# Patient Record
Sex: FEMALE | Employment: OTHER | URBAN - METROPOLITAN AREA
[De-identification: names, ages, dates, MRNs, and addresses within clinical notes are randomized per-mention and may not be internally consistent; named-entity substitution may affect disease eponyms.]

---

## 2018-07-02 NOTE — PERIOP NOTES
called Dr. Sparkle Boateng office LM for Sofía Baker regarding pt needing to see cardiology prior to surgery and to see if patient is cancelled.

## 2018-07-02 NOTE — PERIOP NOTES
Leonel Mathis NP reviewed chart,  patient needs to follow up with [facility] prior to surgery. called Dr. Christie Howard office spoke to [facility] regarding patient not following up with [facility] in 2 weeks per [facility] note on 5/10/18. Cancelled appt per [facility]. Stated she will let Consuelo Day know and she will get back to me.

## 2018-07-03 ENCOUNTER — ANESTHESIA EVENT (OUTPATIENT)
Dept: SURGERY | Age: 83
End: 2018-07-03
Payer: MEDICARE

## 2018-07-03 ENCOUNTER — ANESTHESIA (OUTPATIENT)
Dept: SURGERY | Age: 83
End: 2018-07-03
Payer: MEDICARE

## 2018-07-03 ENCOUNTER — HOSPITAL ENCOUNTER (OUTPATIENT)
Age: 83
Setting detail: OUTPATIENT SURGERY
Discharge: HOME | End: 2018-07-03
Attending: PODIATRIST | Admitting: PODIATRIST
Payer: MEDICARE

## 2018-07-03 VITALS
RESPIRATION RATE: 21 BRPM | HEIGHT: 63 IN | WEIGHT: 134.92 LBS | BODY MASS INDEX: 23.91 KG/M2 | OXYGEN SATURATION: 96 % | HEART RATE: 78 BPM | DIASTOLIC BLOOD PRESSURE: 85 MMHG | TEMPERATURE: 98.1 F | SYSTOLIC BLOOD PRESSURE: 150 MMHG

## 2018-07-03 DIAGNOSIS — G89.18 POST-OP PAIN: Primary | ICD-10-CM

## 2018-07-03 LAB
GLUCOSE BLD STRIP.AUTO-MCNC: 133 MG/DL (ref 65–100)
SERVICE CMNT-IMP: ABNORMAL

## 2018-07-03 PROCEDURE — 74011000250 HC RX REV CODE- 250

## 2018-07-03 PROCEDURE — 74011250637 HC RX REV CODE- 250/637

## 2018-07-03 PROCEDURE — 76210000006 HC OR PH I REC 0.5 TO 1 HR: Performed by: PODIATRIST

## 2018-07-03 PROCEDURE — 74011250636 HC RX REV CODE- 250/636: Performed by: ANESTHESIOLOGY

## 2018-07-03 PROCEDURE — 76010000149 HC OR TIME 1 TO 1.5 HR: Performed by: PODIATRIST

## 2018-07-03 PROCEDURE — 77030036687 HC SHOE PSTOP S2SG -A

## 2018-07-03 PROCEDURE — 77030011640 HC PAD GRND REM COVD -A: Performed by: PODIATRIST

## 2018-07-03 PROCEDURE — 74011250636 HC RX REV CODE- 250/636: Performed by: PODIATRIST

## 2018-07-03 PROCEDURE — 76060000033 HC ANESTHESIA 1 TO 1.5 HR: Performed by: PODIATRIST

## 2018-07-03 PROCEDURE — 74011000250 HC RX REV CODE- 250: Performed by: PODIATRIST

## 2018-07-03 PROCEDURE — 77030018836 HC SOL IRR NACL ICUM -A: Performed by: PODIATRIST

## 2018-07-03 PROCEDURE — 76210000021 HC REC RM PH II 0.5 TO 1 HR: Performed by: PODIATRIST

## 2018-07-03 PROCEDURE — 74011250636 HC RX REV CODE- 250/636

## 2018-07-03 PROCEDURE — 82962 GLUCOSE BLOOD TEST: CPT

## 2018-07-03 RX ORDER — ONDANSETRON 2 MG/ML
4 INJECTION INTRAMUSCULAR; INTRAVENOUS AS NEEDED
Status: DISCONTINUED | OUTPATIENT
Start: 2018-07-03 | End: 2018-07-03 | Stop reason: HOSPADM

## 2018-07-03 RX ORDER — CEFAZOLIN SODIUM/WATER 2 G/20 ML
2 SYRINGE (ML) INTRAVENOUS ONCE
Status: COMPLETED | OUTPATIENT
Start: 2018-07-03 | End: 2018-07-03

## 2018-07-03 RX ORDER — TRAMADOL HYDROCHLORIDE 50 MG/1
50 TABLET ORAL ONCE
Status: COMPLETED | OUTPATIENT
Start: 2018-07-03 | End: 2018-07-03

## 2018-07-03 RX ORDER — PROPOFOL 10 MG/ML
INJECTION, EMULSION INTRAVENOUS AS NEEDED
Status: DISCONTINUED | OUTPATIENT
Start: 2018-07-03 | End: 2018-07-03 | Stop reason: HOSPADM

## 2018-07-03 RX ORDER — MORPHINE SULFATE 10 MG/ML
2 INJECTION, SOLUTION INTRAMUSCULAR; INTRAVENOUS
Status: DISCONTINUED | OUTPATIENT
Start: 2018-07-03 | End: 2018-07-03 | Stop reason: HOSPADM

## 2018-07-03 RX ORDER — SODIUM CHLORIDE 0.9 % (FLUSH) 0.9 %
5-10 SYRINGE (ML) INJECTION AS NEEDED
Status: DISCONTINUED | OUTPATIENT
Start: 2018-07-03 | End: 2018-07-03 | Stop reason: HOSPADM

## 2018-07-03 RX ORDER — TRAMADOL HYDROCHLORIDE 50 MG/1
TABLET ORAL
Status: COMPLETED
Start: 2018-07-03 | End: 2018-07-03

## 2018-07-03 RX ORDER — PHENYLEPHRINE HCL IN 0.9% NACL 0.4MG/10ML
SYRINGE (ML) INTRAVENOUS AS NEEDED
Status: DISCONTINUED | OUTPATIENT
Start: 2018-07-03 | End: 2018-07-03 | Stop reason: HOSPADM

## 2018-07-03 RX ORDER — HYDROMORPHONE HYDROCHLORIDE 1 MG/ML
.2-.5 INJECTION, SOLUTION INTRAMUSCULAR; INTRAVENOUS; SUBCUTANEOUS
Status: DISCONTINUED | OUTPATIENT
Start: 2018-07-03 | End: 2018-07-03 | Stop reason: HOSPADM

## 2018-07-03 RX ORDER — PROPOFOL 10 MG/ML
INJECTION, EMULSION INTRAVENOUS
Status: DISCONTINUED | OUTPATIENT
Start: 2018-07-03 | End: 2018-07-03 | Stop reason: HOSPADM

## 2018-07-03 RX ORDER — SODIUM CHLORIDE, SODIUM LACTATE, POTASSIUM CHLORIDE, CALCIUM CHLORIDE 600; 310; 30; 20 MG/100ML; MG/100ML; MG/100ML; MG/100ML
25 INJECTION, SOLUTION INTRAVENOUS CONTINUOUS
Status: DISCONTINUED | OUTPATIENT
Start: 2018-07-03 | End: 2018-07-03 | Stop reason: HOSPADM

## 2018-07-03 RX ORDER — BACITRACIN 500 [USP'U]/G
OINTMENT TOPICAL AS NEEDED
Status: DISCONTINUED | OUTPATIENT
Start: 2018-07-03 | End: 2018-07-03 | Stop reason: HOSPADM

## 2018-07-03 RX ORDER — TRAMADOL HYDROCHLORIDE 50 MG/1
50 TABLET ORAL
Qty: 35 TAB | Refills: 0 | Status: SHIPPED | OUTPATIENT
Start: 2018-07-03 | End: 2018-11-14 | Stop reason: ALTCHOICE

## 2018-07-03 RX ORDER — FENTANYL CITRATE 50 UG/ML
INJECTION, SOLUTION INTRAMUSCULAR; INTRAVENOUS AS NEEDED
Status: DISCONTINUED | OUTPATIENT
Start: 2018-07-03 | End: 2018-07-03 | Stop reason: HOSPADM

## 2018-07-03 RX ORDER — BUPIVACAINE HYDROCHLORIDE AND EPINEPHRINE 2.5; 5 MG/ML; UG/ML
INJECTION, SOLUTION EPIDURAL; INFILTRATION; INTRACAUDAL; PERINEURAL AS NEEDED
Status: DISCONTINUED | OUTPATIENT
Start: 2018-07-03 | End: 2018-07-03 | Stop reason: HOSPADM

## 2018-07-03 RX ORDER — BUPIVACAINE HYDROCHLORIDE 5 MG/ML
INJECTION, SOLUTION EPIDURAL; INTRACAUDAL AS NEEDED
Status: DISCONTINUED | OUTPATIENT
Start: 2018-07-03 | End: 2018-07-03 | Stop reason: HOSPADM

## 2018-07-03 RX ORDER — LIDOCAINE HYDROCHLORIDE 20 MG/ML
INJECTION, SOLUTION EPIDURAL; INFILTRATION; INTRACAUDAL; PERINEURAL AS NEEDED
Status: DISCONTINUED | OUTPATIENT
Start: 2018-07-03 | End: 2018-07-03 | Stop reason: HOSPADM

## 2018-07-03 RX ORDER — EPHEDRINE SULFATE 50 MG/ML
INJECTION, SOLUTION INTRAVENOUS AS NEEDED
Status: DISCONTINUED | OUTPATIENT
Start: 2018-07-03 | End: 2018-07-03 | Stop reason: HOSPADM

## 2018-07-03 RX ORDER — FENTANYL CITRATE 50 UG/ML
25 INJECTION, SOLUTION INTRAMUSCULAR; INTRAVENOUS
Status: DISCONTINUED | OUTPATIENT
Start: 2018-07-03 | End: 2018-07-03 | Stop reason: HOSPADM

## 2018-07-03 RX ORDER — DIPHENHYDRAMINE HYDROCHLORIDE 50 MG/ML
12.5 INJECTION, SOLUTION INTRAMUSCULAR; INTRAVENOUS AS NEEDED
Status: DISCONTINUED | OUTPATIENT
Start: 2018-07-03 | End: 2018-07-03 | Stop reason: HOSPADM

## 2018-07-03 RX ORDER — SODIUM CHLORIDE 0.9 % (FLUSH) 0.9 %
5-10 SYRINGE (ML) INJECTION EVERY 8 HOURS
Status: DISCONTINUED | OUTPATIENT
Start: 2018-07-03 | End: 2018-07-03 | Stop reason: HOSPADM

## 2018-07-03 RX ADMIN — PROPOFOL 30 MG: 10 INJECTION, EMULSION INTRAVENOUS at 17:00

## 2018-07-03 RX ADMIN — TRAMADOL HYDROCHLORIDE 50 MG: 50 TABLET ORAL at 18:20

## 2018-07-03 RX ADMIN — PROPOFOL 30 MG: 10 INJECTION, EMULSION INTRAVENOUS at 17:04

## 2018-07-03 RX ADMIN — Medication 2 G: at 17:01

## 2018-07-03 RX ADMIN — FENTANYL CITRATE 50 MCG: 50 INJECTION, SOLUTION INTRAMUSCULAR; INTRAVENOUS at 17:15

## 2018-07-03 RX ADMIN — PROPOFOL 30 MG: 10 INJECTION, EMULSION INTRAVENOUS at 17:09

## 2018-07-03 RX ADMIN — Medication 80 MCG: at 17:08

## 2018-07-03 RX ADMIN — Medication 80 MCG: at 17:17

## 2018-07-03 RX ADMIN — Medication 80 MCG: at 17:13

## 2018-07-03 RX ADMIN — TRAMADOL HYDROCHLORIDE 50 MG: 50 TABLET, FILM COATED ORAL at 18:20

## 2018-07-03 RX ADMIN — FENTANYL CITRATE 25 MCG: 50 INJECTION, SOLUTION INTRAMUSCULAR; INTRAVENOUS at 17:00

## 2018-07-03 RX ADMIN — EPHEDRINE SULFATE 10 MG: 50 INJECTION, SOLUTION INTRAVENOUS at 17:19

## 2018-07-03 RX ADMIN — PROPOFOL 50 MCG/KG/MIN: 10 INJECTION, EMULSION INTRAVENOUS at 17:01

## 2018-07-03 RX ADMIN — LIDOCAINE HYDROCHLORIDE 80 MG: 20 INJECTION, SOLUTION EPIDURAL; INFILTRATION; INTRACAUDAL; PERINEURAL at 17:00

## 2018-07-03 RX ADMIN — SODIUM CHLORIDE, SODIUM LACTATE, POTASSIUM CHLORIDE, AND CALCIUM CHLORIDE 25 ML/HR: 600; 310; 30; 20 INJECTION, SOLUTION INTRAVENOUS at 15:02

## 2018-07-03 RX ADMIN — FENTANYL CITRATE 25 MCG: 50 INJECTION, SOLUTION INTRAMUSCULAR; INTRAVENOUS at 17:07

## 2018-07-03 RX ADMIN — Medication 80 MCG: at 17:34

## 2018-07-03 NOTE — PERIOP NOTES
Liquid Phenol/alcohol used on bilateral feet (9 toes)      Patient: Piotr Daigle MRN: [id number]  SSN: [id number]   YOB: 1935  Age: 80 y.o. Sex: female     Patient is status post Procedure(s): PHENOL AND ALCOHOL TOES 1,2,3,4 RIGHT FOOT, PHENOL AND ALCOHOL TOES 1-5 LEFT FOOT . Surgeon(s) and Role:     * Ayanna Bernardo DPM - Primary     * Sparkle Lozada MD - Assisting    Local/Dose/Irrigation:  SEE MAR                  Peripheral IV 07/03/18 Right Forearm (Active)   Site Assessment Clean, dry, & intact 7/3/2018  3:00 PM   Phlebitis Assessment 0 7/3/2018  3:00 PM   Infiltration Assessment 0 7/3/2018  3:00 PM   Dressing Status Clean, dry, & intact 7/3/2018  3:00 PM   Dressing Type Tape;Transparent 7/3/2018  3:00 PM   Hub Color/Line Status Pink; Infusing 7/3/2018  3:00 PM                           Dressing/Packing:  Wound Foot Right-DRESSING TYPE: Xeroform;Gauze (07/03/18 1731)  Wound Foot Left-DRESSING TYPE: Xeroform;Gauze (07/03/18 1731)  Splint/Cast:  ]    Other:  none

## 2018-07-03 NOTE — ANESTHESIA PREPROCEDURE EVALUATION
Anesthetic History   No history of anesthetic complications            Review of Systems / Medical History  Patient summary reviewed, nursing notes reviewed and pertinent labs reviewed    Pulmonary    COPD (stable symptoms)               Neuro/Psych         Psychiatric history ( anxiety/ depression)    Comments: Essential tremor Cardiovascular    Hypertension        Dysrhythmias (palpitations)       Exercise tolerance: >4 METS  Comments: Negative Holter   GI/Hepatic/Renal     GERD: well controlled      PUD     Endo/Other        Arthritis     Other Findings            Physical Exam    Airway  Mallampati: II  TM Distance: 4 - 6 cm    Mouth opening: Normal     Cardiovascular    Rhythm: regular  Rate: normal      Pertinent negatives: No murmur   Dental    Dentition: Full upper dentures     Pulmonary              Pertinent negatives:  No wheezes ( fine, expiratory)   Abdominal  GI exam deferred       Other Findings            Anesthetic Plan    ASA: 3  Anesthesia type: MAC          Induction: Intravenous  Anesthetic plan and risks discussed with: Patient      MAC

## 2018-07-03 NOTE — DISCHARGE INSTRUCTIONS
INSTRUCTIONS FOLLOWING FOOT SURGERY    ACTIVITY:  · Elevate feet for 48 hours  · Use ice as directed by your doctor  · Use crutches / walker as directed by your doctor, and follow your doctors instructions as to your weight bearing status  - you can bear weight    DIET:  · Clear liquids until no nausea or vomiting; then light diet for the first day  · An advance to regular diet on second day, unless your doctor orders otherwise. PAIN:  · Take pain medication as directed by your doctor. · Call your doctor if pain is not relieved by medication. · Do not take aspirin or blood thinners until directed by your doctor. DRESSING CARE: keep dressing clean and dry, do not remove       FOLLOW-UP PHONE CALLS:  · Calls will be made by nursing staff. · If you had any problems, call your doctor as needed. CALL YOUR DOCTOR IF:  · Excessive bleeding that does not stop after holding mild pressure over the area  · Temperature of 101° F or above  · Redness, excessive swelling or bruising, and/or green or yellow, smelly discharge from incision  · Loss of sensation -cold, white, or blue toes    AFTER ANESTHESIA :   · For the first 24 hours: do not drive, drink alcoholic beverages, or make important decisions. · Be aware of dizziness following anesthesia and while taking pain medication. DISCHARGE MEDICATIONS:         APPOINTMENT DATE/TIME: please call for an appointment    YOUR DOCTORS PHONE NUMBER: [phone]    Patients signature:  Date: 7/3/2018  Discharging Nurse:      Tramadol/Acetaminophen (Ultracet) - (By mouth)   Why this medicine is used:   Relieves pain.   Contact a nurse or doctor right away if you have:  · Extreme weakness, shallow breathing, fast or slow heartbeat  · Dark urine or pale stools, loss of appetite, stomach pain  · Yellow skin or eyes  · Seizures, seeing or hearing things that are not there  · Anxiety, restlessness, fever, sweating, muscle spasms, diarrhea     Common side effects:  · Nausea, vomiting, constipation  · Headache, drowsiness  · Itching, feeling of warmth, redness of the face, neck, arms, and upper chest  © 2017 [address] [employer] Information is for End User's use only and may not be sold, redistributed or otherwise used for commercial purposes.

## 2018-07-03 NOTE — BRIEF OP NOTE
BRIEF OPERATIVE NOTE    Date of Procedure: 7/3/2018   Preoperative Diagnosis: INGROWN TOENAIL BILATERAL  Postoperative Diagnosis: INGROWN TOENAIL BILATERAL    Procedure(s):   PHENOL AND ALCOHOL TOES 1,2,3,4 RIGHT FOOT, PHENOL AND ALCOHOL TOES 1-5 LEFT FOOT   Surgeon(s) and Role:     * Azra Mancia DPM - Primary     * Shayla Junior DPM - Assisting         Surgical Assistant: none    Surgical Staff:  Circ-1: Keith Montano RN  Circ-2: Timothy Rodriguez RN  Scrub Tech-1: [facility]  Event Time In   Incision Start 1709   Incision Close 1745     Anesthesia: MAC   Estimated Blood Loss: 5cc  Specimens: * No specimens in log *   Findings: ingrown nails   Complications: none  Implants: * No implants in log *

## 2018-07-03 NOTE — IP AVS SNAPSHOT
[address] [facility] 
[phone] Patient: Sofy Bennett MRN: [id number] A check nohelia indicates which time of day the medication should be taken. My Medications CHANGE how you take these medications Instructions Each Dose to Equal  
 Morning Noon Evening Bedtime * traMADol 50 mg tablet Commonly known as:  ULTRAM  
What changed:  Another medication with the same name was added. Make sure you understand how and when to take each. Your last dose was: Your next dose is: Take 50 mg by mouth every six (6) hours as needed for Pain. 50 mg  
    
   
   
   
  
 * traMADol 50 mg tablet Commonly known as:  ULTRAM  
What changed: You were already taking a medication with the same name, and this prescription was added. Make sure you understand how and when to take each. Your last dose was: Your next dose is: Take 1 Tab by mouth every six (6) hours as needed for Pain. Max Daily Amount: 200 mg. Indications: Pain 50 mg  
    
   
   
   
  
 * Notice: This list has 2 medication(s) that are the same as other medications prescribed for you. Read the directions carefully, and ask your doctor or other care provider to review them with you. CONTINUE taking these medications Instructions Each Dose to Equal  
 Morning Noon Evening Bedtime ALDACTONE 25 mg tablet Generic drug:  spironolactone Your last dose was: Your next dose is: Take 25 mg by mouth daily. 25 mg  
    
   
   
   
  
 metoprolol succinate 50 mg XL tablet Commonly known as:  TOPROL-XL Your last dose was: Your next dose is: Take 50 mg by mouth nightly. Patient takes @ lunchtime 50 mg  
    
   
   
   
  
 multivitamin tablet Commonly known as:  ONE A DAY Your last dose was: Your next dose is: Take 1 Tab by mouth daily. 1 Tab Oxygen Your last dose was: Your next dose is:    
   
   
 3 Devices by Nasal route nightly. Indications: 3L through night 3 Device  
    
   
   
   
  
 pramipexole 0.5 mg tablet Commonly known as:  MIRAPEX Your last dose was: Your next dose is: Take 0.5 mg by mouth once. 0.5 mg  
    
   
   
   
  
 predniSONE 20 mg tablet Commonly known as:  Holly Rodriguez Your last dose was: Your next dose is: Take 3 tablets (60mg) daily for 4 days, then take 2 (40mg) tablets daily for 4 days, then take 1 (20mg) tablet daily until you see Dr Rachelle Junior  
     
   
   
   
  
 quinapril 20 mg tablet Commonly known as:  ACCUPRIL Your last dose was: Your next dose is: Take 40 mg by mouth two (2) times a day. 40 mg  
    
   
   
   
  
 SPIRIVA WITH HANDIHALER 18 mcg inhalation capsule Generic drug:  tiotropium Your last dose was: Your next dose is: Take 1 Cap by inhalation daily. 1 Cap Where to Get Your Medications Information on where to get these meds will be given to you by the nurse or doctor. ! Ask your nurse or doctor about these medications  
  traMADol 50 mg tablet

## 2018-07-03 NOTE — ANESTHESIA POSTPROCEDURE EVALUATION
Post-Anesthesia Evaluation and Assessment    Patient: Ambrose Ribera MRN: [id number]  SSN: [id number]    YOB: 1935  Age: 80 y.o. Sex: female       Cardiovascular Function/Vital Signs  Visit Vitals    /78 (BP 1 Location: Left arm, BP Patient Position: At rest)    Pulse 70    Temp 36.7 °C (98 °F)    Resp 21    Ht 5' 3\" (1.6 m)    Wt 61.2 kg (134 lb 14.7 oz)    SpO2 100%    BMI 23.9 kg/m2       Patient is status post MAC anesthesia for Procedure(s): PHENOL AND ALCOHOL TOES 1,2,3,4 RIGHT FOOT, PHENOL AND ALCOHOL TOES 1-5 LEFT FOOT . Nausea/Vomiting: None    Postoperative hydration reviewed and adequate. Pain:  Pain Scale 1: Numeric (0 - 10) (07/03/18 1830)  Pain Intensity 1: 0 (07/03/18 1830)   Managed    Neurological Status:   Neuro (WDL): Exceptions to WDL (07/03/18 1830)  Neuro  Neurologic State: Alert;Eyes open spontaneously (07/03/18 1830)  Orientation Level: Oriented to person;Oriented to place;Oriented to situation (07/03/18 1830)  Cognition: Follows commands (07/03/18 1830)  Speech: Clear (07/03/18 1830)  Assessment L Pupil: Deferred (07/03/18 1445)  Assessment R Pupil: Deferred (07/03/18 1445)  LUE Motor Response: Purposeful (07/03/18 1830)  LLE Motor Response: Purposeful (07/03/18 1830)  RUE Motor Response: Purposeful (07/03/18 1830)  RLE Motor Response: Purposeful (07/03/18 1830)   At baseline    Mental Status and Level of Consciousness: Arousable    Pulmonary Status:   O2 Device: Room air (07/03/18 1830)   Adequate oxygenation and airway patent    Complications related to anesthesia: None    Post-anesthesia assessment completed.  No concerns    Signed By: Soham Gardner MD     July 3, 2018

## 2018-07-03 NOTE — ROUTINE PROCESS
TRANSFER - IN REPORT:    Verbal report received from PAUL Junior RN(name) on Estephania Alcaraz  being received from OR(unit) for routine post - op      Report consisted of patients Situation, Background, Assessment and   Recommendations(SBAR). Information from the following report(s) OR Summary was reviewed with the receiving nurse. Opportunity for questions and clarification was provided. Assessment completed upon patients arrival to unit and care assumed.

## 2018-07-03 NOTE — IP AVS SNAPSHOT
[address] [facility] 
[phone] Patient: Fady Valdivia MRN: [id number] MGX:4/9/5249 About your hospitalization You were admitted on:  July 3, 2018 You last received care in the:  [facility] PACU You were discharged on:  July 3, 2018 Why you were hospitalized Your primary diagnosis was:  Not on File Follow-up Information Follow up With Details Comments Contact [place]   [address] [address] 
[phone] Discharge Orders None A check nohelia indicates which time of day the medication should be taken. My Medications CHANGE how you take these medications Instructions Each Dose to Equal  
 Morning Noon Evening Bedtime * traMADol 50 mg tablet Commonly known as:  ULTRAM  
What changed:  Another medication with the same name was added. Make sure you understand how and when to take each. Your last dose was: Your next dose is: Take 50 mg by mouth every six (6) hours as needed for Pain. 50 mg  
    
   
   
   
  
 * traMADol 50 mg tablet Commonly known as:  ULTRAM  
What changed: You were already taking a medication with the same name, and this prescription was added. Make sure you understand how and when to take each. Your last dose was: Your next dose is: Take 1 Tab by mouth every six (6) hours as needed for Pain. Max Daily Amount: 200 mg. Indications: Pain 50 mg  
    
   
   
   
  
 * Notice: This list has 2 medication(s) that are the same as other medications prescribed for you. Read the directions carefully, and ask your doctor or other care provider to review them with you. CONTINUE taking these medications Instructions Each Dose to Equal  
 Morning Noon Evening Bedtime ALDACTONE 25 mg tablet Generic drug:  spironolactone Your last dose was: Your next dose is: Take 25 mg by mouth daily. 25 mg  
    
   
   
   
  
 metoprolol succinate 50 mg XL tablet Commonly known as:  TOPROL-XL Your last dose was: Your next dose is: Take 50 mg by mouth nightly. Patient takes @ lunchtime 50 mg  
    
   
   
   
  
 multivitamin tablet Commonly known as:  ONE A DAY Your last dose was: Your next dose is: Take 1 Tab by mouth daily. 1 Tab Oxygen Your last dose was: Your next dose is:    
   
   
 3 Devices by Nasal route nightly. Indications: 3L through night 3 Device  
    
   
   
   
  
 pramipexole 0.5 mg tablet Commonly known as:  MIRAPEX Your last dose was: Your next dose is: Take 0.5 mg by mouth once. 0.5 mg  
    
   
   
   
  
 predniSONE 20 mg tablet Commonly known as:  Iqrahudson Marti Your last dose was: Your next dose is: Take 3 tablets (60mg) daily for 4 days, then take 2 (40mg) tablets daily for 4 days, then take 1 (20mg) tablet daily until you see Dr Armando Gardner  
     
   
   
   
  
 quinapril 20 mg tablet Commonly known as:  ACCUPRIL Your last dose was: Your next dose is: Take 40 mg by mouth two (2) times a day. 40 mg  
    
   
   
   
  
 SPIRIVA WITH HANDIHALER 18 mcg inhalation capsule Generic drug:  tiotropium Your last dose was: Your next dose is: Take 1 Cap by inhalation daily. 1 Cap Where to Get Your Medications Information on where to get these meds will be given to you by the nurse or doctor. ! Ask your nurse or doctor about these medications  
  traMADol 50 mg tablet Opioid Education  Prescription Opioids: What You Need to Know: 
 
 
ACTIVITY: 
· Elevate feet for 48 hours · Use ice as directed by your doctor · Use crutches / walker as directed by your doctor, and follow your doctors instructions as to your weight bearing status  - you can bear weight DIET: 
· Clear liquids until no nausea or vomiting; then light diet for the first day · An advance to regular diet on second day, unless your doctor orders otherwise. PAIN: 
· Take pain medication as directed by your doctor. · Call your doctor if pain is not relieved by medication. · Do not take aspirin or blood thinners until directed by your doctor. DRESSING CARE: keep dressing clean and dry, do not remove FOLLOW-UP PHONE CALLS: 
· Calls will be made by nursing staff. · If you had any problems, call your doctor as needed. CALL YOUR DOCTOR IF: 
· Excessive bleeding that does not stop after holding mild pressure over the area · Temperature of 101° F or above · Redness, excessive swelling or bruising, and/or green or yellow, smelly discharge from incision · Loss of sensation cold, white, or blue toes AFTER ANESTHESIA :  
· For the first 24 hours: do not drive, drink alcoholic beverages, or make important decisions. · Be aware of dizziness following anesthesia and while taking pain medication. DISCHARGE MEDICATIONS:  
   
 
APPOINTMENT DATE/TIME: please call for an appointment YOUR DOCTORS PHONE NUMBER: [phone] Patients signature: 
Date: 7/3/2018 Discharging Nurse:  
 
 Tramadol/Acetaminophen (Ultracet) - (By mouth) Why this medicine is used:  
Relieves pain. Contact a nurse or doctor right away if you have: 
· Extreme weakness, shallow breathing, fast or slow heartbeat · Dark urine or pale stools, loss of appetite, stomach pain · Yellow skin or eyes · Seizures, seeing or hearing things that are not there · Anxiety, restlessness, fever, sweating, muscle spasms, diarrhea Common side effects: 
· Nausea, vomiting, constipation · Headache, drowsiness · Itching, feeling of warmth, redness of the face, neck, arms, and upper chest 
© 2017 [address] [employer] Information is for End User's use only and may not be sold, redistributed or otherwise used for commercial purposes. Introducing [facility]! Morris Andrews introduces makerist patient portal. Now you can access parts of your medical record, email your doctor's office, and request medication refills online. 1. In your internet browser, go to [email or web address] 2. Click on the First Time User? Click Here link in the Sign In box. You will see the New Member Sign Up page. 3. Enter your makerist Access Code exactly as it appears below. You will not need to use this code after youve completed the sign-up process. If you do not sign up before the expiration date, you must request a new code. · makerist Access Code: [email or web address] Expires: 8/23/2018  6:22 AM 
 
4. Enter the last four digits of your Social Security Number (xxxx) and Date of Birth (mm/dd/yyyy) as indicated and click Submit. You will be taken to the next sign-up page. 5. Create a makerist ID. This will be your makerist login ID and cannot be changed, so think of one that is secure and easy to remember. 6. Create a makerist password. You can change your password at any time. 7. Enter your Password Reset Question and Answer. This can be used at a later time if you forget your password. 8. Enter your e-mail address. You will receive e-mail notification when new information is available in [address] 9. Click Sign Up. You can now view and download portions of your medical record. 10. Click the Download Summary menu link to download a portable copy of your medical information. If you have questions, please visit the Frequently Asked Questions section of the [employer] website. Remember, [employer] is NOT to be used for urgent needs. For medical emergencies, dial 911. Now available from your iPhone and Android! Introducing [employer] As a Lucie Robbin patient, I wanted to make you aware of our electronic visit tool called [employer] [facility]. [employer] 24/7 allows you to connect within minutes with a medical provider 24 hours a day, seven days a week via a mobile device or tablet or logging into a secure website from your computer. You can access Jose Solis from anywhere in the [place]. A virtual visit might be right for you when you have a simple condition and feel like you just dont want to get out of bed, or cant get away from work for an appointment, when your regular Lucie Siegel provider is not available (evenings, weekends or holidays), or when youre out of town and need minor care. Electronic visits cost only $49 and if the [facility] 24/7 provider determines a prescription is needed to treat your condition, one can be electronically transmitted to a nearby pharmacy*. Please take a moment to enroll today if you have not already done so. The enrollment process is free and takes just a few minutes. To enroll, please download the [employer]ippable 24/[employer] charly to your tablet or phone, or visit [email or web address] to enroll on your computer. And, as an [address] [place] [address] patient with a [employer]Fly account, the results of your visits will be scanned into your electronic medical record and your primary care provider will be able to view the scanned results. We urge you to continue to see your regular Lucie Siegel provider for your ongoing medical care.   And while your primary care provider may not be the one available when you seek a [employer] Solis virtual visit, the peace of mind you get from getting a real diagnosis real time can be priceless. For more information on [employer] Solis, view our Frequently Asked Questions (FAQs) at [email or web address]. Sincerely, 
 
Dejah Lord MD 
Chief Medical Officer [employer] *:  certain medications cannot be prescribed via Jose Solis Providers Seen During Your Hospitalization Provider Specialty Primary office phone [facility]  [phone] Your Primary Care Physician (PCP) Primary Care Physician Office Phone Office Fax Blanca Martinez [phone] You are allergic to the following Allergen Reactions Adhesive Tape-Silicones Other (comments) Unsure Codeine Rash Patient reports no reaction to Percocet. Ivp Dye (Fd And C Blue No.1) Rash Tylenol (Acetaminophen) Nausea and Vomiting Patient reports no reaction to Percocet Recent Documentation Height Weight BMI OB Status Smoking Status 1.6 m 61.2 kg 23.9 kg/m2 Hysterectomy Former Smoker Emergency Contacts Name Discharge Info Relation Home Work Mobile Eufemia Hdz  Child [2] [phone] Patient Belongings The following personal items are in your possession at time of discharge: 
  Dental Appliances: None  Visual Aid: Glasses (glasses to recovery room )      Home Medications: None   Jewelry: None  Clothing: Pants, Shirt, Undergarments, Footwear    Other Valuables: Purse (purse to daughter ) Please provide this summary of care documentation to your next provider. Signatures-by signing, you are acknowledging that this After Visit Summary has been reviewed with you and you have received a copy. Patient Signature:  ____________________________________________________________  Date:  ____________________________________________________________  
  
[facility]    
    
 Provider Signature:  ____________________________________________________________ Date:  ____________________________________________________________

## 2018-07-04 NOTE — OP NOTES
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 PROCEDURE PERFORMED:  Phenol and alcohol procedures toes 1, 2, 3, 4 and 5 right, and toes 1, 2, 3 and 4 left. SURGEON:  TODD Saez DPm - resident    ASSISTANT:  none     ANESTHESIA:  Monitored anesthesia care. ESTIMATED BLOOD LOSS:  5 mL. COMPLICATIONS:  None. PATHOLOGY:  None. SPECIMENS REMOVED:  none     IMPLANTS:  none     INDICATIONS:  The patient has chronic ingrown nails of her toenails. She complains of pain and has been asking to have them removed permanently. We discussed the surgery, the risks, the complications. She has been cleared by Cardiology and Vascular Surgery. We have explained the nature of the procedure and the risks and she is aware and agreeable. PROCEDURE IN DETAIL:  She was taken to the operating room, placed on the operating table in supine position. After adequate induction of intravenous sedation and the patient blocked locally, infiltrated with 0.5% Marcaine with epinephrine at the posterior nail fold and then Marcaine plain around the toe. At this point, we used a Putnam elevator to remove all of the toenails. Then, we did phenol and then alcohol to the toenails and placed antibiotic ointment around the toenails to protect the surrounding skin from the phenol. After application of phenol we applied alcohol toe the toes After I was done with the phenol and alcohol procedure, I then used the rest of the antibiotic ointment on the nail beds followed by Xeroform and then dry sterile dressing. She left the operating room to the recovery room in stable condition.       BIBI MEADE DPM       LR / PN  D: 07/03/2018 18:37     T: 07/03/2018 21:43  JOB #: [id number]

## 2018-11-14 ENCOUNTER — HOSPITAL ENCOUNTER (EMERGENCY)
Age: 83
Discharge: HOME | End: 2018-11-14
Attending: EMERGENCY MEDICINE
Payer: MEDICARE

## 2018-11-14 ENCOUNTER — APPOINTMENT (OUTPATIENT)
Dept: GENERAL RADIOLOGY | Age: 83
End: 2018-11-14
Attending: PHYSICIAN ASSISTANT
Payer: MEDICARE

## 2018-11-14 ENCOUNTER — APPOINTMENT (OUTPATIENT)
Dept: ULTRASOUND IMAGING | Age: 83
End: 2018-11-14
Attending: EMERGENCY MEDICINE
Payer: MEDICARE

## 2018-11-14 VITALS
OXYGEN SATURATION: 94 % | BODY MASS INDEX: 23.04 KG/M2 | WEIGHT: 130 LBS | DIASTOLIC BLOOD PRESSURE: 91 MMHG | HEIGHT: 63 IN | RESPIRATION RATE: 16 BRPM | TEMPERATURE: 97.3 F | SYSTOLIC BLOOD PRESSURE: 164 MMHG | HEART RATE: 73 BPM

## 2018-11-14 DIAGNOSIS — S32.000A LUMBAR COMPRESSION FRACTURE, CLOSED, INITIAL ENCOUNTER (HCC): ICD-10-CM

## 2018-11-14 DIAGNOSIS — I82.401 LEG DVT (DEEP VENOUS THROMBOEMBOLISM), ACUTE, RIGHT (HCC): Primary | ICD-10-CM

## 2018-11-14 LAB
ALBUMIN SERPL-MCNC: 3.4 G/DL (ref 3.5–5)
ALBUMIN/GLOB SERPL: 1.1 {RATIO} (ref 1.1–2.2)
ALP SERPL-CCNC: 97 U/L (ref 45–117)
ALT SERPL-CCNC: 13 U/L (ref 12–78)
ANION GAP SERPL CALC-SCNC: 4 MMOL/L (ref 5–15)
APPEARANCE UR: CLEAR
AST SERPL-CCNC: 9 U/L (ref 15–37)
BACTERIA URNS QL MICRO: NEGATIVE /HPF
BASOPHILS # BLD: 0 K/UL (ref 0–0.1)
BASOPHILS NFR BLD: 0 % (ref 0–1)
BILIRUB SERPL-MCNC: 0.3 MG/DL (ref 0.2–1)
BILIRUB UR QL: NEGATIVE
BUN SERPL-MCNC: 21 MG/DL (ref 6–20)
BUN/CREAT SERPL: 30 (ref 12–20)
CALCIUM SERPL-MCNC: 9.5 MG/DL (ref 8.5–10.1)
CHLORIDE SERPL-SCNC: 103 MMOL/L (ref 97–108)
CO2 SERPL-SCNC: 34 MMOL/L (ref 21–32)
COLOR UR: ABNORMAL
CREAT SERPL-MCNC: 0.71 MG/DL (ref 0.55–1.02)
DIFFERENTIAL METHOD BLD: NORMAL
EOSINOPHIL # BLD: 0.1 K/UL (ref 0–0.4)
EOSINOPHIL NFR BLD: 1 % (ref 0–7)
EPITH CASTS URNS QL MICRO: ABNORMAL /LPF
ERYTHROCYTE [DISTWIDTH] IN BLOOD BY AUTOMATED COUNT: 13 % (ref 11.5–14.5)
GLOBULIN SER CALC-MCNC: 3.1 G/DL (ref 2–4)
GLUCOSE SERPL-MCNC: 123 MG/DL (ref 65–100)
GLUCOSE UR STRIP.AUTO-MCNC: NEGATIVE MG/DL
HCT VFR BLD AUTO: 42 % (ref 35–47)
HGB BLD-MCNC: 13.1 G/DL (ref 11.5–16)
HGB UR QL STRIP: NEGATIVE
HYALINE CASTS URNS QL MICRO: ABNORMAL /LPF (ref 0–5)
IMM GRANULOCYTES # BLD: 0 K/UL (ref 0–0.04)
IMM GRANULOCYTES NFR BLD AUTO: 0 % (ref 0–0.5)
KETONES UR QL STRIP.AUTO: NEGATIVE MG/DL
LEUKOCYTE ESTERASE UR QL STRIP.AUTO: ABNORMAL
LIPASE SERPL-CCNC: 150 U/L (ref 73–393)
LYMPHOCYTES # BLD: 1.5 K/UL (ref 0.8–3.5)
LYMPHOCYTES NFR BLD: 17 % (ref 12–49)
MCH RBC QN AUTO: 28.5 PG (ref 26–34)
MCHC RBC AUTO-ENTMCNC: 31.2 G/DL (ref 30–36.5)
MCV RBC AUTO: 91.3 FL (ref 80–99)
MONOCYTES # BLD: 0.6 K/UL (ref 0–1)
MONOCYTES NFR BLD: 7 % (ref 5–13)
NEUTS SEG # BLD: 6.5 K/UL (ref 1.8–8)
NEUTS SEG NFR BLD: 75 % (ref 32–75)
NITRITE UR QL STRIP.AUTO: NEGATIVE
NRBC # BLD: 0 K/UL (ref 0–0.01)
NRBC BLD-RTO: 0 PER 100 WBC
PH UR STRIP: 7 [PH] (ref 5–8)
PLATELET # BLD AUTO: 225 K/UL (ref 150–400)
PMV BLD AUTO: 10.4 FL (ref 8.9–12.9)
POTASSIUM SERPL-SCNC: 4.5 MMOL/L (ref 3.5–5.1)
PROT SERPL-MCNC: 6.5 G/DL (ref 6.4–8.2)
PROT UR STRIP-MCNC: NEGATIVE MG/DL
RBC # BLD AUTO: 4.6 M/UL (ref 3.8–5.2)
RBC #/AREA URNS HPF: ABNORMAL /HPF (ref 0–5)
SODIUM SERPL-SCNC: 141 MMOL/L (ref 136–145)
SP GR UR REFRACTOMETRY: 1.02 (ref 1–1.03)
UA: UC IF INDICATED,UAUC: ABNORMAL
UROBILINOGEN UR QL STRIP.AUTO: 0.2 EU/DL (ref 0.2–1)
WBC # BLD AUTO: 8.7 K/UL (ref 3.6–11)
WBC URNS QL MICRO: ABNORMAL /HPF (ref 0–4)

## 2018-11-14 PROCEDURE — 80053 COMPREHEN METABOLIC PANEL: CPT

## 2018-11-14 PROCEDURE — 83690 ASSAY OF LIPASE: CPT

## 2018-11-14 PROCEDURE — 93971 EXTREMITY STUDY: CPT

## 2018-11-14 PROCEDURE — 99284 EMERGENCY DEPT VISIT MOD MDM: CPT

## 2018-11-14 PROCEDURE — 72100 X-RAY EXAM L-S SPINE 2/3 VWS: CPT

## 2018-11-14 PROCEDURE — 36415 COLL VENOUS BLD VENIPUNCTURE: CPT

## 2018-11-14 PROCEDURE — 81001 URINALYSIS AUTO W/SCOPE: CPT

## 2018-11-14 PROCEDURE — 73521 X-RAY EXAM HIPS BI 2 VIEWS: CPT

## 2018-11-14 PROCEDURE — 74011250637 HC RX REV CODE- 250/637: Performed by: EMERGENCY MEDICINE

## 2018-11-14 PROCEDURE — 85025 COMPLETE CBC W/AUTO DIFF WBC: CPT

## 2018-11-14 PROCEDURE — 74011250637 HC RX REV CODE- 250/637: Performed by: PHYSICIAN ASSISTANT

## 2018-11-14 RX ORDER — OXYCODONE AND ACETAMINOPHEN 5; 325 MG/1; MG/1
1 TABLET ORAL
Qty: 10 TAB | Refills: 0 | Status: ON HOLD | OUTPATIENT
Start: 2018-11-14 | End: 2019-12-18

## 2018-11-14 RX ORDER — METHOCARBAMOL 750 MG/1
750 TABLET, FILM COATED ORAL
Qty: 20 TAB | Refills: 0 | Status: ON HOLD | OUTPATIENT
Start: 2018-11-14 | End: 2019-12-18

## 2018-11-14 RX ORDER — METHOCARBAMOL 750 MG/1
750 TABLET, FILM COATED ORAL
Status: COMPLETED | OUTPATIENT
Start: 2018-11-14 | End: 2018-11-14

## 2018-11-14 RX ORDER — OXYCODONE AND ACETAMINOPHEN 5; 325 MG/1; MG/1
1 TABLET ORAL
Status: COMPLETED | OUTPATIENT
Start: 2018-11-14 | End: 2018-11-14

## 2018-11-14 RX ADMIN — OXYCODONE AND ACETAMINOPHEN 1 TABLET: 5; 325 TABLET ORAL at 13:07

## 2018-11-14 RX ADMIN — METHOCARBAMOL 750 MG: 750 TABLET ORAL at 16:31

## 2018-11-14 RX ADMIN — APIXABAN 10 MG: 5 TABLET, FILM COATED ORAL at 17:08

## 2018-11-14 NOTE — ED NOTES
Patient presents with low back pain and right hip pain s/p fall last week when she fell against a wall. No head injury or LOC. Multiple prior back surgeries. I have evaluated the patient as the Provider in Triage. I have reviewed Her  vital signs and the triage nurse assessment. I have talked with the patient and any available family and advised that I am the provider in triage and have ordered the appropriate study to initiate their work up based on the clinical presentation during my assessment. I have advised that the patient will be accommodated in the Main ED as soon as possible. I have also requested to contact the triage nurse or myself immediately if the patient experiences any changes in their condition during this brief waiting period.  
Emery Short PA-C

## 2018-11-14 NOTE — PROGRESS NOTES
Physical Therapy Received ED PT consult on this pt who comes to the ED having had a fall a week ago. Dtr has been encouraging her to come to ED but has only been able to convince her today. Pt fell while trying to lift an object. She lives with her dtr (who works from home) and states she uses a cane sometimes but does not like to use a device. She states she gets into the tub to bathe. She owns a cane, w/c, and a rollator. Pt found to have a DVT and compression fxs. Pt in her w/c and  very anxious to go home from ED. Educated pt in home safety and emphasized need to prevent further falls. Encouraged use of rollator during time of recovery for more support. Reviewed back safety and proper log roll technique for bed mobility. Handouts provided for all. Dtr present for education. Educated in access to [address] through PCP should activity not be returning to normal and also post recommendations from Ortho f/u. Dtr voiced understanding and support for all recommendations but endorses that pt is fiercely independent and does not always follow through. Pt voices understanding as well and reluctantly agrees. No other current PT needs in the ED.  
 
Benoit Chowdhury, PT

## 2018-11-14 NOTE — PROGRESS NOTES
CM consulted re: Eliquis free 30 day card. CM met with pt and family, introduced self, role. Pt verbalized understanding. Pt stated she has previously been on Eliquis, but was receiving samples through the cardiologist office and has not utilized a free 30 day card prior to coming to the ED. CM gave pt the free 30 day card with education on how to utilize the card. Pt verbalized understanding. Pt also requested a new PCP list. CM gave pt a list of current [facility] providers. No further questions or needs identified at this time. CM to continue to remain available for support, discharge planning as needed. Alec Redman, MSW [facility] [phone]

## 2018-11-14 NOTE — ED TRIAGE NOTES
Pt reports she lifted a machine up and fell back against wall, reports lower back pain radiating down right leg
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
## 2018-11-28 ENCOUNTER — HOSPITAL ENCOUNTER (OUTPATIENT)
Dept: MRI IMAGING | Age: 83
Discharge: HOME | End: 2018-11-28
Attending: ORTHOPAEDIC SURGERY
Payer: MEDICARE

## 2018-11-28 DIAGNOSIS — S32.020A COMPRESSION FRACTURE OF L2 LUMBAR VERTEBRA, CLOSED, INITIAL ENCOUNTER (HCC): ICD-10-CM

## 2018-11-28 DIAGNOSIS — S32.010A COMPRESSION FRACTURE OF L1 LUMBAR VERTEBRA, CLOSED, INITIAL ENCOUNTER (HCC): ICD-10-CM

## 2018-11-28 PROCEDURE — 72148 MRI LUMBAR SPINE W/O DYE: CPT

## 2018-12-17 ENCOUNTER — HOSPITAL ENCOUNTER (OUTPATIENT)
Dept: GENERAL RADIOLOGY | Age: 83
Discharge: HOME | End: 2018-12-17
Payer: MEDICARE

## 2018-12-17 DIAGNOSIS — R52 PAIN: ICD-10-CM

## 2018-12-17 PROCEDURE — 71046 X-RAY EXAM CHEST 2 VIEWS: CPT

## 2019-01-21 NOTE — PERIOP NOTES
[facility] Ambulatory Surgery Unit Pre-operative Instructions Procedure Date  1/22/19           Tentative Arrival Time  1. On the day of your procedure, please report to the Ambulatory Surgery Unit Registration Desk and sign in at your designated time. The Ambulatory Surgery Unit is located in [facility] on the [facility] side of the [facility] across from the [address] [place] [address]. Please have all of your health insurance cards and a photo ID. 2. You must have someone with you to drive you home as directed by your surgeon. 3. You may have a light breakfast and take normal morning medications. 4. We recommend you do not drink any alcoholic beverages for 24 hours before and after your procedure. 5. Contact your surgeons office for instructions on the following medications: non-steroidal anti-inflammatory drugs (i.e. Advil, Aleve), vitamins, and supplements. (Some surgeons will want you to stop these medications prior to surgery and others may allow you to take them) **If you are currently taking Plavix, Coumadin, Aspirin and/or other blood-thinning agents, contact your surgeon for instructions. ** Your surgeon will partner with the physician prescribing these medications to determine if it is safe to stop or if you need to continue taking. Please do not stop taking these medications without instructions from your surgeon. 6. In an effort to help prevent surgical site infection, we ask that you shower with an anti-bacterial soap (i.e. Dial or Safeguard) on the morning of your procedure. Do not apply any lotions, powders, or deodorants after showering. 7. Wear comfortable clothes. Wear glasses instead of contacts. Do not bring any jewelry or money (other than copays or fees as instructed). Do not wear make-up, particularly mascara, the morning of your procedure. Wear your hair loose or down, no ponytails, buns, eda pins or clips.  All body piercings must be removed. 8. You should understand that if you do not follow these instructions your procedure may be cancelled. If your physical condition changes (i.e. fever, cold or flu) please contact your surgeon as soon as possible. 9. It is important that you be on time. If a situation occurs where you may be late, or if you have any questions or problems, please call [phone]. 
 
10. Your procedure time may be subject to change. You will receive a phone call the day prior to confirm your arrival time. I understand a pre-operative phone call will be made to verify my procedure time. In the event that I am not available, I give permission for a message to be left on my answering service and/or with another person? YES The above note was reviewed with patient during PAT phone call on 1/21/19, patient verbalized understanding.  
 
 
 
 
 ___________________      ___________________      ___________________ 
(Signature of Patient)          (Witness)                   (Date and Time)

## 2019-01-21 NOTE — PERIOP NOTES
Patient reports she is no longer taking Eliquis as prescribed and was unable to get it refilled after it was initially prescribed in the ED in 11/2018. Per pt she did followup with Dr. Dm Subramanian (cardiology) after ED visit. Patient also reported to RN that she experienced episode of chest pain yesterday as she was out at [employer] shopping. Patient reports she did not seek medical attention. Patient denies chest pain at this present. RN informed pt to seek medical attention for chest pain and shortness of breath multiple times and patient stated to RN \"I always have chest pain it is from my COPD\". RN informed patient that she would make Dr. Ekaterina Salazar aware of episode of chest pain from yesterday and patient stated \"Ill let yall decide and just tell me what time to be there or not\", then patient hung up the phone. [id number]:  RN informed Simran Dela Cruz at Dr. Luz Marina Padilla office of above, per Simran Dela Cruz she will contact patient and inform Dr. Ekaterina Salazar and return call to [facility].   
 
[phone]:  RN spoke with Simran Dela Cruz at Dr. Ekaterina Salazar office who reports she spoke to patient and pt denied any active chest pain and pt is to come for appt tomorrow as planned per Dr. Ekaterina Salazar. [id number]:  RN spoke with patient, pt wanting arrival time. Pt informed to arrive at [phone].

## 2019-01-22 ENCOUNTER — APPOINTMENT (OUTPATIENT)
Dept: GENERAL RADIOLOGY | Age: 84
End: 2019-01-22
Attending: PHYSICAL MEDICINE & REHABILITATION
Payer: MEDICARE

## 2019-01-22 ENCOUNTER — HOSPITAL ENCOUNTER (OUTPATIENT)
Age: 84
Setting detail: OUTPATIENT SURGERY
Discharge: HOME | End: 2019-01-22
Attending: PHYSICAL MEDICINE & REHABILITATION | Admitting: PHYSICAL MEDICINE & REHABILITATION
Payer: MEDICARE

## 2019-01-22 VITALS
RESPIRATION RATE: 18 BRPM | HEART RATE: 84 BPM | HEIGHT: 63 IN | BODY MASS INDEX: 23.04 KG/M2 | DIASTOLIC BLOOD PRESSURE: 88 MMHG | SYSTOLIC BLOOD PRESSURE: 156 MMHG | OXYGEN SATURATION: 93 % | WEIGHT: 130 LBS | TEMPERATURE: 98.3 F

## 2019-01-22 PROCEDURE — A9579 GAD-BASE MR CONTRAST NOS,1ML: HCPCS | Performed by: PHYSICAL MEDICINE & REHABILITATION

## 2019-01-22 PROCEDURE — 74011250636 HC RX REV CODE- 250/636: Performed by: PHYSICAL MEDICINE & REHABILITATION

## 2019-01-22 PROCEDURE — 74011000250 HC RX REV CODE- 250: Performed by: PHYSICAL MEDICINE & REHABILITATION

## 2019-01-22 PROCEDURE — 74011636320 HC RX REV CODE- 636/320: Performed by: PHYSICAL MEDICINE & REHABILITATION

## 2019-01-22 PROCEDURE — 72020 X-RAY EXAM OF SPINE 1 VIEW: CPT

## 2019-01-22 PROCEDURE — 76210000046 HC AMBSU PH II REC FIRST 0.5 HR: Performed by: PHYSICAL MEDICINE & REHABILITATION

## 2019-01-22 PROCEDURE — 77030003665 HC NDL SPN BBMI -A: Performed by: PHYSICAL MEDICINE & REHABILITATION

## 2019-01-22 PROCEDURE — 76030000002 HC AMB SURG OR TIME FIRST 0.: Performed by: PHYSICAL MEDICINE & REHABILITATION

## 2019-01-22 PROCEDURE — 76000 FLUOROSCOPY <1 HR PHYS/QHP: CPT

## 2019-01-22 RX ORDER — LIDOCAINE HYDROCHLORIDE 20 MG/ML
5 INJECTION, SOLUTION INFILTRATION; PERINEURAL ONCE
Status: COMPLETED | OUTPATIENT
Start: 2019-01-22 | End: 2019-01-22

## 2019-01-22 RX ORDER — METHYLPREDNISOLONE ACETATE 40 MG/ML
40 INJECTION, SUSPENSION INTRA-ARTICULAR; INTRALESIONAL; INTRAMUSCULAR; SOFT TISSUE ONCE
Status: COMPLETED | OUTPATIENT
Start: 2019-01-22 | End: 2019-01-22

## 2019-01-22 RX ORDER — SODIUM BICARBONATE 42 MG/ML
2 INJECTION, SOLUTION INTRAVENOUS ONCE
Status: DISCONTINUED | OUTPATIENT
Start: 2019-01-22 | End: 2019-01-22

## 2019-01-22 RX ORDER — BUPIVACAINE HYDROCHLORIDE 5 MG/ML
5 INJECTION, SOLUTION EPIDURAL; INTRACAUDAL ONCE
Status: DISCONTINUED | OUTPATIENT
Start: 2019-01-22 | End: 2019-01-22 | Stop reason: HOSPADM
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
## 2019-01-22 NOTE — DISCHARGE INSTRUCTIONS
Dr. Ceci Marin Discharge Instructions  Transforaminal Epidural Steroid Injection/ Selective Nerve Block    You had a transforaminal epidural steroid injection/ selective nerve block today. You will probably have some numbness, and possibly weakness, in your leg for the next 6 to 8 hours. The steroids will slowly become effective, reducing your pain, over the next 2 weeks. You should begin feeling better after a few days, but it may take up to 2 weeks to notice the difference. The benefit you get from your injection will last a variable amount of time, depending on the severity of your lumbar spine problem.  Pain: Most people do not have any increase in pain after this injection. However, you might experience some soreness in your low back. If this happens, putting an ice pack over the sore area will help.  Bandage: You will have a small bandage covering the site of the injection. You may remove it once you get home.  Restrictions: Someone should drive you home after the injection. After that, you have no restrictions. You need to be careful while walking, as you may still have some numbness or weakness in your leg. You may resume your normal level of activity. You may take a shower or bath, and you may eat normally. You should continue your current exercises and/or therapy routine.   Medications: Continue your current medications as prescribed. If your pain decreases, you may reduce the amount of your pain medicines. If you stopped taking anticoagulants or blood-thinners before the injection, start them tomorrow. If you have diabetes, your blood sugar may be elevated for a few days. Call your primary doctor with any questions.   Call Dr. Ceci Marin at [phone] if you experience:   Fever (101 degrees Fahrenheit or greater)   Nausea or vomiting   Headache unrelieved by your normal pain medicine   Redness or swelling at the injection site that lasts more than 1 day   New numbness, tingling, weakness, or pain that you didnt have before the injection    Follow-up appointment:   If still having pain in 1-2 weeks, call office at [phone] for a follow up appointment. DISCHARGE SUMMARY from Nurse    The following personal items collected during your admission are returned to you:   Dental Appliance: Dental Appliances: Uppers  Vision: Visual Aid: Glasses  Hearing Aid:    Jewelry: Jewelry: With patient  Clothing: Clothing: None  Other Valuables: Other Valuables: Wheelchair(placed in PACU)  Valuables sent to safe: If you were given prescriptions, please review the written information on prescribed medications. · You will receive a Post Operative Call from one of the Recovery Room Nurses on the day after your surgery to check on you. It is very important for us to know how you are recovering after your surgery. · You may receive an e-mail or letter in the mail from [facility] regarding your experience with us in the Ambulatory Surgery Unit. Your feedback is valuable to us and we appreciate your participation in the survey. If you have not had your influenza or pneumococcal vaccines, please follow up with your primary care physician. The discharge information has been reviewed with the patient. The patient verbalized understanding.

## 2019-01-22 NOTE — OP NOTES
Epidural and Facet Steroid Injection Operative Report    Indications: This is a 80 y.o. female who presents with low back pain. She was positive for LS DDD and LS DJD. The patient was admitted for surgery as conservative measures have failed. Date of Surgery: 1/22/2019    Preoperative Diagnosis: LS DDD/DJD    Postoperative Diagnosis: LS DDD/DJD    Surgeon(s) and Role:     * Josh Scott MD - Primary     Procedure:  Procedure(s):  RIGHT L3-4 L4-5 FACET AND RIGHT L3 L4 TRANSFORAMINAL EPIDURAL STEROID INJECTION    Procedure in Detail:  After appropriate informed consent was obtained, the patient was taken to the operating suite and placed in the prone position on the operating table on appropriate padding. The LS region was prepped and draped in the usual sterile fashion. Intraoperative fluoroscopy was used to localize the LS spine. The skin was infiltrated with 2% lidocaine. An 22-g needle was advanced into the Right L3 and L4 neuroforamen and the Right L3-4 and L4-5 facets under fluoroscopic guidance. A small amount of Isovue was injected into the epidural space, confirming appropriate needle placement on fluoroscopy. Next, 2ml of 2% lidocaine and 40mg of Depo-Medrol were injected into the epidural space, and  2ml of 2% lidocaine and 40mg of Depo-Medrol were injected into the facet joints. The needle was removed from the patient. The patient was then turned back into the supine position on the stretcher and was taken to the Recovery Room in stable condition.     Estimated Blood Loss:  none     Specimens: None       Drains: None          Complications:  None    Signed By: Bruce Moore MD                        January 22, 2019

## 2019-01-22 NOTE — PERIOP NOTES
Court Trammell 1935 
[id number] Situation: 
Verbal report given from: YARITZA Lucero Procedure: Procedure(s): RIGHT L3-4 L4-5 FACET AND RIGHT L3 L4 TRANSFORAMINAL EPIDURAL STEROID INJECTION Background: 
 
Preoperative diagnosis: Degeneration of lumbar intervertebral disc [M51.36] Postoperative diagnosis: Degeneration of lumbar intervertebral disc [M51.36] :  Dr. Meenu Quinones Assistant(s): Circ-1: Marshal Gallegos RN Local Nurse Monitor: Michelle Ferreira RN Surg Asst-1: Abiodun Singh Specimens: * No specimens in log * Assessment: 
Intra-procedure medications Anesthesia gave intra-procedure sedation and medications, see anesthesia flow sheet Intravenous fluids: Loye Badder Vital signs stable

## 2019-01-22 NOTE — PERIOP NOTES
Skin assessment: 
 WNL Skin color: normal for ethnicity Skin condition: pt has mulitple bruises to hands and arms; no open sores noted Skin integrity: WNL Turgor: loose; normal for age Neuro:  Push/Pull assessment: LUE Response: strong  LLE Response: moderate push/pull RUE Response: strong  RLE Response: moderate push/pull OB/Gyn assessment: 
 
LMP: n/a If no menstrual period then reason: n/a

## 2019-03-14 ENCOUNTER — HOSPITAL ENCOUNTER (OUTPATIENT)
Dept: CT IMAGING | Age: 84
Discharge: HOME | End: 2019-03-14
Attending: INTERNAL MEDICINE
Payer: MEDICARE

## 2019-03-14 DIAGNOSIS — R63.4 LOSS OF WEIGHT: ICD-10-CM

## 2019-03-14 DIAGNOSIS — I82.452 THROMBOSIS OF LEFT PERONEAL VEIN (HCC): ICD-10-CM

## 2019-03-14 DIAGNOSIS — I82.442 EMBOLISM AND THROMBOSIS OF LEFT TIBIAL VEIN (HCC): ICD-10-CM

## 2019-03-14 DIAGNOSIS — R10.813 ABDOMINAL TENDERNESS, RIGHT LOWER QUADRANT: ICD-10-CM

## 2019-03-14 LAB — CREAT BLD-MCNC: 0.8 MG/DL (ref 0.6–1.3)

## 2019-03-14 PROCEDURE — 74011636320 HC RX REV CODE- 636/320: Performed by: INTERNAL MEDICINE

## 2019-03-14 PROCEDURE — 82565 ASSAY OF CREATININE: CPT

## 2019-03-14 PROCEDURE — 74177 CT ABD & PELVIS W/CONTRAST: CPT

## 2019-03-14 RX ORDER — SODIUM CHLORIDE 0.9 % (FLUSH) 0.9 %
10 SYRINGE (ML) INJECTION
Status: COMPLETED | OUTPATIENT
Start: 2019-03-14 | End: 2019-03-14

## 2019-03-14 RX ADMIN — Medication 10 ML: at 14:23

## 2019-03-14 RX ADMIN — IOHEXOL 50 ML: 240 INJECTION, SOLUTION INTRATHECAL; INTRAVASCULAR; INTRAVENOUS; ORAL at 14:23

## 2019-03-14 RX ADMIN — IOPAMIDOL 100 ML: 755 INJECTION, SOLUTION INTRAVENOUS at 14:23

## 2019-12-10 ENCOUNTER — APPOINTMENT (OUTPATIENT)
Dept: CT IMAGING | Age: 84
DRG: 391 | End: 2019-12-10
Attending: EMERGENCY MEDICINE
Payer: MEDICARE

## 2019-12-10 ENCOUNTER — APPOINTMENT (OUTPATIENT)
Dept: GENERAL RADIOLOGY | Age: 84
DRG: 391 | End: 2019-12-10
Attending: EMERGENCY MEDICINE
Payer: MEDICARE

## 2019-12-10 ENCOUNTER — HOSPITAL ENCOUNTER (INPATIENT)
Age: 84
LOS: 9 days | Discharge: NURSING, REHAB OR LONG-TERM CARE | DRG: 391 | End: 2019-12-20
Attending: EMERGENCY MEDICINE | Admitting: SURGERY
Payer: MEDICARE

## 2019-12-10 DIAGNOSIS — K57.20 DIVERTICULITIS OF LARGE INTESTINE WITH ABSCESS WITHOUT BLEEDING: ICD-10-CM

## 2019-12-10 DIAGNOSIS — R13.10 DYSPHAGIA, UNSPECIFIED TYPE: ICD-10-CM

## 2019-12-10 DIAGNOSIS — K57.32 DIVERTICULITIS OF COLON: ICD-10-CM

## 2019-12-10 DIAGNOSIS — E78.2 MIXED HYPERLIPIDEMIA: ICD-10-CM

## 2019-12-10 DIAGNOSIS — Z71.89 ADVANCED CARE PLANNING/COUNSELING DISCUSSION: ICD-10-CM

## 2019-12-10 DIAGNOSIS — Z71.89 DNR (DO NOT RESUSCITATE) DISCUSSION: ICD-10-CM

## 2019-12-10 DIAGNOSIS — E86.0 DEHYDRATION: ICD-10-CM

## 2019-12-10 DIAGNOSIS — I65.23 BILATERAL CAROTID ARTERY STENOSIS: ICD-10-CM

## 2019-12-10 DIAGNOSIS — N30.00 ACUTE CYSTITIS WITHOUT HEMATURIA: ICD-10-CM

## 2019-12-10 DIAGNOSIS — I66.01 OCCLUSION OF MIDDLE CEREBRAL ARTERY, RIGHT: ICD-10-CM

## 2019-12-10 DIAGNOSIS — I63.9 ACUTE CVA (CEREBROVASCULAR ACCIDENT) (HCC): ICD-10-CM

## 2019-12-10 DIAGNOSIS — K57.30 DIVERTICULOSIS OF COLON: Primary | ICD-10-CM

## 2019-12-10 DIAGNOSIS — R06.02 SHORTNESS OF BREATH: ICD-10-CM

## 2019-12-10 LAB
ALBUMIN SERPL-MCNC: 2.8 G/DL (ref 3.5–5)
ALBUMIN/GLOB SERPL: 0.8 {RATIO} (ref 1.1–2.2)
ALP SERPL-CCNC: 75 U/L (ref 45–117)
ALT SERPL-CCNC: 12 U/L (ref 12–78)
ANION GAP SERPL CALC-SCNC: 3 MMOL/L (ref 5–15)
AST SERPL-CCNC: 11 U/L (ref 15–37)
BASOPHILS # BLD: 0 K/UL (ref 0–0.1)
BASOPHILS NFR BLD: 0 % (ref 0–1)
BILIRUB SERPL-MCNC: 0.4 MG/DL (ref 0.2–1)
BUN SERPL-MCNC: 32 MG/DL (ref 6–20)
BUN/CREAT SERPL: 34 (ref 12–20)
CALCIUM SERPL-MCNC: 9.4 MG/DL (ref 8.5–10.1)
CHLORIDE SERPL-SCNC: 103 MMOL/L (ref 97–108)
CO2 SERPL-SCNC: 34 MMOL/L (ref 21–32)
CREAT SERPL-MCNC: 0.95 MG/DL (ref 0.55–1.02)
DIFFERENTIAL METHOD BLD: ABNORMAL
EOSINOPHIL # BLD: 0.1 K/UL (ref 0–0.4)
EOSINOPHIL NFR BLD: 1 % (ref 0–7)
ERYTHROCYTE [DISTWIDTH] IN BLOOD BY AUTOMATED COUNT: 14.4 % (ref 11.5–14.5)
GLOBULIN SER CALC-MCNC: 3.3 G/DL (ref 2–4)
GLUCOSE SERPL-MCNC: 150 MG/DL (ref 65–100)
HCT VFR BLD AUTO: 42.5 % (ref 35–47)
HEMOCCULT STL QL: ABNORMAL
HGB BLD-MCNC: 12.4 G/DL (ref 11.5–16)
IMM GRANULOCYTES # BLD AUTO: 0 K/UL (ref 0–0.04)
IMM GRANULOCYTES NFR BLD AUTO: 0 % (ref 0–0.5)
INR PPP: 1 (ref 0.9–1.1)
LACTATE BLD-SCNC: 1.17 MMOL/L (ref 0.4–2)
LYMPHOCYTES # BLD: 0.6 K/UL (ref 0.8–3.5)
LYMPHOCYTES NFR BLD: 6 % (ref 12–49)
MCH RBC QN AUTO: 25.3 PG (ref 26–34)
MCHC RBC AUTO-ENTMCNC: 29.2 G/DL (ref 30–36.5)
MCV RBC AUTO: 86.7 FL (ref 80–99)
MONOCYTES # BLD: 0.8 K/UL (ref 0–1)
MONOCYTES NFR BLD: 8 % (ref 5–13)
NEUTS SEG # BLD: 8.5 K/UL (ref 1.8–8)
NEUTS SEG NFR BLD: 85 % (ref 32–75)
NRBC # BLD: 0 K/UL (ref 0–0.01)
NRBC BLD-RTO: 0 PER 100 WBC
PLATELET # BLD AUTO: 215 K/UL (ref 150–400)
PMV BLD AUTO: 10.3 FL (ref 8.9–12.9)
POTASSIUM SERPL-SCNC: 4.6 MMOL/L (ref 3.5–5.1)
PROT SERPL-MCNC: 6.1 G/DL (ref 6.4–8.2)
PROTHROMBIN TIME: 10.5 SEC (ref 9–11.1)
RBC # BLD AUTO: 4.9 M/UL (ref 3.8–5.2)
RBC MORPH BLD: ABNORMAL
SODIUM SERPL-SCNC: 140 MMOL/L (ref 136–145)
WBC # BLD AUTO: 10 K/UL (ref 3.6–11)

## 2019-12-10 PROCEDURE — 81001 URINALYSIS AUTO W/SCOPE: CPT

## 2019-12-10 PROCEDURE — 99285 EMERGENCY DEPT VISIT HI MDM: CPT

## 2019-12-10 PROCEDURE — 85025 COMPLETE CBC W/AUTO DIFF WBC: CPT

## 2019-12-10 PROCEDURE — 87077 CULTURE AEROBIC IDENTIFY: CPT

## 2019-12-10 PROCEDURE — 83605 ASSAY OF LACTIC ACID: CPT

## 2019-12-10 PROCEDURE — 85610 PROTHROMBIN TIME: CPT

## 2019-12-10 PROCEDURE — 96366 THER/PROPH/DIAG IV INF ADDON: CPT

## 2019-12-10 PROCEDURE — 96375 TX/PRO/DX INJ NEW DRUG ADDON: CPT

## 2019-12-10 PROCEDURE — 80053 COMPREHEN METABOLIC PANEL: CPT

## 2019-12-10 PROCEDURE — 87086 URINE CULTURE/COLONY COUNT: CPT

## 2019-12-10 PROCEDURE — 87186 SC STD MICRODIL/AGAR DIL: CPT

## 2019-12-10 PROCEDURE — 36415 COLL VENOUS BLD VENIPUNCTURE: CPT

## 2019-12-10 PROCEDURE — 71045 X-RAY EXAM CHEST 1 VIEW: CPT

## 2019-12-10 PROCEDURE — 74011250636 HC RX REV CODE- 250/636: Performed by: EMERGENCY MEDICINE

## 2019-12-10 PROCEDURE — 74177 CT ABD & PELVIS W/CONTRAST: CPT

## 2019-12-10 PROCEDURE — 94761 N-INVAS EAR/PLS OXIMETRY MLT: CPT

## 2019-12-10 PROCEDURE — 93005 ELECTROCARDIOGRAM TRACING: CPT

## 2019-12-10 PROCEDURE — 87040 BLOOD CULTURE FOR BACTERIA: CPT

## 2019-12-10 PROCEDURE — 82272 OCCULT BLD FECES 1-3 TESTS: CPT

## 2019-12-10 PROCEDURE — 86900 BLOOD TYPING SEROLOGIC ABO: CPT

## 2019-12-10 PROCEDURE — 96365 THER/PROPH/DIAG IV INF INIT: CPT

## 2019-12-10 RX ORDER — SODIUM CHLORIDE 0.9 % (FLUSH) 0.9 %
10 SYRINGE (ML) INJECTION
Status: COMPLETED | OUTPATIENT
Start: 2019-12-10 | End: 2019-12-11

## 2019-12-10 RX ADMIN — SODIUM CHLORIDE 1000 ML: 900 INJECTION, SOLUTION INTRAVENOUS at 22:37

## 2019-12-11 PROBLEM — K57.30 DIVERTICULAR DISEASE OF LARGE INTESTINE WITH COMPLICATION: Status: ACTIVE | Noted: 2019-12-11

## 2019-12-11 PROBLEM — K57.20 DIVERTICULITIS OF LARGE INTESTINE WITH ABSCESS: Status: ACTIVE | Noted: 2019-12-11

## 2019-12-11 LAB
ABO + RH BLD: NORMAL
APPEARANCE UR: ABNORMAL
ATRIAL RATE: 137 BPM
BACTERIA URNS QL MICRO: ABNORMAL /HPF
BILIRUB UR QL: NEGATIVE
BLOOD GROUP ANTIBODIES SERPL: NORMAL
CALCULATED P AXIS, ECG09: 38 DEGREES
CALCULATED R AXIS, ECG10: -63 DEGREES
CALCULATED T AXIS, ECG11: 79 DEGREES
COLOR UR: ABNORMAL
DIAGNOSIS, 93000: NORMAL
EPITH CASTS URNS QL MICRO: ABNORMAL /LPF
GLUCOSE UR STRIP.AUTO-MCNC: NEGATIVE MG/DL
HGB UR QL STRIP: NEGATIVE
HYALINE CASTS URNS QL MICRO: ABNORMAL /LPF (ref 0–5)
KETONES UR QL STRIP.AUTO: NEGATIVE MG/DL
LEUKOCYTE ESTERASE UR QL STRIP.AUTO: ABNORMAL
NITRITE UR QL STRIP.AUTO: POSITIVE
P-R INTERVAL, ECG05: 148 MS
PH UR STRIP: 6 [PH] (ref 5–8)
PROT UR STRIP-MCNC: ABNORMAL MG/DL
Q-T INTERVAL, ECG07: 304 MS
QRS DURATION, ECG06: 92 MS
QTC CALCULATION (BEZET), ECG08: 459 MS
RBC #/AREA URNS HPF: ABNORMAL /HPF (ref 0–5)
SP GR UR REFRACTOMETRY: 1.02 (ref 1–1.03)
SPECIMEN EXP DATE BLD: NORMAL
UA: UC IF INDICATED,UAUC: ABNORMAL
UROBILINOGEN UR QL STRIP.AUTO: 1 EU/DL (ref 0.2–1)
VENTRICULAR RATE, ECG03: 137 BPM
WBC URNS QL MICRO: ABNORMAL /HPF (ref 0–4)

## 2019-12-11 PROCEDURE — 74011000258 HC RX REV CODE- 258: Performed by: EMERGENCY MEDICINE

## 2019-12-11 PROCEDURE — 65660000000 HC RM CCU STEPDOWN

## 2019-12-11 PROCEDURE — 74011250637 HC RX REV CODE- 250/637: Performed by: INTERNAL MEDICINE

## 2019-12-11 PROCEDURE — 74011000258 HC RX REV CODE- 258: Performed by: SURGERY

## 2019-12-11 PROCEDURE — 74011250636 HC RX REV CODE- 250/636: Performed by: EMERGENCY MEDICINE

## 2019-12-11 PROCEDURE — 77010033678 HC OXYGEN DAILY

## 2019-12-11 PROCEDURE — 74011636320 HC RX REV CODE- 636/320

## 2019-12-11 PROCEDURE — 74011000258 HC RX REV CODE- 258: Performed by: INTERNAL MEDICINE

## 2019-12-11 PROCEDURE — 74011250637 HC RX REV CODE- 250/637: Performed by: SURGERY

## 2019-12-11 PROCEDURE — 74011250636 HC RX REV CODE- 250/636: Performed by: SURGERY

## 2019-12-11 RX ORDER — PREDNISONE 5 MG/1
10 TABLET ORAL
Status: DISCONTINUED | OUTPATIENT
Start: 2019-12-12 | End: 2019-12-12

## 2019-12-11 RX ORDER — ACETAMINOPHEN 325 MG/1
650 TABLET ORAL
Status: DISCONTINUED | OUTPATIENT
Start: 2019-12-11 | End: 2019-12-20 | Stop reason: HOSPADM

## 2019-12-11 RX ORDER — DIPHENHYDRAMINE HYDROCHLORIDE 50 MG/ML
25 INJECTION, SOLUTION INTRAMUSCULAR; INTRAVENOUS
Status: DISCONTINUED | OUTPATIENT
Start: 2019-12-11 | End: 2019-12-20 | Stop reason: HOSPADM

## 2019-12-11 RX ORDER — POTASSIUM CHLORIDE 750 MG/1
10 TABLET, EXTENDED RELEASE ORAL DAILY
Status: ON HOLD | COMMUNITY
End: 2019-12-20 | Stop reason: SDUPTHER

## 2019-12-11 RX ORDER — PREDNISONE 5 MG/1
5 TABLET ORAL
Status: DISCONTINUED | OUTPATIENT
Start: 2019-12-12 | End: 2019-12-11

## 2019-12-11 RX ORDER — SPIRONOLACTONE 25 MG/1
25 TABLET ORAL DAILY
Status: DISCONTINUED | OUTPATIENT
Start: 2019-12-12 | End: 2019-12-20 | Stop reason: HOSPADM

## 2019-12-11 RX ORDER — SODIUM CHLORIDE 0.9 % (FLUSH) 0.9 %
5-40 SYRINGE (ML) INJECTION AS NEEDED
Status: DISCONTINUED | OUTPATIENT
Start: 2019-12-11 | End: 2019-12-20 | Stop reason: HOSPADM

## 2019-12-11 RX ORDER — HEPARIN SODIUM 5000 [USP'U]/ML
5000 INJECTION, SOLUTION INTRAVENOUS; SUBCUTANEOUS EVERY 12 HOURS
Status: DISCONTINUED | OUTPATIENT
Start: 2019-12-12 | End: 2019-12-12

## 2019-12-11 RX ORDER — QUINAPRIL 40 MG/1
40 TABLET ORAL 2 TIMES DAILY
Status: DISCONTINUED | OUTPATIENT
Start: 2019-12-11 | End: 2019-12-17

## 2019-12-11 RX ORDER — METHOCARBAMOL 500 MG/1
750 TABLET, FILM COATED ORAL
Status: DISCONTINUED | OUTPATIENT
Start: 2019-12-11 | End: 2019-12-20 | Stop reason: HOSPADM

## 2019-12-11 RX ORDER — MORPHINE SULFATE 2 MG/ML
2 INJECTION, SOLUTION INTRAMUSCULAR; INTRAVENOUS
Status: DISCONTINUED | OUTPATIENT
Start: 2019-12-11 | End: 2019-12-18

## 2019-12-11 RX ORDER — SODIUM CHLORIDE 0.9 % (FLUSH) 0.9 %
5-40 SYRINGE (ML) INJECTION EVERY 8 HOURS
Status: DISCONTINUED | OUTPATIENT
Start: 2019-12-11 | End: 2019-12-20 | Stop reason: HOSPADM

## 2019-12-11 RX ORDER — DEXTROSE, SODIUM CHLORIDE, AND POTASSIUM CHLORIDE 5; .45; .15 G/100ML; G/100ML; G/100ML
125 INJECTION INTRAVENOUS CONTINUOUS
Status: DISCONTINUED | OUTPATIENT
Start: 2019-12-11 | End: 2019-12-11

## 2019-12-11 RX ORDER — ACETAMINOPHEN AND CODEINE PHOSPHATE 300; 30 MG/1; MG/1
1 TABLET ORAL
Status: DISCONTINUED | OUTPATIENT
Start: 2019-12-11 | End: 2019-12-11

## 2019-12-11 RX ORDER — NALOXONE HYDROCHLORIDE 0.4 MG/ML
0.4 INJECTION, SOLUTION INTRAMUSCULAR; INTRAVENOUS; SUBCUTANEOUS AS NEEDED
Status: DISCONTINUED | OUTPATIENT
Start: 2019-12-11 | End: 2019-12-20 | Stop reason: HOSPADM

## 2019-12-11 RX ORDER — ACETAMINOPHEN AND CODEINE PHOSPHATE 300; 30 MG/1; MG/1
1 TABLET ORAL
Status: DISCONTINUED | OUTPATIENT
Start: 2019-12-11 | End: 2019-12-20 | Stop reason: HOSPADM

## 2019-12-11 RX ORDER — ENOXAPARIN SODIUM 100 MG/ML
40 INJECTION SUBCUTANEOUS
Status: DISCONTINUED | OUTPATIENT
Start: 2019-12-12 | End: 2019-12-11 | Stop reason: ALTCHOICE

## 2019-12-11 RX ORDER — POLYETHYLENE GLYCOL 3350 17 G/17G
17 POWDER, FOR SOLUTION ORAL DAILY
Status: DISCONTINUED | OUTPATIENT
Start: 2019-12-11 | End: 2019-12-20 | Stop reason: HOSPADM

## 2019-12-11 RX ORDER — ONDANSETRON 2 MG/ML
4 INJECTION INTRAMUSCULAR; INTRAVENOUS
Status: DISCONTINUED | OUTPATIENT
Start: 2019-12-11 | End: 2019-12-12 | Stop reason: SDUPTHER

## 2019-12-11 RX ORDER — METOPROLOL SUCCINATE 50 MG/1
50 TABLET, EXTENDED RELEASE ORAL DAILY
Status: DISCONTINUED | OUTPATIENT
Start: 2019-12-12 | End: 2019-12-16

## 2019-12-11 RX ORDER — QUINAPRIL 10 MG/1
40 TABLET ORAL 2 TIMES DAILY
Status: DISCONTINUED | OUTPATIENT
Start: 2019-12-11 | End: 2019-12-11

## 2019-12-11 RX ORDER — PRAMIPEXOLE DIHYDROCHLORIDE 0.25 MG/1
0.5 TABLET ORAL DAILY
Status: DISCONTINUED | OUTPATIENT
Start: 2019-12-12 | End: 2019-12-20 | Stop reason: HOSPADM

## 2019-12-11 RX ORDER — BUMETANIDE 1 MG/1
1 TABLET ORAL DAILY
Status: ON HOLD | COMMUNITY
End: 2019-12-20 | Stop reason: SDUPTHER

## 2019-12-11 RX ORDER — ACETAMINOPHEN AND CODEINE PHOSPHATE 300; 30 MG/1; MG/1
1 TABLET ORAL
COMMUNITY
End: 2020-06-25

## 2019-12-11 RX ORDER — DEXTROSE MONOHYDRATE AND SODIUM CHLORIDE 5; .45 G/100ML; G/100ML
75 INJECTION, SOLUTION INTRAVENOUS CONTINUOUS
Status: DISCONTINUED | OUTPATIENT
Start: 2019-12-11 | End: 2019-12-12 | Stop reason: SDUPTHER

## 2019-12-11 RX ORDER — DIPHENHYDRAMINE HYDROCHLORIDE 50 MG/ML
25 INJECTION, SOLUTION INTRAMUSCULAR; INTRAVENOUS
Status: COMPLETED | OUTPATIENT
Start: 2019-12-11 | End: 2019-12-11

## 2019-12-11 RX ORDER — IPRATROPIUM BROMIDE AND ALBUTEROL SULFATE 2.5; .5 MG/3ML; MG/3ML
3 SOLUTION RESPIRATORY (INHALATION)
Status: DISCONTINUED | OUTPATIENT
Start: 2019-12-11 | End: 2019-12-20 | Stop reason: HOSPADM

## 2019-12-11 RX ADMIN — Medication 10 ML: at 02:09

## 2019-12-11 RX ADMIN — POLYETHYLENE GLYCOL (3350) 17 G: 17 POWDER, FOR SOLUTION ORAL at 10:32

## 2019-12-11 RX ADMIN — MORPHINE SULFATE 2 MG: 2 INJECTION, SOLUTION INTRAMUSCULAR; INTRAVENOUS at 22:26

## 2019-12-11 RX ADMIN — DIPHENHYDRAMINE HYDROCHLORIDE 25 MG: 50 INJECTION, SOLUTION INTRAMUSCULAR; INTRAVENOUS at 00:18

## 2019-12-11 RX ADMIN — ONDANSETRON 4 MG: 2 INJECTION INTRAMUSCULAR; INTRAVENOUS at 07:13

## 2019-12-11 RX ADMIN — MORPHINE SULFATE 2 MG: 2 INJECTION, SOLUTION INTRAMUSCULAR; INTRAVENOUS at 15:45

## 2019-12-11 RX ADMIN — PIPERACILLIN AND TAZOBACTAM 3.38 G: 3; .375 INJECTION, POWDER, LYOPHILIZED, FOR SOLUTION INTRAVENOUS at 04:15

## 2019-12-11 RX ADMIN — SODIUM CHLORIDE 1000 ML: 900 INJECTION, SOLUTION INTRAVENOUS at 00:22

## 2019-12-11 RX ADMIN — CEFTRIAXONE 1 G: 1 INJECTION, POWDER, FOR SOLUTION INTRAMUSCULAR; INTRAVENOUS at 01:09

## 2019-12-11 RX ADMIN — Medication 10 ML: at 22:26

## 2019-12-11 RX ADMIN — DEXTROSE MONOHYDRATE AND SODIUM CHLORIDE 75 ML/HR: 5; .45 INJECTION, SOLUTION INTRAVENOUS at 21:11

## 2019-12-11 RX ADMIN — IOPAMIDOL 100 ML: 755 INJECTION, SOLUTION INTRAVENOUS at 01:34

## 2019-12-11 RX ADMIN — PIPERACILLIN AND TAZOBACTAM 3.38 G: 3; .375 INJECTION, POWDER, LYOPHILIZED, FOR SOLUTION INTRAVENOUS at 10:32

## 2019-12-11 RX ADMIN — PIPERACILLIN AND TAZOBACTAM 3.38 G: 3; .375 INJECTION, POWDER, LYOPHILIZED, FOR SOLUTION INTRAVENOUS at 17:47

## 2019-12-11 RX ADMIN — METHYLPREDNISOLONE SODIUM SUCCINATE 125 MG: 125 INJECTION, POWDER, FOR SOLUTION INTRAMUSCULAR; INTRAVENOUS at 00:18

## 2019-12-11 RX ADMIN — POTASSIUM CHLORIDE, DEXTROSE MONOHYDRATE AND SODIUM CHLORIDE 125 ML/HR: 150; 5; 450 INJECTION, SOLUTION INTRAVENOUS at 05:20

## 2019-12-11 RX ADMIN — ACETAMINOPHEN AND CODEINE PHOSPHATE 1 TABLET: 300; 30 TABLET ORAL at 23:37

## 2019-12-11 NOTE — PROGRESS NOTES
Reason for Admission:   Diverticulosis, acute cystitis                  RRAT Score:     13 moderate risk             Do you (patient/family) have any concerns for transition/discharge? Patient feels she needs assistance at home              Plan for utilizing home health:   Has not used home health in the past    Current Advanced Directive/Advance Care Plan:  none            Transition of Care Plan:          1. Patient in ED bed waiting for inpatient admission  2. Patient will need 2nd IM letter at discharge  3. Patient prefers to discharge home but acknowledges she needs more assistance at home and is unsafe with some activities. 4.  Patient needs oxygen needs addressed at discharge  5. Patient would benefit from transportation resources    Patient is a 80year old female admitted 12/11 for diverticulosis and acute cystitis. Patient alert and oriented, tearful, with no visitors present in room. Demographic information verified and correct. Insurance information verified and correct. Patient lives alone but her daughter Frances Morley stays with her frequently. Patient uses 3-4L oxygen at night from [address]. Patient states she \"fired\" [employer] because they were unreliable and \"nasty on the phone\". Patient states she bought an [employer] machine from a friend but has used it sparingly because \"I don't want to use up those expensive batteries\". Patient uses a walker occasionally for ambulation but states she furniture walks in the home. She also has a transport chair. Patient has not used home health in the past.  Patient [facility] [address] in [place]    Patient states she takes sponge baths and gets into the tub once/week but knows this is dangerous and is afraid of falling. Patient states she can dress herself and fixes meals for herself and her daughter. Patient manages her own medications.   Patient still drives to [employer] and the grocery store but feels she is becoming more unsafe to drive alone. Patient is very worried about her home safety and would appreciated home health or caregivers to assist her . Care Management Interventions  PCP Verified by CM: Chema Giron MD)  Mode of Transport at Discharge:  Other (see comment)(family provides transportatoin)  Transition of Care Consult (CM Consult): Discharge Planning  Discharge Durable Medical Equipment: No  Physical Therapy Consult: No  Occupational Therapy Consult: No  Speech Therapy Consult: No  Current Support Network: Own Home(1 story house, 3 steps to enter)  Confirm Follow Up Transport: Family  Plan discussed with Pt/Family/Caregiver: Yes  Discharge Location  Discharge Placement: [address], RN, [facility]  [phone]

## 2019-12-11 NOTE — ED PROVIDER NOTES
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 questions as they arise throughout their visit. I reviewed our electronic medical record system for any past medical records that were available that may contribute to the patients current condition, the nursing notes and and vital signs from today's visit    Nursing notes will be reviewed as they become available in realtime while the pt has been in the ED. Claude Saddler, MD    EKG interpretation 4847: Sinus tachycardia with PVCs, left Axis, rate 137; , QRS 92, QTc 459; no acute ischemia; interpreted by Claude Saddler, MD    I personally reviewed pt's imaging. Official read by radiology noted above. Claude Saddler, MD    PROGRESS NOTE:  11:15 PM  Nursing reports that guaiac testing could not be performed due to lack of stool on card. Claude Saddler, MD    PROGRESS NOTE  12:06 AM  Notified by CT  attack the patient is allergic to IV dye and Benadryl Solu-Medrol before receiving her scan. Will order notes that she usually just receives and wait for CT scan to be done. PROGRESS NOTE  1230 AM  Patient requesting Tylenol 3 that she takes regularly. Patient with allergies with codeine and Tylenol in her chart. Patient notes that she takes this medicine regularly without issue and that she is not allergic to codeine. Notes that she is allergic to \"regular Tylenol. \"  Will provide her Tylenol 3 as requested for chronic pain issues related to her previously noted compression fractures. PROGRESS NOTE  3:05 AM  Patient noted to have findings on CT consistent with diverticulitis with small intramural abscess. Will consult with general surgery for further recommendations. Patient with improvement of heart rate to 90s. Remains on her supplement oxygen for her COPD. Patient reportedly mouth breathing per staff noting oxygen levels around high 80s/low 90s. Jossie Beatty MD      CONSULT NOTE:   3:08 AM  Jossie Beatty MD spoke with Dr. Alek Martínez,   Specialty: Pramod Limb Dr. Alek Martínez due to diverticulitis and small intramural abscess on CT. Discussed pt's HPI and available diagnostic results thus far, specifically noting normal white blood cell count and lactate, UTI treated with ceftriaxone. Consultant recommends admission under his service. Jossie Beatty MD      ADMISSION NOTE:  3:08 AM  Patient is being admitted to the hospital by Dr. Alek Martínez. The results of their tests and reasons for their admission have been discussed with them and/or available family. They convey agreement and understanding for the need to be admitted and for their admission diagnosis.    Jossie Beatty MD           Critical Care Time:     CRITICAL CARE NOTE:  11:15 PM  IMPENDING DETERIORATION -Cardiovascular, CNS and Metabolic  ASSOCIATED RISK FACTORS - Hypotension, Hypoxia, Dysrhythmia, Metabolic changes, Dehydration, Vascular Compromise and CNS Decompensation  MANAGEMENT- Bedside Assessment and Supervision of Care  INTERPRETATION -  Xrays, CT Scan, ECG and Blood Pressure  INTERVENTIONS - hemodynamic mngmt, vascular control, Neurologic interventions  and Metobolic interventions  CASE REVIEW - Hospitalist, Nursing and Family  TREATMENT RESPONSE -Improved  PERFORMED BY - Self  NOTES   :  I have spent 125 minutes of critical care time involved in lab review, consultations with specialist, family decision- making, bedside attention and documentation excluding time spent on any separately billed procedures. During this entire length of time I was immediately available to the patient . Daron Rubinstein, MD      Diagnosis     Clinical Impression:   1. Diverticulosis of colon    2. Acute cystitis without hematuria    3. Dehydration    4. Diverticulitis of colon        PLAN:  1. Admit to general surgery          This note will not be viewable in [address]
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
## 2019-12-11 NOTE — ED NOTES
Patient alert in bed, requesting juice or crackers. Patient informed that she is currently NPO. Will continue to monitor.

## 2019-12-11 NOTE — ED NOTES
Patient resting in bed, no complaints at this time. Will continue to monitor and assess patient needs.

## 2019-12-11 NOTE — PROGRESS NOTES
TRANSFER - OUT REPORT:    Verbal report given to Lemuel rn(name) on Rickey Gould  being transferred to [facility] for routine progression of care       Report consisted of patients Situation, Background, Assessment and   Recommendations(SBAR). Information from the following report(s) SBAR, Kardex, Procedure Summary, Intake/Output, MAR, Recent Results and Cardiac Rhythm NSR with PVCs was reviewed with the receiving nurse. Lines:   Peripheral IV 12/11/19 Left Antecubital (Active)   Site Assessment Clean, dry, & intact 12/11/2019  3:19 AM   Phlebitis Assessment 0 12/11/2019  3:19 AM   Infiltration Assessment 0 12/11/2019  3:19 AM   Dressing Status Clean, dry, & intact 12/11/2019  3:19 AM   Dressing Type 4 X 4 12/11/2019  3:19 AM        Opportunity for questions and clarification was provided.       Patient transported with:   Monitor  O2 @ 4L liters  [employer]

## 2019-12-11 NOTE — PROGRESS NOTES
Pharmacy Clarification of Prior to Admission Medication Regimen-Follow Up Needed    The patient was able to participate in interview regarding clarification of the prior to admission medication regimen. Pharmacy attempted to clarify the prior to admission medication regimen for the patient, but was unsuccessful after multiple attempts. The following resources were used to facilitate this attempt:  MHT interviewed patient with son in room and patient and son stated that the patient has compliance issues. Per patient , she lives with her daughter who has been sick with high blood pressure and it has been like \"the blind leading the blind. \"  Neither patient nor son could remember or knew what medications that are taken. Per patient and son, patient's last day on medication calendar was 12/5 and patient has medications laying everywhere around the house. MHT called [facility] [phone] and spoke with Aden De La Fuente who was able to giver patient's recent fills. MHT updated patient's PTA med list based on information provided. The medication history will need to be re-evaluated at a later time during admission when patient is willing/able to participate or if more information is provided.     Thank you,  Peg Alexandra  Medication History Pharmacy Technician

## 2019-12-11 NOTE — PROGRESS NOTES
Pharmacy Automatic Renal Dosing Protocol - Antimicrobials    Indication for Antimicrobials: Intraabdominal Infection    Current Regimen of Each Antimicrobial:  Zosyn 3.375 gram iv q8h (start: , day 1)    Previous Antimicrobial Therapy:    Goal Level: (AUC: 400 - 600 mg/hr/Liter/day)     Date Dose & Interval Measured (mcg/mL) Extrapolated (mcg/mL)                       Date & time of next level:     Significant Cultures:    12/10: Paired blood cultures - pending  12/10: urine - pending    Radiology / Imaging results: (X-ray, CT scan or MRI):   12/10: CT abdomen/pelvis: IMPRESSION:  1. Sigmoid diverticulosis with mild adjacent stranding consistent with  diverticulitis. Probable small intramural abscess. 12/10: chest xray: no acute process    Paralysis, amputations, malnutrition: none    Labs:  Recent Labs     12/10/19  2219   CREA 0.95   BUN 32*   WBC 10.0     Temp (24hrs), Av.7 °F (37.1 °C), Min:97.6 °F (36.4 °C), Max:100.2 °F (37.9 °C)    Creatinine Clearance (mL/min) or Dialysis: 34.9    Impression/Plan:   Zosyn dosing is appropriate. BMP daily per MD   Antimicrobial stop date      Pharmacy will follow daily and adjust medications as appropriate for renal function and/or serum levels. Thank you,  FERNY Russell    Recommended duration of therapy  [email or web address]://[facility]/[place]/[facility]/Pharmacy/Clinical%20Companion/Duration%20of%[email or web address]. docx    Renal Dosing  http://[facility]/[place]/[facility]/Pharmacy/Clinical%20Companion/Renal%[id number]. pdf

## 2019-12-12 ENCOUNTER — APPOINTMENT (OUTPATIENT)
Dept: NON INVASIVE DIAGNOSTICS | Age: 84
DRG: 391 | End: 2019-12-12
Attending: INTERNAL MEDICINE
Payer: MEDICARE

## 2019-12-12 ENCOUNTER — APPOINTMENT (OUTPATIENT)
Dept: CT IMAGING | Age: 84
DRG: 391 | End: 2019-12-12
Attending: HOSPITALIST
Payer: MEDICARE

## 2019-12-12 LAB
ANION GAP SERPL CALC-SCNC: 1 MMOL/L (ref 5–15)
ANION GAP SERPL CALC-SCNC: 4 MMOL/L (ref 5–15)
APTT PPP: 22.7 SEC (ref 22.1–32)
AV VELOCITY RATIO: 0.48
AV VTI RATIO: 0.6
BUN SERPL-MCNC: 20 MG/DL (ref 6–20)
BUN SERPL-MCNC: 21 MG/DL (ref 6–20)
BUN/CREAT SERPL: 27 (ref 12–20)
BUN/CREAT SERPL: 27 (ref 12–20)
CALCIUM SERPL-MCNC: 8.4 MG/DL (ref 8.5–10.1)
CALCIUM SERPL-MCNC: 8.6 MG/DL (ref 8.5–10.1)
CHLORIDE SERPL-SCNC: 106 MMOL/L (ref 97–108)
CHLORIDE SERPL-SCNC: 106 MMOL/L (ref 97–108)
CO2 SERPL-SCNC: 30 MMOL/L (ref 21–32)
CO2 SERPL-SCNC: 32 MMOL/L (ref 21–32)
CREAT SERPL-MCNC: 0.74 MG/DL (ref 0.55–1.02)
CREAT SERPL-MCNC: 0.77 MG/DL (ref 0.55–1.02)
ECHO AV AREA PEAK VELOCITY: 1.5 CM2
ECHO AV AREA VTI: 1.8 CM2
ECHO AV MEAN GRADIENT: 8.5 MMHG
ECHO AV MEAN VELOCITY: 1.37 M/S
ECHO AV PEAK GRADIENT: 15.7 MMHG
ECHO AV PEAK VELOCITY: 198.3 CM/S
ECHO AV VTI: 34.82 CM
ECHO EST RA PRESSURE: 10 MMHG
ECHO LA AREA 4C: 18.6 CM2
ECHO LA MAJOR AXIS: 2.47 CM
ECHO LA VOL 4C: 45.67 ML (ref 22–52)
ECHO LA VOLUME INDEX A4C: 29.48 ML/M2 (ref 16–28)
ECHO LV E' LATERAL VELOCITY: 3.05 CM/S
ECHO LV E' SEPTAL VELOCITY: 3.9 CM/S
ECHO LV EDV A4C: 97.4 ML
ECHO LV EDV INDEX A4C: 62.9 ML/M2
ECHO LV EJECTION FRACTION A4C: 45 %
ECHO LV ESV A4C: 53.6 ML
ECHO LV ESV INDEX A4C: 34.6 ML/M2
ECHO LV INTERNAL DIMENSION DIASTOLIC: 2.89 CM (ref 3.9–5.3)
ECHO LV INTERNAL DIMENSION SYSTOLIC: 2.64 CM
ECHO LV IVSD: 1.56 CM (ref 0.6–0.9)
ECHO LV MASS 2D: 135.2 G (ref 67–162)
ECHO LV MASS INDEX 2D: 87.3 G/M2 (ref 43–95)
ECHO LV POSTERIOR WALL DIASTOLIC: 1.07 CM (ref 0.6–0.9)
ECHO LVOT CARDIAC OUTPUT: 6.1 L/MIN
ECHO LVOT DIAM: 1.98 CM
ECHO LVOT PEAK GRADIENT: 3.7 MMHG
ECHO LVOT PEAK VELOCITY: 95.55 CM/S
ECHO LVOT SV: 63.7 ML
ECHO LVOT VTI: 20.68 CM
ECHO MV A VELOCITY: 128.94 CM/S
ECHO MV AREA PHT: 5.1 CM2
ECHO MV AREA VTI: 3.5 CM2
ECHO MV E DECELERATION TIME (DT): 125.8 MS
ECHO MV E VELOCITY: 67.65 CM/S
ECHO MV E/A RATIO: 0.52
ECHO MV E/E' LATERAL: 22.18
ECHO MV E/E' RATIO (AVERAGED): 19.76
ECHO MV E/E' SEPTAL: 17.35
ECHO MV MAX VELOCITY: 128.49 CM/S
ECHO MV MEAN GRADIENT: 2.7 MMHG
ECHO MV MEAN INFLOW VELOCITY: 0.75 M/S
ECHO MV PEAK GRADIENT: 6.6 MMHG
ECHO MV PRESSURE HALF TIME (PHT): 43.1 MS
ECHO MV VTI: 18.19 CM
ECHO PULMONARY ARTERY SYSTOLIC PRESSURE (PASP): 36.8 MMHG
ECHO PV MAX VELOCITY: 97.05 CM/S
ECHO PV MEAN GRADIENT: 1.4 MMHG
ECHO PV PEAK GRADIENT: 3.8 MMHG
ECHO PV VTI: 15.87 CM
ECHO RA AREA 4C: 18.95 CM2
ECHO RIGHT VENTRICULAR SYSTOLIC PRESSURE (RVSP): 36.8 MMHG
ECHO TV A WAVE: 91.75 CM/S
ECHO TV E WAVE: 52.08 CM/S
ECHO TV EROA: 1.8
ECHO TV REGURGITANT MAX VELOCITY: 258.77 CM/S
ECHO TV REGURGITANT PEAK GRADIENT: 26.8 MMHG
ERYTHROCYTE [DISTWIDTH] IN BLOOD BY AUTOMATED COUNT: 14.1 % (ref 11.5–14.5)
GLUCOSE BLD STRIP.AUTO-MCNC: 125 MG/DL (ref 65–100)
GLUCOSE SERPL-MCNC: 122 MG/DL (ref 65–100)
GLUCOSE SERPL-MCNC: 134 MG/DL (ref 65–100)
HCT VFR BLD AUTO: 38.4 % (ref 35–47)
HGB BLD-MCNC: 11.1 G/DL (ref 11.5–16)
INR PPP: 1.2 (ref 0.9–1.1)
LVFS 2D: 8.66 %
LVOT MG: 2.24 MMHG
LVOT MV: 0.72 CM/S
MCH RBC QN AUTO: 25.2 PG (ref 26–34)
MCHC RBC AUTO-ENTMCNC: 28.9 G/DL (ref 30–36.5)
MCV RBC AUTO: 87.1 FL (ref 80–99)
MV DEC SLOPE: 5.38
NRBC # BLD: 0 K/UL (ref 0–0.01)
NRBC BLD-RTO: 0 PER 100 WBC
PLATELET # BLD AUTO: 203 K/UL (ref 150–400)
PMV BLD AUTO: 10.8 FL (ref 8.9–12.9)
POTASSIUM SERPL-SCNC: 4.4 MMOL/L (ref 3.5–5.1)
POTASSIUM SERPL-SCNC: 4.7 MMOL/L (ref 3.5–5.1)
PROTHROMBIN TIME: 12.2 SEC (ref 9–11.1)
RBC # BLD AUTO: 4.41 M/UL (ref 3.8–5.2)
SERVICE CMNT-IMP: ABNORMAL
SODIUM SERPL-SCNC: 139 MMOL/L (ref 136–145)
SODIUM SERPL-SCNC: 140 MMOL/L (ref 136–145)
THERAPEUTIC RANGE,PTTT: NORMAL SECS (ref 58–77)
TROPONIN I SERPL-MCNC: 0.16 NG/ML
WBC # BLD AUTO: 8.4 K/UL (ref 3.6–11)

## 2019-12-12 PROCEDURE — 74011250636 HC RX REV CODE- 250/636: Performed by: SURGERY

## 2019-12-12 PROCEDURE — 94760 N-INVAS EAR/PLS OXIMETRY 1: CPT

## 2019-12-12 PROCEDURE — 85027 COMPLETE CBC AUTOMATED: CPT

## 2019-12-12 PROCEDURE — 70496 CT ANGIOGRAPHY HEAD: CPT

## 2019-12-12 PROCEDURE — 94640 AIRWAY INHALATION TREATMENT: CPT

## 2019-12-12 PROCEDURE — 74011636320 HC RX REV CODE- 636/320: Performed by: SURGERY

## 2019-12-12 PROCEDURE — 84484 ASSAY OF TROPONIN QUANT: CPT

## 2019-12-12 PROCEDURE — 70450 CT HEAD/BRAIN W/O DYE: CPT

## 2019-12-12 PROCEDURE — 74011250636 HC RX REV CODE- 250/636: Performed by: INTERNAL MEDICINE

## 2019-12-12 PROCEDURE — 65610000006 HC RM INTENSIVE CARE

## 2019-12-12 PROCEDURE — 74011250636 HC RX REV CODE- 250/636: Performed by: HOSPITALIST

## 2019-12-12 PROCEDURE — 0042T CT CODE NEURO PERF W CBF: CPT

## 2019-12-12 PROCEDURE — 92526 ORAL FUNCTION THERAPY: CPT

## 2019-12-12 PROCEDURE — 82962 GLUCOSE BLOOD TEST: CPT

## 2019-12-12 PROCEDURE — 77010033678 HC OXYGEN DAILY

## 2019-12-12 PROCEDURE — 77030029684 HC NEB SM VOL KT MONA -A

## 2019-12-12 PROCEDURE — 0042T CT PERF W CBF: CPT

## 2019-12-12 PROCEDURE — 85610 PROTHROMBIN TIME: CPT

## 2019-12-12 PROCEDURE — 36415 COLL VENOUS BLD VENIPUNCTURE: CPT

## 2019-12-12 PROCEDURE — 92610 EVALUATE SWALLOWING FUNCTION: CPT

## 2019-12-12 PROCEDURE — 74011000250 HC RX REV CODE- 250: Performed by: INTERNAL MEDICINE

## 2019-12-12 PROCEDURE — 74011000258 HC RX REV CODE- 258: Performed by: SURGERY

## 2019-12-12 PROCEDURE — 80048 BASIC METABOLIC PNL TOTAL CA: CPT

## 2019-12-12 PROCEDURE — 77030038269 HC DRN EXT URIN PURWCK BARD -A

## 2019-12-12 PROCEDURE — 74011250636 HC RX REV CODE- 250/636

## 2019-12-12 PROCEDURE — 85730 THROMBOPLASTIN TIME PARTIAL: CPT

## 2019-12-12 PROCEDURE — 74011000258 HC RX REV CODE- 258: Performed by: HOSPITALIST

## 2019-12-12 PROCEDURE — 3E03317 INTRODUCTION OF OTHER THROMBOLYTIC INTO PERIPHERAL VEIN, PERCUTANEOUS APPROACH: ICD-10-PCS | Performed by: HOSPITALIST

## 2019-12-12 PROCEDURE — 93306 TTE W/DOPPLER COMPLETE: CPT

## 2019-12-12 RX ORDER — SODIUM CHLORIDE 0.9 % (FLUSH) 0.9 %
5-40 SYRINGE (ML) INJECTION EVERY 8 HOURS
Status: DISCONTINUED | OUTPATIENT
Start: 2019-12-12 | End: 2019-12-18

## 2019-12-12 RX ORDER — IPRATROPIUM BROMIDE AND ALBUTEROL SULFATE 2.5; .5 MG/3ML; MG/3ML
3 SOLUTION RESPIRATORY (INHALATION)
Status: DISCONTINUED | OUTPATIENT
Start: 2019-12-12 | End: 2019-12-19

## 2019-12-12 RX ORDER — SODIUM CHLORIDE 0.9 % (FLUSH) 0.9 %
5-40 SYRINGE (ML) INJECTION AS NEEDED
Status: DISCONTINUED | OUTPATIENT
Start: 2019-12-12 | End: 2019-12-20 | Stop reason: HOSPADM

## 2019-12-12 RX ORDER — DIPHENHYDRAMINE HYDROCHLORIDE 50 MG/ML
25 INJECTION, SOLUTION INTRAMUSCULAR; INTRAVENOUS ONCE
Status: COMPLETED | OUTPATIENT
Start: 2019-12-12 | End: 2019-12-12

## 2019-12-12 RX ORDER — SODIUM CHLORIDE 0.9 % (FLUSH) 0.9 %
10 SYRINGE (ML) INJECTION
Status: COMPLETED | OUTPATIENT
Start: 2019-12-12 | End: 2019-12-12

## 2019-12-12 RX ORDER — SODIUM CHLORIDE 9 MG/ML
75 INJECTION, SOLUTION INTRAVENOUS CONTINUOUS
Status: DISCONTINUED | OUTPATIENT
Start: 2019-12-12 | End: 2019-12-17

## 2019-12-12 RX ORDER — DEXTROSE MONOHYDRATE 25 G/50ML
12.5 INJECTION, SOLUTION INTRAVENOUS
Status: ACTIVE | OUTPATIENT
Start: 2019-12-12 | End: 2019-12-13

## 2019-12-12 RX ORDER — ONDANSETRON 2 MG/ML
4 INJECTION INTRAMUSCULAR; INTRAVENOUS
Status: DISCONTINUED | OUTPATIENT
Start: 2019-12-12 | End: 2019-12-20 | Stop reason: HOSPADM

## 2019-12-12 RX ORDER — ATORVASTATIN CALCIUM 40 MG/1
80 TABLET, FILM COATED ORAL
Status: DISCONTINUED | OUTPATIENT
Start: 2019-12-12 | End: 2019-12-20 | Stop reason: HOSPADM

## 2019-12-12 RX ADMIN — ALTEPLASE 4.98 MG: KIT at 07:28

## 2019-12-12 RX ADMIN — IPRATROPIUM BROMIDE AND ALBUTEROL SULFATE 3 ML: .5; 3 SOLUTION RESPIRATORY (INHALATION) at 11:22

## 2019-12-12 RX ADMIN — SODIUM CHLORIDE 50 ML: 900 INJECTION, SOLUTION INTRAVENOUS at 08:22

## 2019-12-12 RX ADMIN — IOPAMIDOL 100 ML: 755 INJECTION, SOLUTION INTRAVENOUS at 06:25

## 2019-12-12 RX ADMIN — PIPERACILLIN AND TAZOBACTAM 3.38 G: 3; .375 INJECTION, POWDER, LYOPHILIZED, FOR SOLUTION INTRAVENOUS at 02:15

## 2019-12-12 RX ADMIN — METHYLPREDNISOLONE SODIUM SUCCINATE 125 MG: 40 INJECTION, POWDER, FOR SOLUTION INTRAMUSCULAR; INTRAVENOUS at 05:57

## 2019-12-12 RX ADMIN — Medication 10 ML: at 21:10

## 2019-12-12 RX ADMIN — Medication 10 ML: at 14:33

## 2019-12-12 RX ADMIN — MORPHINE SULFATE 2 MG: 2 INJECTION, SOLUTION INTRAMUSCULAR; INTRAVENOUS at 07:02

## 2019-12-12 RX ADMIN — IPRATROPIUM BROMIDE AND ALBUTEROL SULFATE 3 ML: .5; 3 SOLUTION RESPIRATORY (INHALATION) at 15:27

## 2019-12-12 RX ADMIN — PIPERACILLIN AND TAZOBACTAM 3.38 G: 3; .375 INJECTION, POWDER, LYOPHILIZED, FOR SOLUTION INTRAVENOUS at 18:01

## 2019-12-12 RX ADMIN — PIPERACILLIN AND TAZOBACTAM 3.38 G: 3; .375 INJECTION, POWDER, LYOPHILIZED, FOR SOLUTION INTRAVENOUS at 10:49

## 2019-12-12 RX ADMIN — Medication 10 ML: at 06:25

## 2019-12-12 RX ADMIN — SODIUM CHLORIDE 75 ML/HR: 900 INJECTION, SOLUTION INTRAVENOUS at 08:45

## 2019-12-12 RX ADMIN — METHYLPREDNISOLONE SODIUM SUCCINATE 40 MG: 40 INJECTION, POWDER, FOR SOLUTION INTRAMUSCULAR; INTRAVENOUS at 21:10

## 2019-12-12 RX ADMIN — SODIUM CHLORIDE 75 ML/HR: 900 INJECTION, SOLUTION INTRAVENOUS at 21:11

## 2019-12-12 RX ADMIN — METHYLPREDNISOLONE SODIUM SUCCINATE 40 MG: 40 INJECTION, POWDER, FOR SOLUTION INTRAMUSCULAR; INTRAVENOUS at 10:42

## 2019-12-12 RX ADMIN — ALTEPLASE 44.79 MG: KIT at 07:30

## 2019-12-12 RX ADMIN — DIPHENHYDRAMINE HYDROCHLORIDE 25 MG: 50 INJECTION, SOLUTION INTRAMUSCULAR; INTRAVENOUS at 06:05

## 2019-12-12 RX ADMIN — IPRATROPIUM BROMIDE AND ALBUTEROL SULFATE 3 ML: .5; 3 SOLUTION RESPIRATORY (INHALATION) at 19:18

## 2019-12-12 RX ADMIN — Medication 10 ML: at 09:37

## 2019-12-12 NOTE — PROGRESS NOTES
General Surgery End of Shift Nursing Note        Shift worked:   [id number]   Summary of shift:  Chronic back/Acute abdominal pain persists. No s/s bleeding. Code Stroke called at 0535 for Left sided facial droop and slurred speech. See RRT note. Issues for physician to address: Pt states she's only been taking quinapril 40mg daily HS at home (she thought this was how it was prescribed) BP's normal to low yesterday, last night's dose held.       Number times ambulated in hallway past shift: 0    Number of times OOB to chair past shift: 0    Pain Management:  Current medication: Tylenol #3, Morphine  Patient states pain is manageable on current pain medication: YES    GI:    Current diet:  DIET CLEAR LIQUID    Tolerating current diet: YES  Passing flatus: YES  Last Bowel Movement: 12/10      Respiratory:    4L NC at baseline        Zaida Godfrey RN

## 2019-12-12 NOTE — PROGRESS NOTES
Patient to CCU room 2512. Patient being transferred to bed and attached to monitor. Patient alert and oriented X4. Patient reports no discomfort. Patient with left mouth droop. Dual skin assessment with Parker Perry RN reveals pink barely blanchable area on right buttock, medial area of buttock, both arms with scattered ecchymosis. Reynaldo scale 15.   0725 NIH 3.   0728 TPA bolus started at this time. 0730 TPA drip started at this time. 3287 Dr. Astrid Kidd in to see patient. 5252 Dr. Jose Henriquez in to see patient. 3926 Speech therapy in to see patient. 1105 left a/c IV bleeding at site and tape loosened and IV out. Hand pressure held to site and pressure dsg. Placed over old IV site. 1112 Patient O2 sats 83-86%. Patient keeps trying to go to sleep and breathing through her mouth. Venti mask placed back on patient at this time. O2 sat 93%. Dr. Jose Henriquez notified of IV site being lost and patient with only 1 IV at this time. Unable to re-draw CBC at this time, due to loss of IV site. Dr. Jose Henriquez aware, ok to hold off on CBC at this time. [address] Dona Coy in to see patient. 1136 Left arm dressing from IV site clean and dry, no oozing or bleeding noted at this time. 1249 echo tech in to do echocardiogram.  1250 Dr. Jama Abreu in to see patient. 1511 Patient with incontinent of large amount of urine. Patient cleaned,  Bed pad changed and new pure wick placed. [phone] Patient still with some drooping of mouth on left side, but patient able to smile and left side of mouth much better with patients smile at this time. Patient states that she feels like her mouth is less droopy. 1900 No changes. Report to Balbir Hall RN.
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
## 2019-12-12 NOTE — CDMP QUERY
Patient admitted with \"Rectal Bleeding and irregular heart beat. Noted documentation of \"Acute on chronic respiratory failure with hypoxia\" in IM consult note on 12/11. Please provide clinical indicators/treatment to support this diagnosis. The medical record reflects the following:   
   Risk Factors: Hx COPD w/ 2.5-3L home O2 at HS and PRN Clinical Indicators: RR: 20-43. Sera Linda .. 95% on 5L (wears at baseline). ...... noted per ED: with significant pmhx of COPD (02 use at home 3-5L pm at home. .... Sera Linda Sera Linda Respiratory distress present. .. Sera Linda Sera Linda Sera Linda Rhonchi and rales (Bilateral bases) present. .. Sera Linda Sera Linda CXR: No acute process. ... Sera Linda noted: Patient reported being on 3 L of oxygen at baseline, currently is on 4 L. Treatment: Supplemental O2; CXR; IV Solu-Medrol; C/w Incruse; C/w Nebs q6 as needed; Home dose prednisone increased to 10mg daily Thank you, Kaykay Click CDI

## 2019-12-12 NOTE — PROGRESS NOTES
Physical Therapy    Chart reviewed, patient had code stroke called last night and now in TPA window. Plan to defer PT eval today as per guidelines and plan to see tomorrow 11/13/19.     Popeye Shin

## 2019-12-12 NOTE — PROGRESS NOTES
Hospitalist Progress Note    NAME: Leigha Herron   :  1935   MRN:  [id number]       Assessment / Plan:  Pt admitted to Surgery service for Acute Diverticulitis with abscess, being treated conservatively for now. Had Code stroke 2019  Around 550 AM. Currently getting TPA. Acute CVA  Left Sided Facial droop/Slurred speech with Left Ext weakness    -CTA showed  1. 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 2. At least 70% stenosis at origin of right internal carotid artery. No neuro intervention as per NIS. -getting TPA  -Tomorrow CT brain/MRI Brain    -ECHO pending  -Check A1c and Lipid panel    Acute on chronic respiratory failure with hypoxia  Mild COPD exacerbation  Patient reported being on 3 L of oxygen at baseline, currently is on 4 L. C/w incruse      Sinus tachycardia resolved   On admission , pt HR went up to 130-140, resolved . Last HR <100  C/w BB. Pt denies any chest pain      History of provoked DVT x3  Patient reported being initially on warfarin which was switched to Eliquis 1 year ago, she stopped taking Eliquis by herself because it was expensive and no physician was monitoring Eliquis per patient        Hypertension  Continue BB , acei      Acute complicated diverticulitis   C/w IV abx, CLD , pain control . Management per surgery       Discussed with Neurologist.    Daniel Gutiérrez continue to follow     Subjective:     Chief Complaint / Reason for Physician Visit  \"pt seen in ICU today. Getting TPA now. \". Discussed with RN events overnight.      Review of Systems:  Symptom Y/N Comments  Symptom Y/N Comments   Fever/Chills n   Chest Pain n    Poor Appetite n   Edema n    Cough n   Abdominal Pain     Sputum n   Joint Pain     SOB/CRAIG    Pruritis/Rash     Nausea/vomit    Tolerating PT/OT     Diarrhea    Tolerating Diet     Constipation    Other       Could NOT obtain due to:      Objective:     VITALS:   Last 24hrs VS reviewed since prior progress note. Most recent are:  Patient Vitals for the past 24 hrs:   Temp Pulse Resp BP SpO2   12/12/19 1330  (!) 105 22 113/63 92 %   12/12/19 1300  (!) 116 26 110/60 91 %   12/12/19 1252    125/58    12/12/19 1230  (!) 114 26 125/58 94 %   12/12/19 1200 97.5 °F (36.4 °C) 95 25 120/62 92 %   12/12/19 1130  (!) 107 27 125/77 95 %   12/12/19 1122     95 %   12/12/19 1112  94 26  93 %   12/12/19 1104  95 27  (!) 86 %   12/12/19 1100  (!) 101 20 129/76 (!) 89 %   12/12/19 1030  (!) 109 23 148/68 90 %   12/12/19 1000  (!) 104 24 132/68 95 %   12/12/19 0930  (!) 112 26 117/60 93 %   12/12/19 0928  94 25 127/63 94 %   12/12/19 0913  97 24 127/84 90 %   12/12/19 0900  82 22 127/70 91 %   12/12/19 0858  83 21 110/61 93 %   12/12/19 0843  98 25 121/57 91 %   12/12/19 0828  (!) 102 27 123/66 92 %   12/12/19 0823  86 25 128/63 94 %   12/12/19 0813  91 25 126/63 91 %   12/12/19 0800 98 °F (36.7 °C) 100 23 138/72 93 %   12/12/19 0758 98 °F (36.7 °C) 97 22 129/75 94 %   12/12/19 0743 97.8 °F (36.6 °C) 98 25 134/78 93 %   12/12/19 0735 97.7 °F (36.5 °C) 83 23 130/64 97 %   12/12/19 0728 97.7 °F (36.5 °C) 89 23 132/63 94 %   12/12/19 0725  99 27 138/68 93 %   12/12/19 0718  (!) 102 23 135/78 94 %   12/12/19 0645    145/88    12/12/19 0555     97 %   12/12/19 0545  99 18 143/74 95 %   12/12/19 0542 97.5 °F (36.4 °C) 99 18 141/72 94 %   12/12/19 0319 97.5 °F (36.4 °C) 79 18 122/68 92 %   12/11/19 2341 97.8 °F (36.6 °C) 67 18 104/61 94 %   12/11/19 1827 97.7 °F (36.5 °C) 97 19 125/65 96 %   12/11/19 1459   22     12/11/19 1451 98.2 °F (36.8 °C) 87 17 131/72 93 %       Intake/Output Summary (Last 24 hours) at 12/12/2019 1345  Last data filed at 12/12/2019 1300  Gross per 24 hour   Intake 1034.58 ml   Output 175 ml   Net 859.58 ml        PHYSICAL EXAM:  General: WD, WN. Alert, cooperative, no acute distress    EENT:  EOMI. Anicteric sclerae.  MMM  Resp:  CTA bilaterally, no wheezing or rales. No accessory muscle use  CV:  Regular  rhythm,  No edema  GI:  Soft, Non distended, Non tender.  +Bowel sounds  Neurologic:  Alert and oriented X 3 left facial droop. Left ext 4/5 strength. Decreased sensation on left ext  Psych:   Good insight. Not anxious nor agitated  Skin:  No rashes. No jaundice    Reviewed most current lab test results and cultures  YES  Reviewed most current radiology test results   YES  Review and summation of old records today    NO  Reviewed patient's current orders and MAR    YES  PMH/SH reviewed - no change compared to H&P  ________________________________________________________________________  Care Plan discussed with:    Comments   Patient x    Family      RN x    Care Manager     Consultant  x Neuro                     Multidiciplinary team rounds were held today with , nursing, pharmacist and clinical coordinator. Patient's plan of care was discussed; medications were reviewed and discharge planning was addressed. ________________________________________________________________________  Total NON critical care TIME:  28   Minutes    Total CRITICAL CARE TIME Spent:   Minutes non procedure based      Comments   >50% of visit spent in counseling and coordination of care     ________________________________________________________________________  Loco Galarza MD     Procedures: see electronic medical records for all procedures/Xrays and details which were not copied into this note but were reviewed prior to creation of Plan. LABS:  I reviewed today's most current labs and imaging studies.   Pertinent labs include:  Recent Labs     12/12/19  0317 12/10/19  2219   WBC 8.4 10.0   HGB 11.1* 12.4   HCT 38.4 42.5    215     Recent Labs     12/12/19  1013 12/12/19  0317 12/10/19  2219    139 140   K 4.4 4.7 4.6    106 103   CO2 30 32 34*   * 122* 150*   BUN 20 21* 32*   CREA 0.74 0.77 0.95   CA 8.4* 8.6 9.4   ALB  -- --  2.8*   TBILI  --   --  0.4   SGOT  --   --  11*   ALT  --   --  12   INR 1.2*  --  1.0       Signed: Raimundo Medrano MD

## 2019-12-12 NOTE — PROGRESS NOTES
SURGERY PROGRESS NOTE      Admit Date: 12/10/2019      Subjective:     Patient had an acute stroke and was placed on TPA over night. She states she feels better than on admission. Abdominal pain is resolved. Denies nausea, fevers and chills    Objective:     Visit Vitals  /59   Pulse 96   Temp 97.5 °F (36.4 °C)   Resp 23   Ht 5' 2\" (1.575 m)   Wt 55.3 kg (122 lb)   SpO2 92%   BMI 22.31 kg/m²        Temp (24hrs), Av.8 °F (36.6 °C), Min:97.5 °F (36.4 °C), Max:98.2 °F (36.8 °C)      701 -  190  In: 633.3 [I.V.:633.3]  Out: 175 [Urine:175]  12/10 1901 -  0700  In: 3527.9 [P.O.:60; I.V.:3467.9]  Out: 500 [Urine:500]    Physical Exam:    General:  alert, cooperative, no distress, appears stated age   Abdomen: soft, bowel sounds active, non-tender   neuro:   left facial drop, and left arm weakness            Lab Results   Component Value Date/Time    WBC 8.4 2019 03:17 AM    HGB 11.1 (L) 2019 03:17 AM    HCT 38.4 2019 03:17 AM    PLATELET 490  03:17 AM    MCV 87.1 2019 03:17 AM     Lab Results   Component Value Date/Time    GFR est non-AA >60 2019 10:13 AM    GFRNA, POC >60 2019 01:57 PM    GFR est AA >60 2019 10:13 AM    GFRAA, POC >60 2019 01:57 PM    Creatinine 0.74 2019 10:13 AM    Creatinine (POC) 0.8 2019 01:57 PM    BUN 20 2019 10:13 AM    Sodium 140 2019 10:13 AM    Potassium 4.4 2019 10:13 AM    Chloride 106 2019 10:13 AM    CO2 30 2019 10:13 AM    Magnesium 1.7 2016 02:16 AM       Assessment:     Active Problems:    Diverticulitis of large intestine with abscess (2019)      Diverticular disease of large intestine with complication ()    diverticulitis -  Improving with medical management. Very unlikely to need surgery this admission    CVA - on TPA, some improvement but still residual weakness. Appreciate hospitalist and neurology assistance.    Pain is MRI tomorrow     Arrhythmias -  Episodic and not persistent.   TTE

## 2019-12-12 NOTE — PROGRESS NOTES
Responded to code stroke. Called due to new left facial droop and slurred speech. Last known well 3:00 AM per nursing. On my assessment she is alert and follows commands, has dysarthria, left facial droop, 4/5 L arm strength (handgrip, elbow and shoulder flexion). Will obtain a STAT head CT and CTA of head neck for code stroke protocol. Pre-medicate with IV solumedrol and diphenhydramine for contrast allergy. Discussed with tele-neurology on-call who will assess the patient after imaging. Noted the pt had been on Eliquis in the past but she reports not being on any anticoagulation since April of this year. 6:36 AM d/w Radiology:  R MCA territory CVA  CTA shows distal right M2 occlusion  Infarct appears completed  Will d/w NIS      D/w Dr. Regina Olivas  Occlusion actually appears M3/M4, appears completed, no significant ischemic penumbra nothing to do from a NIS standpoint. Will d/w tele-neuro. D/w Dr. Kandy Sepulveda tele-neuro who evaluated the patient. Discussed the above findings with him. Recommends tPA. Orders placed. Transfer to ICU.

## 2019-12-12 NOTE — PROGRESS NOTES
0535: Noticed new left sided facial droop and slurred speech. Code stroke called. 0800: Notified pt's daughter Evelyn Levy of event and transfer that occurred this morning.
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
## 2019-12-12 NOTE — PROGRESS NOTES
Chart reviewed, patient had code stroke called last night and now in TPA window. Plan to defer OT eval today as per guidelines. She will be out of TPA window after 18:30pm today. Will eval pt tomorrow if cleared to be seen. Will continue to follow.

## 2019-12-12 NOTE — PROGRESS NOTES
Pharmacy - Enoxaparin (Lovenox®) Monitoring      Indication: VTE prophylaxis     Current Dose: Enoxaparin 40 mg subcutaneously every 24 hours    Labs:  Recent Labs     12/10/19  2219   CREA 0.95   HGB 12.4      INR 1.0     Wt Readings from Last 1 Encounters:   12/10/19 55.3 kg (122 lb)     Ht Readings from Last 1 Encounters:   12/10/19 157.5 cm (62\")       Impression/Plan:   Change to heparin 5000 units subcutaneously every 12 hours per protocol for low weight patients <60 kg       Thanks,  Ivis Nicholson, PHARMD

## 2019-12-12 NOTE — PROGRESS NOTES
RAPID RESPONSE TEAM- CODE S    0537:  Responded to overhead CODE S to room 2142. Patient alert in the bed, oriented x4, with left sided facial droop, left sided weakness, and slurred/garbeled speech. NIH 7. . Discrepency of last known well, per primary RN Sari, she last saw patient at baseline at 23:30, however [facility] states she saw patient at baseline at 0300.     0543: Dr. Jim Agustin at bedside, assessment completed. MD would like Neuro CT/CTA, however patient with allergy to Nacho Broom, Dr. Jim Agustin requested radiologist to be paged to obtain pre-medication orders. 9664: Radiology paged, awaiting response. 6860: Tele-neurologist consulted for Dr. Jim Agustin to [phone].     56: Dr. Jim Agustin discussing with Dr. Elly Peters (tele-neurologist). 1294: Patient medicated benadryl 25 mg and solu-medrol 125 mg IV.     1024: Patient arrived at CT/CTA.    5827: Patient returned to room 2143. Dr. Jim Agustin called requesting telephone number for [facility] neuro IR. CT/CTA results: \"Acute occlusion right M2 branch with associated moderate-sized area of hypoperfusion lateral frontal lobe, corresponding to infarct on noncontrast CT. \"    9597: Tele-Neuro called and notified of patient return to room. 9633: Dr. Elly Peters on tele-neuro screen, performing assessment. 1941: Dr. Elly Peters given Dr. Jim Agustin phone number ([phone]) for patient treatment discussion. 0700: Dr. Jim Agustin placed orders for TPA and transfer to CCU. Patient Vitals for the past 8 hrs:   Temp Pulse Resp BP SpO2   12/12/19 0728    132/63    12/12/19 0645    145/88    12/12/19 0555     97 %   12/12/19 0545  99 18 143/74 95 %   12/12/19 0542 97.5 °F (36.4 °C) 99 18 141/72 94 %   12/12/19 0319 97.5 °F (36.4 °C) 79 18 122/68 92 %   12/11/19 2341 97.8 °F (36.6 °C) 67 18 104/61 94 %       Patient transferred to CCU and care handed over to CCU team.     Please call with any needs or concerns.      Alyce Vallecillo  [facility] [place]

## 2019-12-12 NOTE — PROGRESS NOTES
12/12/2019    INTENSIVIST PROGRESS NOTE:     Patient seen and evaluated, chart reviewed. Patient initially admitted with acute diverticulitis with plans for conservative management. She had a mild COPD exacerbation which was improving, but she developed an acute stroke last night with facial droop. Was administered TPA with some neurologic improvement. No significant bleeding. Has some abdominal pain but is also hungry.      Visit Vitals  /84   Pulse 97   Temp 98 °F (36.7 °C)   Resp 24   Ht 5' 2\" (1.575 m)   Wt 55.3 kg (122 lb)   SpO2 90%   BMI 22.31 kg/m²       General: alert, NAD  Eyes: anicteric  HEENT: NC/AT  CV: RRR  Lungs: decreased bilateral air movement, no wheeze  Abd: soft, +BS  Ext: no cyanosis, no clubbing  Skin: warm and dry  Musculoskeletal: no gross deformities    Head CT reports reviewed  Labs reviewed    A/P:  - acute stroke s/p TPA - continue post-stroke care, TPA completed at 0800 today  - COPD baseline 3 LPM with mild exacerbation, continue bronchodilators, patient of Dr. Alessandra Suárez  - diverticulitis per surgery  - h/o recurrent DVTs, has been off anticoagulation  Gage Srivastava MD

## 2019-12-12 NOTE — CONSULTS
Vascular Surgery Consult Note  12/12/2019    Subjective:     Anna Johnson is a 80 y.o.  female w/ a pmhx significant for COPD, HTN, diverticulosis, PUD, and GERD. She has a hx of tracheostomy and DVT. She is admitted to the hospital w/ acute diverticulitis w/ abscess. She suffers from chronic constipation with associated abd pain and has been manually disimpacting herself 3x per week for the last 2 months. She has failed to report this to her PCP as he is a male and she felt uncomfortable discussing it w/ him. She was encouraged by a fellow Saurav Sullivan who is an NP to seek medical evaluation. She reports palpitations and presyncope for the last severe months (holter test was unremarkable). She has recently had multiple GLFs as well resulting in compression fractures of the spine. Her hospital course was complicated by hypotension. At approximate 0535 this am patient was noted to have left sided facial droop, left sided weakness and slurred speech. A code stroke was initiated. A CTA of the head and neck was significant for an acute occlusion of the right M2 branch with associated moderate-sized area of hypoperfusion nof the lateral frontal lobe. TPA was administered. Her CTA also revealed a right ICA stenosis of > 70%. Past Medical History   COPD  -w/ chronic respiratory failure that is oxygen dependent. She is intermittently non-compliant w/ oxygen use. She uses a  [employer] machine she purchased herself after \"firing\" [employer].     Hypertension  Diverticulosis   GERD  PUD  Constipation  Bladder prolapse  MRSA after foot sx  Provoked DVT x 3  -non-complaint w/ Eliquis   Spinal compression fractures   Right eye blindness     Past Procedural History   Tracheotomy s/p throat sx  Cystocele w/ bladder tacking   Foot sx  Back sx  Esophageal dilation   Breast Augmentation  Appendectomy   Bilateral cataract removal    Family History   Problem Relation Age of Onset    Alcohol abuse Mother  Heart Disease Mother         CHF    Diabetes Mother     Hypertension Mother     Emphysema Mother     Asthma Father     Alcohol abuse Father     Cancer Sister         STOMACH CA    Alcohol abuse Sister     Cancer Brother         LIVER CA    Alcohol abuse Brother     Alcohol abuse Daughter     Diabetes Sister     Hypertension Sister       Social History     Tobacco Use    Smoking status: Former Smoker     Years: 15.00     Last attempt to quit: 4/21/2004     Years since quitting: 15.6    Smokeless tobacco: Never Used   Substance Use Topics    Alcohol use: Yes     Comment: approx 2 mixed drinks per year       Patient uses a walker and furniture to ambulate baseline. She is routinely independent of her ADLs. She gets into the bathtub weekly and takes sponge baths in between. She continues to cook and drive. Prior to Admission medications    Medication Sig Start Date End Date Taking? Authorizing Provider   bumetanide (BUMEX) 1 mg tablet Take 1 mg by mouth daily. Provider, Historical   potassium chloride (KLOR-CON) 10 mEq tablet Take 10 mEq by mouth daily. Provider, Historical   methocarbamol (ROBAXIN-750) 750 mg tablet Take 1 Tab by mouth four (4) times daily as needed. 11/14/18   Michael Pride MD   Oxygen 3 Devices by Nasal route nightly. Provider, Historical   pramipexole (MIRAPEX) 0.5 mg tablet Take 0.5 mg by mouth daily. Other, MD Gianfranco   tiotropium (SPIRIVA WITH HANDIHALER) 18 mcg inhalation capsule Take 1 Cap by inhalation daily. Provider, Historical   spironolactone (ALDACTONE) 25 mg tablet Take 25 mg by mouth daily. Provider, Historical   metoprolol succinate (TOPROL-XL) 50 mg XL tablet Take 50 mg by mouth daily. Provider, Historical   multivitamin (ONE A DAY) tablet Take 1 Tab by mouth daily. Provider, Historical   quinapril (ACCUPRIL) 40 mg tablet Take 40 mg by mouth two (2) times a day.  Patient has been taking once per day    Other, MD Gianfranco     Allergies Allergen Reactions    Adhesive Tape-Silicones Other (comments)     Unsure     Ivp Dye [Fd And C Blue No.1] Rash    Tylenol [Acetaminophen] Nausea and Vomiting     Patient reports no reaction to Percocet. As of 12/18/18 pt states she has taken tylenol since without problems. As of 1/21/2018 pt states she cannot take tylenol as it makes her extremely nauseous. Review of Systems   Constitutional: Positive for activity change. Negative for chills, fatigue and fever. HENT: Negative for congestion. Eyes: Negative for visual disturbance. Respiratory: Positive for shortness of breath. Negative for cough and choking. Cardiovascular: Positive for palpitations. Negative for chest pain. Gastrointestinal: Positive for abdominal pain, blood in stool and constipation. Negative for nausea and vomiting. Endocrine: Negative for polydipsia and polyuria. Genitourinary: Negative. Musculoskeletal: Positive for arthralgias, back pain and gait problem. Skin: Negative for color change and wound. Neurological: Positive for dizziness, facial asymmetry, speech difficulty, weakness and numbness. Hematological: Negative. Psychiatric/Behavioral: Negative.       Objective:       Patient Vitals for the past 24 hrs:   BP Temp Pulse Resp SpO2 Height Weight   12/12/19 1400 106/59  96 23 92 %     12/12/19 1330 113/63  (!) 105 22 92 %     12/12/19 1300 110/60  (!) 116 26 91 %     12/12/19 1252 125/58     5' 2\" (1.575 m) 55.3 kg (122 lb)   12/12/19 1230 125/58  (!) 114 26 94 %     12/12/19 1200 120/62 97.5 °F (36.4 °C) 95 25 92 %     12/12/19 1130 125/77  (!) 107 27 95 %     12/12/19 1122     95 %     12/12/19 1112   94 26 93 %     12/12/19 1104   95 27 (!) 86 %     12/12/19 1100 129/76  (!) 101 20 (!) 89 %     12/12/19 1030 148/68  (!) 109 23 90 %     12/12/19 1000 132/68  (!) 104 24 95 %     12/12/19 0930 117/60  (!) 112 26 93 %     12/12/19 0928 127/63  94 25 94 %     12/12/19 0913 127/84  97 24 90 %     12/12/19 0900 127/70  82 22 91 %     12/12/19 0858 110/61  83 21 93 %     12/12/19 0843 121/57  98 25 91 %     12/12/19 0828 123/66  (!) 102 27 92 %     12/12/19 0823 128/63  86 25 94 %     12/12/19 0813 126/63  91 25 91 %     12/12/19 0800 138/72 98 °F (36.7 °C) 100 23 93 %     12/12/19 0758 129/75 98 °F (36.7 °C) 97 22 94 %     12/12/19 0743 134/78 97.8 °F (36.6 °C) 98 25 93 %     12/12/19 0735 130/64 97.7 °F (36.5 °C) 83 23 97 %     12/12/19 0728 132/63 97.7 °F (36.5 °C) 89 23 94 %     12/12/19 0725 138/68  99 27 93 %     12/12/19 0718 135/78  (!) 102 23 94 %     12/12/19 0645 145/88         12/12/19 0555     97 %     12/12/19 0545 143/74  99 18 95 %     12/12/19 0542 141/72 97.5 °F (36.4 °C) 99 18 94 %     12/12/19 0319 122/68 97.5 °F (36.4 °C) 79 18 92 %     12/11/19 2341 104/61 97.8 °F (36.6 °C) 67 18 94 %     12/11/19 1827 125/65 97.7 °F (36.5 °C) 97 19 96 %     12/11/19 1459    22      12/11/19 1451 131/72 98.2 °F (36.8 °C) 87 17 93 %       Physical Exam  Constitutional:       Appearance: Normal appearance. HENT:      Head: Normocephalic and atraumatic. Nose: Nose normal.      Mouth/Throat:      Mouth: Mucous membranes are moist.   Eyes:      Pupils: Pupils are equal, round, and reactive to light. Neck:      Musculoskeletal: Normal range of motion. Vascular: Carotid bruit present. Cardiovascular:      Rate and Rhythm: Normal rate and regular rhythm. Pulmonary:      Effort: Pulmonary effort is normal.      Breath sounds: Normal breath sounds. Abdominal:      General: Abdomen is flat. Palpations: Abdomen is soft. Musculoskeletal: Normal range of motion. Skin:     General: Skin is warm and dry. Neurological:      Mental Status: She is alert and oriented to person, place, and time. Motor: Weakness (LUE ) and pronator drift present.       Comments: Right eye blindness  Left facial droop    Psychiatric:         Mood and Affect: Mood normal.         Behavior: Behavior normal.       Pertinent Test Results:   Recent Results (from the past 24 hour(s))   METABOLIC PANEL, BASIC    Collection Time: 12/12/19  3:17 AM   Result Value Ref Range    Sodium 139 136 - 145 mmol/L    Potassium 4.7 3.5 - 5.1 mmol/L    Chloride 106 97 - 108 mmol/L    CO2 32 21 - 32 mmol/L    Anion gap 1 (L) 5 - 15 mmol/L    Glucose 122 (H) 65 - 100 mg/dL    BUN 21 (H) 6 - 20 MG/DL    Creatinine 0.77 0.55 - 1.02 MG/DL    BUN/Creatinine ratio 27 (H) 12 - 20      GFR est AA >60 >60 ml/min/1.73m2    GFR est non-AA >60 >60 ml/min/1.73m2    Calcium 8.6 8.5 - 10.1 MG/DL   CBC W/O DIFF    Collection Time: 12/12/19  3:17 AM   Result Value Ref Range    WBC 8.4 3.6 - 11.0 K/uL    RBC 4.41 3.80 - 5.20 M/uL    HGB 11.1 (L) 11.5 - 16.0 g/dL    HCT 38.4 35.0 - 47.0 %    MCV 87.1 80.0 - 99.0 FL    MCH 25.2 (L) 26.0 - 34.0 PG    MCHC 28.9 (L) 30.0 - 36.5 g/dL    RDW 14.1 11.5 - 14.5 %    PLATELET 701 110 - 804 K/uL    MPV 10.8 8.9 - 12.9 FL    NRBC 0.0 0  WBC    ABSOLUTE NRBC 0.00 0.00 - 0.01 K/uL   GLUCOSE, POC    Collection Time: 12/12/19  5:41 AM   Result Value Ref Range    Glucose (POC) 125 (H) 65 - 100 mg/dL    Performed by Ernesto Bingham, BASIC    Collection Time: 12/12/19 10:13 AM   Result Value Ref Range    Sodium 140 136 - 145 mmol/L    Potassium 4.4 3.5 - 5.1 mmol/L    Chloride 106 97 - 108 mmol/L    CO2 30 21 - 32 mmol/L    Anion gap 4 (L) 5 - 15 mmol/L    Glucose 134 (H) 65 - 100 mg/dL    BUN 20 6 - 20 MG/DL    Creatinine 0.74 0.55 - 1.02 MG/DL    BUN/Creatinine ratio 27 (H) 12 - 20      GFR est AA >60 >60 ml/min/1.73m2    GFR est non-AA >60 >60 ml/min/1.73m2    Calcium 8.4 (L) 8.5 - 10.1 MG/DL   TROPONIN I    Collection Time: 12/12/19 10:13 AM   Result Value Ref Range    Troponin-I, Qt. 0.16 (H) <0.05 ng/mL   PTT    Collection Time: 12/12/19 10:13 AM   Result Value Ref Range    aPTT 22.7 22.1 - 32.0 sec    aPTT, therapeutic range     58.0 - 77.0 SECS   PROTHROMBIN TIME + INR    Collection Time: 12/12/19 10:13 AM   Result Value Ref Range    INR 1.2 (H) 0.9 - 1.1      Prothrombin time 12.2 (H) 9.0 - 11.1 sec   ECHO ADULT COMPLETE    Collection Time: 12/12/19  1:18 PM   Result Value Ref Range    Right Atrial Area 4C 18.95 cm2    LV E' Lateral Velocity 3.05 cm/s    LV E' Septal Velocity 3.90 cm/s    Aortic Valve Systolic Peak Velocity 850.55 cm/s    AoV VTI 34.82 cm    Aortic Valve Area by Continuity of Peak Velocity 1.5 cm2    Aortic Valve Area by Continuity of VTI 1.8 cm2    AoV PG 15.7 mmHg    LVIDd 2.89 (A) 3.9 - 5.3 cm    LVPWd 1.07 (A) 0.6 - 0.9 cm    LVIDs 2.64 cm    IVSd 1.56 (A) 0.6 - 0.9 cm    LV ES Vol A4C 53.6 mL    LVOT d 1.98 cm    LVOT Peak Velocity 95.55 cm/s    LVOT Peak Gradient 3.7 mmHg    LVOT VTI 20.68 cm    MVA (PHT) 5.1 cm2    MV A González 128.94 cm/s    MV E [place] 67.65 cm/s    MV E/A 0.50     MV Mean Gradient 2.7 mmHg    Mitral Valve Annulus Velocity Time Integral 18.19 cm    Pulmonic Valve Systolic Mean Gradient 1.4 mmHg    Pulmonic Valve Systolic Velocity Time Integral 15.87 cm    Tricuspid Valve E Wave Peak Velocity 52.08 cm/s    Tricuspid Valve A Wave Peak Velocity 91.75 cm/s    Aortic Valve Systolic Mean Gradient 8.5 mmHg    LV Ejection Fraction MOD 4C 45 %    LVOT Cardiac Output 6.1 L/min    LA Vol 4C 45.67 22 - 52 mL    LA Area 4C 18.6 cm2    LV Mass .2 67 - 162 g    LV Mass AL Index 87.3 43 - 95 g/m2    E/E' lateral 22.18     E/E' septal 17.35     TR ERO 1.8     RVSP 36.8 mmHg    MV Peak Gradient 6.6 mmHg    E/E' ratio (averaged) 19.76     LV ED Vol A4C 97.4 mL    Est. RA Pressure 10.0 mmHg    Mitral Valve E Wave Deceleration Time 125.8 ms    Mitral Valve Pressure Half-time 43.1 ms    Left Atrium Major Axis 2.47 cm    Triscuspid Valve Regurgitation Peak Gradient 26.8 mmHg    Pulmonic Valve Max Velocity 97.05 cm/s    Mitral Valve Max Velocity 128.49 cm/s    MVA VTI 3.5 cm2    LVOT SV 63.7 ml    TR Max Velocity 258.77 cm/s    PASP 36.8 mmHg    LA Vol Index 29.48 16 - 28 ml/m2    LVED Vol Index A4C 62.9 mL/m2    LVES Vol Index A4C 34.6 mL/m2    Left Ventricular Fractional Shortening by 2D 0.7467581844 %    Left Ventricular Outflow Tract Mean Gradient 9.210054844575 mmHg    Left Ventricular Outflow Tract Mean Velocity 1.08011456115530 cm/s    Mitral Valve Deceleration [place] 0.4920871303670     Mitral valve mean inflow velocity 1.540728718142 m/s    AV Velocity Ratio 0.48     AV VTI Ratio 0.6     Aortic valve mean velocity 6.6672923855033 m/s    PV peak gradient 3.8 mmHg       Assessmen/Plan:     Consult problem   Symptomatic right ICA stenosis w/ a right hemispheric acute CVA  -s/p tPA    Recommend Plavix/ASA from a vascular standpoint once cleared from a general surgery standpoint. Recommend statin. Check carotid ultrasound. May need carotid revascularization as outpatient once she recovers from stroke and episode of diverticulitis. Active problems  Diverticulitis w/ abscess  -conservative management per general surgery     Acute on chronic hypoxic/hypercapnic respiratory failure   COPD exacerbation   Hypertension  GERD  Hx of PUD  Constipation  Hx of spinal compression fractures   Management of comorbid conditions by primary team.    VTE Prophylaxis:  Hx of provoked DVT x 3  -non-complaint w/ Eliquis     Disposition:  Inpatient rehabilitation     Vascular surgery will continue to follow intermittently while admitted. We appreciate the opportunity to participate in the care of Ms. Kaley Gilmore. Please call for any urgent vascular issues.          Signed By: Vesta Herring NP     December 12, 2019
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
## 2019-12-13 ENCOUNTER — APPOINTMENT (OUTPATIENT)
Dept: CT IMAGING | Age: 84
DRG: 391 | End: 2019-12-13
Attending: PSYCHIATRY & NEUROLOGY
Payer: MEDICARE

## 2019-12-13 ENCOUNTER — APPOINTMENT (OUTPATIENT)
Dept: VASCULAR SURGERY | Age: 84
DRG: 391 | End: 2019-12-13
Attending: SURGERY
Payer: MEDICARE

## 2019-12-13 LAB
ANION GAP SERPL CALC-SCNC: 5 MMOL/L (ref 5–15)
BACTERIA SPEC CULT: ABNORMAL
BUN SERPL-MCNC: 19 MG/DL (ref 6–20)
BUN/CREAT SERPL: 26 (ref 12–20)
CALCIUM SERPL-MCNC: 8.5 MG/DL (ref 8.5–10.1)
CC UR VC: ABNORMAL
CHLORIDE SERPL-SCNC: 111 MMOL/L (ref 97–108)
CHOLEST SERPL-MCNC: 153 MG/DL
CO2 SERPL-SCNC: 28 MMOL/L (ref 21–32)
CREAT SERPL-MCNC: 0.74 MG/DL (ref 0.55–1.02)
EST. AVERAGE GLUCOSE BLD GHB EST-MCNC: 154 MG/DL
GLUCOSE SERPL-MCNC: 113 MG/DL (ref 65–100)
HBA1C MFR BLD: 7 % (ref 4–5.6)
HDLC SERPL-MCNC: 43 MG/DL
HDLC SERPL: 3.6 {RATIO} (ref 0–5)
LDLC SERPL CALC-MCNC: 84.4 MG/DL (ref 0–100)
LIPID PROFILE,FLP: NORMAL
POTASSIUM SERPL-SCNC: 4.2 MMOL/L (ref 3.5–5.1)
SERVICE CMNT-IMP: ABNORMAL
SODIUM SERPL-SCNC: 144 MMOL/L (ref 136–145)
TRIGL SERPL-MCNC: 128 MG/DL (ref ?–150)
TROPONIN I SERPL-MCNC: 0.08 NG/ML
VLDLC SERPL CALC-MCNC: 25.6 MG/DL

## 2019-12-13 PROCEDURE — 51798 US URINE CAPACITY MEASURE: CPT

## 2019-12-13 PROCEDURE — 92526 ORAL FUNCTION THERAPY: CPT

## 2019-12-13 PROCEDURE — 74011250636 HC RX REV CODE- 250/636: Performed by: HOSPITALIST

## 2019-12-13 PROCEDURE — 93005 ELECTROCARDIOGRAM TRACING: CPT

## 2019-12-13 PROCEDURE — 74011000250 HC RX REV CODE- 250: Performed by: INTERNAL MEDICINE

## 2019-12-13 PROCEDURE — 74011250636 HC RX REV CODE- 250/636: Performed by: SURGERY

## 2019-12-13 PROCEDURE — 84484 ASSAY OF TROPONIN QUANT: CPT

## 2019-12-13 PROCEDURE — 83036 HEMOGLOBIN GLYCOSYLATED A1C: CPT

## 2019-12-13 PROCEDURE — 65610000006 HC RM INTENSIVE CARE

## 2019-12-13 PROCEDURE — 97165 OT EVAL LOW COMPLEX 30 MIN: CPT

## 2019-12-13 PROCEDURE — 80048 BASIC METABOLIC PNL TOTAL CA: CPT

## 2019-12-13 PROCEDURE — 36415 COLL VENOUS BLD VENIPUNCTURE: CPT

## 2019-12-13 PROCEDURE — 80061 LIPID PANEL: CPT

## 2019-12-13 PROCEDURE — 97161 PT EVAL LOW COMPLEX 20 MIN: CPT

## 2019-12-13 PROCEDURE — 74011250637 HC RX REV CODE- 250/637: Performed by: INTERNAL MEDICINE

## 2019-12-13 PROCEDURE — 74011000258 HC RX REV CODE- 258: Performed by: SURGERY

## 2019-12-13 PROCEDURE — 94640 AIRWAY INHALATION TREATMENT: CPT

## 2019-12-13 PROCEDURE — 74011250636 HC RX REV CODE- 250/636: Performed by: INTERNAL MEDICINE

## 2019-12-13 PROCEDURE — 93882 EXTRACRANIAL UNI/LTD STUDY: CPT

## 2019-12-13 PROCEDURE — 77010033678 HC OXYGEN DAILY

## 2019-12-13 PROCEDURE — 74011250637 HC RX REV CODE- 250/637: Performed by: SURGERY

## 2019-12-13 PROCEDURE — 70450 CT HEAD/BRAIN W/O DYE: CPT

## 2019-12-13 PROCEDURE — 97530 THERAPEUTIC ACTIVITIES: CPT

## 2019-12-13 RX ORDER — LORAZEPAM 2 MG/ML
1 INJECTION INTRAMUSCULAR
Status: DISCONTINUED | OUTPATIENT
Start: 2019-12-13 | End: 2019-12-20 | Stop reason: HOSPADM

## 2019-12-13 RX ORDER — BUMETANIDE 0.25 MG/ML
1 INJECTION INTRAMUSCULAR; INTRAVENOUS DAILY
Status: DISCONTINUED | OUTPATIENT
Start: 2019-12-13 | End: 2019-12-14

## 2019-12-13 RX ADMIN — METHOCARBAMOL TABLETS 750 MG: 750 TABLET, COATED ORAL at 08:23

## 2019-12-13 RX ADMIN — Medication 10 ML: at 05:19

## 2019-12-13 RX ADMIN — IPRATROPIUM BROMIDE AND ALBUTEROL SULFATE 3 ML: .5; 3 SOLUTION RESPIRATORY (INHALATION) at 15:08

## 2019-12-13 RX ADMIN — IPRATROPIUM BROMIDE AND ALBUTEROL SULFATE 3 ML: .5; 3 SOLUTION RESPIRATORY (INHALATION) at 19:57

## 2019-12-13 RX ADMIN — MORPHINE SULFATE 2 MG: 2 INJECTION, SOLUTION INTRAMUSCULAR; INTRAVENOUS at 14:42

## 2019-12-13 RX ADMIN — MORPHINE SULFATE 2 MG: 2 INJECTION, SOLUTION INTRAMUSCULAR; INTRAVENOUS at 09:29

## 2019-12-13 RX ADMIN — Medication 10 ML: at 14:46

## 2019-12-13 RX ADMIN — METHYLPREDNISOLONE SODIUM SUCCINATE 40 MG: 40 INJECTION, POWDER, FOR SOLUTION INTRAMUSCULAR; INTRAVENOUS at 21:04

## 2019-12-13 RX ADMIN — ONDANSETRON 4 MG: 2 INJECTION INTRAMUSCULAR; INTRAVENOUS at 07:16

## 2019-12-13 RX ADMIN — UMECLIDINIUM 1 PUFF: 62.5 AEROSOL, POWDER ORAL at 09:37

## 2019-12-13 RX ADMIN — Medication 10 ML: at 21:05

## 2019-12-13 RX ADMIN — Medication 1 AMPULE: at 10:47

## 2019-12-13 RX ADMIN — PIPERACILLIN AND TAZOBACTAM 3.38 G: 3; .375 INJECTION, POWDER, LYOPHILIZED, FOR SOLUTION INTRAVENOUS at 17:32

## 2019-12-13 RX ADMIN — IPRATROPIUM BROMIDE AND ALBUTEROL SULFATE 3 ML: .5; 3 SOLUTION RESPIRATORY (INHALATION) at 11:44

## 2019-12-13 RX ADMIN — PIPERACILLIN AND TAZOBACTAM 3.38 G: 3; .375 INJECTION, POWDER, LYOPHILIZED, FOR SOLUTION INTRAVENOUS at 02:44

## 2019-12-13 RX ADMIN — LORAZEPAM 1 MG: 2 INJECTION INTRAMUSCULAR; INTRAVENOUS at 17:29

## 2019-12-13 RX ADMIN — Medication 10 ML: at 21:04

## 2019-12-13 RX ADMIN — METHYLPREDNISOLONE SODIUM SUCCINATE 40 MG: 40 INJECTION, POWDER, FOR SOLUTION INTRAMUSCULAR; INTRAVENOUS at 08:24

## 2019-12-13 RX ADMIN — Medication 10 ML: at 14:47

## 2019-12-13 RX ADMIN — SODIUM CHLORIDE 75 ML/HR: 900 INJECTION, SOLUTION INTRAVENOUS at 22:43

## 2019-12-13 RX ADMIN — BUMETANIDE 1 MG: 0.25 INJECTION INTRAMUSCULAR; INTRAVENOUS at 15:28

## 2019-12-13 RX ADMIN — IPRATROPIUM BROMIDE AND ALBUTEROL SULFATE 3 ML: .5; 3 SOLUTION RESPIRATORY (INHALATION) at 07:35

## 2019-12-13 RX ADMIN — PIPERACILLIN AND TAZOBACTAM 3.38 G: 3; .375 INJECTION, POWDER, LYOPHILIZED, FOR SOLUTION INTRAVENOUS at 09:37

## 2019-12-13 RX ADMIN — Medication 1 AMPULE: at 21:05

## 2019-12-13 RX ADMIN — MORPHINE SULFATE 2 MG: 2 INJECTION, SOLUTION INTRAMUSCULAR; INTRAVENOUS at 21:05

## 2019-12-13 NOTE — PROGRESS NOTES
Vascular Surgery Progress Note  Devang Al ACNP-BC  12/13/2019       Subjective:     Tabitha Brar is a 80 y.o.  female w/ a pmhx significant for COPD, HTN, diverticulosis, PUD, and GERD. She has a hx of tracheostomy and DVT. She is admitted to the hospital w/ acute diverticulitis w/ abscess. She suffers from chronic constipation with associated abd pain and has been manually disimpacting herself 3x per week for the last 2 months. She has failed to report this to her PCP as he is a male and she felt uncomfortable discussing it w/ him. She was encouraged by a fellow Balta Carrasco who is an NP to seek medical evaluation. She reports palpitations and presyncope for the last severe months (holter test was unremarkable). She has recently had multiple GLFs as well resulting in compression fractures of the spine. Her hospital course has been complicated by hypotension.       At approximate 0535 am on hospital day one (12/12/2019) patient was noted to have left sided facial droop, left sided weakness and slurred speech. A code stroke was initiated. A CTA of the head and neck was significant for an acute occlusion of the right M2 branch with associated moderate-sized area of hypoperfusion nof the lateral frontal lobe. TPA was administered. Her CTA also revealed a right ICA stenosis of > 70% and we were asked to evaluate. Carotid duplex was ordered and her right ICA stenosis is <50%. Suspect the CTA was an over read.       Nursing Data:     Patient Vitals for the past 24 hrs:   BP Temp Pulse Resp SpO2 Height Weight   12/13/19 1144     94 %     12/13/19 1100 155/72  98 26 92 %     12/13/19 1000 147/80  95 26 92 %     12/13/19 0900 139/83  (!) 101 (!) 31 95 %     12/13/19 0816 151/58 97.7 °F (36.5 °C) (!) 103 (!) 32 92 %     12/13/19 0749     96 %     12/13/19 0735     97 %     12/13/19 0700 153/77  92 26 90 %     12/13/19 0600 142/81  77 27 92 %     12/13/19 0500 148/75  90 23 90 %     12/13/19 0400 146/74 98.7 °F (37.1 °C) 98 25 90 %     12/13/19 0300 124/77  94 25 (!) 89 %     12/13/19 0200 140/85  (!) 106 29      12/13/19 0100 133/67  (!) 107 20 95 %     12/13/19 0000 120/53 98.9 °F (37.2 °C) 100 22 95 %     12/12/19 2300 130/56  (!) 116 24 93 %     12/12/19 2200 110/72  (!) 102 25 100 %     12/12/19 2100 115/60  (!) 103 24      12/12/19 2000 118/63 99 °F (37.2 °C) (!) 109 25 96 %     12/12/19 1918     97 %     12/12/19 1900 120/66  93 23 92 %     12/12/19 1800 126/58  (!) 103 29 93 %     12/12/19 1700 127/69  (!) 107 24 94 %     12/12/19 1600 111/56 99.1 °F (37.3 °C) (!) 102 27 94 %     12/12/19 1530 112/56  (!) 101 24 93 %     12/12/19 1528     93 %     12/12/19 1500 133/84  (!) 108 22 93 %     12/12/19 1430 124/62  100 28 97 %     12/12/19 1400 106/59  96 23 92 %     12/12/19 1330 113/63  (!) 105 22 92 %     12/12/19 1300 110/60  (!) 116 26 91 %     12/12/19 1252 125/58     5' 2\" (1.575 m) 55.3 kg (122 lb)   12/12/19 1230 125/58  (!) 114 26 94 %       ---------------------------------------------------------------------------------------------------------    Intake/Output Summary (Last 24 hours) at 12/13/2019 1222  Last data filed at 12/13/2019 0730  Gross per 24 hour   Intake 1662.5 ml   Output 300 ml   Net 1362.5 ml       Exam:     Physical Exam  Constitutional:       Appearance: Normal appearance. HENT:      Head: Normocephalic and atraumatic. Nose: Nose normal.      Mouth/Throat:      Mouth: Mucous membranes are moist.   Eyes:      Pupils: Pupils are equal, round, and reactive to light. Neck:      Musculoskeletal: Normal range of motion. Cardiovascular:      Rate and Rhythm: Normal rate and regular rhythm. Pulmonary:      Effort: Pulmonary effort is normal.      Breath sounds: Normal breath sounds. Abdominal:      General: Abdomen is flat. Palpations: Abdomen is soft. Musculoskeletal: Normal range of motion. Skin:     General: Skin is warm and dry. Neurological:      Mental Status: She is alert and oriented to person, place, and time. Motor: Weakness (LUE ) and pronator drift present. Comments: Right eye blindness  Left facial droop    Psychiatric:         Mood and Affect: Mood normal.         Behavior: Behavior normal.      Lab Review:     .   Recent Results (from the past 24 hour(s))   ECHO ADULT COMPLETE    Collection Time: 12/12/19  1:18 PM   Result Value Ref Range    Right Atrial Area 4C 18.95 cm2    LV E' Lateral Velocity 3.05 cm/s    LV E' Septal Velocity 3.90 cm/s    Aortic Valve Systolic Peak Velocity 574.95 cm/s    AoV VTI 34.82 cm    Aortic Valve Area by Continuity of Peak Velocity 1.5 cm2    Aortic Valve Area by Continuity of VTI 1.8 cm2    AoV PG 15.7 mmHg    LVIDd 2.89 (A) 3.9 - 5.3 cm    LVPWd 1.07 (A) 0.6 - 0.9 cm    LVIDs 2.64 cm    IVSd 1.56 (A) 0.6 - 0.9 cm    LV ES Vol A4C 53.6 mL    LVOT d 1.98 cm    LVOT Peak Velocity 95.55 cm/s    LVOT Peak Gradient 3.7 mmHg    LVOT VTI 20.68 cm    MVA (PHT) 5.1 cm2    MV A González 128.94 cm/s    MV E González 67.65 cm/s    MV E/A 0.52     MV Mean Gradient 2.7 mmHg    Mitral Valve Annulus Velocity Time Integral 18.19 cm    Pulmonic Valve Systolic Mean Gradient 1.4 mmHg    Pulmonic Valve Systolic Velocity Time Integral 15.87 cm    Tricuspid Valve E Wave Peak Velocity 52.08 cm/s    Tricuspid Valve A Wave Peak Velocity 91.75 cm/s    Aortic Valve Systolic Mean Gradient 8.5 mmHg    LV Ejection Fraction MOD 4C 45 %    LVOT Cardiac Output 6.1 L/min    LA Vol 4C 45.67 22 - 52 mL    LA Area 4C 18.6 cm2    LV Mass .2 67 - 162 g    LV Mass AL Index 87.3 43 - 95 g/m2    E/E' lateral 22.18     E/E' septal 17.35     TR ERO 1.8     RVSP 36.8 mmHg    MV Peak Gradient 6.6 mmHg    E/E' ratio (averaged) 19.76     LV ED Vol A4C 97.4 mL    Est. RA Pressure 10.0 mmHg    Mitral Valve E Wave Deceleration Time 125.8 ms    Mitral Valve Pressure Half-time 43.1 ms    Left Atrium Major Axis 2.47 cm    Triscuspid Valve Regurgitation Peak Gradient 26.8 mmHg    Pulmonic Valve Max Velocity 97.05 cm/s    Mitral Valve Max Velocity 128.49 cm/s    MVA VTI 3.5 cm2    LVOT SV 63.7 ml    TR Max Velocity 258.77 cm/s    PASP 36.8 mmHg    LA Vol Index 29.48 16 - 28 ml/m2    LVED Vol Index A4C 62.9 mL/m2    LVES Vol Index A4C 34.6 mL/m2    Left Ventricular Fractional Shortening by 2D 4.2858631047 %    Left Ventricular Outflow Tract Mean Gradient 2.121938164193 mmHg    Left Ventricular Outflow Tract Mean Velocity 4.55218576611093 cm/s    Mitral Valve Deceleration Dare 3.2297565255303     Mitral valve mean inflow velocity 4.065528079920 m/s    AV Velocity Ratio 0.48     AV VTI Ratio 0.6     Aortic valve mean velocity 4.9133677974049 m/s    PV peak gradient 3.8 mmHg   METABOLIC PANEL, BASIC    Collection Time: 12/13/19  6:22 AM   Result Value Ref Range    Sodium 144 136 - 145 mmol/L    Potassium 4.2 3.5 - 5.1 mmol/L    Chloride 111 (H) 97 - 108 mmol/L    CO2 28 21 - 32 mmol/L    Anion gap 5 5 - 15 mmol/L    Glucose 113 (H) 65 - 100 mg/dL    BUN 19 6 - 20 MG/DL    Creatinine 0.74 0.55 - 1.02 MG/DL    BUN/Creatinine ratio 26 (H) 12 - 20      GFR est AA >60 >60 ml/min/1.73m2    GFR est non-AA >60 >60 ml/min/1.73m2    Calcium 8.5 8.5 - 10.1 MG/DL   HEMOGLOBIN A1C WITH EAG    Collection Time: 12/13/19  6:22 AM   Result Value Ref Range    Hemoglobin A1c 7.0 (H) 4.0 - 5.6 %    Est. average glucose 154 mg/dL   LIPID PANEL    Collection Time: 12/13/19  6:22 AM   Result Value Ref Range    LIPID PROFILE          Cholesterol, total 153 <200 MG/DL    Triglyceride 128 <150 MG/DL    HDL Cholesterol 43 MG/DL    LDL, calculated 84.4 0 - 100 MG/DL    VLDL, calculated 25.6 MG/DL    CHOL/HDL Ratio 3.6 0.0 - 5.0     DUPLEX CAROTID RIGHT    Collection Time: 12/13/19  9:04 AM   Result Value Ref Range    Right subclavian sys 69.0 cm/s    RIGHT SUBCLAVIAN ARTERY D 11.60 cm/s    Right cca dist sys 45.5 cm/s    Right CCA dist ureña 19.4 cm/s    Right CCA prox sys 50.7 cm/s    Right CCA prox ureña 12.9 cm/s    Right ICA dist sys 69.2 cm/s    Right ICA dist ureña 15.2 cm/s    Right ICA mid sys 60.9 cm/s    Right ICA mid ureña 19.5 cm/s    Right ICA prox sys 41.6 cm/s    Right ICA prox ureña 14.2 cm/s    Right eca sys 85.4 cm/s    RIGHT EXTERNAL CAROTID ARTERY D 8.60 cm/s    Right vertebral sys 25.2 cm/s    RIGHT VERTEBRAL ARTERY D 7.60 cm/s    Right ICA/CCA sys 1.5    TROPONIN I    Collection Time: 12/13/19 11:23 AM   Result Value Ref Range    Troponin-I, Qt. 0.08 (H) <0.05 ng/mL          Assessment/Plan:      Consult problem   Mild right ICA stenosis w/ a right hemispheric acute CVA  -s/p tPA  -right ICA stenosis < 50% confirmed w/ over read of CTA and carotid duplex  -mild left ICA stenosis per CTA      Likely not the source of her acute CVA. Antiplt therapy per neurology. Outpatient follow up w/ vascular surgery in 1 month w/ bilateral carotid duplex.       Active problems  Diverticulitis w/ abscess  -conservative management per general surgery      Acute on chronic hypoxic/hypercapnic respiratory failure   COPD exacerbation   Hypertension  GERD  Hx of PUD  Constipation  Hx of spinal compression fractures   Management of comorbid conditions by primary team.     VTE Prophylaxis:  Hx of provoked DVT x 3  -non-complaint w/ Eliquis      Disposition:  Inpatient rehabilitation      Vascular surgery signing off. We appreciate the opportunity to participate in the care of Ms. Waite   Patient should f/u in 6 months w/ Dr. Guaman Art. Please reconsult as needed.

## 2019-12-13 NOTE — PROGRESS NOTES
Speech pathology note  Patient currently on Ventimask with RR 30 at rest. Will follow up later today as respiratory status allows. Thank you.     Luisito Haile., CCC-SLP

## 2019-12-13 NOTE — PROGRESS NOTES
Occupational Therapy:  12/13/19    Orders received and appreciated, chart reviewed, spoke to RN who cleared patient for therapy. Patient seen and evaluated by OT. Evaluation limited by pt's vitals; HR ranged from low 100s to 140s with RR up into 30s. Patient with good participation throughout session, reports being very independent at baseline, lives alone. Recommend IPR at discharge. Full note to follow.     Thank you,  Holli Mars, OTR/L

## 2019-12-13 NOTE — PROGRESS NOTES
1900- Bedside report received from Siria Lei, [facility] [address] [place] [address] completed, see charting for details. Neuro assessment completed with day RN, Siria Lei. Patient resting on ventimask currently with family present. 2120- Spoke with MRI, patient dentures and mesh sling are fine for the morning MRI. MRI screening complete    2300- Bedpan provided    0543- Called MRI, they do not make appt times for inpatients, only outpatients. They would not be able to fit this patient in this morning for her brain MRI.    Will put in for CT at 0800 to ensure we are rechecking brain within 24hours of TPA    0700- Bedside report given to [facility]

## 2019-12-13 NOTE — PROGRESS NOTES
[facility]AN--Rehab. Called daughter to discuss dcp. She was driving but states she is in agreement. She will get back with me later. Faustina Rogers RN BSN CRM        [phone]

## 2019-12-13 NOTE — PROGRESS NOTES
Problem: Dysphagia (Adult)  Goal: *Acute Goals and Plan of Care (Insert Text)  Description  Speech pathology goals initiated 12/12/2019   1. Patient will participate in swallow re-eval within 7 days. MET  2. Patient will participate in formal motor speech eval within 7 days   3. Added 12/13/2019 Patient will tolerate puree/nectar liquid diet with no overt s/s aspiration within 7 days  4. Added 12/13/2019 Patient will tolerate thin liquids with no overt s/s aspiration within 7 days  5. Added 12/13/2019 Patient will tolerate trials of solids with timely/complete mastication and no oral residue within 7 days   Outcome: [facility] TREATMENT  Patient: Rickey Gould (43 y.o. female)  Date: 12/13/2019  Diagnosis: Diverticulitis of large intestine with abscess [K57.20]  Diverticular disease of large intestine with complication [O38.20]   <principal problem not specified>       Precautions: swallow      ASSESSMENT:  Patient with moderate oropharyngeal dysphagia characterized by slow oral prep, delayed posterior propulsion, and delayed swallow initiation. Strong cough noted with thin liquids that was not observed with nectar thick liquids. Patient is at risk for aspiration secondary to R CVA and respiratory status. PLAN:  Recommendations and Planned Interventions:  -Puree / Nectar thick liquid diet.   -Upright 90 degrees, Single sips, Small bites, No ice cream, jello, I.e. foods/drinks that melt and at least distance supervision to ensure tolerance, Use straws. -PO intake only when on nasal cannula, RR<30 and O2 sats >90%. Hold PO if any s/s aspiration or change in vital signs noted  -Meds in puree  -SLP to follow for diet tolerance and liberalization  Patient continues to benefit from skilled intervention to address the above impairments. Continue treatment per established plan of care. Discharge Recommendations:   To Be Determined     SUBJECTIVE:   Patient stated Do you want me to feed you?  Patient alert, cooperative. O2 sats ranged from 86-92% on 5LNC. RR ranged from 20-36. HR ranged from . RN notified. OBJECTIVE:   Cognitive and Communication Status:  Neurologic State: Alert  Orientation Level: Oriented to person  Cognition: Follows commands     Perseveration: No perseveration noted  Safety/Judgement: Awareness of environment  Dysphagia Treatment:  Oral Assessment:  Oral Assessment  Labial: Left droop  Dentition: Natural;Limited; Upper dentures  Oral Hygiene: moist oral mucosa  Lingual: Decreased rate;Decreased strength  P.O. Trials:  Patient Position: upright in bed  Vocal quality prior to P.O.: No impairment  Consistency Presented: Ice chips; Thin liquid; Nectar thick liquid;Puree  How Presented: Self-fed/presented;Cup/sip;SLP-fed/presented;Spoon;Straw;Successive swallows     Bolus Acceptance: No impairment  Bolus Formation/Control: Impaired  Type of Impairment: Delayed  Propulsion: Delayed (# of seconds)  Oral Residue: None  Initiation of Swallow: Delayed (# of seconds)  Laryngeal Elevation: Functional  Aspiration Signs/Symptoms: Strong cough(with thin)                Pain:  Pain Scale 1: Numeric (0 - 10)  Pain Intensity 1: 4  Pain Location 1: Back    After treatment:   Patient left in no apparent distress in bed, Call bell within reach and Nursing notified    COMMUNICATION/EDUCATION:   Patient was educated regarding her deficit(s) of dysphagia as this relates to her diagnosis of CVA. She demonstrated Good understanding as evidenced by verbalizing understanding. The patient's plan of care including recommendations, planned interventions, and recommended diet changes were discussed with: Registered Nurse.     MICHELLE Hernández  Time Calculation: 20 mins

## 2019-12-13 NOTE — PROGRESS NOTES
12/13/2019    INTENSIVIST PROGRESS NOTE:   Initial history:  Patient seen and evaluated, chart reviewed. Patient initially admitted with acute diverticulitis with plans for conservative management. She had a mild COPD exacerbation which was improving, but she developed an acute stroke last night with facial droop. Was administered TPA with some neurologic improvement. No significant bleeding. Has some abdominal pain but is also hungry. 12-13-19: no events.   Has had persistent tachypnea and hypoxia beyond baseline    Visit Vitals  /80   Pulse 95   Temp 97.7 °F (36.5 °C)   Resp 26   Ht 5' 2\" (1.575 m)   Wt 55.3 kg (122 lb)   SpO2 92%   BMI 22.31 kg/m²       General: alert, NAD  Eyes: anicteric  HEENT: NC/AT  CV: RRR  Lungs: decreased bilateral air movement, no wheeze, tachypneic   Abd: soft, +BS  Ext: no cyanosis, no clubbing  Skin: warm and dry  Musculoskeletal: no gross deformities    Head CT reports reviewed  Labs reviewed    A/P:  - acute stroke s/p TPA - continue post-stroke care, TPA completed at 0800 today  - borderline troponin elevation - repeat pending  - COPD baseline 3 LPM with mild exacerbation, continue bronchodilators and steroids, patient of Dr. Awilda Junior  - diverticulitis per surgery  - h/o recurrent DVTs, has been off anticoagulation - will need to restart Coby Willis MD

## 2019-12-13 NOTE — ROUTINE PROCESS
-Please complete MRI History and Safety Screening Form for this patient using [facility] only under Orders Requiring a Screening Form: 
 

## 2019-12-13 NOTE — PROGRESS NOTES
Neurology Progress Note    Patient ID:  Karina Mckeon  [id number]  80 y.o.  1935    CC: stroke    Subjective:      Patient reports improvement. Speech is better. No facial droop and improving left arm strength. Labs revealed increase hemoglobin A1c at 7. Repeat head CT did not reveal any acute changes. Carotid Doppler study on the right revealed mild stenosis. Echocardiogram revealed EF of 51 to 55% but otherwise unremarkable. LDL was elevated at 84.4.     Current Facility-Administered Medications   Medication Dose Route Frequency    sodium chloride (NS) flush 5-40 mL  5-40 mL IntraVENous Q8H    sodium chloride (NS) flush 5-40 mL  5-40 mL IntraVENous PRN    ondansetron (ZOFRAN) injection 4 mg  4 mg IntraVENous Q6H PRN    [Held by provider] atorvastatin (LIPITOR) tablet 80 mg  80 mg Oral QHS    0.9% sodium chloride infusion  75 mL/hr IntraVENous CONTINUOUS    albuterol-ipratropium (DUO-NEB) 2.5 MG-0.5 MG/3 ML  3 mL Nebulization QID RT    methylPREDNISolone (PF) (SOLU-MEDROL) injection 40 mg  40 mg IntraVENous Q12H    [Held by provider] metoprolol succinate (TOPROL-XL) XL tablet 50 mg  50 mg Oral DAILY    methocarbamol (ROBAXIN) tablet 750 mg  750 mg Oral QID PRN    [Held by provider] pramipexole (MIRAPEX) tablet 0.5 mg  0.5 mg Oral DAILY    [Held by provider] spironolactone (ALDACTONE) tablet 25 mg  25 mg Oral DAILY    umeclidinium (INCRUSE ELLIPTA) 62.5 mcg/actuation  1 Puff Inhalation DAILY    sodium chloride (NS) flush 5-40 mL  5-40 mL IntraVENous Q8H    sodium chloride (NS) flush 5-40 mL  5-40 mL IntraVENous PRN    acetaminophen (TYLENOL) tablet 650 mg  650 mg Oral Q6H PRN    morphine injection 2 mg  2 mg IntraVENous Q4H PRN    naloxone (NARCAN) injection 0.4 mg  0.4 mg IntraVENous PRN    diphenhydrAMINE (BENADRYL) injection 25 mg  25 mg IntraVENous Q6H PRN    [Held by provider] polyethylene glycol (MIRALAX) packet 17 g  17 g Oral DAILY    piperacillin-tazobactam (ZOSYN) 3.375 g in 0.9% sodium chloride (MBP/ADV) 100 mL  3.375 g IntraVENous Q8H    acetaminophen-codeine (TYLENOL #3) per tablet 1 Tab  1 Tab Oral Q6H PRN    [Held by provider] quinapril (ACCUPRIL) tablet 40 mg  40 mg Oral BID    albuterol-ipratropium (DUO-NEB) 2.5 MG-0.5 MG/3 ML  3 mL Nebulization Q6H PRN        Review of Systems:    Pertinent items are noted in HPI. Objective:     Patient Vitals for the past 8 hrs:   BP Temp Pulse Resp SpO2   12/13/19 0900 139/83  (!) 101 (!) 31 95 %   12/13/19 0816 151/58 97.7 °F (36.5 °C) (!) 103 (!) 32 92 %   12/13/19 0749     96 %   12/13/19 0735     97 %   12/13/19 0700 153/77  92 26 90 %   12/13/19 0600 142/81  77 27 92 %   12/13/19 0500 148/75  90 23 90 %   12/13/19 0400 146/74 98.7 °F (37.1 °C) 98 25 90 %   12/13/19 0300 124/77  94 25 (!) 89 %   12/13/19 0200 140/85  (!) 106 29        No intake/output data recorded.   12/11 1901 - 12/13 0700  In: 2509.6 [I.V.:2509.6]  Out: 1 [Urine:475]    Lab Review   Recent Results (from the past 24 hour(s))   METABOLIC PANEL, BASIC    Collection Time: 12/12/19 10:13 AM   Result Value Ref Range    Sodium 140 136 - 145 mmol/L    Potassium 4.4 3.5 - 5.1 mmol/L    Chloride 106 97 - 108 mmol/L    CO2 30 21 - 32 mmol/L    Anion gap 4 (L) 5 - 15 mmol/L    Glucose 134 (H) 65 - 100 mg/dL    BUN 20 6 - 20 MG/DL    Creatinine 0.74 0.55 - 1.02 MG/DL    BUN/Creatinine ratio 27 (H) 12 - 20      GFR est AA >60 >60 ml/min/1.73m2    GFR est non-AA >60 >60 ml/min/1.73m2    Calcium 8.4 (L) 8.5 - 10.1 MG/DL   TROPONIN I    Collection Time: 12/12/19 10:13 AM   Result Value Ref Range    Troponin-I, Qt. 0.16 (H) <0.05 ng/mL   PTT    Collection Time: 12/12/19 10:13 AM   Result Value Ref Range    aPTT 22.7 22.1 - 32.0 sec    aPTT, therapeutic range     58.0 - 77.0 SECS   PROTHROMBIN TIME + INR    Collection Time: 12/12/19 10:13 AM   Result Value Ref Range    INR 1.2 (H) 0.9 - 1.1      Prothrombin time 12.2 (H) 9.0 - 11.1 sec   ECHO ADULT COMPLETE Collection Time: 12/12/19  1:18 PM   Result Value Ref Range    Right Atrial Area 4C 18.95 cm2    LV E' Lateral Velocity 3.05 cm/s    LV E' Septal Velocity 3.90 cm/s    Aortic Valve Systolic Peak Velocity 004.65 cm/s    AoV VTI 34.82 cm    Aortic Valve Area by Continuity of Peak Velocity 1.5 cm2    Aortic Valve Area by Continuity of VTI 1.8 cm2    AoV PG 15.7 mmHg    LVIDd 2.89 (A) 3.9 - 5.3 cm    LVPWd 1.07 (A) 0.6 - 0.9 cm    LVIDs 2.64 cm    IVSd 1.56 (A) 0.6 - 0.9 cm    LV ES Vol A4C 53.6 mL    LVOT d 1.98 cm    LVOT Peak Velocity 95.55 cm/s    LVOT Peak Gradient 3.7 mmHg    LVOT VTI 20.68 cm    MVA (PHT) 5.1 cm2    MV A González 128.94 cm/s    MV E [place]ález 67.65 cm/s    MV E/A 0.52     MV Mean Gradient 2.7 mmHg    Mitral Valve Annulus Velocity Time Integral 18.19 cm    Pulmonic Valve Systolic Mean Gradient 1.4 mmHg    Pulmonic Valve Systolic Velocity Time Integral 15.87 cm    Tricuspid Valve E Wave Peak Velocity 52.08 cm/s    Tricuspid Valve A Wave Peak Velocity 91.75 cm/s    Aortic Valve Systolic Mean Gradient 8.5 mmHg    LV Ejection Fraction MOD 4C 45 %    LVOT Cardiac Output 6.1 L/min    LA Vol 4C 45.67 22 - 52 mL    LA Area 4C 18.6 cm2    LV Mass .2 67 - 162 g    LV Mass AL Index 87.3 43 - 95 g/m2    E/E' lateral 22.18     E/E' septal 17.35     TR ERO 1.8     RVSP 36.8 mmHg    MV Peak Gradient 6.6 mmHg    E/E' ratio (averaged) 19.76     LV ED Vol A4C 97.4 mL    Est. RA Pressure 10.0 mmHg    Mitral Valve E Wave Deceleration Time 125.8 ms    Mitral Valve Pressure Half-time 43.1 ms    Left Atrium Major Axis 2.47 cm    Triscuspid Valve Regurgitation Peak Gradient 26.8 mmHg    Pulmonic Valve Max Velocity 97.05 cm/s    Mitral Valve Max Velocity 128.49 cm/s    MVA VTI 3.5 cm2    LVOT SV 63.7 ml    TR Max Velocity 258.77 cm/s    PASP 36.8 mmHg    LA Vol Index 29.48 16 - 28 ml/m2    LVED Vol Index A4C 62.9 mL/m2    LVES Vol Index A4C 34.6 mL/m2    Left Ventricular Fractional Shortening by 2D 4.0083499607 %    Left Ventricular Outflow Tract Mean Gradient 7.268171288465 mmHg    Left Ventricular Outflow Tract Mean Velocity 3.85237299065124 cm/s    Mitral Valve Deceleration [place] 6.9650799191053     Mitral valve mean inflow velocity 5.342562139011 m/s    AV Velocity Ratio 0.48     AV VTI Ratio 0.6     Aortic valve mean velocity 4.1036296693568 m/s    PV peak gradient 3.8 mmHg   METABOLIC PANEL, BASIC    Collection Time: 12/13/19  6:22 AM   Result Value Ref Range    Sodium 144 136 - 145 mmol/L    Potassium 4.2 3.5 - 5.1 mmol/L    Chloride 111 (H) 97 - 108 mmol/L    CO2 28 21 - 32 mmol/L    Anion gap 5 5 - 15 mmol/L    Glucose 113 (H) 65 - 100 mg/dL    BUN 19 6 - 20 MG/DL    Creatinine 0.74 0.55 - 1.02 MG/DL    BUN/Creatinine ratio 26 (H) 12 - 20      GFR est AA >60 >60 ml/min/1.73m2    GFR est non-AA >60 >60 ml/min/1.73m2    Calcium 8.5 8.5 - 10.1 MG/DL   HEMOGLOBIN A1C WITH EAG    Collection Time: 12/13/19  6:22 AM   Result Value Ref Range    Hemoglobin A1c 7.0 (H) 4.0 - 5.6 %    Est. average glucose 154 mg/dL   LIPID PANEL    Collection Time: 12/13/19  6:22 AM   Result Value Ref Range    LIPID PROFILE          Cholesterol, total 153 <200 MG/DL    Triglyceride 128 <150 MG/DL    HDL Cholesterol 43 MG/DL    LDL, calculated 84.4 0 - 100 MG/DL    VLDL, calculated 25.6 MG/DL    CHOL/HDL Ratio 3.6 0.0 - 5.0     DUPLEX CAROTID RIGHT    Collection Time: 12/13/19  9:04 AM   Result Value Ref Range    Right subclavian sys 69.0 cm/s    RIGHT SUBCLAVIAN ARTERY D 11.60 cm/s    Right cca dist sys 45.5 cm/s    Right CCA dist ureña 19.4 cm/s    Right CCA prox sys 50.7 cm/s    Right CCA prox ureña 12.9 cm/s    Right ICA dist sys 69.2 cm/s    Right ICA dist ureña 15.2 cm/s    Right ICA mid sys 60.9 cm/s    Right ICA mid ureña 19.5 cm/s    Right ICA prox sys 41.6 cm/s    Right ICA prox ureña 14.2 cm/s    Right eca sys 85.4 cm/s    RIGHT EXTERNAL CAROTID ARTERY D 8.60 cm/s    Right vertebral sys 25.2 cm/s    RIGHT VERTEBRAL ARTERY D 7.60 cm/s    Right ICA/CCA sys 1.5      NEUROLOGICAL EXAM:     Appearance: The patient is well developed, well nourished, provides a coherent history and is in no acute distress. Mental Status: Oriented to time, place and person. Fluent, very mild dysarthria and no aphasia. Follows simple commands. Mood and affect appropriate. Cranial Nerves:   Intact visual fields on the left. Blind right eye. Pupil on the left was 3 mm and reactive to light. EOM's full, no nystagmus, no ptosis. Facial sensation is normal. Corneal reflexes are intact. Face is symmetric. No left facial droop. Hearing is normal bilaterally. Palate is midline with normal sternocleidomastoid and trapezius muscles are normal. Tongue is midline. Motor:  5/5 strength right extremity and LLE. LUE 4/5. Normal bulk and tone. (+) mild left drift. Reflexes:   Left hyperreflexia. Left upgoing toe. Sensory:   Normal to cold and pinprick. Gait:  Not tested. Tremor:   No tremor noted. Cerebellar:  Slow and on the left FTN/SIERRA.        Assessment:   Acute CVA  Right MCA occlusion  Bilateral ICA stenosis  Hyperlipidemia    Plan:   Neurological examination reveals improvement. Very mild dysarthria, no left facial droop, improving left upper extremity weakness, left-sided hyperreflexia and upgoing toe. Status post acute right posterolateral frontal lobe infarct.     Head CT without contrast revealed moderate sized acute/subacute right posterior lateral frontal lobe infarct. Repeat head   CT revealed no changes. Awaiting brain MRI without contrast.     CTA reveals right 70% ICA stenosis and 40% left ICA stenosis. Vascular surgery has seen the patient. Carotid doppler study reveal mild ICA stenosis only. They will follow her up as an outpatient.     CT also reveals occlusion of the distal right M2 branch of the middle cerebral artery. No intervention necessary per neuro interventional service.     Recommend restarting anticoagulation.      LDL is elevated.  Should be less than 70. Continue statin therapy.     Echocardiogram was unremarkable.      No further recommendations from a neurological standpoint. Follow-up with outpatient neurology in 1 month. Per DMV regulation patient is not to drive for 30 days and until cleared by neurology.     Signed:  Mainor Hendrickson MD  12/13/2019  9:29 AM

## 2019-12-13 NOTE — PROGRESS NOTES
SURGERY PROGRESS NOTE      Admit Date: 12/10/2019        Subjective:     Patient denies abdominal pain. Wants to eat      Objective:     Visit Vitals  /72   Pulse 98   Temp 97.7 °F (36.5 °C)   Resp 26   Ht 5' 2\" (1.575 m)   Wt 55.3 kg (122 lb)   SpO2 92%   BMI 22.31 kg/m²        Temp (24hrs), Av.5 °F (36.9 °C), Min:97.5 °F (36.4 °C), Max:99.1 °F (37.3 °C)      701 - 1900  In: 112.5 [I.V.:112.5]  Out: -   1901 -  0700  In: 2509.6 [I.V.:2509.6]  Out: 718 [Urine:475]    Physical Exam:    General:  alert, cooperative, no distress, appears stated age   Abdomen: soft, bowel sounds active, non-tender           Lab Results   Component Value Date/Time    WBC 8.4 2019 03:17 AM    HGB 11.1 (L) 2019 03:17 AM    HCT 38.4 2019 03:17 AM    PLATELET 636  03:17 AM    MCV 87.1 2019 03:17 AM     Lab Results   Component Value Date/Time    GFR est non-AA >60 2019 06:22 AM    GFRNA, POC >60 2019 01:57 PM    GFR est AA >60 2019 06:22 AM    GFRAA, POC >60 2019 01:57 PM    Creatinine 0.74 2019 06:22 AM    Creatinine (POC) 0.8 2019 01:57 PM    BUN 19 2019 06:22 AM    Sodium 144 2019 06:22 AM    Potassium 4.2 2019 06:22 AM    Chloride 111 (H) 2019 06:22 AM    CO2 28 2019 06:22 AM    Magnesium 1.7 2016 02:16 AM       Assessment:     Active Problems:    Diverticulitis of large intestine with abscess (2019)      Diverticular disease of large intestine with complication ()    diverticulitis -  Responding to antibiotics. No need for surgery    CVA - improved with TPA. Head CT unchanged. Awaiting MRI. Neuro following.   Speech says patient should be NPO    Nutrition -  If NPO status continues,  Will need to consider TPN vs NG tube feeds
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
## 2019-12-13 NOTE — PROGRESS NOTES
0700: Bedside shift change report given to Yara Jain, ESME (oncoming nurse) by Tonya Peña RN (offgoing nurse). Report included the following information SBAR, Kardex, ED Summary, Procedure Summary, Intake/Output, MAR, Recent Results and Cardiac Rhythm ST w/ PACs. 6374: Shift assessment completed, see flowsheets for details. Pt transported off floor accompanied by RRT RN and transport to CT on 6L NC and life pack -- CT notified. 8128: Pt returned to floor from CT, VSS at this time -- pt complaining of discomfort under ribs and in back, PRN methocarbamol administered. Will continue to monitor     [id number]: US at bedside for ordered carotid dopplar    1123: Verbal orders received for troponin redraw, blood drawn and sent to lab    1137: Lab notified RN of positive urine culture. Pts urine from 12/10 is positive for ESBL -- orders placed for contact precautions, Dr. Marjan Mcqueen notified. 1200: Reassessment completed, see flowsheets    1240: Pt HR up into 140s when working with PT/ OT and SLP -- Short runs of VT noted on monitor. Orders placed for EKG. 1315: SLP at bedside for patient eval, plan discussed -- Pt should be on a NC, w/ RR < 30 and stable HR prior to any oral intake      1350: Pt has only had a small episode of incontinence since 0700, pt bladder scan showing 434 mL in bladder. Dr. Marjan Mcqueen notified, orders received to straight cath one time. 1425: Pt straight cathed w/ 2 RNs at bedside, 375 mL immediate output obtained. Procedure tolerated well, will reassess in 6 hours. 1430: Pt appears tachypnic, fine crackles auscultated in bilateral bases. Dr. Yesica Phipps notified, orders received -- see MAR      1600: Reassessment completed, see flowsheets,     [id number]: Pts bladder scan remains 533 mL despite incontinent episode -- verbal orders received from Dr. Marjan Mcqueen for quiroz placement.  Quiroz placed using sterile technique with 2 RNs present at bedside, pt had 775 mL immediate output once quiroz was inserted. 1729: Pt very anxious/ agitated currently sustaining a RR~30s. PRN ativan administered, will continue to monitor. 1900: Bedside shift change report given to Pilar Gilmore RN (oncoming nurse) by Susan Camara RN (offgoing nurse).  Report included the following information SBAR, Kardex, ED Summary, Procedure Summary, Intake/Output, MAR, Recent Results and Cardiac Rhythm SR.

## 2019-12-13 NOTE — INTERDISCIPLINARY ROUNDS
Critical care interdisciplinary rounds held on 12/13/2019. Following members present, Pharmacy, Diabetes Treatment, Case Management, Respiratory Therapy, Physical Therapy and Nutrition. Led by Chanda Jerez RN and Dr. Joseph Milligan. Plan of care discussed. See clinical pathway for plan of care and interventions and desired outcomes. Remains NPO due to swallowing issues.

## 2019-12-14 ENCOUNTER — APPOINTMENT (OUTPATIENT)
Dept: GENERAL RADIOLOGY | Age: 84
DRG: 391 | End: 2019-12-14
Attending: INTERNAL MEDICINE
Payer: MEDICARE

## 2019-12-14 LAB
ANION GAP SERPL CALC-SCNC: 3 MMOL/L (ref 5–15)
ARTERIAL PATENCY WRIST A: YES
ATRIAL RATE: 126 BPM
BASE EXCESS BLD CALC-SCNC: 7 MMOL/L
BDY SITE: ABNORMAL
BUN SERPL-MCNC: 17 MG/DL (ref 6–20)
BUN/CREAT SERPL: 25 (ref 12–20)
CALCIUM SERPL-MCNC: 8.5 MG/DL (ref 8.5–10.1)
CALCULATED P AXIS, ECG09: 30 DEGREES
CALCULATED R AXIS, ECG10: -46 DEGREES
CALCULATED T AXIS, ECG11: 154 DEGREES
CHLORIDE SERPL-SCNC: 108 MMOL/L (ref 97–108)
CO2 SERPL-SCNC: 33 MMOL/L (ref 21–32)
CREAT SERPL-MCNC: 0.67 MG/DL (ref 0.55–1.02)
DIAGNOSIS, 93000: NORMAL
ERYTHROCYTE [DISTWIDTH] IN BLOOD BY AUTOMATED COUNT: 14.2 % (ref 11.5–14.5)
GAS FLOW.O2 O2 DELIVERY SYS: ABNORMAL L/MIN
GLUCOSE SERPL-MCNC: 131 MG/DL (ref 65–100)
HCO3 BLD-SCNC: 31.6 MMOL/L (ref 22–26)
HCT VFR BLD AUTO: 34.8 % (ref 35–47)
HGB BLD-MCNC: 10.3 G/DL (ref 11.5–16)
MAGNESIUM SERPL-MCNC: 2.1 MG/DL (ref 1.6–2.4)
MCH RBC QN AUTO: 25.7 PG (ref 26–34)
MCHC RBC AUTO-ENTMCNC: 29.6 G/DL (ref 30–36.5)
MCV RBC AUTO: 86.8 FL (ref 80–99)
NRBC # BLD: 0 K/UL (ref 0–0.01)
NRBC BLD-RTO: 0 PER 100 WBC
O2/TOTAL GAS SETTING VFR VENT: 35 %
P-R INTERVAL, ECG05: 138 MS
PCO2 BLD: 52 MMHG (ref 35–45)
PH BLD: 7.39 [PH] (ref 7.35–7.45)
PHOSPHATE SERPL-MCNC: 2.9 MG/DL (ref 2.6–4.7)
PLATELET # BLD AUTO: 188 K/UL (ref 150–400)
PMV BLD AUTO: 10.3 FL (ref 8.9–12.9)
PO2 BLD: 63 MMHG (ref 80–100)
POTASSIUM SERPL-SCNC: 4 MMOL/L (ref 3.5–5.1)
Q-T INTERVAL, ECG07: 342 MS
QRS DURATION, ECG06: 90 MS
QTC CALCULATION (BEZET), ECG08: 495 MS
RBC # BLD AUTO: 4.01 M/UL (ref 3.8–5.2)
SAO2 % BLD: 91 % (ref 92–97)
SODIUM SERPL-SCNC: 144 MMOL/L (ref 136–145)
SPECIMEN TYPE: ABNORMAL
TOTAL RESP. RATE, ITRR: 16
VENTRICULAR RATE, ECG03: 126 BPM
WBC # BLD AUTO: 6.8 K/UL (ref 3.6–11)

## 2019-12-14 PROCEDURE — 74011250636 HC RX REV CODE- 250/636: Performed by: SURGERY

## 2019-12-14 PROCEDURE — 77010033678 HC OXYGEN DAILY

## 2019-12-14 PROCEDURE — 74011000250 HC RX REV CODE- 250: Performed by: INTERNAL MEDICINE

## 2019-12-14 PROCEDURE — 83735 ASSAY OF MAGNESIUM: CPT

## 2019-12-14 PROCEDURE — 74011000258 HC RX REV CODE- 258: Performed by: INTERNAL MEDICINE

## 2019-12-14 PROCEDURE — 85027 COMPLETE CBC AUTOMATED: CPT

## 2019-12-14 PROCEDURE — 80048 BASIC METABOLIC PNL TOTAL CA: CPT

## 2019-12-14 PROCEDURE — 94640 AIRWAY INHALATION TREATMENT: CPT

## 2019-12-14 PROCEDURE — 74011000258 HC RX REV CODE- 258: Performed by: SURGERY

## 2019-12-14 PROCEDURE — 74011250637 HC RX REV CODE- 250/637: Performed by: INTERNAL MEDICINE

## 2019-12-14 PROCEDURE — 36415 COLL VENOUS BLD VENIPUNCTURE: CPT

## 2019-12-14 PROCEDURE — 71045 X-RAY EXAM CHEST 1 VIEW: CPT

## 2019-12-14 PROCEDURE — 84100 ASSAY OF PHOSPHORUS: CPT

## 2019-12-14 PROCEDURE — 74011250636 HC RX REV CODE- 250/636: Performed by: HOSPITALIST

## 2019-12-14 PROCEDURE — 65610000006 HC RM INTENSIVE CARE

## 2019-12-14 PROCEDURE — 36600 WITHDRAWAL OF ARTERIAL BLOOD: CPT

## 2019-12-14 PROCEDURE — 74011250636 HC RX REV CODE- 250/636: Performed by: INTERNAL MEDICINE

## 2019-12-14 PROCEDURE — 99232 SBSQ HOSP IP/OBS MODERATE 35: CPT | Performed by: SURGERY

## 2019-12-14 PROCEDURE — 82803 BLOOD GASES ANY COMBINATION: CPT

## 2019-12-14 RX ORDER — BUMETANIDE 0.25 MG/ML
1 INJECTION INTRAMUSCULAR; INTRAVENOUS EVERY 12 HOURS
Status: DISCONTINUED | OUTPATIENT
Start: 2019-12-14 | End: 2019-12-17

## 2019-12-14 RX ADMIN — MORPHINE SULFATE 2 MG: 2 INJECTION, SOLUTION INTRAMUSCULAR; INTRAVENOUS at 13:30

## 2019-12-14 RX ADMIN — IPRATROPIUM BROMIDE AND ALBUTEROL SULFATE 3 ML: .5; 3 SOLUTION RESPIRATORY (INHALATION) at 07:55

## 2019-12-14 RX ADMIN — Medication 10 ML: at 14:00

## 2019-12-14 RX ADMIN — BUMETANIDE 1 MG: 0.25 INJECTION INTRAMUSCULAR; INTRAVENOUS at 20:22

## 2019-12-14 RX ADMIN — Medication 10 ML: at 05:26

## 2019-12-14 RX ADMIN — Medication 1 AMPULE: at 20:21

## 2019-12-14 RX ADMIN — MORPHINE SULFATE 2 MG: 2 INJECTION, SOLUTION INTRAMUSCULAR; INTRAVENOUS at 09:04

## 2019-12-14 RX ADMIN — IPRATROPIUM BROMIDE AND ALBUTEROL SULFATE 3 ML: .5; 3 SOLUTION RESPIRATORY (INHALATION) at 11:42

## 2019-12-14 RX ADMIN — APIXABAN 5 MG: 2.5 TABLET, FILM COATED ORAL at 20:22

## 2019-12-14 RX ADMIN — SODIUM CHLORIDE 75 ML/HR: 900 INJECTION, SOLUTION INTRAVENOUS at 12:07

## 2019-12-14 RX ADMIN — LORAZEPAM 1 MG: 2 INJECTION INTRAMUSCULAR; INTRAVENOUS at 01:12

## 2019-12-14 RX ADMIN — ACETAMINOPHEN AND CODEINE PHOSPHATE 1 TABLET: 300; 30 TABLET ORAL at 22:09

## 2019-12-14 RX ADMIN — Medication 1 AMPULE: at 08:50

## 2019-12-14 RX ADMIN — IPRATROPIUM BROMIDE AND ALBUTEROL SULFATE 3 ML: .5; 3 SOLUTION RESPIRATORY (INHALATION) at 15:36

## 2019-12-14 RX ADMIN — METHYLPREDNISOLONE SODIUM SUCCINATE 40 MG: 40 INJECTION, POWDER, FOR SOLUTION INTRAMUSCULAR; INTRAVENOUS at 20:22

## 2019-12-14 RX ADMIN — UMECLIDINIUM 1 PUFF: 62.5 AEROSOL, POWDER ORAL at 11:09

## 2019-12-14 RX ADMIN — BUMETANIDE 1 MG: 0.25 INJECTION INTRAMUSCULAR; INTRAVENOUS at 08:41

## 2019-12-14 RX ADMIN — MORPHINE SULFATE 2 MG: 2 INJECTION, SOLUTION INTRAMUSCULAR; INTRAVENOUS at 23:38

## 2019-12-14 RX ADMIN — METHYLPREDNISOLONE SODIUM SUCCINATE 40 MG: 40 INJECTION, POWDER, FOR SOLUTION INTRAMUSCULAR; INTRAVENOUS at 08:36

## 2019-12-14 RX ADMIN — IPRATROPIUM BROMIDE AND ALBUTEROL SULFATE 3 ML: .5; 3 SOLUTION RESPIRATORY (INHALATION) at 19:37

## 2019-12-14 RX ADMIN — Medication 10 ML: at 22:08

## 2019-12-14 RX ADMIN — APIXABAN 5 MG: 2.5 TABLET, FILM COATED ORAL at 13:30

## 2019-12-14 RX ADMIN — MEROPENEM 500 MG: 500 INJECTION, POWDER, FOR SOLUTION INTRAVENOUS at 16:38

## 2019-12-14 RX ADMIN — PIPERACILLIN AND TAZOBACTAM 3.38 G: 3; .375 INJECTION, POWDER, LYOPHILIZED, FOR SOLUTION INTRAVENOUS at 01:11

## 2019-12-14 RX ADMIN — MEROPENEM 500 MG: 500 INJECTION, POWDER, FOR SOLUTION INTRAVENOUS at 22:08

## 2019-12-14 RX ADMIN — ACETAMINOPHEN AND CODEINE PHOSPHATE 1 TABLET: 300; 30 TABLET ORAL at 16:38

## 2019-12-14 NOTE — PROGRESS NOTES
Admit Date: 12/10/2019    Subjective:     Patient has complaints of back pain. Objective:     Blood pressure 143/73, pulse 86, temperature 97.9 °F (36.6 °C), resp. rate 27, height 5' 2\" (1.575 m), weight 122 lb (55.3 kg), SpO2 93 %. Temp (24hrs), Av.9 °F (36.6 °C), Min:97.4 °F (36.3 °C), Max:98.5 °F (36.9 °C)      Physical Exam:  GENERAL: alert, cooperative, no distress, appears stated age, LUNG: clear to auscultation bilaterally, HEART: regular rate and rhythm, ABDOMEN: soft, non-tender.  Bowel sounds normal. No masses,  no organomegaly, EXTREMITIES:  extremities normal, atraumatic, no cyanosis or edema    Labs:   Recent Results (from the past 24 hour(s))   DUPLEX CAROTID RIGHT    Collection Time: 19  9:04 AM   Result Value Ref Range    Right subclavian sys 69.0 cm/s    RIGHT SUBCLAVIAN ARTERY D 11.60 cm/s    Right cca dist sys 45.5 cm/s    Right CCA dist ureña 19.4 cm/s    Right CCA prox sys 50.7 cm/s    Right CCA prox ureña 12.9 cm/s    Right ICA dist sys 69.2 cm/s    Right ICA dist ureña 15.2 cm/s    Right ICA mid sys 60.9 cm/s    Right ICA mid ureña 19.5 cm/s    Right ICA prox sys 41.6 cm/s    Right ICA prox ureña 14.2 cm/s    Right eca sys 85.4 cm/s    RIGHT EXTERNAL CAROTID ARTERY D 8.60 cm/s    Right vertebral sys 25.2 cm/s    RIGHT VERTEBRAL ARTERY D 7.60 cm/s    Right ICA/CCA sys 1.5    TROPONIN I    Collection Time: 19 11:23 AM   Result Value Ref Range    Troponin-I, Qt. 0.08 (H) <0.05 ng/mL   EKG, 12 LEAD, SUBSEQUENT    Collection Time: 19  1:06 PM   Result Value Ref Range    Ventricular Rate 126 BPM    Atrial Rate 126 BPM    P-R Interval 138 ms    QRS Duration 90 ms    Q-T Interval 342 ms    QTC Calculation (Bezet) 495 ms    Calculated P Axis 30 degrees    Calculated R Axis -46 degrees    Calculated T Axis 154 degrees    Diagnosis       Sinus tachycardia with occasional premature ventricular complexes  Left axis deviation  Anteroseptal infarct (cited on or before 10-DEC-2019)  ST & T wave abnormality, consider lateral ischemia  Abnormal ECG  When compared with ECG of 10-DEC-2019 21:48,  Incomplete right bundle branch block is no longer present  Criteria for Inferior infarct are no longer present  Questionable change in initial forces of Septal leads     CBC W/O DIFF    Collection Time: 12/14/19  4:18 AM   Result Value Ref Range    WBC 6.8 3.6 - 11.0 K/uL    RBC 4.01 3.80 - 5.20 M/uL    HGB 10.3 (L) 11.5 - 16.0 g/dL    HCT 34.8 (L) 35.0 - 47.0 %    MCV 86.8 80.0 - 99.0 FL    MCH 25.7 (L) 26.0 - 34.0 PG    MCHC 29.6 (L) 30.0 - 36.5 g/dL    RDW 14.2 11.5 - 14.5 %    PLATELET 093 773 - 515 K/uL    MPV 10.3 8.9 - 12.9 FL    NRBC 0.0 0  WBC    ABSOLUTE NRBC 0.00 0.00 - 9.65 K/uL   METABOLIC PANEL, BASIC    Collection Time: 12/14/19  4:18 AM   Result Value Ref Range    Sodium 144 136 - 145 mmol/L    Potassium 4.0 3.5 - 5.1 mmol/L    Chloride 108 97 - 108 mmol/L    CO2 33 (H) 21 - 32 mmol/L    Anion gap 3 (L) 5 - 15 mmol/L    Glucose 131 (H) 65 - 100 mg/dL    BUN 17 6 - 20 MG/DL    Creatinine 0.67 0.55 - 1.02 MG/DL    BUN/Creatinine ratio 25 (H) 12 - 20      GFR est AA >60 >60 ml/min/1.73m2    GFR est non-AA >60 >60 ml/min/1.73m2    Calcium 8.5 8.5 - 10.1 MG/DL   MAGNESIUM    Collection Time: 12/14/19  4:18 AM   Result Value Ref Range    Magnesium 2.1 1.6 - 2.4 mg/dL   PHOSPHORUS    Collection Time: 12/14/19  4:18 AM   Result Value Ref Range    Phosphorus 2.9 2.6 - 4.7 MG/DL   POC G3 - PUL    Collection Time: 12/14/19  4:46 AM   Result Value Ref Range    FIO2 (POC) 35 %    pH (POC) 7.391 7.35 - 7.45      pCO2 (POC) 52.0 (H) 35.0 - 45.0 MMHG    pO2 (POC) 63 (L) 80 - 100 MMHG    HCO3 (POC) 31.6 (H) 22 - 26 MMOL/L    sO2 (POC) 91 (L) 92 - 97 %    Base excess (POC) 7 mmol/L    Site RIGHT RADIAL      Device: VENTURI MASK      Allens test (POC) YES      Specimen type (POC) ARTERIAL      Total resp.  rate 16         Data Review images and reports reviewed    Assessment:     Active Problems: Diverticulitis of large intestine with abscess (12/11/2019)      Diverticular disease of large intestine with complication (24/26/2285)        Plan/Recommendations/Medical Decision Making:     Continue present treatment   Sigmoid diverticulitis with 2.6 cm intramural abscess. New stroke s/p TPA  Unable to eat presently secondary to aspiration risk - may require PEG for tube feeds if swallowing does not improve  COPD exacerbation      Jean Castellano MD, [facility]
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
## 2019-12-14 NOTE — PROGRESS NOTES
Hospitalist Progress Note    NAME: Loretta Baker   :  1935   MRN:  [id number]       Assessment / Plan:    Acute CVA s/p tPA  Left facial drop, slurred speech with left side weakness, improved  CTA showed  1. 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 2. At least 70% stenosis at origin of right internal carotid artery. MRI and ECHO pending  A1c 7% 2019    Acute on chronic respiratory failure with hypoxia  2/2 COPD exacerbation  chronically on O2, requires more now  Cont bronchodilators and Systemic steroids    Complicated UTI  ESBL  On Zosyn    Sinus tachycardia, resolved    Hx of provoked PE x3  On Eliquis, was held due to tPA    HTN    Acute complicated diverticulitis   C/w IV abx, CLD , pain control . Management per surgery    18.5 - 24.9 Normal weight / Body mass index is 22.31 kg/m². Code status: Full  Prophylaxis: SCD's  Recommended Disposition: TBD     Subjective:     Patient was seen and examined this PM.  No acute events overnight. She was on none-rebreather mask. Nurse at bedside. She said \" I want to drink something\". Review of Systems:  Symptom Y/N Comments  Symptom Y/N Comments   Fever/Chills n   Chest Pain n    Poor Appetite n   Edema n    Cough n   Abdominal Pain n    Sputum n   Joint Pain n    SOB/CRAIG n   Pruritis/Rash n    Nausea/vomit n   Tolerating PT/OT     Diarrhea n   Tolerating Diet     Constipation n   Other         Objective:     VITALS:   Last 24hrs VS reviewed since prior progress note.  Most recent are:  Patient Vitals for the past 24 hrs:   Temp Pulse Resp BP SpO2   19 1957     94 %   19 1900  (!) 108 28 134/70 96 %   19 1800  (!) 102 27 119/57 96 %   19 1700  (!) 104 21 123/47 96 %   19 1600 97.4 °F (36.3 °C) (!) 108 24 122/63 94 %   19 1508     93 %   19 1500  (!) 106 23 130/70 93 %   19 1400  (!) 110 29 140/79 90 %   19 1300  (!) 103 24 139/70 96 %   12/13/19 1200 97.8 °F (36.6 °C) 94 27 140/67 97 %   12/13/19 1144     94 %   12/13/19 1100  98 26 155/72 92 %   12/13/19 1000  95 26 147/80 92 %   12/13/19 0900  (!) 101 (!) 31 139/83 95 %   12/13/19 0816 97.7 °F (36.5 °C) (!) 103 (!) 32 151/58 92 %   12/13/19 0749     96 %   12/13/19 0735     97 %   12/13/19 0700  92 26 153/77 90 %   12/13/19 0600  77 27 142/81 92 %   12/13/19 0500  90 23 148/75 90 %   12/13/19 0400 98.7 °F (37.1 °C) 98 25 146/74 90 %   12/13/19 0300  94 25 124/77 (!) 89 %   12/13/19 0200  (!) 106 29 140/85    12/13/19 0100  (!) 107 20 133/67 95 %   12/13/19 0000 98.9 °F (37.2 °C) 100 22 120/53 95 %   12/12/19 2300  (!) 116 24 130/56 93 %   12/12/19 2200  (!) 102 25 110/72 100 %   12/12/19 2100  (!) 103 24 115/60        Intake/Output Summary (Last 24 hours) at 12/13/2019 2019  Last data filed at 12/13/2019 1915  Gross per 24 hour   Intake 1850 ml   Output 2025 ml   Net -175 ml        PHYSICAL EXAM:  General: WD, WN. Alert, cooperative, no acute distress    EENT:  EOMI. Anicteric sclerae. MMM  Resp:  CTA bilaterally, no wheezing or rales. No accessory muscle use  CV:  Regular  rhythm,  No edema  GI:  Soft, Non distended, Non tender.  +Bowel sounds  Neurologic:  Alert and oriented X 3, normal speech,   Psych:   Good insight. Not anxious nor agitated  Skin:  No rashes. No jaundice    Reviewed most current lab test results and cultures  YES  Reviewed most current radiology test results   YES  Review and summation of old records today    NO  Reviewed patient's current orders and MAR    YES  PMH/SH reviewed - no change compared to H&P  ________________________________________________________________________  Care Plan discussed with:    Comments   Patient x    Family      RN x    Care Manager     Consultant                        Multidiciplinary team rounds were held today with , nursing, pharmacist and clinical coordinator.   Patient's plan of care was discussed; medications were reviewed and discharge planning was addressed. ________________________________________________________________________  Total NON critical care TIME:  35   Minutes    Total CRITICAL CARE TIME Spent:   Minutes non procedure based      Comments   >50% of visit spent in counseling and coordination of care     ________________________________________________________________________  Jeremy Harmon MD     Procedures: see electronic medical records for all procedures/Xrays and details which were not copied into this note but were reviewed prior to creation of Plan. LABS:  I reviewed today's most current labs and imaging studies.   Pertinent labs include:  Recent Labs     12/12/19  0317 12/10/19  2219   WBC 8.4 10.0   HGB 11.1* 12.4   HCT 38.4 42.5    215     Recent Labs     12/13/19  0622 12/12/19  1013 12/12/19 0317 12/10/19  2219    140 139 140   K 4.2 4.4 4.7 4.6   * 106 106 103   CO2 28 30 32 34*   * 134* 122* 150*   BUN 19 20 21* 32*   CREA 0.74 0.74 0.77 0.95   CA 8.5 8.4* 8.6 9.4   ALB  --   --   --  2.8*   TBILI  --   --   --  0.4   SGOT  --   --   --  11*   ALT  --   --   --  12   INR  --  1.2*  --  1.0       Signed: Jeremy Harmon MD

## 2019-12-14 NOTE — PROGRESS NOTES

## 2019-12-14 NOTE — PROGRESS NOTES
Hospitalist Progress Note    NAME: Paris Couch   :  1935   MRN:  [id number]       Assessment / Plan:    Acute CVA s/p tPA  Left facial drop, slurred speech with left side weakness, improved  CTA showed  1. 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 2. At least 70% stenosis at origin of right internal carotid artery. MRI and ECHO pending  A1c 7% 2019    Acute on chronic respiratory failure with hypoxia  2/2 COPD exacerbation  chronically on O2, requires more now  Cont bronchodilators and Systemic steroids  Pulmonary edema on CXR    Complicated UTI  ESBL  On Zosyn    Sinus tachycardia, resolved    Hx of provoked PE x3  On Eliquis, was held due to tPA    HTN    Acute complicated diverticulitis   C/w IV abx, CLD , pain control . Management per surgery    18.5 - 24.9 Normal weight / Body mass index is 22.31 kg/m². Code status: Full  Prophylaxis: SCD's  Recommended Disposition: TBD     Subjective:     Patient was seen and examined this PM.  No acute events overnight. She watching TV. She said she feels much better. Review of Systems:  Symptom Y/N Comments  Symptom Y/N Comments   Fever/Chills n   Chest Pain n    Poor Appetite n   Edema n    Cough n   Abdominal Pain n    Sputum n   Joint Pain n    SOB/CRAIG n   Pruritis/Rash n    Nausea/vomit n   Tolerating PT/OT     Diarrhea n   Tolerating Diet     Constipation n   Other         Objective:     VITALS:   Last 24hrs VS reviewed since prior progress note.  Most recent are:  Patient Vitals for the past 24 hrs:   Temp Pulse Resp BP SpO2   19 1200  (!) 109 24 128/63 99 %   19 1142     96 %   19 1100  96 29 133/60 92 %   19 1000  (!) 120 26 133/72 91 %   19 0900  (!) 126 22 142/73 93 %   19 0800 98.2 °F (36.8 °C) 98 27 146/68 (!) 88 %   19 0755     93 %   19 0700  86 27 143/73 97 %   19 0600  (!) 103 26 (!) 145/92 93 % 12/14/19 0500  (!) 102 26 126/70 91 %   12/14/19 0400 97.9 °F (36.6 °C) (!) 101 19 125/63 97 %   12/14/19 0300  (!) 106 27 137/71 95 %   12/14/19 0200  (!) 108 27 134/62 94 %   12/14/19 0100  (!) 118 (!) 33 136/65 95 %   12/14/19 0000 98.5 °F (36.9 °C) (!) 112 24 131/65 98 %   12/13/19 2300  100 23 133/71 96 %   12/13/19 2200  (!) 108 29 117/60 95 %   12/13/19 2100  (!) 114 20 114/66 95 %   12/13/19 2000 97.9 °F (36.6 °C) (!) 105 28 134/66 94 %   12/13/19 1957     94 %   12/13/19 1900  (!) 108 28 134/70 96 %   12/13/19 1800  (!) 102 27 119/57 96 %   12/13/19 1700  (!) 104 21 123/47 96 %   12/13/19 1600 97.4 °F (36.3 °C) (!) 108 24 122/63 94 %   12/13/19 1508     93 %   12/13/19 1500  (!) 106 23 130/70 93 %   12/13/19 1400  (!) 110 29 140/79 90 %       Intake/Output Summary (Last 24 hours) at 12/14/2019 1301  Last data filed at 12/14/2019 1200  Gross per 24 hour   Intake 2130 ml   Output 3665 ml   Net -1535 ml        PHYSICAL EXAM:  General: WD, WN. Alert, cooperative, no acute distress    EENT:  EOMI. Anicteric sclerae. MMM  Resp:  CTA bilaterally, no wheezing or rales. No accessory muscle use  CV:  Regular  rhythm,  No edema  GI:  Soft, Non distended, Non tender.  +Bowel sounds  Neurologic:  Alert and oriented X 3, normal speech,   Psych:   Good insight. Not anxious nor agitated  Skin:  No rashes. No jaundice    Reviewed most current lab test results and cultures  YES  Reviewed most current radiology test results   YES  Review and summation of old records today    NO  Reviewed patient's current orders and MAR    YES  PMH/SH reviewed - no change compared to H&P  ________________________________________________________________________  Care Plan discussed with:    Comments   Patient x    Family  x    RN x    Care Manager     Consultant                        Multidiciplinary team rounds were held today with , nursing, pharmacist and clinical coordinator.   Patient's plan of care was discussed; medications were reviewed and discharge planning was addressed. ________________________________________________________________________  Total NON critical care TIME:  35   Minutes    Total CRITICAL CARE TIME Spent:   Minutes non procedure based      Comments   >50% of visit spent in counseling and coordination of care     ________________________________________________________________________  Edna Lei MD     Procedures: see electronic medical records for all procedures/Xrays and details which were not copied into this note but were reviewed prior to creation of Plan. LABS:  I reviewed today's most current labs and imaging studies.   Pertinent labs include:  Recent Labs     12/14/19 0418 12/12/19  0317   WBC 6.8 8.4   HGB 10.3* 11.1*   HCT 34.8* 38.4    203     Recent Labs     12/14/19 0418 12/13/19  0622 12/12/19  1013    144 140   K 4.0 4.2 4.4    111* 106   CO2 33* 28 30   * 113* 134*   BUN 17 19 20   CREA 0.67 0.74 0.74   CA 8.5 8.5 8.4*   MG 2.1  --   --    PHOS 2.9  --   --    INR  --   --  1.2*       Signed: Edna Lei MD

## 2019-12-14 NOTE — PROGRESS NOTES
1900- Bedside report received from Kimberlee, [address] [place] [address] completed, see charting for details. Patient resting on the ventimask. 2045- Dr. Loi Portillo called to have RN tell patient that the carotid doppler did not show enough stenosis to warrant intervention at this time and should not be what is causing her pain. And can follow up with vascular OP. Will relay to patient and day RN. 1715- Labs drawn and new PIV inserted. 56- Daughter, Cash Brown, called and updated on patient's night.     0700- Bedside report given to Carlos Ratliff

## 2019-12-14 NOTE — PROGRESS NOTES
0 - Received report from ESME Rutledge.    0800 - Assessment completed. Patient alert and oriented x 4. Complains of 8/10 right flank pain, repositioned for comfort. On venti mask. Cronin patently draining. On contact precautions for ESBL in the urine. 0830 - Pain reassessment completed. Patient still complains of 8/10 flank pain. Switched to High flow nasal cannula at 7L.    0900 - Administered 2mg of IV morphine PRN for 8/10 right flank pain. 0930 - Pain reassessment completed. Pain 3/10 at goal.    1120 - Titrated Nasal cannula down to 4L and patient SpO2 between 92-97%    1200 - Reassessment completed. No changes noted. 1300 - Patient complains of 8/10 right flank pain SpO2 82-88% increased Midflow Nasal cannula to 10L. Administered 2mg IV morphine PRN. Patient states it is harder to breathe when she is in pain. 1330 - SpO2 97%, Titrated Nasal cannula down to 6L, SpO2 staying greater than 94%. 1600 - Reassessment completed. No changes noted. [id number] - Patient complains of 8/10 right flank pain. Gave PRN oral acetaminophine-codeine tablet. 1700 - Patient states pain is better 3/10.    1907 - Report given to Raman Hess RN.

## 2019-12-14 NOTE — PROGRESS NOTES
12/14/2019    INTENSIVIST PROGRESS NOTE:   Initial history:  Patient seen and evaluated, chart reviewed. Patient initially admitted with acute diverticulitis with plans for conservative management. She had a mild COPD exacerbation which was improving, but she developed an acute stroke last night with facial droop. Was administered TPA with some neurologic improvement. No significant bleeding. Has some abdominal pain but is also hungry. 12-13-19: no events.   Has had persistent tachypnea and hypoxia beyond baseline    Visit Vitals  /73   Pulse (!) 126   Temp 98.2 °F (36.8 °C)   Resp 22   Ht 5' 2\" (1.575 m)   Wt 55.3 kg (122 lb)   SpO2 93%   BMI 22.31 kg/m²       General: alert, NAD  Eyes: anicteric  HEENT: NC/AT  CV: RRR  Lungs: decreased bilateral air movement, no wheeze, tachypneic   Abd: soft, +BS  Ext: no cyanosis, no clubbing  Skin: warm and dry  Musculoskeletal: no gross deformities    chest X-ray - mild pulmonary edema  Labs reviewed    A/P:  - acute stroke s/p TPA - continue post-stroke care  - COPD baseline 3 LPM with mild exacerbation, continue bronchodilators and steroids, patient of Dr. Marissa Smith  - acute pulmonary edema - diurese   - diverticulitis per surgery  - ESBL E coli UTI - IV antibiotics   - h/o recurrent DVTs, has been off anticoagulation - will need to restart Janet Velarde MD

## 2019-12-15 LAB
ANION GAP SERPL CALC-SCNC: 5 MMOL/L (ref 5–15)
BUN SERPL-MCNC: 20 MG/DL (ref 6–20)
BUN/CREAT SERPL: 34 (ref 12–20)
CALCIUM SERPL-MCNC: 8.4 MG/DL (ref 8.5–10.1)
CHLORIDE SERPL-SCNC: 104 MMOL/L (ref 97–108)
CO2 SERPL-SCNC: 33 MMOL/L (ref 21–32)
CREAT SERPL-MCNC: 0.58 MG/DL (ref 0.55–1.02)
ERYTHROCYTE [DISTWIDTH] IN BLOOD BY AUTOMATED COUNT: 14.2 % (ref 11.5–14.5)
GLUCOSE SERPL-MCNC: 162 MG/DL (ref 65–100)
HCT VFR BLD AUTO: 36.9 % (ref 35–47)
HGB BLD-MCNC: 10.8 G/DL (ref 11.5–16)
MAGNESIUM SERPL-MCNC: 1.9 MG/DL (ref 1.6–2.4)
MCH RBC QN AUTO: 25.1 PG (ref 26–34)
MCHC RBC AUTO-ENTMCNC: 29.3 G/DL (ref 30–36.5)
MCV RBC AUTO: 85.8 FL (ref 80–99)
NRBC # BLD: 0 K/UL (ref 0–0.01)
NRBC BLD-RTO: 0 PER 100 WBC
PHOSPHATE SERPL-MCNC: 2.4 MG/DL (ref 2.6–4.7)
PLATELET # BLD AUTO: 180 K/UL (ref 150–400)
PMV BLD AUTO: 10.3 FL (ref 8.9–12.9)
POTASSIUM SERPL-SCNC: 3.8 MMOL/L (ref 3.5–5.1)
RBC # BLD AUTO: 4.3 M/UL (ref 3.8–5.2)
RIGHT CCA DIST DIAS: 19.4 CM/S
RIGHT CCA DIST SYS: 45.5 CM/S
RIGHT CCA PROX DIAS: 12.9 CM/S
RIGHT CCA PROX SYS: 50.7 CM/S
RIGHT ECA DIAS: 8.6 CM/S
RIGHT ECA SYS: 85.4 CM/S
RIGHT ICA DIST DIAS: 15.2 CM/S
RIGHT ICA DIST SYS: 69.2 CM/S
RIGHT ICA MID DIAS: 19.5 CM/S
RIGHT ICA MID SYS: 60.9 CM/S
RIGHT ICA PROX DIAS: 14.2 CM/S
RIGHT ICA PROX SYS: 41.6 CM/S
RIGHT ICA/CCA SYS: 1.5
RIGHT SUBCLAVIAN DIAS: 11.6 CM/S
RIGHT SUBCLAVIAN SYS: 69 CM/S
RIGHT VERTEBRAL DIAS: 7.6 CM/S
RIGHT VERTEBRAL SYS: 25.2 CM/S
SODIUM SERPL-SCNC: 142 MMOL/L (ref 136–145)
WBC # BLD AUTO: 8.9 K/UL (ref 3.6–11)

## 2019-12-15 PROCEDURE — 99232 SBSQ HOSP IP/OBS MODERATE 35: CPT | Performed by: SURGERY

## 2019-12-15 PROCEDURE — 74011250637 HC RX REV CODE- 250/637: Performed by: INTERNAL MEDICINE

## 2019-12-15 PROCEDURE — 80048 BASIC METABOLIC PNL TOTAL CA: CPT

## 2019-12-15 PROCEDURE — 74011000250 HC RX REV CODE- 250: Performed by: INTERNAL MEDICINE

## 2019-12-15 PROCEDURE — 74011000258 HC RX REV CODE- 258: Performed by: INTERNAL MEDICINE

## 2019-12-15 PROCEDURE — 83735 ASSAY OF MAGNESIUM: CPT

## 2019-12-15 PROCEDURE — 77010033711 HC HIGH FLOW OXYGEN

## 2019-12-15 PROCEDURE — 36415 COLL VENOUS BLD VENIPUNCTURE: CPT

## 2019-12-15 PROCEDURE — 65660000000 HC RM CCU STEPDOWN

## 2019-12-15 PROCEDURE — 74011250636 HC RX REV CODE- 250/636: Performed by: HOSPITALIST

## 2019-12-15 PROCEDURE — 85027 COMPLETE CBC AUTOMATED: CPT

## 2019-12-15 PROCEDURE — 74011250636 HC RX REV CODE- 250/636: Performed by: SURGERY

## 2019-12-15 PROCEDURE — 84100 ASSAY OF PHOSPHORUS: CPT

## 2019-12-15 PROCEDURE — 74011250636 HC RX REV CODE- 250/636: Performed by: INTERNAL MEDICINE

## 2019-12-15 PROCEDURE — 94640 AIRWAY INHALATION TREATMENT: CPT

## 2019-12-15 PROCEDURE — 77010033678 HC OXYGEN DAILY

## 2019-12-15 RX ORDER — ENOXAPARIN SODIUM 100 MG/ML
1 INJECTION SUBCUTANEOUS EVERY 12 HOURS
Status: DISCONTINUED | OUTPATIENT
Start: 2019-12-15 | End: 2019-12-17

## 2019-12-15 RX ADMIN — Medication 10 ML: at 21:46

## 2019-12-15 RX ADMIN — ENOXAPARIN SODIUM 60 MG: 40 INJECTION SUBCUTANEOUS at 21:44

## 2019-12-15 RX ADMIN — APIXABAN 5 MG: 2.5 TABLET, FILM COATED ORAL at 09:08

## 2019-12-15 RX ADMIN — ACETAMINOPHEN AND CODEINE PHOSPHATE 1 TABLET: 300; 30 TABLET ORAL at 10:45

## 2019-12-15 RX ADMIN — SODIUM CHLORIDE 75 ML/HR: 900 INJECTION, SOLUTION INTRAVENOUS at 21:44

## 2019-12-15 RX ADMIN — Medication 10 ML: at 06:00

## 2019-12-15 RX ADMIN — BUMETANIDE 1 MG: 0.25 INJECTION INTRAMUSCULAR; INTRAVENOUS at 09:10

## 2019-12-15 RX ADMIN — IPRATROPIUM BROMIDE AND ALBUTEROL SULFATE 3 ML: .5; 3 SOLUTION RESPIRATORY (INHALATION) at 07:42

## 2019-12-15 RX ADMIN — METHYLPREDNISOLONE SODIUM SUCCINATE 40 MG: 40 INJECTION, POWDER, FOR SOLUTION INTRAMUSCULAR; INTRAVENOUS at 09:09

## 2019-12-15 RX ADMIN — SODIUM CHLORIDE 75 ML/HR: 900 INJECTION, SOLUTION INTRAVENOUS at 00:00

## 2019-12-15 RX ADMIN — MEROPENEM 500 MG: 500 INJECTION, POWDER, FOR SOLUTION INTRAVENOUS at 04:06

## 2019-12-15 RX ADMIN — MEROPENEM 500 MG: 500 INJECTION, POWDER, FOR SOLUTION INTRAVENOUS at 10:45

## 2019-12-15 RX ADMIN — IPRATROPIUM BROMIDE AND ALBUTEROL SULFATE 3 ML: .5; 3 SOLUTION RESPIRATORY (INHALATION) at 15:49

## 2019-12-15 RX ADMIN — MORPHINE SULFATE 2 MG: 2 INJECTION, SOLUTION INTRAMUSCULAR; INTRAVENOUS at 13:11

## 2019-12-15 RX ADMIN — UMECLIDINIUM 1 PUFF: 62.5 AEROSOL, POWDER ORAL at 10:44

## 2019-12-15 RX ADMIN — IPRATROPIUM BROMIDE AND ALBUTEROL SULFATE 3 ML: .5; 3 SOLUTION RESPIRATORY (INHALATION) at 11:39

## 2019-12-15 RX ADMIN — Medication 10 ML: at 13:06

## 2019-12-15 RX ADMIN — IPRATROPIUM BROMIDE AND ALBUTEROL SULFATE 3 ML: .5; 3 SOLUTION RESPIRATORY (INHALATION) at 20:30

## 2019-12-15 RX ADMIN — Medication 1 AMPULE: at 09:09

## 2019-12-15 RX ADMIN — BUMETANIDE 1 MG: 0.25 INJECTION INTRAMUSCULAR; INTRAVENOUS at 21:45

## 2019-12-15 RX ADMIN — MEROPENEM 500 MG: 500 INJECTION, POWDER, FOR SOLUTION INTRAVENOUS at 17:02

## 2019-12-15 RX ADMIN — MORPHINE SULFATE 2 MG: 2 INJECTION, SOLUTION INTRAMUSCULAR; INTRAVENOUS at 09:10

## 2019-12-15 RX ADMIN — MORPHINE SULFATE 2 MG: 2 INJECTION, SOLUTION INTRAMUSCULAR; INTRAVENOUS at 21:53

## 2019-12-15 RX ADMIN — MORPHINE SULFATE 2 MG: 2 INJECTION, SOLUTION INTRAMUSCULAR; INTRAVENOUS at 17:40

## 2019-12-15 RX ADMIN — METHYLPREDNISOLONE SODIUM SUCCINATE 40 MG: 40 INJECTION, POWDER, FOR SOLUTION INTRAMUSCULAR; INTRAVENOUS at 21:46

## 2019-12-15 RX ADMIN — ACETAMINOPHEN AND CODEINE PHOSPHATE 1 TABLET: 300; 30 TABLET ORAL at 04:06

## 2019-12-15 NOTE — PROGRESS NOTES
Admit Date: 12/10/2019    Subjective:     Patient c/o mild abdominal pain this morning. Objective:     Blood pressure 150/64, pulse 95, temperature 98.1 °F (36.7 °C), resp. rate 21, height 5' 2\" (1.575 m), weight 123 lb 7.3 oz (56 kg), SpO2 98 %. Temp (24hrs), Av.3 °F (36.8 °C), Min:98.1 °F (36.7 °C), Max:98.6 °F (37 °C)      Physical Exam:  GENERAL: alert, cooperative, no distress, appears stated age, LUNG: clear to auscultation bilaterally, HEART: regular rate and rhythm, ABDOMEN: soft, non-tender.  Bowel sounds normal. No masses,  no organomegaly, EXTREMITIES:  extremities normal, atraumatic, no cyanosis or edema    Labs:   Recent Results (from the past 24 hour(s))   CBC W/O DIFF    Collection Time: 12/15/19  4:11 AM   Result Value Ref Range    WBC 8.9 3.6 - 11.0 K/uL    RBC 4.30 3.80 - 5.20 M/uL    HGB 10.8 (L) 11.5 - 16.0 g/dL    HCT 36.9 35.0 - 47.0 %    MCV 85.8 80.0 - 99.0 FL    MCH 25.1 (L) 26.0 - 34.0 PG    MCHC 29.3 (L) 30.0 - 36.5 g/dL    RDW 14.2 11.5 - 14.5 %    PLATELET 609 763 - 416 K/uL    MPV 10.3 8.9 - 12.9 FL    NRBC 0.0 0  WBC    ABSOLUTE NRBC 0.00 0.00 - 7.21 K/uL   METABOLIC PANEL, BASIC    Collection Time: 12/15/19  4:11 AM   Result Value Ref Range    Sodium 142 136 - 145 mmol/L    Potassium 3.8 3.5 - 5.1 mmol/L    Chloride 104 97 - 108 mmol/L    CO2 33 (H) 21 - 32 mmol/L    Anion gap 5 5 - 15 mmol/L    Glucose 162 (H) 65 - 100 mg/dL    BUN 20 6 - 20 MG/DL    Creatinine 0.58 0.55 - 1.02 MG/DL    BUN/Creatinine ratio 34 (H) 12 - 20      GFR est AA >60 >60 ml/min/1.73m2    GFR est non-AA >60 >60 ml/min/1.73m2    Calcium 8.4 (L) 8.5 - 10.1 MG/DL   MAGNESIUM    Collection Time: 12/15/19  4:11 AM   Result Value Ref Range    Magnesium 1.9 1.6 - 2.4 mg/dL   PHOSPHORUS    Collection Time: 12/15/19  4:11 AM   Result Value Ref Range    Phosphorus 2.4 (L) 2.6 - 4.7 MG/DL       Data Review images and reports reviewed    Assessment:     Active Problems:    Diverticulitis of large intestine with abscess (12/11/2019)      Diverticular disease of large intestine with complication (29/88/0842)        Plan/Recommendations/Medical Decision Making:     Continue present treatment   Sigmoid diverticulitis with 2.6 cm intramural abscess. New stroke s/p TPA  Now on pureed diet  COPD exacerbation  Would hold eliquis for now as pt may require abscess drainage. Would favor lovenox until ready for d/c.      Tejal President MEGAN Shepherd MD, [facility]

## 2019-12-15 NOTE — PROGRESS NOTES
12/15/2019    INTENSIVIST PROGRESS NOTE:   Initial history:  Patient seen and evaluated, chart reviewed. Patient initially admitted with acute diverticulitis with plans for conservative management. She had a mild COPD exacerbation which was improving, but she developed an acute stroke last night with facial droop. Was administered TPA with some neurologic improvement. No significant bleeding. Has some abdominal pain but is also hungry. 12-13-19: no events. Has had persistent tachypnea and hypoxia beyond baseline  12-15-19: looks and feels a lot better. Still with some dyspnea, but weaning O2.      Visit Vitals  /65   Pulse (!) 113   Temp 98.4 °F (36.9 °C)   Resp 20   Ht 5' 2\" (1.575 m)   Wt 56 kg (123 lb 7.3 oz)   SpO2 94%   BMI 22.58 kg/m²       General: alert, NAD  Eyes: anicteric  HEENT: NC/AT  CV: tachycardic,irregular  Lungs: decreased bilateral air movement, no wheeze   Abd: soft, +BS  Ext: no cyanosis, no clubbing  Skin: warm and dry  Musculoskeletal: no gross deformities    Labs reviewed    A/P:  - acute stroke s/p TPA - continue post-stroke care  - COPD baseline 3 LPM with mild exacerbation, continue bronchodilators and steroids, patient of Dr. Reddy Pry  - acute pulmonary edema - diurese as tolerated   - diverticulitis per surgery - Eilquis changed to lovenox per surgery as she may need abscess drainage  - ESBL E coli UTI - IV antibiotics   - h/o recurrent DVTs, has been off anticoagulation - will need to restart Eliquis  - stable for transfer to PCU  William Alicea MD

## 2019-12-15 NOTE — PROGRESS NOTES

## 2019-12-15 NOTE — PROGRESS NOTES
26 [facility]    3[facility] - Assessment completed. Patient alert and oriented x 4. Bilateral  strong and equal. On 4L nasal cannula. Sinus tachycardia with moderate ectopy. Complains of 3/10 pain which is patient goal.    0930 - Put patient on 6L high flow nasal cannula while eating due to low SpO2. Now SpO2 >94%. 1100 - High flow nasal cannula set to 2L. SpO2 > 94%    1200 - Reassessment completed. No changes noted. 1330 - TRANSFER - OUT REPORT:    Verbal report given to Cesar Spicer RN(name) on Gonzalo Heath  being transferred to PCU(unit) for routine progression of care       Report consisted of patients Situation, Background, Assessment and   Recommendations(SBAR). Information from the following report(s) SBAR, Kardex, Intake/Output, MAR, Recent Results and Cardiac Rhythm Sinus Tachycardia was reviewed with the receiving nurse. Lines:   Peripheral IV 12/14/19 Distal;Left Arm (Active)   Site Assessment Clean, dry, & intact 12/15/2019 12:00 PM   Phlebitis Assessment 0 12/15/2019 12:00 PM   Infiltration Assessment 0 12/15/2019 12:00 PM   Dressing Status Clean, dry, & intact 12/15/2019 12:00 PM   Dressing Type Transparent 12/15/2019 12:00 PM   Hub Color/Line Status Pink;Flushed 12/15/2019 12:00 PM   Action Taken Open ports on tubing capped 12/15/2019 12:00 PM   Alcohol Cap Used Yes 12/15/2019 12:00 PM       Saline Lock 12/12/19 Distal;Posterior;Right Forearm (Active)   Site Assessment Clean, dry, & intact 12/15/2019 12:00 PM   Phlebitis Assessment 0 12/15/2019 12:00 PM   Infiltration Assessment 0 12/15/2019 12:00 PM   Dressing Status Clean, dry, & intact 12/15/2019 12:00 PM   Dressing Type Transparent 12/15/2019 12:00 PM   Hub Color/Line Status Blue; Infusing 12/15/2019 12:00 PM        Opportunity for questions and clarification was provided.       Patient transported with:   Monitor  O2 @ 4 liters  Patient-specific medications from Pharmacy  Registered Nurse

## 2019-12-15 NOTE — PROGRESS NOTES
Hospitalist Progress Note    NAME: Geovanna Day   :  1935   MRN:  [id number]       Assessment / Plan:    Acute CVA s/p tPA  Left facial drop, slurred speech with left side weakness, improved  CTA showed  1. 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 2. At least 70% stenosis at origin of right internal carotid artery. MRI and ECHO pending  A1c 7% 2019    Acute on chronic respiratory failure with hypoxia  2/2 COPD exacerbation  chronically on O2, requires more now  Cont bronchodilators and Systemic steroids  Pulmonary edema on CXR     Complicated UTI  ESBL  On Zosyn    Sinus tachycardia, resolved    Hx of provoked PE x3  Surgery recommended switching Eliquis to Lovenox given the possibility of having abscess drainage     HTN    Acute complicated diverticulitis   C/w IV abx, CLD , pain control . Management per surgery    18.5 - 24.9 Normal weight / Body mass index is 22.58 kg/m². Code status: Full  Prophylaxis: SCD's  Recommended Disposition: TBD     Subjective:     Patient was seen and examined this PM.  No acute events overnight. Her daughter at bedside, all questions answered. Review of Systems:  Symptom Y/N Comments  Symptom Y/N Comments   Fever/Chills n   Chest Pain n    Poor Appetite n   Edema n    Cough n   Abdominal Pain n    Sputum n   Joint Pain n    SOB/CRAIG n   Pruritis/Rash n    Nausea/vomit n   Tolerating PT/OT     Diarrhea n   Tolerating Diet     Constipation n   Other         Objective:     VITALS:   Last 24hrs VS reviewed since prior progress note.  Most recent are:  Patient Vitals for the past 24 hrs:   Temp Pulse Resp BP SpO2   12/15/19 1200 98.6 °F (37 °C) (!) 117 23 149/74 93 %   12/15/19 1140     93 %   12/15/19 1100  (!) 122 22 141/81 93 %   12/15/19 1000  (!) 113 20 122/65 94 %   12/15/19 0900  (!) 127 27 144/71 (!) 87 %   12/15/19 0800 98.4 °F (36.9 °C) (!) 110 24 149/70 99 %   12/15/19 Jenifer N[place] 48   98 %   12/15/19 0730  (!) 110 17  95 %   12/15/19 0700  95 21 150/64 98 %   12/15/19 0600  75 20 153/88 94 %   12/15/19 0500  (!) 111 20 145/79 96 %   12/15/19 0400  96 20 157/85 96 %   12/15/19 0300 98.1 °F (36.7 °C) 100 15 147/87 99 %   12/15/19 0200  96 19 140/69 91 %   12/15/19 0100  90 18 132/66 100 %   12/14/19 2300 98.1 °F (36.7 °C) (!) 101 22 139/86 97 %   12/14/19 2100  (!) 106 21 119/58 90 %   12/14/19 2000 98.1 °F (36.7 °C) 94 22 134/68 95 %   12/14/19 1939     92 %   12/14/19 1930  (!) 104 23  95 %   12/14/19 1900  95 23 122/64 95 %   12/14/19 1800  (!) 124 26 124/77 97 %   12/14/19 1700  (!) 125 25 126/84 94 %   12/14/19 1600 98.6 °F (37 °C) (!) 106 25 125/67 99 %   12/14/19 1536     97 %   12/14/19 1500  98 25 120/64 98 %   12/14/19 1400  (!) 107 23 129/65 96 %       Intake/Output Summary (Last 24 hours) at 12/15/2019 1301  Last data filed at 12/15/2019 1245  Gross per 24 hour   Intake 2510 ml   Output 2605 ml   Net -95 ml        PHYSICAL EXAM:  General: WD, WN. Alert, cooperative, no acute distress    EENT:  EOMI. Anicteric sclerae. MMM  Resp:  CTA bilaterally, no wheezing or rales. No accessory muscle use  CV:  Regular  rhythm,  No edema  GI:  Soft, Non distended, Non tender.  +Bowel sounds  Neurologic:  Alert and oriented X 3, normal speech,   Psych:   Good insight. Not anxious nor agitated  Skin:  No rashes.   No jaundice    Reviewed most current lab test results and cultures  YES  Reviewed most current radiology test results   YES  Review and summation of old records today    NO  Reviewed patient's current orders and MAR    YES  PMH/SH reviewed - no change compared to H&P  ________________________________________________________________________  Care Plan discussed with:    Comments   Patient x    Family  x    RN x    Care Manager     Consultant                        Multidiciplinary team rounds were held today with , nursing, pharmacist and clinical coordinator. Patient's plan of care was discussed; medications were reviewed and discharge planning was addressed. ________________________________________________________________________  Total NON critical care TIME:  35   Minutes    Total CRITICAL CARE TIME Spent:   Minutes non procedure based      Comments   >50% of visit spent in counseling and coordination of care     ________________________________________________________________________  Juju Benoit MD     Procedures: see electronic medical records for all procedures/Xrays and details which were not copied into this note but were reviewed prior to creation of Plan. LABS:  I reviewed today's most current labs and imaging studies.   Pertinent labs include:  Recent Labs     12/15/19  0411 12/14/19 0418   WBC 8.9 6.8   HGB 10.8* 10.3*   HCT 36.9 34.8*    188     Recent Labs     12/15/19  0411 12/14/19 0418 12/13/19  0622    144 144   K 3.8 4.0 4.2    108 111*   CO2 33* 33* 28   * 131* 113*   BUN 20 17 19   CREA 0.58 0.67 0.74   CA 8.4* 8.5 8.5   MG 1.9 2.1  --    PHOS 2.4* 2.9  --        Signed: Juju Benoit MD

## 2019-12-16 ENCOUNTER — APPOINTMENT (OUTPATIENT)
Dept: CT IMAGING | Age: 84
DRG: 391 | End: 2019-12-16
Attending: NURSE PRACTITIONER
Payer: MEDICARE

## 2019-12-16 ENCOUNTER — APPOINTMENT (OUTPATIENT)
Dept: MRI IMAGING | Age: 84
DRG: 391 | End: 2019-12-16
Attending: PSYCHIATRY & NEUROLOGY
Payer: MEDICARE

## 2019-12-16 LAB
ANION GAP SERPL CALC-SCNC: 4 MMOL/L (ref 5–15)
BACTERIA SPEC CULT: NORMAL
BUN SERPL-MCNC: 21 MG/DL (ref 6–20)
BUN/CREAT SERPL: 33 (ref 12–20)
CALCIUM SERPL-MCNC: 8.5 MG/DL (ref 8.5–10.1)
CHLORIDE SERPL-SCNC: 99 MMOL/L (ref 97–108)
CO2 SERPL-SCNC: 36 MMOL/L (ref 21–32)
CREAT SERPL-MCNC: 0.64 MG/DL (ref 0.55–1.02)
ERYTHROCYTE [DISTWIDTH] IN BLOOD BY AUTOMATED COUNT: 14.2 % (ref 11.5–14.5)
GLUCOSE SERPL-MCNC: 154 MG/DL (ref 65–100)
HCT VFR BLD AUTO: 39.5 % (ref 35–47)
HGB BLD-MCNC: 11.5 G/DL (ref 11.5–16)
MAGNESIUM SERPL-MCNC: 2 MG/DL (ref 1.6–2.4)
MCH RBC QN AUTO: 24.9 PG (ref 26–34)
MCHC RBC AUTO-ENTMCNC: 29.1 G/DL (ref 30–36.5)
MCV RBC AUTO: 85.5 FL (ref 80–99)
NRBC # BLD: 0 K/UL (ref 0–0.01)
NRBC BLD-RTO: 0 PER 100 WBC
PHOSPHATE SERPL-MCNC: 2.3 MG/DL (ref 2.6–4.7)
PLATELET # BLD AUTO: 202 K/UL (ref 150–400)
PMV BLD AUTO: 10.6 FL (ref 8.9–12.9)
POTASSIUM SERPL-SCNC: 3.9 MMOL/L (ref 3.5–5.1)
RBC # BLD AUTO: 4.62 M/UL (ref 3.8–5.2)
SERVICE CMNT-IMP: NORMAL
SODIUM SERPL-SCNC: 139 MMOL/L (ref 136–145)
WBC # BLD AUTO: 10.3 K/UL (ref 3.6–11)

## 2019-12-16 PROCEDURE — 92522 EVALUATE SPEECH PRODUCTION: CPT

## 2019-12-16 PROCEDURE — 74011250636 HC RX REV CODE- 250/636: Performed by: NURSE PRACTITIONER

## 2019-12-16 PROCEDURE — 74011636320 HC RX REV CODE- 636/320: Performed by: SURGERY

## 2019-12-16 PROCEDURE — 92526 ORAL FUNCTION THERAPY: CPT

## 2019-12-16 PROCEDURE — 74011000250 HC RX REV CODE- 250: Performed by: INTERNAL MEDICINE

## 2019-12-16 PROCEDURE — 74011250637 HC RX REV CODE- 250/637: Performed by: NURSE PRACTITIONER

## 2019-12-16 PROCEDURE — 74011250636 HC RX REV CODE- 250/636: Performed by: INTERNAL MEDICINE

## 2019-12-16 PROCEDURE — 94640 AIRWAY INHALATION TREATMENT: CPT

## 2019-12-16 PROCEDURE — 36415 COLL VENOUS BLD VENIPUNCTURE: CPT

## 2019-12-16 PROCEDURE — 99232 SBSQ HOSP IP/OBS MODERATE 35: CPT | Performed by: SURGERY

## 2019-12-16 PROCEDURE — 84100 ASSAY OF PHOSPHORUS: CPT

## 2019-12-16 PROCEDURE — 74011250636 HC RX REV CODE- 250/636: Performed by: SURGERY

## 2019-12-16 PROCEDURE — 83735 ASSAY OF MAGNESIUM: CPT

## 2019-12-16 PROCEDURE — 74177 CT ABD & PELVIS W/CONTRAST: CPT

## 2019-12-16 PROCEDURE — 74011000258 HC RX REV CODE- 258: Performed by: INTERNAL MEDICINE

## 2019-12-16 PROCEDURE — 65660000000 HC RM CCU STEPDOWN

## 2019-12-16 PROCEDURE — 70551 MRI BRAIN STEM W/O DYE: CPT

## 2019-12-16 PROCEDURE — 80048 BASIC METABOLIC PNL TOTAL CA: CPT

## 2019-12-16 PROCEDURE — 74011250637 HC RX REV CODE- 250/637: Performed by: INTERNAL MEDICINE

## 2019-12-16 PROCEDURE — 74011250637 HC RX REV CODE- 250/637: Performed by: SURGERY

## 2019-12-16 PROCEDURE — 74011000255 HC RX REV CODE- 255: Performed by: NURSE PRACTITIONER

## 2019-12-16 PROCEDURE — 74011250636 HC RX REV CODE- 250/636: Performed by: HOSPITALIST

## 2019-12-16 PROCEDURE — 85027 COMPLETE CBC AUTOMATED: CPT

## 2019-12-16 PROCEDURE — 74011250637 HC RX REV CODE- 250/637: Performed by: HOSPITALIST

## 2019-12-16 PROCEDURE — 77010033678 HC OXYGEN DAILY

## 2019-12-16 RX ORDER — SODIUM CHLORIDE 0.9 % (FLUSH) 0.9 %
10 SYRINGE (ML) INJECTION
Status: COMPLETED | OUTPATIENT
Start: 2019-12-16 | End: 2019-12-16

## 2019-12-16 RX ORDER — DIPHENHYDRAMINE HYDROCHLORIDE 50 MG/ML
50 INJECTION, SOLUTION INTRAMUSCULAR; INTRAVENOUS ONCE
Status: COMPLETED | OUTPATIENT
Start: 2019-12-16 | End: 2019-12-16

## 2019-12-16 RX ORDER — METOPROLOL TARTRATE 25 MG/1
25 TABLET, FILM COATED ORAL ONCE
Status: COMPLETED | OUTPATIENT
Start: 2019-12-16 | End: 2019-12-16

## 2019-12-16 RX ORDER — BARIUM SULFATE 20 MG/ML
900 SUSPENSION ORAL ONCE
Status: COMPLETED | OUTPATIENT
Start: 2019-12-16 | End: 2019-12-16

## 2019-12-16 RX ORDER — METOPROLOL SUCCINATE 50 MG/1
100 TABLET, EXTENDED RELEASE ORAL DAILY
Status: DISCONTINUED | OUTPATIENT
Start: 2019-12-17 | End: 2019-12-17

## 2019-12-16 RX ADMIN — IPRATROPIUM BROMIDE AND ALBUTEROL SULFATE 3 ML: .5; 3 SOLUTION RESPIRATORY (INHALATION) at 19:29

## 2019-12-16 RX ADMIN — Medication 10 ML: at 15:05

## 2019-12-16 RX ADMIN — IPRATROPIUM BROMIDE AND ALBUTEROL SULFATE 3 ML: .5; 3 SOLUTION RESPIRATORY (INHALATION) at 08:05

## 2019-12-16 RX ADMIN — MEROPENEM 500 MG: 500 INJECTION, POWDER, FOR SOLUTION INTRAVENOUS at 01:00

## 2019-12-16 RX ADMIN — BUMETANIDE 1 MG: 0.25 INJECTION INTRAMUSCULAR; INTRAVENOUS at 20:00

## 2019-12-16 RX ADMIN — ACETAMINOPHEN AND CODEINE PHOSPHATE 1 TABLET: 300; 30 TABLET ORAL at 01:00

## 2019-12-16 RX ADMIN — SODIUM CHLORIDE 75 ML/HR: 900 INJECTION, SOLUTION INTRAVENOUS at 20:12

## 2019-12-16 RX ADMIN — IPRATROPIUM BROMIDE AND ALBUTEROL SULFATE 3 ML: .5; 3 SOLUTION RESPIRATORY (INHALATION) at 15:28

## 2019-12-16 RX ADMIN — Medication 10 ML: at 05:35

## 2019-12-16 RX ADMIN — Medication 10 ML: at 20:02

## 2019-12-16 RX ADMIN — ACETAMINOPHEN AND CODEINE PHOSPHATE 1 TABLET: 300; 30 TABLET ORAL at 22:33

## 2019-12-16 RX ADMIN — MORPHINE SULFATE 2 MG: 2 INJECTION, SOLUTION INTRAMUSCULAR; INTRAVENOUS at 03:16

## 2019-12-16 RX ADMIN — IOPAMIDOL 100 ML: 755 INJECTION, SOLUTION INTRAVENOUS at 12:07

## 2019-12-16 RX ADMIN — DIPHENHYDRAMINE HYDROCHLORIDE 50 MG: 50 INJECTION, SOLUTION INTRAMUSCULAR; INTRAVENOUS at 09:04

## 2019-12-16 RX ADMIN — ATORVASTATIN CALCIUM 80 MG: 40 TABLET, FILM COATED ORAL at 22:00

## 2019-12-16 RX ADMIN — MEROPENEM 500 MG: 500 INJECTION, POWDER, FOR SOLUTION INTRAVENOUS at 21:45

## 2019-12-16 RX ADMIN — METHYLPREDNISOLONE SODIUM SUCCINATE 40 MG: 40 INJECTION, POWDER, FOR SOLUTION INTRAMUSCULAR; INTRAVENOUS at 20:01

## 2019-12-16 RX ADMIN — QUINAPRIL 40 MG: 40 TABLET ORAL at 19:23

## 2019-12-16 RX ADMIN — BARIUM SULFATE 900 ML: 20 SUSPENSION ORAL at 09:04

## 2019-12-16 RX ADMIN — UMECLIDINIUM 1 PUFF: 62.5 AEROSOL, POWDER ORAL at 09:07

## 2019-12-16 RX ADMIN — ENOXAPARIN SODIUM 60 MG: 40 INJECTION SUBCUTANEOUS at 09:05

## 2019-12-16 RX ADMIN — ENOXAPARIN SODIUM 60 MG: 40 INJECTION SUBCUTANEOUS at 20:01

## 2019-12-16 RX ADMIN — BUMETANIDE 1 MG: 0.25 INJECTION INTRAMUSCULAR; INTRAVENOUS at 09:05

## 2019-12-16 RX ADMIN — METHYLPREDNISOLONE SODIUM SUCCINATE 40 MG: 40 INJECTION, POWDER, FOR SOLUTION INTRAMUSCULAR; INTRAVENOUS at 09:05

## 2019-12-16 RX ADMIN — MEROPENEM 500 MG: 500 INJECTION, POWDER, FOR SOLUTION INTRAVENOUS at 05:35

## 2019-12-16 RX ADMIN — MORPHINE SULFATE 2 MG: 2 INJECTION, SOLUTION INTRAMUSCULAR; INTRAVENOUS at 22:32

## 2019-12-16 RX ADMIN — ACETAMINOPHEN 650 MG: 325 TABLET ORAL at 17:28

## 2019-12-16 RX ADMIN — Medication 10 ML: at 12:08

## 2019-12-16 RX ADMIN — METOPROLOL TARTRATE 25 MG: 25 TABLET ORAL at 19:23

## 2019-12-16 RX ADMIN — MEROPENEM 500 MG: 500 INJECTION, POWDER, FOR SOLUTION INTRAVENOUS at 14:57

## 2019-12-16 NOTE — PROGRESS NOTES
I reviewed pertinent labs and imaging, and discussed /agreed on the plan of care with Dr. Ekaterina Gonzalez. Hospitalist Progress Note    NAME: Judd Servin   :  1935   MRN:  [id number]     Assessment / Plan:  Acute CVA s/p tPA  · CT Head: Moderate-sized area of acute subacute infarction lateral right frontal lobe. No acute hemorrhage   · CT perf with CBF:  Occlusion of distal M2 branch of middle divisional right middle cerebral artery with acute bland infarct. 2. At least 70% stenosis at origin of right internal carotid artery. 3. COPD  · CTA Head:  1. Occlusion of distal M2 branch of middle divisional right middle cerebral artery with acute bland infarct. 2. At least 70% stenosis at origin of right internal carotid artery. 3. COPD  · Received tPA 2019  · Repeat Head CT showed no change   · MRI brain:  No change in size of acute, moderate-sized right MCA territory infarction. Small amount of hemosiderin deposition within the infarct suggests petechial hemorrhage. · Appreciate Neurology input   · Hgb A1c 7.0    Acute on chronic respiratory failure with hypoxia  · Secondary to COPD exacerbation  · On home O2  · Continue bronchodilators and steroids   · CXR showed pulmonary edema  · Appreciate pulmonary input     Complicated UTI  · ESBL per UC  · Continue Meropenem     Sinus tachycardia  · Had previously resolved  · -130 today per nursing  · Increase BB  · ECHO: Left Ventricle: Normal cavity size, wall thickness and systolic function (ejection fraction normal). Estimated left ventricular ejection fraction is 51 - 55%. Visually measured ejection fraction. No regional wall motion abnormality noted. Mitral Valve: Mitral annular calcification. Trace mitral valve regurgitation is present. Right Ventricle: Mildly dilated right ventricle. Aortic Valve: Aortic valve sclerosis.  IVC/Hepatic Veins: Severely elevated central venous pressure (15+ mmHg); IVC diameter is larger than 21 mm and collapses less than 50% with respiration. · Consult cardiology in AM for further recommendations if HR not improved with increased dose of BB    Hx of provoked PE x3  · Continue Lovenox; possibly having surgery to drain abscess     HTN  · Continue home meds    Acute complicated diverticulitis   · Continue IV antibiotics  · Diet advanced  · Pain management PRN  · Management per surgery     18.5 - 24.9 Normal weight / Body mass index is 22.58 kg/m². Code status: Full  Prophylaxis: SCD's  Recommended Disposition: TBD     Subjective:     Chief Complaint / Reason for Physician Visit  \"I am feeling nauseous. \"  Discussed with RN events overnight. Review of Systems:  Symptom Y/N Comments  Symptom Y/N Comments   Fever/Chills n   Chest Pain n    Poor Appetite n   Edema     Cough    Abdominal Pain     Sputum    Joint Pain     SOB/CRAIG n   Pruritis/Rash     Nausea/vomit y   Tolerating PT/OT     Diarrhea n   Tolerating Diet y    Constipation n   Other       Could NOT obtain due to:      Objective:     VITALS:   Last 24hrs VS reviewed since prior progress note. Most recent are:  Patient Vitals for the past 24 hrs:   Temp Pulse Resp BP SpO2   12/16/19 0805     91 %   12/16/19 0714 98.3 °F (36.8 °C) (!) 102 16 140/82 98 %   12/16/19 0315 98 °F (36.7 °C) (!) 104 16 147/79 90 %   12/15/19 2300 98.1 °F (36.7 °C) 84 19 131/74 98 %   12/15/19 2030     97 %   12/15/19 1936 98 °F (36.7 °C) 99 18 152/71 96 %   12/15/19 1549     94 %   12/15/19 1430 98.3 °F (36.8 °C) (!) 102 20 116/74 94 %       Intake/Output Summary (Last 24 hours) at 12/16/2019 1333  Last data filed at 12/16/2019 0756  Gross per 24 hour   Intake    Output 2250 ml   Net -2250 ml        PHYSICAL EXAM:  General: Elderly, frail,  female. NAD    EENT:  EOMI. Anicteric sclerae. MMM  Resp:  Scattered wheezing in upper airways, diminished in bases.   No accessory muscle use  CV:  Regular rate rhythm,  No edema  GI:  Soft, Non distended, Non tender.  +Bowel sounds  Neurologic:  Alert and oriented X 3, normal speech,   Psych:   Good insight. Not anxious nor agitated  Skin:  No rashes. No jaundice    Reviewed most current lab test results and cultures  YES  Reviewed most current radiology test results   YES  Review and summation of old records today    NO  Reviewed patient's current orders and MAR    YES  PMH/SH reviewed - no change compared to H&P  ________________________________________________________________________  Care Plan discussed with:    Comments   Patient x    Family      RN x    Care Manager     Consultant                        Multidiciplinary team rounds were held today with , nursing, pharmacist and clinical coordinator. Patient's plan of care was discussed; medications were reviewed and discharge planning was addressed. ________________________________________________________________________  Total NON critical care TIME:  25   Minutes    Total CRITICAL CARE TIME Spent:   Minutes non procedure based      Comments   >50% of visit spent in counseling and coordination of care     ________________________________________________________________________  Indy Fuelling, NP     Procedures: see electronic medical records for all procedures/Xrays and details which were not copied into this note but were reviewed prior to creation of Plan. LABS:  I reviewed today's most current labs and imaging studies.   Pertinent labs include:  Recent Labs     12/16/19  0341 12/15/19  0411 12/14/19  0418   WBC 10.3 8.9 6.8   HGB 11.5 10.8* 10.3*   HCT 39.5 36.9 34.8*    180 188     Recent Labs     12/16/19  0341 12/15/19  0411 12/14/19  0418    142 144   K 3.9 3.8 4.0   CL 99 104 108   CO2 36* 33* 33*   * 162* 131*   BUN 21* 20 17   CREA 0.64 0.58 0.67   CA 8.5 8.4* 8.5   MG 2.0 1.9 2.1   PHOS 2.3* 2.4* 2.9       Signed: Indy Robb, NP

## 2019-12-16 NOTE — PROGRESS NOTES
2020 Bedside report given to Rosetta Hebert RN by Robert Rider RN. Report included SBAR, ED Report, Labs, and Cardiac Rhythm ST. Patient in bed resting well. Vitals are stable. [address], NP related to patient continued increased HR. Requested cardiology consult and medication. Patient will drop to 80-90 HR range and then back up to 140's not holding. Patient is asymptomatic.

## 2019-12-16 NOTE — PROGRESS NOTES
Problem: Pressure Injury - Risk of  Goal: *Prevention of pressure injury  Description  Document Reynaldo Scale and appropriate interventions in the flowsheet.   Outcome: Progressing Towards Goal  Note: Pressure Injury Interventions:  Sensory Interventions: Assess changes in LOC    Moisture Interventions: Absorbent underpads    Activity Interventions: Assess need for specialty bed    Mobility Interventions: Assess need for specialty bed    Nutrition Interventions: Document food/fluid/supplement intake    Friction and Shear Interventions: Apply protective barrier, creams and emollients                Problem: Patient Education: Go to Patient Education Activity  Goal: Patient/Family Education  Outcome: Progressing Towards Goal     Problem: Patient Education: Go to Patient Education Activity  Goal: Patient/Family Education  Outcome: Progressing Towards Goal     Problem: TIA/CVA Stroke: 0-24 hours  Goal: Off Pathway (Use only if patient is Off Pathway)  Outcome: Progressing Towards Goal  Goal: Activity/Safety  Outcome: Progressing Towards Goal  Goal: Consults, if ordered  Outcome: Progressing Towards Goal  Goal: Diagnostic Test/Procedures  Outcome: Progressing Towards Goal  Goal: Nutrition/Diet  Outcome: Progressing Towards Goal  Goal: Discharge Planning  Outcome: Progressing Towards Goal  Goal: Medications  Outcome: Progressing Towards Goal  Goal: Respiratory  Outcome: Progressing Towards Goal  Goal: Treatments/Interventions/Procedures  Outcome: Progressing Towards Goal  Goal: Minimize risk of bleeding post-thrombolytic infusion  Outcome: Progressing Towards Goal  Goal: Monitor for complications post-thrombolytic infusion  Outcome: Progressing Towards Goal  Goal: Psychosocial  Outcome: Progressing Towards Goal  Goal: *Hemodynamically stable  Outcome: Progressing Towards Goal  Goal: *Neurologically stable  Description  Absence of additional neurological deficits    Outcome: Progressing Towards Goal  Goal: *Verbalizes anxiety and depression are reduced or absent  Outcome: Progressing Towards Goal  Goal: *Absence of Signs of Aspiration on Current Diet  Outcome: Progressing Towards Goal  Goal: *Absence of deep venous thrombosis signs and symptoms(Stroke Metric)  Outcome: Progressing Towards Goal

## 2019-12-16 NOTE — PROGRESS NOTES
Admit Date: 12/10/2019    Subjective:     Patient feeling well without complaint. Objective:     Blood pressure 140/82, pulse (!) 102, temperature 98.3 °F (36.8 °C), resp. rate 16, height 5' 2\" (1.575 m), weight 123 lb 7.3 oz (56 kg), SpO2 91 %. Temp (24hrs), Av.2 °F (36.8 °C), Min:98 °F (36.7 °C), Max:98.6 °F (37 °C)      Physical Exam:  GENERAL: alert, cooperative, no distress, appears stated age, LUNG: clear to auscultation bilaterally, HEART: regular rate and rhythm, ABDOMEN: soft, non-tender.  Bowel sounds normal. No masses,  no organomegaly, EXTREMITIES:  extremities normal, atraumatic, no cyanosis or edema    Labs:   Recent Results (from the past 24 hour(s))   CBC W/O DIFF    Collection Time: 19  3:41 AM   Result Value Ref Range    WBC 10.3 3.6 - 11.0 K/uL    RBC 4.62 3.80 - 5.20 M/uL    HGB 11.5 11.5 - 16.0 g/dL    HCT 39.5 35.0 - 47.0 %    MCV 85.5 80.0 - 99.0 FL    MCH 24.9 (L) 26.0 - 34.0 PG    MCHC 29.1 (L) 30.0 - 36.5 g/dL    RDW 14.2 11.5 - 14.5 %    PLATELET 088 603 - 105 K/uL    MPV 10.6 8.9 - 12.9 FL    NRBC 0.0 0  WBC    ABSOLUTE NRBC 0.00 0.00 - 5.39 K/uL   METABOLIC PANEL, BASIC    Collection Time: 19  3:41 AM   Result Value Ref Range    Sodium 139 136 - 145 mmol/L    Potassium 3.9 3.5 - 5.1 mmol/L    Chloride 99 97 - 108 mmol/L    CO2 36 (H) 21 - 32 mmol/L    Anion gap 4 (L) 5 - 15 mmol/L    Glucose 154 (H) 65 - 100 mg/dL    BUN 21 (H) 6 - 20 MG/DL    Creatinine 0.64 0.55 - 1.02 MG/DL    BUN/Creatinine ratio 33 (H) 12 - 20      GFR est AA >60 >60 ml/min/1.73m2    GFR est non-AA >60 >60 ml/min/1.73m2    Calcium 8.5 8.5 - 10.1 MG/DL   MAGNESIUM    Collection Time: 19  3:41 AM   Result Value Ref Range    Magnesium 2.0 1.6 - 2.4 mg/dL   PHOSPHORUS    Collection Time: 19  3:41 AM   Result Value Ref Range    Phosphorus 2.3 (L) 2.6 - 4.7 MG/DL       Data Review images and reports reviewed    Assessment:     Active Problems:    Diverticulitis of large intestine with abscess (12/11/2019)      Diverticular disease of large intestine with complication (53/56/6641)        Plan/Recommendations/Medical Decision Making:     Continue present treatment   Sigmoid diverticulitis with 2.6 cm intramural abscess. New stroke s/p TPA  Now on pureed diet  COPD exacerbation  Would hold eliquis for now as pt may require abscess drainage. Would favor lovenox until ready for d/c. Interval CT abdomen and pelvis today. Discharge planning. Ross Godoy.  Mike Woods MD, [facility]
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
## 2019-12-16 NOTE — PROGRESS NOTES
Problem: Dysphagia (Adult)  Goal: *Acute Goals and Plan of Care (Insert Text)  Description  Speech pathology goals initiated 12/12/2019   1. Patient will participate in swallow re-eval within 7 days. MET  2. Patient will participate in formal motor speech eval within 7 days   3. Added 12/13/2019 Patient will tolerate puree/nectar liquid diet with no overt s/s aspiration within 7 days  4. Added 12/13/2019 Patient will tolerate thin liquids with no overt s/s aspiration within 7 days  5. Added 12/13/2019 Patient will tolerate trials of solids with timely/complete mastication and no oral residue within 7 days   Outcome: [address] TREATMENT  Patient: Geovanna Day (57 y.o. female)  Date: 12/16/2019  Diagnosis: Diverticulitis of large intestine with abscess [K57.20]  Diverticular disease of large intestine with complication [J91.25]   <principal problem not specified>       Precautions:       ASSESSMENT:  This pt is tolerating nectar thick liquids today with no cough after the swallow but a delayed cough after thins. She had significant upper airway congestion. She reported she ate peanut butter and crackers this morning with no coughing. Pt is eating jello that is considered a thin liquid and having thin liquids provided to her by staff. PLAN:  Recommendations and Planned Interventions:  -Puree / Nectar thick liquid diet.   -Single sips. MBS if the physician is in agreement  Patient continues to benefit from skilled intervention to address the above impairments. Continue treatment per established plan of care. Discharge Recommendations:  None     SUBJECTIVE:   Patient stated I'm following the rules.    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 Trials:  Patient Position: upright in bed  Vocal quality prior to P.O.: No impairment  Consistency Presented: Thin liquid; Nectar thick liquid  How Presented: Straw;Spoon     Bolus Acceptance: No impairment  Bolus Formation/Control: No impairment  Type of Impairment: Delayed  Propulsion: No impairment  Oral Residue: None  Initiation of Swallow: Delayed (# of seconds)  Laryngeal Elevation: Functional  Aspiration Signs/Symptoms: Delayed cough/throat clear                Oral Phase Severity: Mild  Pharyngeal Phase Severity : Mild     Pain:  Pain Scale 1: Numeric (0 - 10)  Pain Intensity 1: 0       After treatment:   Call bell within reach    COMMUNICATION/EDUCATION:   Patient was educated regarding her deficit(s) of dysphagia as this relates to her diagnosis of CVA. She demonstrated Good understanding . The patient's plan of care including recommendations, planned interventions, and recommended diet changes were discussed with: Registered Nurse.     MICHELLE Smith  Time Calculation: 25 mins

## 2019-12-16 NOTE — PROGRESS NOTES
ADULT PROTOCOL: JET AEROSOL ASSESSMENT    Patient  Reggie      80 y.o.   female     12/15/2019  8:46PM    Breath Sounds Pre Procedure: Right Breath Sounds: Diminished, Clear                               Left Breath Sounds: Diminished, Coarse    Breath Sounds Post Procedure: Right Breath Sounds: Diminished, Coarse                                 Left Breath Sounds: Diminished, Coarse    Heart Rate: Pre procedure Pulse: 96           Post procedure Pulse: 109    Resp Rate: Pre procedure Respirations: 20           Post procedure Respirations: 20    Oxygen: O2 Device: Nasal cannula   Flow rate (L/min) 4     Changed: NO    SpO2: Pre procedure SpO2: 97 %   with oxygen              Post procedure SpO2: 96 %  with oxygen    Nebulizer Therapy: Current medications Aerosolized Medications: DuoNeb      Changed: NO    Smoking History:    Smoking status: Former Smoker       Years: 15.00       Last attempt to quit: 4/21/2004       Years since quitting: 15.6       Problem List:   Patient Active Problem List   Diagnosis Code    Syncope and collapse R55    HTN (hypertension) I10    Hypokalemia E87.6    Dehydration E86.0    COPD exacerbation (HCC) J44.1    Diverticulitis of large intestine with abscess K57.20    Diverticular disease of large intestine with complication M64.61       Respiratory Therapist: Sohan Farris, RT

## 2019-12-16 NOTE — PROGRESS NOTES
RAPID RESPONSE TEAM-Recent CCU Transfer    Rounded on patient due to recent transfer out of CCU on 12/15/19. Discussed with primary RN, Dagoberto Gould. No acute concerns. No patient complaints. Vitals stable. MEWS 1. No RRT interventions indicated at this time. RN encouraged to call with any questions or concerns.     [facility]  Rapid Response RN  Sumit Corcoran

## 2019-12-16 NOTE — PROGRESS NOTES
Physical Therapy    Chart reviewed, patient with HR in 130s at rest, plan to defer today and continue to follow.     Marcleina Ta

## 2019-12-16 NOTE — PROGRESS NOTES
OT note:  OT attempted to see pt and chart reviewed , per nursing pts HR was 130 at rest and asked to defer at this time.   Will continue to follow

## 2019-12-17 ENCOUNTER — APPOINTMENT (OUTPATIENT)
Dept: GENERAL RADIOLOGY | Age: 84
DRG: 391 | End: 2019-12-17
Attending: INTERNAL MEDICINE
Payer: MEDICARE

## 2019-12-17 LAB
ANION GAP SERPL CALC-SCNC: 5 MMOL/L (ref 5–15)
BUN SERPL-MCNC: 25 MG/DL (ref 6–20)
BUN/CREAT SERPL: 40 (ref 12–20)
CALCIUM SERPL-MCNC: 8.6 MG/DL (ref 8.5–10.1)
CHLORIDE SERPL-SCNC: 97 MMOL/L (ref 97–108)
CO2 SERPL-SCNC: 38 MMOL/L (ref 21–32)
CREAT SERPL-MCNC: 0.62 MG/DL (ref 0.55–1.02)
GLUCOSE SERPL-MCNC: 155 MG/DL (ref 65–100)
POTASSIUM SERPL-SCNC: 3.3 MMOL/L (ref 3.5–5.1)
SODIUM SERPL-SCNC: 140 MMOL/L (ref 136–145)

## 2019-12-17 PROCEDURE — 77010033678 HC OXYGEN DAILY

## 2019-12-17 PROCEDURE — 74011000258 HC RX REV CODE- 258: Performed by: INTERNAL MEDICINE

## 2019-12-17 PROCEDURE — 74011250636 HC RX REV CODE- 250/636: Performed by: INTERNAL MEDICINE

## 2019-12-17 PROCEDURE — 97530 THERAPEUTIC ACTIVITIES: CPT

## 2019-12-17 PROCEDURE — 74011250636 HC RX REV CODE- 250/636: Performed by: HOSPITALIST

## 2019-12-17 PROCEDURE — 97535 SELF CARE MNGMENT TRAINING: CPT

## 2019-12-17 PROCEDURE — 74011250636 HC RX REV CODE- 250/636: Performed by: SURGERY

## 2019-12-17 PROCEDURE — 74011000250 HC RX REV CODE- 250: Performed by: INTERNAL MEDICINE

## 2019-12-17 PROCEDURE — 92526 ORAL FUNCTION THERAPY: CPT

## 2019-12-17 PROCEDURE — 80048 BASIC METABOLIC PNL TOTAL CA: CPT

## 2019-12-17 PROCEDURE — 92611 MOTION FLUOROSCOPY/SWALLOW: CPT

## 2019-12-17 PROCEDURE — 74230 X-RAY XM SWLNG FUNCJ C+: CPT

## 2019-12-17 PROCEDURE — 74011250637 HC RX REV CODE- 250/637: Performed by: INTERNAL MEDICINE

## 2019-12-17 PROCEDURE — 97116 GAIT TRAINING THERAPY: CPT

## 2019-12-17 PROCEDURE — 74011250637 HC RX REV CODE- 250/637: Performed by: SURGERY

## 2019-12-17 PROCEDURE — 74011250637 HC RX REV CODE- 250/637: Performed by: NURSE PRACTITIONER

## 2019-12-17 PROCEDURE — 65660000000 HC RM CCU STEPDOWN

## 2019-12-17 PROCEDURE — 97110 THERAPEUTIC EXERCISES: CPT

## 2019-12-17 PROCEDURE — 94640 AIRWAY INHALATION TREATMENT: CPT

## 2019-12-17 PROCEDURE — 36415 COLL VENOUS BLD VENIPUNCTURE: CPT

## 2019-12-17 PROCEDURE — 99232 SBSQ HOSP IP/OBS MODERATE 35: CPT | Performed by: SURGERY

## 2019-12-17 RX ORDER — ENOXAPARIN SODIUM 100 MG/ML
1 INJECTION SUBCUTANEOUS EVERY 12 HOURS
Status: DISCONTINUED | OUTPATIENT
Start: 2019-12-17 | End: 2019-12-20

## 2019-12-17 RX ORDER — POTASSIUM CHLORIDE 750 MG/1
40 TABLET, FILM COATED, EXTENDED RELEASE ORAL 2 TIMES DAILY
Status: DISCONTINUED | OUTPATIENT
Start: 2019-12-17 | End: 2019-12-17

## 2019-12-17 RX ADMIN — Medication 10 ML: at 14:24

## 2019-12-17 RX ADMIN — MEROPENEM 500 MG: 500 INJECTION, POWDER, FOR SOLUTION INTRAVENOUS at 22:13

## 2019-12-17 RX ADMIN — ENOXAPARIN SODIUM 50 MG: 60 INJECTION SUBCUTANEOUS at 22:06

## 2019-12-17 RX ADMIN — Medication 10 ML: at 04:17

## 2019-12-17 RX ADMIN — IPRATROPIUM BROMIDE AND ALBUTEROL SULFATE 3 ML: .5; 3 SOLUTION RESPIRATORY (INHALATION) at 07:51

## 2019-12-17 RX ADMIN — IPRATROPIUM BROMIDE AND ALBUTEROL SULFATE 3 ML: .5; 3 SOLUTION RESPIRATORY (INHALATION) at 20:44

## 2019-12-17 RX ADMIN — POLYETHYLENE GLYCOL (3350) 17 G: 17 POWDER, FOR SOLUTION ORAL at 08:31

## 2019-12-17 RX ADMIN — MEROPENEM 500 MG: 500 INJECTION, POWDER, FOR SOLUTION INTRAVENOUS at 15:50

## 2019-12-17 RX ADMIN — POTASSIUM CHLORIDE 40 MEQ: 750 TABLET, FILM COATED, EXTENDED RELEASE ORAL at 10:42

## 2019-12-17 RX ADMIN — Medication 10 ML: at 08:32

## 2019-12-17 RX ADMIN — Medication 10 ML: at 21:50

## 2019-12-17 RX ADMIN — SODIUM CHLORIDE 75 ML/HR: 900 INJECTION, SOLUTION INTRAVENOUS at 08:25

## 2019-12-17 RX ADMIN — Medication 10 ML: at 21:51

## 2019-12-17 RX ADMIN — ACETAMINOPHEN AND CODEINE PHOSPHATE 1 TABLET: 300; 30 TABLET ORAL at 14:19

## 2019-12-17 RX ADMIN — MEROPENEM 500 MG: 500 INJECTION, POWDER, FOR SOLUTION INTRAVENOUS at 10:49

## 2019-12-17 RX ADMIN — METHYLPREDNISOLONE SODIUM SUCCINATE 40 MG: 40 INJECTION, POWDER, FOR SOLUTION INTRAMUSCULAR; INTRAVENOUS at 21:50

## 2019-12-17 RX ADMIN — BUMETANIDE 1 MG: 0.25 INJECTION INTRAMUSCULAR; INTRAVENOUS at 10:46

## 2019-12-17 RX ADMIN — METOPROLOL SUCCINATE 100 MG: 50 TABLET, EXTENDED RELEASE ORAL at 08:28

## 2019-12-17 RX ADMIN — SPIRONOLACTONE 25 MG: 25 TABLET ORAL at 08:28

## 2019-12-17 RX ADMIN — ENOXAPARIN SODIUM 60 MG: 40 INJECTION SUBCUTANEOUS at 09:00

## 2019-12-17 RX ADMIN — IPRATROPIUM BROMIDE AND ALBUTEROL SULFATE 3 ML: .5; 3 SOLUTION RESPIRATORY (INHALATION) at 16:09

## 2019-12-17 RX ADMIN — MORPHINE SULFATE 2 MG: 2 INJECTION, SOLUTION INTRAMUSCULAR; INTRAVENOUS at 17:29

## 2019-12-17 RX ADMIN — PRAMIPEXOLE DIHYDROCHLORIDE 0.5 MG: 0.25 TABLET ORAL at 08:29

## 2019-12-17 RX ADMIN — ACETAMINOPHEN AND CODEINE PHOSPHATE 1 TABLET: 300; 30 TABLET ORAL at 08:27

## 2019-12-17 RX ADMIN — METHYLPREDNISOLONE SODIUM SUCCINATE 40 MG: 40 INJECTION, POWDER, FOR SOLUTION INTRAMUSCULAR; INTRAVENOUS at 08:31

## 2019-12-17 RX ADMIN — UMECLIDINIUM 1 PUFF: 62.5 AEROSOL, POWDER ORAL at 09:04

## 2019-12-17 RX ADMIN — MEROPENEM 500 MG: 500 INJECTION, POWDER, FOR SOLUTION INTRAVENOUS at 04:17

## 2019-12-17 RX ADMIN — DEXTROSE MONOHYDRATE 2.5 MG/HR: 50 INJECTION, SOLUTION INTRAVENOUS at 17:27

## 2019-12-17 RX ADMIN — QUINAPRIL 40 MG: 40 TABLET ORAL at 08:59

## 2019-12-17 NOTE — PROGRESS NOTES
Pt heels were becoming red and blanchable. Elevated heels off the bed. Her hips were also becoming just slightly red. Turned pt and kept pt free from cords.
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
## 2019-12-17 NOTE — PROGRESS NOTES
Problem: Dysphagia (Adult)  Goal: *Acute Goals and Plan of Care (Insert Text)  Description  Speech pathology goals initiated 12/12/2019   1. Patient will participate in swallow re-eval within 7 days. MET  2. Patient will participate in formal motor speech eval within 7 days   3. Added 12/13/2019 Patient will tolerate puree/nectar liquid diet with no overt s/s aspiration within 7 days  4. Added 12/13/2019 Patient will tolerate thin liquids with no overt s/s aspiration within 7 days  5. Added 12/13/2019 Patient will tolerate trials of solids with timely/complete mastication and no oral residue within 7 days. Upgraded to dys 3. Outcome: Progressing Towards Goal   SPEECH LANGUAGE PATHOLOGY DYSPHAGIA TREATMENT  Patient: Chelita Duncan (19 y.o. female)  Date: 12/17/2019  Diagnosis: Diverticulitis of large intestine with abscess [K57.20]  Diverticular disease of large intestine with complication [I09.88]   <principal problem not specified>       Precautions:  Aspiration    ASSESSMENT:  Pt seen today for swallowing therapy. She is still having difficulty with thin liquids with strong cough after the swallow. With solids she masticates very slowly and anteriorly. She reports this all to be baseline for her. Slow to clear her mouth of solids as well. Contacted Dr. Che Johnson and he agrees with MBS. PLAN:  Recommendations and Planned Interventions:  -Dysphagia 3 / Nectar thick liquid diet.   -Single sips. Patient continues to benefit from skilled intervention to address the above impairments. Continue treatment per established plan of care. Discharge Recommendations:  to be determined      SUBJECTIVE:   Patient stated she disagreed that she needed thickener.      OBJECTIVE:   Cognitive and Communication Status:  Neurologic State: Alert  Orientation Level: Oriented X4  Cognition: Appropriate decision making  Perception: Appears intact  Perseveration: No perseveration noted  Safety/Judgement: Awareness of environment  Dysphagia Treatment:  Oral Assessment:     P.O. Trials:  Patient Position: upright in bed  Vocal quality prior to P.O.: No impairment  Consistency Presented: Thin liquid; Solid; Nectar thick liquid  How Presented: Self-fed/presented;Straw(needs a straw due to her tremors. )     Bolus Acceptance: No impairment  Bolus Formation/Control: No impairment  Type of Impairment: (very slow mastication which she reports is baseline for her. chews anteriorly)  Propulsion: Delayed (# of seconds)  Oral Residue: None  Initiation of Swallow: Delayed (# of seconds)  Laryngeal Elevation: Decreased  Aspiration Signs/Symptoms: Strong cough                Oral Phase Severity: Mild  Pharyngeal Phase Severity : Mild      Pain:  Pain Scale 1: Numeric (0 - 10)  Pain Intensity 1: 8  Pain Location 1: Back    After treatment:   Call bell within reach    COMMUNICATION/EDUCATION:   Patient was educated regarding her deficit(s) of dysphagia as this relates to her diagnosis of CVA. She demonstrated Good understanding as evidenced by repeating instructions. The patient's plan of care including recommendations, planned interventions, and recommended diet changes were discussed with: Registered Nurse.     Colie Mcburney, SLP  Time Calculation: 15 mins

## 2019-12-17 NOTE — PROGRESS NOTES
Hospitalist Progress Note    NAME: Merly Taylor   :  1935   MRN:  [id number]     Assessment / Plan:  Dysphagia with high risk aspiration  NPO  MBS results noted, discussed plan with speech  Discussion with patient and family earlier  Switch meds to IV  Palliative care consult for goal of care    Acute CVA s/p tPA  CT Head: Moderate-sized area of acute subacute infarction lateral right frontal lobe. No acute hemorrhage   CT perf with CBF:  Occlusion of distal M2 branch of middle divisional right middle cerebral artery with acute bland infarct. 2. At least 70% stenosis at origin of right internal carotid artery. 3. COPD  CTA Head:  1. Occlusion of distal M2 branch of middle divisional right middle cerebral artery with acute bland infarct. 2. At least 70% stenosis at origin of right internal carotid artery. 3. COPD  Received tPA 2019  Repeat Head CT showed no change   MRI brain:  No change in size of acute, moderate-sized right MCA territory infarction. Small amount of hemosiderin deposition within the infarct suggests petechial hemorrhage.   Appreciate Neurology input   Continue Lovenox while we are able to clarify PO status as above  Hold statins while NPO    Acute on chronic respiratory failure with hypoxia POA  Secondary to COPD exacerbation with acute on chronic diastolic heart failure  Suspect aspiration could be reason for COPD exacerbation  Continue Px abx, IV Steroids  Continue O2  Continue Nebs  Pulmonary input appreciated  CXR with pulmonary edema  S/p IV diruetics, hold for now considering NPO    UTI POA  ESBL per UC  Continue Meropenem     Acute complicated diverticulitis with abscess  Continue IV Merrem   Pain management PRN  Surgery input noted    Intermittent a.fib on tele  Considering holding PO meds, start Cardizem ggt  Cardiology consult  ECHO with normal EF and Aortic Valve sclerosis  Continue Lovenox    Hx of provoked PE x3  Continue Lovenox     HTN  Holding PO meds  cardizem ggt as above    18.5 - 24.9 Normal weight / Body mass index is 21.58 kg/m². Code status: Full  Prophylaxis: SCD's  Recommended Disposition: TBD     Subjective: Pt seen and examined at bedside. NAD. Feels the same. Overnight events d/w RN     Chief Complaint / Reason for Physician Visit: f/u \"acute respiratory failure, Diverticulitis, CVA\"    Review of Systems:  Symptom Y/N Comments  Symptom Y/N Comments   Fever/Chills n   Chest Pain n    Poor Appetite n   Edema     Cough    Abdominal Pain     Sputum    Joint Pain     SOB/CRAIG n   Pruritis/Rash     Nausea/vomit n   Tolerating PT/OT     Diarrhea n   Tolerating Diet n    Constipation n   Other       Could NOT obtain due to:      Objective:     VITALS:   Last 24hrs VS reviewed since prior progress note. Most recent are:  Patient Vitals for the past 24 hrs:   Temp Pulse Resp BP SpO2   12/17/19 1729  83  141/78    12/17/19 1411 97.7 °F (36.5 °C) 100 18 (!) 145/95 91 %   12/17/19 1135 98.2 °F (36.8 °C) 76 18 129/70 91 %   12/17/19 1046  65  135/62    12/17/19 0747 97.8 °F (36.6 °C) 68 18 139/72 98 %   12/17/19 0424 98.2 °F (36.8 °C) (!) 103 19 (!) 126/94 92 %   12/16/19 2300 97.6 °F (36.4 °C) 92 19 131/77    12/16/19 1930     95 %   12/16/19 1923 97.9 °F (36.6 °C) (!) 118 18 145/83 93 %       Intake/Output Summary (Last 24 hours) at 12/17/2019 1803  Last data filed at 12/17/2019 1600  Gross per 24 hour   Intake 670 ml   Output 3075 ml   Net -2405 ml        PHYSICAL EXAM:  General: Elderly, frail,  female. NAD    EENT:  EOMI. Anicteric sclerae. MMM  Resp:  Scattered wheezing in upper airways, diminished in bases. No accessory muscle use  CV:  Regular rate rhythm,  No edema  GI:  Soft, Non distended, Non tender.  +Bowel sounds  Neurologic:  Alert and oriented X 3, normal speech,   Psych:   Good insight. Not anxious nor agitated  Skin:  No rashes.   No jaundice    Reviewed most current lab test results and cultures  YES  Reviewed most current radiology test results   YES  Review and summation of old records today    NO  Reviewed patient's current orders and MAR    YES  PMH/SH reviewed - no change compared to H&P  ________________________________________________________________________  Care Plan discussed with:    Comments   Patient x    Family  x At bedside   RN x    Care Manager     Consultant                        Multidiciplinary team rounds were held today with , nursing, pharmacist and clinical coordinator. Patient's plan of care was discussed; medications were reviewed and discharge planning was addressed. ________________________________________________________________________  Total NON critical care TIME:  30  Minutes    Total CRITICAL CARE TIME Spent:   Minutes non procedure based      Comments   >50% of visit spent in counseling and coordination of care     ________________________________________________________________________  Marissa Gonzalez MD     Procedures: see electronic medical records for all procedures/Xrays and details which were not copied into this note but were reviewed prior to creation of Plan. LABS:  I reviewed today's most current labs and imaging studies.   Pertinent labs include:  Recent Labs     12/16/19  0341 12/15/19  0411   WBC 10.3 8.9   HGB 11.5 10.8*   HCT 39.5 36.9    180     Recent Labs     12/17/19  0434 12/16/19  0341 12/15/19  0411    139 142   K 3.3* 3.9 3.8   CL 97 99 104   CO2 38* 36* 33*   * 154* 162*   BUN 25* 21* 20   CREA 0.62 0.64 0.58   CA 8.6 8.5 8.4*   MG  --  2.0 1.9   PHOS  --  2.3* 2.4*       Signed: Marissa Gonzalez MD
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
## 2019-12-17 NOTE — PROGRESS NOTES
Problem: Self Care Deficits Care Plan (Adult)  Goal: *Acute Goals and Plan of Care (Insert Text)  Description    FUNCTIONAL STATUS PRIOR TO ADMISSION: Per chart, Patient was modified independent using a walker for functional mobility, occasionally furniture walking in the home. Patient was independent for basic and instrumental ADLs, reporting she mostly sponge bathed and got into the tub 1x/week. At baseline patient uses supplemental oxygen. Patient reporting she still drives and cooks. Patient is blind in R eye. HOME SUPPORT: Per chart, patient lives alone, but her daughter will occasionally stay with her. Occupational Therapy Goals  Initiated 12/13/2019    1. Patient will perform grooming with supervision/set-up within 7 day(s). 2.  Patient will perform self-feeding with supervision/set-up within 7 day(s). 3.  Patient will perform upper body dressing with supervision/set-up within 7 day(s). 4.  Patient will tolerate sitting EOB for 5 minutes in preparation for OOB ADLs with supervision/set up within 7 days. 5.  Patient will perform toilet transfers with moderate assistance using least restrictive assistive device within 7 day(s). 6.  Patient will participate in upper extremity therapeutic exercise/activities with supervision/set-up for 5 minutes within 7 day(s). Outcome: Progressing Towards Goal   OCCUPATIONAL THERAPY TREATMENT  Patient: Lydia Xavier (53 y.o. female)  Date: 12/17/2019  Diagnosis: Diverticulitis of large intestine with abscess [K57.20]  Diverticular disease of large intestine with complication [D22.49]   <principal problem not specified>       Precautions: Aspiration  Chart, occupational therapy assessment, plan of care, and goals were reviewed. ASSESSMENT  Patient continues with skilled OT services and is progressing towards goals. Pt was mod assist  of 2 for bed mobility and was able to sit on EOB, with CGA. Pt is on 4.5 liters of NC and her O2% was 90-01%.   Pt has good  in BUE and her range is functional in  BUE. She was able to stand with mod assist of 2 and have her buttocks cleaned and sheets changed. She was able to side step to [facility] with min assist of 1  with RW, and mod assist of 2 for sit to supine. Pt was heading VON for modified barium test. Pt was independent and very active PTA and does not appear to have deficits from her CVA, but is very weak and her respiratory status is limiting her. She would benefit from  In pt rehab at discharge. Current Level of Function Impacting Discharge (ADLs): decreased ADLs, endurance, resp status, strength, functional mobility. PLAN :  Patient continues to benefit from skilled intervention to address the above impairments. Continue treatment per established plan of care. to address goals. Recommend with staff: chair position for meals or OOB  and BSC for toileting. Recommend next OT session: work on transfers to toilet in bathroom and or standing for grooming at bedside. Recommendation for discharge: (in order for the patient to meet his/her long term goals)  Therapy 3 hours per day 5-7 days per week    This discharge recommendation:  Has been made in collaboration with the attending provider and/or case management    IF patient discharges home will need the following DME: to be determined (TBD)       SUBJECTIVE:   Patient stated I dont know if I can one more.     OBJECTIVE DATA SUMMARY:   Cognitive/Behavioral Status:  Neurologic State: Alert  Orientation Level: Oriented X4  Cognition: Follows commands  Perception: Appears intact  Perseveration: No perseveration noted  Safety/Judgement: Fall prevention    Functional Mobility and Transfers for ADLs:  Bed Mobility:  Rolling: Moderate assistance;Assist x2  Supine to Sit: Moderate assistance;Assist x2  Sit to Supine: Moderate assistance;Assist x2    Transfers:  Sit to Stand: Moderate assistance;Assist x2          Balance:  Sitting: Impaired; Without support  Sitting - Static: Good (unsupported)  Sitting - Dynamic: Fair (occasional)  Standing: Impaired; Without support  Standing - Static: Fair;Constant support  Standing - Dynamic : Fair;Constant support    ADL Intervention:  Feeding  Feeding Assistance: Set-up    Grooming  Grooming Assistance: Minimum assistance;Contact guard assistance  Position Performed: Long sitting on bed  Washing Face: Minimum assistance;Contact guard assistance  Washing Hands: Minimum assistance;Contact guard assistance  Brushing Teeth: Minimum assistance;Contact guard assistance    Upper Body Bathing  Bathing Assistance: Maximum assistance    Lower Body Bathing  Bathing Assistance: Maximum assistance              Toileting  Toileting Assistance: Maximum assistance  Bladder Hygiene: Maximum assistance  Bowel Hygiene: Maximum assistance    Cognitive Retraining  Safety/Judgement: Fall prevention      Activity Tolerance:   Fair  Please refer to the flowsheet for vital signs taken during this treatment. After treatment patient left in no apparent distress:   Supine in bed, Call bell within reach, and Caregiver / family present    COMMUNICATION/COLLABORATION:   The patients plan of care was discussed with: Physical Therapist, Registered Nurse, and family. Patient was educated regarding Her deficit(s) of weakness, decreased balance and endurance as this relates to Her diagnosis of CVA. She demonstrated Fair understanding. Patient and/or family was verbally educated on the BE FAST acronym for signs/symptoms of CVA and TIA. BE FAST was written on patient's communication board  for visual education and reinforcement. All questions answered with patient indicating good understanding.      Glenda Bell OT  Time Calculation: 49 mins

## 2019-12-17 NOTE — PROGRESS NOTES
Pharmacy Automatic Direct Oral Anticoagulant Renal Dosing Protocol    Patient currently receiving Apixaban 5 mg bid with an indication of DVT. Start date: PTA    Major interacting medications:     Platelet inhibitors (dose/frequency): none currently, may start ASA for stroke    Labs:  Recent Labs     12/17/19  0434 12/16/19  0341 12/15/19  0411   CREA 0.62 0.64 0.58   HGB  --  11.5 10.8*   PLT  --  202 180     Wt Readings from Last 1 Encounters:   12/17/19 53.5 kg (118 lb)        Creatinine Clearance: 57.1 mL/min (actual weight)     Impression/Plan:   Discussed with Dr. Leno Reed and will switch the Enoxaparin to Apixaban 5 mg BID starting 12/17 9:00 PM   Of note, patient was not taking PTA because of bruising     Pharmacy will follow daily and adjust as appropriate.     Thank you,  Estelle Vasquez, PHARMD

## 2019-12-17 NOTE — PROGRESS NOTES
7:30 AM Bedside report received from [facility].    10:00 AM Notified Dr. Mitchell Brush patient has orders for IV bumex and K+ is 3.3 this AM. Orders received to administer 40 mEq K+ now and this evening. See MAR.    12:00 PM Spoke with Dr. Mitchell Brush regarding patient's IVF orders. Patient has been taking in 50% and more of meals and liquids. Orders to stop IVF. Patient off floor for barium swallow. 2:15 PM Patient returned to room 2268 via bed. VSS. Patient c/o pain 8/10. PRN tylenol #3 given per MD order. See MAR. Cardiology consult called to Dr. Yahir Kebede. Per [facility], Danni Gibbs, and Martha are on call. 3:30 PM Quiroz catheter dc'd, per MD order. 225 cc clear, yellow urine noted in quiroz bag in removal.    5:00 PM Spoke with Dr. Mueller  regarding cardiology consult. MD states Dr. Belem Schmidt will most likely see patient. Will f/u and continue to monitor. 5:15 PM Called Dr. Mitchell Brush to clarify potassium order d/t MD states patient is NPO except ice chips. MD states to dc potassium. See MAR.    5:30 PM PRN IV morphine given for patient's c/o pain per MD order. See MAR.    5:50 PM Called [facility] to follow up with cardiology consult. Office states Dr. Betty Hurd is on call. Awaiting return call. 6:35 PM Paged [facility] again. Awaiting return call.     7:20 PM Bedside report given to Larry Padilla [facility].

## 2019-12-17 NOTE — PROGRESS NOTES
Admit Date: 12/10/2019    Subjective:     Patient feeling well without complaint. Roberta diet well.  + BMs. Objective:     Blood pressure 139/72, pulse 68, temperature 97.8 °F (36.6 °C), resp. rate 18, height 5' 2\" (1.575 m), weight 118 lb (53.5 kg), SpO2 98 %. Temp (24hrs), Av.8 °F (36.6 °C), Min:97.5 °F (36.4 °C), Max:98.2 °F (36.8 °C)      Physical Exam:  GENERAL: alert, cooperative, no distress, appears stated age, LUNG: clear to auscultation bilaterally, HEART: regular rate and rhythm, ABDOMEN: soft, non-tender. Bowel sounds normal. No masses,  no organomegaly, EXTREMITIES:  extremities normal, atraumatic, no cyanosis or edema    Labs:   Recent Results (from the past 24 hour(s))   METABOLIC PANEL, BASIC    Collection Time: 19  4:34 AM   Result Value Ref Range    Sodium 140 136 - 145 mmol/L    Potassium 3.3 (L) 3.5 - 5.1 mmol/L    Chloride 97 97 - 108 mmol/L    CO2 38 (H) 21 - 32 mmol/L    Anion gap 5 5 - 15 mmol/L    Glucose 155 (H) 65 - 100 mg/dL    BUN 25 (H) 6 - 20 MG/DL    Creatinine 0.62 0.55 - 1.02 MG/DL    BUN/Creatinine ratio 40 (H) 12 - 20      GFR est AA >60 >60 ml/min/1.73m2    GFR est non-AA >60 >60 ml/min/1.73m2    Calcium 8.6 8.5 - 10.1 MG/DL       Data Review images and reports reviewed    Assessment:     Active Problems:    Diverticulitis of large intestine with abscess (2019)      Diverticular disease of large intestine with complication (81/27/6496)        Plan/Recommendations/Medical Decision Making:     Continue present treatment   Sigmoid diverticulitis with 2.6 cm intramural abscess. New stroke s/p TPA  Now on pureed diet  COPD exacerbation  CT yesterday shows improvement in diverticulitis. No indication for drain placement. Okay to resume Eliquis  Suitable for d/c from surgical standpoint at any time. Continue po antibiotics another week on discharge Cipro and Flagyl. Will follow peripherally. Earlene Velarde.  Leno Reed MD, [facility]

## 2019-12-17 NOTE — PROGRESS NOTES
ADULT PROTOCOL: JET AEROSOL  REASSESSMENT    Patient  Cuong Jay     80 y.o.   female     12/16/2019  8:41 PM    Breath Sounds Pre Procedure: Right Breath Sounds: Coarse, Diminished                               Left Breath Sounds: Expiratory wheezing    Breath Sounds Post Procedure: Right Breath Sounds: Coarse, Diminished                                 Left Breath Sounds: Coarse, Diminished    Breathing pattern: Pre procedure Breathing Pattern: Regular          Post procedure Breathing Pattern: Regular    Heart Rate: Pre procedure Pulse: 112           Post procedure Pulse: 120    Resp Rate: Pre procedure Respirations: 18           Post procedure Respirations: 20    Peak Flow: Pre bronchodilator             Post bronchodilator       FVC/FEV1:  n/a    Incentive Spirometry:             Cough: Pre procedure Cough: Non-productive               Post procedure Cough: Congested    Suctioned: NO    Sputum: Pre procedure                   Post procedure      Oxygen: O2 Device: Nasal cannula   Flow rate (L/min) 4LPM     Changed: NO    SpO2: Pre procedure SpO2: 95 %   with oxygen              Post procedure SpO2: 92 %  with oxygen    Nebulizer Therapy: Current medications Aerosolized Medications: DuoNeb      Changed: NO    Smoking History: n/a    Problem List:   Patient Active Problem List   Diagnosis Code    Syncope and collapse R55    HTN (hypertension) I10    Hypokalemia E87.6    Dehydration E86.0    COPD exacerbation ([facility] [address] J44.1    Diverticulitis of large intestine with abscess K57.20    Diverticular disease of large intestine with complication K89.26       Respiratory Therapist: Kenia Leo, RT

## 2019-12-17 NOTE — ACP (ADVANCE CARE PLANNING)
Advance Care Planning Note      NAME: Anna Johnson   :  1935   MRN:  [id number]     Date/Time:  2019 6:05 PM    Active Diagnoses:  Dysphagia with failed MBC   Acute CVA  Diverticulitis    These active diagnoses are of sufficient risk that focused discussion on advance care planning is indicated in order to allow the patient to thoughtfully consider personal goals of care, and if situations arise that prevent the ability to personally give input, to ensure appropriate representation of their personal desires for different levels and aggressiveness of care. Discussion: Long discussion with patient Manisha Pal in presence of Daughter Tristian Schmidt. Discussed her MBS results and risk of aspiration and prognosis. Discussed option of PEG tube vs Comfort feed and hospice. Family wants patient to make her decisions. Patient is not decided and want to think about it. Patient  would like to assign her daughter Tristian Schmidt and her son as the surrogate decision maker if she is not able to make decision. Persons present and participating in discussion: Karey Pearson MD      Time Spent:   Total time spent face-to-face in education and discussion: 18  minutes.          Malathi Flores MD   Hospitalist

## 2019-12-17 NOTE — PROGRESS NOTES
Problem: Mobility Impaired (Adult and Pediatric)  Goal: *Acute Goals and Plan of Care (Insert Text)  Description  FUNCTIONAL STATUS PRIOR TO ADMISSION: Patient lived alone and was able to drive short distances, has a walker but doesn't often use it, reports she furniture walks at home. Has a history of falls with recent compression fx. HOME SUPPORT PRIOR TO ADMISSION: Patient was able to perform ADLs, has a daughter who would sometimes stay with her. Physical Therapy Goals  Initiated 12/13/2019  1. Patient will move from supine to sit and sit to supine  in bed with minimal assistance/contact guard assist within 7 day(s). 2.  Patient will transfer from bed to chair and chair to bed with minimal assistance/contact guard assist using the least restrictive device within 7 day(s). 3.  Patient will perform sit to stand with minimal assistance/contact guard assist within 7 day(s). 4.  Patient will ambulate with minimal assistance/contact guard assist for 50 feet with the least restrictive device within 7 day(s). Outcome: Progressing Towards Goal   PHYSICAL THERAPY TREATMENT  Patient: Shon Lynette (25 y.o. female)  Date: 12/17/2019  Diagnosis: Diverticulitis of large intestine with abscess [K57.20]  Diverticular disease of large intestine with complication [R06.26]   <principal problem not specified>       Precautions: Aspiration  Chart, physical therapy assessment, plan of care and goals were reviewed. ASSESSMENT  Patient continues with skilled PT services and is progressing towards goals. Patient received in bed and agreeable to participate, son also present in room. Patient able to move bilateral LEs antigravity through full range, no focal weakness noted but patient complained of right sided low back pain when moving right LE. Patient reports history of back since surgery many years ago and has had pain since then.   Patient required mod assist to come to sit on edge of bed, reports back pain 8/10.  Patient able to come to stand with mod assist, had bowel movement and was able to stand with RW for clean up and to change sheets. Patient took one seated rest then able to stand again and side step to [facility] with RW and min assist.  Patient returned to supine, transport arrived to take for MBS. Patient sats stayed between 89-90 on 5 liters O2 during visit. Patient with good effort and very compliant, well below baseline and would benefit from rehab post discharge. Current Level of Function Impacting Discharge (mobility/balance): mod assist    Other factors to consider for discharge: well below baseline, compliant during session,  exerts good effort and had high functional level prior to admission         PLAN :  Patient continues to benefit from skilled intervention to address the above impairments. Continue treatment per established plan of care. to address goals. Recommendation for discharge: (in order for the patient to meet his/her long term goals)  Therapy 3 hours per day 5-7 days per week    This discharge recommendation: In collaboration with case management    IF patient discharges home will need the following DME: patient owns DME required for discharge       SUBJECTIVE:   Patient stated I knew my back would bother me.     OBJECTIVE DATA SUMMARY:   Critical Behavior:  Neurologic State: Alert  Orientation Level: Oriented X4  Cognition: Follows commands  Safety/Judgement: Fall prevention  Functional Mobility Training:  Bed Mobility:  Rolling: Moderate assistance;Assist x2  Supine to Sit: Moderate assistance;Assist x2  Sit to Supine: Moderate assistance;Assist x2           Transfers:  Sit to Stand: Moderate assistance;Assist x2  Stand to Sit: Moderate assistance;Assist x2                             Balance:  Sitting: Impaired; Without support  Sitting - Static: Good (unsupported)  Sitting - Dynamic: Fair (occasional)  Standing: Impaired; Without support  Standing - Static: Fair;Constant support  Standing - Dynamic : Fair;Constant support  Ambulation/Gait Training:                                                        Stairs: Therapeutic Exercises: Ankle pumps, heel slides, hip abd add, SLR x 2 to 3 each    Activity Tolerance:   Fair and requires frequent rest breaks  Please refer to the flowsheet for vital signs taken during this treatment. After treatment patient left in no apparent distress:   Supine in bed, Call bell within reach, and Caregiver / family present    COMMUNICATION/COLLABORATION:   The patients plan of care was discussed with: Occupational Therapist and Registered Nurse    Patient was educated regarding Her deficit(s) of no focal weakness noted in LEs as this relates to Her diagnosis of CVA. She demonstrated Good understanding as evidenced by discussion.     Selma Truong, PT   Time Calculation: 46 mins

## 2019-12-17 NOTE — PROGRESS NOTES
1100: Interdisciplinary rounds were held: no plan for biliary drain per surgery, ok to restart eliquis, wean O2, PT/OT to see, remove quiroz?     1315: Dr Jg Kapoor paged to request quiroz removal.

## 2019-12-17 NOTE — PROGRESS NOTES
Problem: Dysphagia (Adult)  Goal: *Acute Goals and Plan of Care (Insert Text)  Description  Speech pathology goals initiated 12/12/2019   1. Patient will participate in swallow re-eval within 7 days. MET  2. Patient will participate in formal motor speech eval within 7 days   3. Added 12/13/2019 Patient will tolerate puree/nectar liquid diet with no overt s/s aspiration within 7 days. Discontinued 12/17. 4. Added 12/13/2019 Patient will tolerate thin liquids with no overt s/s aspiration within 7 days. discontinued 12/17. 5. Added 12/13/2019 Patient will tolerate trials of solids with timely/complete mastication and no oral residue within 7 days. Upgraded to dys 3. Discontinued 12/17.   12/17/2019 1600 by Cheyenne Estrada, SLP  Outcome: Not Met  12/17/2019 1354 by Cheyenne Estrada, SLP  Outcome: Not Met  12/17/2019 1044 by Cheyenne Estrada, SLP  Outcome: [address] TREATMENT  Patient: Paris Couch (44 y.o. female)  Date: 12/17/2019  Diagnosis: Diverticulitis of large intestine with abscess [K57.20]  Diverticular disease of large intestine with complication [J36.86]   <principal problem not specified>       Precautions:  Aspiration    ASSESSMENT:  Pt seen today for MBS and results were discussed with pt and her daughter. Pt was in disbelief but with her throat surgery, COPD and CVA she has multiple factors that could be causing the aspiration. Pt will discuss with Dr. Mitchell Brush and consider a PEG vs comfort feeds. PLAN:  Recommendations and Planned Interventions:  -Patient continues to benefit from skilled intervention to address the above impairments. Continue treatment per established plan of care. Discharge Recommendations:  None     SUBJECTIVE:   Patient sat with her mouth agape in disbelief with test results.      OBJECTIVE:   Cognitive and Communication Status:  Neurologic State: Alert  Orientation Level: Oriented X4  Cognition: Follows commands  Perception: Appears intact  Perseveration: No perseveration noted  Safety/Judgement: Fall prevention  Dysphagia Treatment:  Oral Assessment:     P.O. Trials:  Patient Position: upright in bed  Vocal quality prior to P.O.: No impairment  Consistency Presented: Thin liquid; Solid; Nectar thick liquid  How Presented: Self-fed/presented;Straw(needs a straw due to her tremors. )     Bolus Acceptance: No impairment  Bolus Formation/Control: No impairment  Type of Impairment: (very slow mastication which she reports is baseline for her. chews anteriorly)  Propulsion: Delayed (# of seconds)  Oral Residue: None  Initiation of Swallow: Delayed (# of seconds)  Laryngeal Elevation: Decreased  Aspiration Signs/Symptoms: Strong cough                Oral Phase Severity: Mild  Pharyngeal Phase Severity : Mild          Pain:  Pain Scale 1: Numeric (0 - 10)  Pain Intensity 1: 0  Pain Location 1: Back    After treatment:   Call bell within reach and Caregiver / family present    COMMUNICATION/EDUCATION:   Pt and daughter educated on the MBS results and pt is not in favor of a feeding tube. She needs more information. The patient's plan of care including recommendations, planned interventions, and recommended diet changes were discussed with: Registered Nurse.     Venancio Sagastume, SLP  Time Calculation: 8 mins

## 2019-12-18 LAB
ANION GAP SERPL CALC-SCNC: 2 MMOL/L (ref 5–15)
BUN SERPL-MCNC: 29 MG/DL (ref 6–20)
BUN/CREAT SERPL: 60 (ref 12–20)
CALCIUM SERPL-MCNC: 8.8 MG/DL (ref 8.5–10.1)
CHLORIDE SERPL-SCNC: 101 MMOL/L (ref 97–108)
CO2 SERPL-SCNC: 37 MMOL/L (ref 21–32)
CREAT SERPL-MCNC: 0.48 MG/DL (ref 0.55–1.02)
ERYTHROCYTE [DISTWIDTH] IN BLOOD BY AUTOMATED COUNT: 13.7 % (ref 11.5–14.5)
GLUCOSE SERPL-MCNC: 145 MG/DL (ref 65–100)
HCT VFR BLD AUTO: 40.1 % (ref 35–47)
HGB BLD-MCNC: 11.9 G/DL (ref 11.5–16)
MCH RBC QN AUTO: 25 PG (ref 26–34)
MCHC RBC AUTO-ENTMCNC: 29.7 G/DL (ref 30–36.5)
MCV RBC AUTO: 84.2 FL (ref 80–99)
NRBC # BLD: 0 K/UL (ref 0–0.01)
NRBC BLD-RTO: 0 PER 100 WBC
PLATELET # BLD AUTO: 190 K/UL (ref 150–400)
PMV BLD AUTO: 10.6 FL (ref 8.9–12.9)
POTASSIUM SERPL-SCNC: 4.1 MMOL/L (ref 3.5–5.1)
RBC # BLD AUTO: 4.76 M/UL (ref 3.8–5.2)
SODIUM SERPL-SCNC: 140 MMOL/L (ref 136–145)
WBC # BLD AUTO: 9.6 K/UL (ref 3.6–11)

## 2019-12-18 PROCEDURE — 74011000250 HC RX REV CODE- 250: Performed by: INTERNAL MEDICINE

## 2019-12-18 PROCEDURE — 80048 BASIC METABOLIC PNL TOTAL CA: CPT

## 2019-12-18 PROCEDURE — 97535 SELF CARE MNGMENT TRAINING: CPT

## 2019-12-18 PROCEDURE — 92526 ORAL FUNCTION THERAPY: CPT

## 2019-12-18 PROCEDURE — 65660000000 HC RM CCU STEPDOWN

## 2019-12-18 PROCEDURE — 97110 THERAPEUTIC EXERCISES: CPT

## 2019-12-18 PROCEDURE — 36415 COLL VENOUS BLD VENIPUNCTURE: CPT

## 2019-12-18 PROCEDURE — 74011250636 HC RX REV CODE- 250/636: Performed by: SURGERY

## 2019-12-18 PROCEDURE — 97530 THERAPEUTIC ACTIVITIES: CPT

## 2019-12-18 PROCEDURE — 94640 AIRWAY INHALATION TREATMENT: CPT

## 2019-12-18 PROCEDURE — 74011250636 HC RX REV CODE- 250/636: Performed by: INTERNAL MEDICINE

## 2019-12-18 PROCEDURE — 97116 GAIT TRAINING THERAPY: CPT

## 2019-12-18 PROCEDURE — 74011250637 HC RX REV CODE- 250/637: Performed by: INTERNAL MEDICINE

## 2019-12-18 PROCEDURE — 77010033678 HC OXYGEN DAILY

## 2019-12-18 PROCEDURE — 85027 COMPLETE CBC AUTOMATED: CPT

## 2019-12-18 PROCEDURE — 74011000258 HC RX REV CODE- 258: Performed by: INTERNAL MEDICINE

## 2019-12-18 RX ORDER — MORPHINE SULFATE 2 MG/ML
2-3 INJECTION, SOLUTION INTRAMUSCULAR; INTRAVENOUS
Status: DISCONTINUED | OUTPATIENT
Start: 2019-12-18 | End: 2019-12-20 | Stop reason: HOSPADM

## 2019-12-18 RX ORDER — SODIUM CHLORIDE 9 MG/ML
50 INJECTION, SOLUTION INTRAVENOUS CONTINUOUS
Status: DISCONTINUED | OUTPATIENT
Start: 2019-12-18 | End: 2019-12-20 | Stop reason: HOSPADM

## 2019-12-18 RX ORDER — PREDNISONE 10 MG/1
10 TABLET ORAL DAILY
Status: ON HOLD | COMMUNITY
End: 2019-12-20 | Stop reason: SDUPTHER

## 2019-12-18 RX ADMIN — MORPHINE SULFATE 2 MG: 2 INJECTION, SOLUTION INTRAMUSCULAR; INTRAVENOUS at 12:30

## 2019-12-18 RX ADMIN — MORPHINE SULFATE 2 MG: 2 INJECTION, SOLUTION INTRAMUSCULAR; INTRAVENOUS at 03:10

## 2019-12-18 RX ADMIN — MORPHINE SULFATE 2 MG: 2 INJECTION, SOLUTION INTRAMUSCULAR; INTRAVENOUS at 14:55

## 2019-12-18 RX ADMIN — METHYLPREDNISOLONE SODIUM SUCCINATE 40 MG: 40 INJECTION, POWDER, FOR SOLUTION INTRAMUSCULAR; INTRAVENOUS at 08:10

## 2019-12-18 RX ADMIN — ENOXAPARIN SODIUM 50 MG: 60 INJECTION SUBCUTANEOUS at 09:00

## 2019-12-18 RX ADMIN — MEROPENEM 500 MG: 500 INJECTION, POWDER, FOR SOLUTION INTRAVENOUS at 12:31

## 2019-12-18 RX ADMIN — MORPHINE SULFATE 2 MG: 2 INJECTION, SOLUTION INTRAMUSCULAR; INTRAVENOUS at 08:07

## 2019-12-18 RX ADMIN — MORPHINE SULFATE 2 MG: 2 INJECTION, SOLUTION INTRAMUSCULAR; INTRAVENOUS at 23:06

## 2019-12-18 RX ADMIN — UMECLIDINIUM 1 PUFF: 62.5 AEROSOL, POWDER ORAL at 08:12

## 2019-12-18 RX ADMIN — IPRATROPIUM BROMIDE AND ALBUTEROL SULFATE 3 ML: .5; 3 SOLUTION RESPIRATORY (INHALATION) at 12:01

## 2019-12-18 RX ADMIN — METHYLPREDNISOLONE SODIUM SUCCINATE 40 MG: 40 INJECTION, POWDER, FOR SOLUTION INTRAMUSCULAR; INTRAVENOUS at 20:08

## 2019-12-18 RX ADMIN — MEROPENEM 500 MG: 500 INJECTION, POWDER, FOR SOLUTION INTRAVENOUS at 19:15

## 2019-12-18 RX ADMIN — DEXTROSE MONOHYDRATE 2.5 MG/HR: 50 INJECTION, SOLUTION INTRAVENOUS at 14:55

## 2019-12-18 RX ADMIN — MORPHINE SULFATE 2 MG: 2 INJECTION, SOLUTION INTRAMUSCULAR; INTRAVENOUS at 20:08

## 2019-12-18 RX ADMIN — IPRATROPIUM BROMIDE AND ALBUTEROL SULFATE 3 ML: .5; 3 SOLUTION RESPIRATORY (INHALATION) at 15:56

## 2019-12-18 RX ADMIN — MEROPENEM 500 MG: 500 INJECTION, POWDER, FOR SOLUTION INTRAVENOUS at 03:21

## 2019-12-18 RX ADMIN — IPRATROPIUM BROMIDE AND ALBUTEROL SULFATE 3 ML: .5; 3 SOLUTION RESPIRATORY (INHALATION) at 08:32

## 2019-12-18 RX ADMIN — ACETAMINOPHEN AND CODEINE PHOSPHATE 1 TABLET: 300; 30 TABLET ORAL at 11:12

## 2019-12-18 RX ADMIN — IPRATROPIUM BROMIDE AND ALBUTEROL SULFATE 3 ML: .5; 3 SOLUTION RESPIRATORY (INHALATION) at 19:53

## 2019-12-18 RX ADMIN — SODIUM CHLORIDE 50 ML/HR: 900 INJECTION, SOLUTION INTRAVENOUS at 18:56
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
## 2019-12-18 NOTE — PROGRESS NOTES
Problem: Dysphagia (Adult)  Goal: *Acute Goals and Plan of Care (Insert Text)  Description  Speech pathology goals initiated 12/12/2019   1. Patient will participate in swallow re-eval within 7 days. MET  2. Patient will participate in formal motor speech eval within 7 days   3. Added 12/13/2019 Patient will tolerate puree/nectar liquid diet with no overt s/s aspiration within 7 days. Discontinued 12/17. 4. Added 12/13/2019 Patient will tolerate thin liquids with no overt s/s aspiration within 7 days. discontinued 12/17. 5. Added 12/13/2019 Patient will tolerate trials of solids with timely/complete mastication and no oral residue within 7 days. Upgraded to dys 3. Discontinued 12/17. Outcome: Not Met   SPEECH LANGUAGE PATHOLOGY DYSPHAGIA TREATMENT  Patient: Frederich Hammans (70 y.o. female)  Date: 12/18/2019  Diagnosis: Diverticulitis of large intestine with abscess [K57.20]  Diverticular disease of large intestine with complication [F70.71]   <principal problem not specified>       Precautions:  Aspiration    ASSESSMENT:  Educated the pt, her son and nsg that the pt can have meds crushed in applesauce. After oral care/denture cleaned and teeth brushed she can have ice chips to be administered by staff only. The pt cannot remember to take only one ice chip at a time. She was scooping a spoonful. Reduced memory noted by son and the concern is she will overdo ice if left with no supervision. Her vocal quality was wet after an ice chip. If she is not allowed any po she will have an overgrowth of oral bacteria due to the dry environment that she can aspirate and cause pneumonia. Good oral care and ice should be offered in small amts. Was able to educate the son and he felt better with a consistent message. Nsg aware of med administration and ice chips only. No prognosis given as etiology of dysphagia is not clear. PLAN:  Recommendations and Planned Interventions:  NPO except for ice after oral care.  One ice chip at a time offered by [facility]. Pt cannot have the cup of ice and left unsupervised. Meds crushed in applesauce ok. Patient continues to benefit from skilled intervention to address the above impairments. Continue treatment per established plan of care. Discharge Recommendations:  None     SUBJECTIVE:   Patient stated had dysphagia for some time now. OBJECTIVE:   Cognitive and Communication Status:  Neurologic State: Alert  Orientation Level: Oriented to person, Oriented to place, Oriented to time  Cognition: Follows commands  Perception: Appears intact  Perseveration: No perseveration noted  Safety/Judgement: Fall prevention  Dysphagia Treatment:  Oral Assessment:     P.O. Trials:     Vocal quality prior to P.O.: Wet; No impairment  Consistency Presented: Ice chips  How Presented: Self-fed/presented;Spoon;SLP-fed/presented     Bolus Acceptance: No impairment  Bolus Formation/Control: No impairment     Propulsion: Delayed (# of seconds)     Initiation of Swallow: Delayed (# of seconds)  Laryngeal Elevation: Functional  Aspiration Signs/Symptoms: Change vocal quality                Oral Phase Severity: Mild  Pharyngeal Phase Severity : Moderate  Exercises:  Laryngeal Exercises:                                                                                                                                   Pain:  Pain Scale 1: Numeric (0 - 10)  Pain Intensity 1: 5  Pain Location 1: Back    After treatment:   Call bell within reach    COMMUNICATION/EDUCATION:   Patient was educated regarding her deficit(s) of dysphagia as this relates to her diagnosis of ? etiology. She demonstrated Fair understanding as evidenced by poor recall. The patient's plan of care including recommendations, planned interventions, and recommended diet changes were discussed with: Registered Nurse.     Cb Joyce SLP  Time Calculation: 20 mins

## 2019-12-18 NOTE — PROGRESS NOTES
Pharmacy Medication Reconciliation     The patient was interviewed regarding current PTA medication list, use and drug allergies;  her daughter was the one who was asked about her medications     Allergy Update: Adhesive tape-silicones; Ivp dye [fd and c blue no.1]; and Tylenol [acetaminophen]    Recommendations/Findings: The following amendments were made to the patient's active medication list on file at [facility]:   1) Additions: none    2) Deletions: apixaban, methocarbamol, Percocet, spironolactone    3) Changes: Changed prednisone to 10 mg every day       Pertinent Findings: Per daughter, the patient STOPPED taking her apixaban since it gave her a lot of bruises. Daughter doesn't know as to when she stopped taking it, but knew that she wasn't taking it.     -Clarified PTA med list with Rx Query and the patient's medications bottles from home were brought by her daughter. PTA medication list was corrected to the following:     Prior to Admission Medications   Prescriptions Last Dose Informant Taking?   acetaminophen-codeine (TYLENOL-CODEINE #3) 300-30 mg per tablet  Child Yes   Sig: Take 1 Tab by mouth every four (4) hours as needed for Pain. bumetanide (BUMEX) 1 mg tablet  Child Yes   Sig: Take 1 mg by mouth daily. metoprolol succinate (TOPROL-XL) 50 mg XL tablet  Child Yes   Sig: Take 50 mg by mouth daily. multivitamin (ONE A DAY) tablet  Child Yes   Sig: Take 1 Tab by mouth daily. potassium chloride (KLOR-CON) 10 mEq tablet  Child Yes   Sig: Take 10 mEq by mouth daily. pramipexole (MIRAPEX) 0.5 mg tablet  Child Yes   Sig: Take 0.5 mg by mouth daily. predniSONE (DELTASONE) 10 mg tablet  Child Yes   Sig: Take 10 mg by mouth daily. quinapril (ACCUPRIL) 40 mg tablet  Child Yes   Sig: Take 40 mg by mouth two (2) times a day. tiotropium (SPIRIVA WITH HANDIHALER) 18 mcg inhalation capsule  Child Yes   Sig: Take 1 Cap by inhalation daily.       Facility-Administered Medications: None          Thank you,  Rickey Gavin, PHARMD

## 2019-12-18 NOTE — PROGRESS NOTES
CM spoke with pt and pts children, Marcello and Jie Núñez concerning d/c planning. CM discussed potential discharge plans and explained IP Rehab and SNF placement. Pt and family were provided with IP Rehab list to review. They are aware of current recommendation for IP Rehab but are aware that this recommendation could change. Pt family is not open to SNF placement if needed at this time. CM will continue to follow patient for discharge planning needs and arrange for services as deemed necessary.     Savage Guerrier, Care Manager  [phone]

## 2019-12-18 NOTE — ACP (ADVANCE CARE PLANNING)
Advance Care Planning (ACP) Provider Conversation Snapshot    Date of ACP Conversation: 12/18/19  Persons included in Conversation:  patient and family  Length of ACP Conversation in minutes:  35 minutes    Authorized Decision Maker (if patient is incapable of making informed decisions):    This person is:   Healthcare Agent/Medical Power of  under Advance Directive      Primary Decision Maker: Joshua Mims \"Marcello\" - Son - [phone]    Secondary Decision Maker: Jd Unruly - Child - [phone]    Conversation Outcomes / Follow-Up Plan:     -Ms Carrington Sigala has opted to go ahead with the PEG tube, she is very hopeful that with continued speech therapy, she might regain some ability to swallow on her own  -She wants to name her son and daughter on an AMD- so we completed that documentation with her  -She wants to be a DNR, she shared that she sees no point in trying to fight God if he is calling you home  -Did not complete DDNR today, will follow up tomorrow to have that documentation signed

## 2019-12-18 NOTE — PROGRESS NOTES
Upon entering pt room, son verbally aggressive regarding cup of ice and another cup of water at pt bedside and that pt was wet. Informed son that pt was allowed ice chips but he continued to be aggressive verbally so I threw all cups away and stated speech would see pt later and reassess. Then myself and asst gave pt complete bath and linen change. Pt states she feels better.

## 2019-12-18 NOTE — PROGRESS NOTES
1922 Bedside report given to Emily Schilling RN by Anisa Banks RN. Report included SBAR, ED Report, Labs, and Cardiac Rhythm NSR, ST.  Patient in bed resting well. Vitals are stable.

## 2019-12-18 NOTE — PROGRESS NOTES
Pt continues to c/o back pain, administered tyl 3 crushed in applesauce as instructed by speech, phoned palliative to make sure they received consult to assess pt, they stated they received consult and will see pt today

## 2019-12-18 NOTE — PROGRESS NOTES
Spiritual Care Assessment/Progress Note  [facility]      NAME: Cuong Jay      MRN: [id number]  AGE: 80 y.o.  SEX: female  Yazidism Affiliation: Adventist   Language: English     12/18/2019     Total Time (in minutes): 39     Spiritual Assessment begun in [facility] PROGRESSIVE CARE through conversation with:         [x]Patient        [x] Family    [] Friend(s)        Reason for Consult: Advance medical directive consult, Initial/Spiritual assessment, patient floor     Spiritual beliefs: (Please include comment if needed)     [x] Identifies with a kendal tradition:         [x] Supported by a kendal community: [address]           [] Claims no spiritual orientation:           [] Seeking spiritual identity:                [] Adheres to an individual form of spirituality:           [] Not able to assess:                           Identified resources for coping:      [x] Prayer                               [] Music                  [] Guided Imagery     [x] Family/friends                 [] Pet visits     [] Devotional reading                         [] Unknown     [] Other                                              Interventions offered during this visit: (See comments for more details)    Patient Interventions: Advance medical directive consult, Catharsis/review of pertinent events in supportive environment, Iconic (affirming the presence of God/Higher Power), Initial/Spiritual assessment, patient floor, Normalization of emotional/spiritual concerns, End of life issues discussed, Prayer (assurance of), Yazidism beliefs/image of God discussed, Decision support (comment)     Family/Friend(s): Catharsis/review of pertinent events in supportive environment, Iconic (affirming the presence of God/Higher Power), Initial Assessment, End of life issues discussed, Decision support (comment), Prayer (assurance of), Yazidism beliefs/image of God discussed     Plan of Care:     [] Support spiritual and/or cultural needs    [] Support AMD and/or advance care planning process      [] Support grieving process   [] Coordinate Rites and/or Rituals    [] Coordination with community clergy   [] No spiritual needs identified at this time   [] Detailed Plan of Care below (See Comments)  [] Make referral to Music Therapy  [] Make referral to Pet Therapy     [] Make referral to Addiction services  [] Make referral to [facility]  [] Make referral to Spiritual Care Partner  [] No future visits requested        [x] Follow up visits as needed     Comments: Visited Ms. Waite with Palliative NP Lucía. Pt's son Troy Munoz (goes by [employer]) was present in pt's room.  offered supportive presence as NP explored some care decisions with patient and spoke with patient about AMD.  assisted in completion of AMD with NP Lucía. Pt indicated her desire to name her son, Troy Munoz (Marcello) as primary decision-maker and daughter Sher Peterson as secondary agent. See NP's note for more details regarding care decisions. Daughter Sher Peterson arrived towards the end of our visit. Pt has four children and spoke of the blessings of her family - [employer] and Sher Peterson live locally (pt lives with Sher Peterson). Pt provided an update on her condition and how she is coping at this time. Pt and family expressed their belief in the power of prayer. They are connected with [facility], and son shared that one of their missionaries plans to visit this afternoon. Offered assurance of prayers to pt and  availability.     Mitra Moreno, Palliative
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
## 2019-12-18 NOTE — PROGRESS NOTES
Problem: Mobility Impaired (Adult and Pediatric)  Goal: *Acute Goals and Plan of Care (Insert Text)  Description  FUNCTIONAL STATUS PRIOR TO ADMISSION: Patient lived alone and was able to drive short distances, has a walker but doesn't often use it, reports she furniture walks at home. Has a history of falls with recent compression fx. HOME SUPPORT PRIOR TO ADMISSION: Patient was able to perform ADLs, has a daughter who would sometimes stay with her. Physical Therapy Goals  Initiated 12/13/2019  1. Patient will move from supine to sit and sit to supine  in bed with minimal assistance/contact guard assist within 7 day(s). 2.  Patient will transfer from bed to chair and chair to bed with minimal assistance/contact guard assist using the least restrictive device within 7 day(s). 3.  Patient will perform sit to stand with minimal assistance/contact guard assist within 7 day(s). 4.  Patient will ambulate with minimal assistance/contact guard assist for 50 feet with the least restrictive device within 7 day(s). Outcome: Progressing Towards Goal   PHYSICAL THERAPY TREATMENT  Patient: Cassidy Tolentino (37 y.o. female)  Date: 12/18/2019  Diagnosis: Diverticulitis of large intestine with abscess [K57.20]  Diverticular disease of large intestine with complication [H99.27]   <principal problem not specified>  Procedure(s) (LRB):  ESOPHAGOGASTRODUODENOSCOPY (EGD) (N/A)  PERCUTANEOUS ENDOSCOPIC GASTROSTOMY TUBE INSERTION (N/A)    Precautions: Aspiration  Chart, physical therapy assessment, plan of care and goals were reviewed. ASSESSMENT  Patient continues with skilled PT services and is progressing towards goals. Patient received up in chair and agreeable to participate, family present in room. Patient able to perform LE strengthening exercises with good tolerance, then came to stand with CGA and ambulated x 6 feet with RW back to bed.   Patient worked on standing posture, balance and weight shifting, was able to stand unsupported briefly without LOB. Patient returned to supine in bed using log rolling technique to protect back and left with call bell in reach. Current Level of Function Impacting Discharge (mobility/balance): CGA to min    Other factors to consider for discharge: well below baseline, making progress and exerts good effort during session         PLAN :  Patient continues to benefit from skilled intervention to address the above impairments. Continue treatment per established plan of care. to address goals. Recommendation for discharge: (in order for the patient to meet his/her long term goals)  Therapy 3 hours per day 5-7 days per week    This discharge recommendation:  Has been made in collaboration with the attending provider and/or case management    IF patient discharges home will need the following DME: patient owns DME required for discharge       SUBJECTIVE:   Patient stated this is the tallest I have stood in a long time.     OBJECTIVE DATA SUMMARY:   Critical Behavior:  Neurologic State: Alert  Orientation Level: Oriented X4  Cognition: Follows commands  Safety/Judgement: Fall prevention  Functional Mobility Training:  Bed Mobility:  Rolling: Contact guard assistance  Supine to Sit: Contact guard assistance  Sit to Supine: Minimum assistance  Scooting: Contact guard assistance        Transfers:  Sit to Stand: Contact guard assistance  Stand to Sit: Contact guard assistance        Bed to Chair: Minimum assistance                    Balance:  Sitting: Intact  Sitting - Static: Good (unsupported)  Sitting - Dynamic: Good (unsupported)  Standing: Impaired; With support  Standing - Static: Constant support;Good  Standing - Dynamic : Constant support; Fair  Ambulation/Gait Training:  Distance (ft): 6 Feet (ft)  Assistive Device: Gait belt;Walker, rolling  Ambulation - Level of Assistance: Minimal assistance        Gait Abnormalities: Decreased step clearance        Base of Support: Narrowed        Step Length: Left shortened;Right shortened                    Stairs: Therapeutic Exercises:   Seated LAQ, ankle pumps, marching and hip abd add x 8 bilaterally    Activity Tolerance:   Fair  Please refer to the flowsheet for vital signs taken during this treatment.     After treatment patient left in no apparent distress:   Supine in bed, Call bell within reach, Caregiver / family present, and Side rails x 3    COMMUNICATION/COLLABORATION:   The patients plan of care was discussed with: Occupational Therapist and Registered Nurse    Aamir Kumari PT   Time Calculation: 41 mins

## 2019-12-18 NOTE — PROGRESS NOTES
Hospitalist Progress Note    NAME: Paris Couch   :  1935   MRN:  [id number]     Assessment / Plan:  Dysphagia with high risk aspiration  NPO  IVF  Appreciate palliative care discussion, will ask GI evaluation for PEG placement    Acute CVA s/p tPA  CT Head: Moderate-sized area of acute subacute infarction lateral right frontal lobe. No acute hemorrhage   CT perf with CBF:  Occlusion of distal M2 branch of middle divisional right middle cerebral artery with acute bland infarct. 2. At least 70% stenosis at origin of right internal carotid artery. 3. COPD  CTA Head:  1. Occlusion of distal M2 branch of middle divisional right middle cerebral artery with acute bland infarct. 2. At least 70% stenosis at origin of right internal carotid artery. 3. COPD  Received tPA 2019  Repeat Head CT showed no change   MRI brain:  No change in size of acute, moderate-sized right MCA territory infarction. Small amount of hemosiderin deposition within the infarct suggests petechial hemorrhage.   Appreciate Neurology input   Continue Lovenox while we are able to clarify PO status as above  Hold statins while NPO    Acute on chronic respiratory failure with hypoxia POA  Secondary to COPD exacerbation with acute on chronic diastolic heart failure  Suspect aspiration could be reason for COPD exacerbation  Continue Px abx, IV Steroids  Continue O2  Continue Nebs  Pulmonary input appreciated  CXR with pulmonary edema  S/p IV diruetics, hold for now considering NPO    UTI POA  ESBL per UC  Continue Meropenem     Acute complicated diverticulitis with abscess  Continue IV Merrem   Pain management PRN  Surgery input noted    Intermittent a.fib on tele  Considering holding PO meds, started Cardizem ggt  Cardiology consult  ECHO with normal EF and Aortic Valve sclerosis  Continue Lovenox, will transition to PO anticoagulation once ok with GI after PEG placement    Hx of provoked PE x3  Continue Lovenox     HTN  Holding PO meds  cardizem ggt as above    18.5 - 24.9 Normal weight / Body mass index is 20.81 kg/m². Code status: Full  Prophylaxis: SCD's  Recommended Disposition: TBD     Subjective: Pt seen and examined at bedside. NAD. Feels the same, decided to have PEG placement. Overnight events d/w RN     Chief Complaint / Reason for Physician Visit: f/u \"acute respiratory failure, Diverticulitis, CVA\"    Review of Systems:  Symptom Y/N Comments  Symptom Y/N Comments   Fever/Chills n   Chest Pain n    Poor Appetite n   Edema     Cough    Abdominal Pain     Sputum    Joint Pain     SOB/CRAIG n   Pruritis/Rash     Nausea/vomit n   Tolerating PT/OT     Diarrhea n   Tolerating Diet n    Constipation n   Other       Could NOT obtain due to:      Objective:     VITALS:   Last 24hrs VS reviewed since prior progress note. Most recent are:  Patient Vitals for the past 24 hrs:   Temp Pulse Resp BP SpO2   12/18/19 1201     94 %   12/18/19 1058 97.6 °F (36.4 °C) 68 18 144/87 96 %   12/18/19 0832     97 %   12/18/19 0700 97.6 °F (36.4 °C) 70 18 (!) 152/97 96 %   12/18/19 0344 97.5 °F (36.4 °C) 71 20 (!) 152/102 97 %   12/18/19 0012 97.4 °F (36.3 °C) 83 18 154/82 97 %   12/17/19 2044     97 %   12/17/19 1922 97.5 °F (36.4 °C) 68 18 134/69 95 %   12/17/19 1834  74  142/77    12/17/19 1729  83  141/78        Intake/Output Summary (Last 24 hours) at 12/18/2019 1534  Last data filed at 12/18/2019 1121  Gross per 24 hour   Intake 100 ml   Output 525 ml   Net -425 ml        PHYSICAL EXAM:  General: Elderly, frail,  female. NAD    EENT:  EOMI. Anicteric sclerae. MMM  Resp:  Scattered wheezing in upper airways, diminished in bases. No accessory muscle use  CV:  Regular rate rhythm,  No edema  GI:  Soft, Non distended, Non tender.  +Bowel sounds  Neurologic:  Alert and oriented X 3, normal speech,   Psych:   Good insight. Not anxious nor agitated  Skin:  No rashes.   No jaundice    Reviewed most current lab test results and cultures  YES  Reviewed most current radiology test results   YES  Review and summation of old records today    NO  Reviewed patient's current orders and MAR    YES  PMH/SH reviewed - no change compared to H&P  ________________________________________________________________________  Care Plan discussed with:    Comments   Patient x    Family  x At bedside   RN x    Care Manager     Consultant                        Multidiciplinary team rounds were held today with , nursing, pharmacist and clinical coordinator. Patient's plan of care was discussed; medications were reviewed and discharge planning was addressed. ________________________________________________________________________  Total NON critical care TIME:  30  Minutes    Total CRITICAL CARE TIME Spent:   Minutes non procedure based      Comments   >50% of visit spent in counseling and coordination of care     ________________________________________________________________________  Sharri Raygoza MD     Procedures: see electronic medical records for all procedures/Xrays and details which were not copied into this note but were reviewed prior to creation of Plan. LABS:  I reviewed today's most current labs and imaging studies.   Pertinent labs include:  Recent Labs     12/18/19  0452 12/16/19  0341   WBC 9.6 10.3   HGB 11.9 11.5   HCT 40.1 39.5    202     Recent Labs     12/18/19  0452 12/17/19  0434 12/16/19  0341    140 139   K 4.1 3.3* 3.9    97 99   CO2 37* 38* 36*   * 155* 154*   BUN 29* 25* 21*   CREA 0.48* 0.62 0.64   CA 8.8 8.6 8.5   MG  --   --  2.0   PHOS  --   --  2.3*       Signed: Sharri Raygoza MD

## 2019-12-18 NOTE — PROGRESS NOTES
Progress Note      12/18/2019 11:59 AM  NAME: Gonzalo Heath   MRN:  [id number]   Admit Diagnosis: Diverticulitis of large intestine with abscess [K57.20]; Diverticular disease of large intestine with complication [P00.46]     Assessment:     Paroxysmal Atrial Fibrillation . Reported . However The documented rhythm strips show NSR with PACs /  PVCs. Review of monitor does not show any A fib that I could see. Will need to try to find tracing showing a fib. 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 Acute Diverticulitis on admission. Remote Cath 2010 with normal Coronaries and LV function. Echo 12/12/19  Normal LV function. Minimal valve findings. Essentially normal .  Nothing that would cause embolization.      HR is well controlled on IV Diltiazem . Sinus rhythm. PACs. 12/18  She is awake. Conversant . Feels OK. Says better than when she came in. She failed swallow eval and is NPO . Would continue with IV Cardizem for now for rhythm management. Dr Juan Manuel Little will be assuming cardiac care. Cardiology : Romain. Plan:     Continue meds. [x]        High complexity decision making was performed    Subjective:     Gonzalo Heath denies chest pain, dyspnea. Discussed with RN events overnight.      Patient Active Problem List   Diagnosis Code    Syncope and collapse R55    HTN (hypertension) I10    Hypokalemia E87.6    Dehydration E86.0    COPD exacerbation (HCC) J44.1    Diverticulitis of large intestine with abscess K57.20    Diverticular disease of large intestine with complication X93.94       Review of Systems:    Symptom Y/N Comments  Symptom Y/N Comments   Fever/Chills N   Chest Pain N    Poor Appetite N   Edema N    Cough N   Abdominal Pain N    Sputum N   Joint Pain N    SOB/CRAIG N   Pruritis/Rash N    Nausea/vomit N   Tolerating PT/OT Y    Diarrhea N   Tolerating Diet Y    Constipation N   Other Could NOT obtain due to:      Objective:      Physical Exam:    Last 24hrs VS reviewed since prior progress note. Most recent are:    Visit Vitals  /87   Pulse 68   Temp 97.6 °F (36.4 °C)   Resp 18   Ht 5' 2\" (1.575 m)   Wt 51.6 kg (113 lb 12.8 oz)   SpO2 96%   BMI 20.81 kg/m²       Intake/Output Summary (Last 24 hours) at 12/18/2019 1159  Last data filed at 12/18/2019 1121  Gross per 24 hour   Intake 340 ml   Output 1000 ml   Net -660 ml        General Appearance: Well developed, well nourished, alert & oriented x 3,    no acute distress. Ears/Nose/Mouth/Throat: Hearing grossly normal.  Neck: Supple. Chest: Lungs clear to auscultation bilaterally. Cardiovascular: Regular rate and rhythm, S1S2 normal, no murmur. Abdomen: Soft, non-tender, bowel sounds are active. Extremities: No edema bilaterally. Skin: Warm and dry. PMH/SH reviewed - no change compared to H&P    Data Review    Telemetry: normal sinus rhythm     Lab Data Personally Reviewed:    Recent Labs     12/18/19  0452 12/16/19  0341   WBC 9.6 10.3   HGB 11.9 11.5   HCT 40.1 39.5    202   LABRCNT(INR:3,PTP:3,APTT:3,)  Recent Labs     12/18/19  0452 12/17/19  0434 12/16/19  0341    140 139   K 4.1 3.3* 3.9    97 99   CO2 37* 38* 36*   BUN 29* 25* 21*   CREA 0.48* 0.62 0.64   * 155* 154*   CA 8.8 8.6 8.5   MG  --   --  2.0   LABRCNT(CPK:3,CpKMB:3,ckndx:3,troiq:3)  Lab Results   Component Value Date/Time    Cholesterol, total 153 12/13/2019 06:22 AM    HDL Cholesterol 43 12/13/2019 06:22 AM    LDL, calculated 84.4 12/13/2019 06:22 AM    Triglyceride 128 12/13/2019 06:22 AM    CHOL/HDL Ratio 3.6 12/13/2019 06:22 AM   LABRCNT(sgot:3,gpt:3,ap:3,tbiL:3,TP:3,ALB:3,GLOB:3,ggt:3,aml:3,amyp:3,lpse:3,hlpse:3)No results for input(s): PH, PCO2, PO2 in the last 72 hours.   Lab Results   Component Value Date/Time    Cholesterol, total 153 12/13/2019 06:22 AM    HDL Cholesterol 43 12/13/2019 06:22 AM    LDL, calculated 84.4 12/13/2019 06:22 AM    Triglyceride 128 12/13/2019 06:22 AM    CHOL/HDL Ratio 3.6 12/13/2019 06:22 AM   MEDTABLEAbel Hanson MD  No results for input(s): PH, PCO2, PO2 in the last 72 hours.     Medications Personally Reviewed:    Current Facility-Administered Medications   Medication Dose Route Frequency    enoxaparin (LOVENOX) injection 50 mg  1 mg/kg SubCUTAneous Q12H    dilTIAZem (CARDIZEM) 100 mg in dextrose 5% (MBP/ADV) 100 mL infusion  0-15 mg/hr IntraVENous TITRATE    meropenem (MERREM) 500 mg in 0.9% sodium chloride (MBP/ADV) 50 mL  0.5 g IntraVENous Q6H    LORazepam (ATIVAN) injection 1 mg  1 mg IntraVENous Q6H PRN    sodium chloride (NS) flush 5-40 mL  5-40 mL IntraVENous Q8H    sodium chloride (NS) flush 5-40 mL  5-40 mL IntraVENous PRN    ondansetron (ZOFRAN) injection 4 mg  4 mg IntraVENous Q6H PRN    [Held by provider] atorvastatin (LIPITOR) tablet 80 mg  80 mg Oral QHS    albuterol-ipratropium (DUO-NEB) 2.5 MG-0.5 MG/3 ML  3 mL Nebulization QID RT    methylPREDNISolone (PF) (SOLU-MEDROL) injection 40 mg  40 mg IntraVENous Q12H    methocarbamol (ROBAXIN) tablet 750 mg  750 mg Oral QID PRN    pramipexole (MIRAPEX) tablet 0.5 mg  0.5 mg Oral DAILY    [Held by provider] spironolactone (ALDACTONE) tablet 25 mg  25 mg Oral DAILY    umeclidinium (INCRUSE ELLIPTA) 62.5 mcg/actuation  1 Puff Inhalation DAILY    sodium chloride (NS) flush 5-40 mL  5-40 mL IntraVENous Q8H    sodium chloride (NS) flush 5-40 mL  5-40 mL IntraVENous PRN    acetaminophen (TYLENOL) tablet 650 mg  650 mg Oral Q6H PRN    morphine injection 2 mg  2 mg IntraVENous Q4H PRN    naloxone (NARCAN) injection 0.4 mg  0.4 mg IntraVENous PRN    diphenhydrAMINE (BENADRYL) injection 25 mg  25 mg IntraVENous Q6H PRN    polyethylene glycol (MIRALAX) packet 17 g  17 g Oral DAILY    acetaminophen-codeine (TYLENOL #3) per tablet 1 Tab  1 Tab Oral Q6H PRN    albuterol-ipratropium (DUO-NEB) 2.5 MG-0.5 MG/3 ML  3 mL Nebulization Q6H PRN         Darcie Valladares MD

## 2019-12-18 NOTE — PROGRESS NOTES
ADULT PROTOCOL: JET AEROSOL  REASSESSMENT    Patient  Omaira Go     80 y.o.   female     12/17/2019  9:53 PM    Breath Sounds Pre Procedure: Right Breath Sounds: Coarse, Diminished                               Left Breath Sounds: Expiratory wheezing    Breath Sounds Post Procedure: Right Breath Sounds: Coarse, Diminished                                 Left Breath Sounds: Coarse, Diminished    Breathing pattern: Pre procedure Breathing Pattern: Regular          Post procedure Breathing Pattern: Regular    Heart Rate: Pre procedure Pulse: 107           Post procedure Pulse: 120    Resp Rate: Pre procedure Respirations: 18           Post procedure Respirations: 20      Cough: Pre procedure Cough: Non-productive               Post procedure Cough: Congested    Oxygen: O2 Device: Nasal cannula 2LPM     Changed: NO    SpO2: Pre procedure SpO2: 97 %  WITH oxygen              Post procedure SpO2: 92 %  WITH oxygen    Nebulizer Therapy: Current medications Aerosolized Medications: DuoNeb      Changed: NO    Problem List:   Patient Active Problem List   Diagnosis Code    Syncope and collapse R55    HTN (hypertension) I10    Hypokalemia E87.6    Dehydration E86.0    COPD exacerbation ([address] J44.1    Diverticulitis of large intestine with abscess K57.20    Diverticular disease of large intestine with complication Z10.28       Respiratory Therapist: Philip Singh

## 2019-12-18 NOTE — PROGRESS NOTES
1515 Bedside, Verbal and Written shift change report given to Radha GREENE RN  (oncoming nurse) by Rey Gil RN  (offgoing nurse).  Report included the following information SBAR, Kardex, Intake/Output, MAR and Cardiac Rhythm SR with PAC & PVC    1945 Bedside, Verbal and Written shift change report given to  (oncoming nurse) by Bandar Fraga RN (offgoing nurse)    8840 consent for PEG tube placement signed

## 2019-12-19 ENCOUNTER — ANESTHESIA (OUTPATIENT)
Dept: ENDOSCOPY | Age: 84
DRG: 391 | End: 2019-12-19
Payer: MEDICARE

## 2019-12-19 ENCOUNTER — ANESTHESIA EVENT (OUTPATIENT)
Dept: ENDOSCOPY | Age: 84
DRG: 391 | End: 2019-12-19
Payer: MEDICARE

## 2019-12-19 LAB
ANION GAP SERPL CALC-SCNC: 3 MMOL/L (ref 5–15)
BUN SERPL-MCNC: 36 MG/DL (ref 6–20)
BUN/CREAT SERPL: 55 (ref 12–20)
CALCIUM SERPL-MCNC: 8.8 MG/DL (ref 8.5–10.1)
CHLORIDE SERPL-SCNC: 102 MMOL/L (ref 97–108)
CO2 SERPL-SCNC: 37 MMOL/L (ref 21–32)
CREAT SERPL-MCNC: 0.66 MG/DL (ref 0.55–1.02)
GLUCOSE SERPL-MCNC: 156 MG/DL (ref 65–100)
POTASSIUM SERPL-SCNC: 4.1 MMOL/L (ref 3.5–5.1)
SODIUM SERPL-SCNC: 142 MMOL/L (ref 136–145)

## 2019-12-19 PROCEDURE — 65660000000 HC RM CCU STEPDOWN

## 2019-12-19 PROCEDURE — 74011250636 HC RX REV CODE- 250/636: Performed by: NURSE ANESTHETIST, CERTIFIED REGISTERED

## 2019-12-19 PROCEDURE — 97535 SELF CARE MNGMENT TRAINING: CPT

## 2019-12-19 PROCEDURE — 97530 THERAPEUTIC ACTIVITIES: CPT

## 2019-12-19 PROCEDURE — 36415 COLL VENOUS BLD VENIPUNCTURE: CPT

## 2019-12-19 PROCEDURE — 74011000258 HC RX REV CODE- 258: Performed by: INTERNAL MEDICINE

## 2019-12-19 PROCEDURE — 74011250636 HC RX REV CODE- 250/636: Performed by: INTERNAL MEDICINE

## 2019-12-19 PROCEDURE — 74011000250 HC RX REV CODE- 250: Performed by: NURSE ANESTHETIST, CERTIFIED REGISTERED

## 2019-12-19 PROCEDURE — 74011000250 HC RX REV CODE- 250: Performed by: INTERNAL MEDICINE

## 2019-12-19 PROCEDURE — 77010033678 HC OXYGEN DAILY

## 2019-12-19 PROCEDURE — 76060000031 HC ANESTHESIA FIRST 0.5 HR: Performed by: INTERNAL MEDICINE

## 2019-12-19 PROCEDURE — 76040000019: Performed by: INTERNAL MEDICINE

## 2019-12-19 PROCEDURE — 0DH63UZ INSERTION OF FEEDING DEVICE INTO STOMACH, PERCUTANEOUS APPROACH: ICD-10-PCS | Performed by: INTERNAL MEDICINE

## 2019-12-19 PROCEDURE — 97116 GAIT TRAINING THERAPY: CPT

## 2019-12-19 PROCEDURE — 80048 BASIC METABOLIC PNL TOTAL CA: CPT

## 2019-12-19 PROCEDURE — 77030005122 HC CATH GASTMY PEG BSC -B: Performed by: INTERNAL MEDICINE

## 2019-12-19 PROCEDURE — 77030018798 HC PMP KT ENTRL FED COVD -A

## 2019-12-19 PROCEDURE — 94640 AIRWAY INHALATION TREATMENT: CPT

## 2019-12-19 RX ORDER — FENTANYL CITRATE 50 UG/ML
25 INJECTION, SOLUTION INTRAMUSCULAR; INTRAVENOUS
Status: DISCONTINUED | OUTPATIENT
Start: 2019-12-19 | End: 2019-12-19 | Stop reason: HOSPADM

## 2019-12-19 RX ORDER — NALOXONE HYDROCHLORIDE 0.4 MG/ML
0.4 INJECTION, SOLUTION INTRAMUSCULAR; INTRAVENOUS; SUBCUTANEOUS
Status: DISCONTINUED | OUTPATIENT
Start: 2019-12-19 | End: 2019-12-19 | Stop reason: HOSPADM

## 2019-12-19 RX ORDER — FLUMAZENIL 0.1 MG/ML
0.2 INJECTION INTRAVENOUS
Status: DISCONTINUED | OUTPATIENT
Start: 2019-12-19 | End: 2019-12-19 | Stop reason: HOSPADM

## 2019-12-19 RX ORDER — EPINEPHRINE 0.1 MG/ML
1 INJECTION INTRACARDIAC; INTRAVENOUS
Status: DISCONTINUED | OUTPATIENT
Start: 2019-12-19 | End: 2019-12-19 | Stop reason: HOSPADM

## 2019-12-19 RX ORDER — MIDAZOLAM HYDROCHLORIDE 1 MG/ML
.25-5 INJECTION, SOLUTION INTRAMUSCULAR; INTRAVENOUS
Status: DISCONTINUED | OUTPATIENT
Start: 2019-12-19 | End: 2019-12-19 | Stop reason: HOSPADM

## 2019-12-19 RX ORDER — PROPOFOL 10 MG/ML
INJECTION, EMULSION INTRAVENOUS AS NEEDED
Status: DISCONTINUED | OUTPATIENT
Start: 2019-12-19 | End: 2019-12-19 | Stop reason: HOSPADM

## 2019-12-19 RX ORDER — IPRATROPIUM BROMIDE AND ALBUTEROL SULFATE 2.5; .5 MG/3ML; MG/3ML
3 SOLUTION RESPIRATORY (INHALATION)
Status: DISCONTINUED | OUTPATIENT
Start: 2019-12-19 | End: 2019-12-20 | Stop reason: HOSPADM

## 2019-12-19 RX ORDER — LIDOCAINE HYDROCHLORIDE 20 MG/ML
INJECTION, SOLUTION EPIDURAL; INFILTRATION; INTRACAUDAL; PERINEURAL AS NEEDED
Status: DISCONTINUED | OUTPATIENT
Start: 2019-12-19 | End: 2019-12-19 | Stop reason: HOSPADM

## 2019-12-19 RX ORDER — SODIUM CHLORIDE 0.9 % (FLUSH) 0.9 %
5-40 SYRINGE (ML) INJECTION AS NEEDED
Status: DISCONTINUED | OUTPATIENT
Start: 2019-12-19 | End: 2019-12-20 | Stop reason: HOSPADM

## 2019-12-19 RX ORDER — SODIUM CHLORIDE 9 MG/ML
75 INJECTION, SOLUTION INTRAVENOUS CONTINUOUS
Status: DISPENSED | OUTPATIENT
Start: 2019-12-19 | End: 2019-12-19

## 2019-12-19 RX ORDER — SODIUM CHLORIDE 0.9 % (FLUSH) 0.9 %
5-40 SYRINGE (ML) INJECTION EVERY 8 HOURS
Status: DISCONTINUED | OUTPATIENT
Start: 2019-12-19 | End: 2019-12-20 | Stop reason: HOSPADM

## 2019-12-19 RX ORDER — DEXTROMETHORPHAN/PSEUDOEPHED 2.5-7.5/.8
1.2 DROPS ORAL
Status: DISCONTINUED | OUTPATIENT
Start: 2019-12-19 | End: 2019-12-19 | Stop reason: HOSPADM

## 2019-12-19 RX ORDER — ATROPINE SULFATE 0.1 MG/ML
0.5 INJECTION INTRAVENOUS
Status: DISCONTINUED | OUTPATIENT
Start: 2019-12-19 | End: 2019-12-19 | Stop reason: HOSPADM

## 2019-12-19 RX ADMIN — UMECLIDINIUM 1 PUFF: 62.5 AEROSOL, POWDER ORAL at 09:13

## 2019-12-19 RX ADMIN — LIDOCAINE HYDROCHLORIDE 40 MG: 20 INJECTION, SOLUTION EPIDURAL; INFILTRATION; INTRACAUDAL; PERINEURAL at 12:22

## 2019-12-19 RX ADMIN — MORPHINE SULFATE 2 MG: 2 INJECTION, SOLUTION INTRAMUSCULAR; INTRAVENOUS at 03:00

## 2019-12-19 RX ADMIN — IPRATROPIUM BROMIDE AND ALBUTEROL SULFATE 3 ML: .5; 3 SOLUTION RESPIRATORY (INHALATION) at 19:50

## 2019-12-19 RX ADMIN — METHYLPREDNISOLONE SODIUM SUCCINATE 40 MG: 40 INJECTION, POWDER, FOR SOLUTION INTRAMUSCULAR; INTRAVENOUS at 21:34

## 2019-12-19 RX ADMIN — PROPOFOL 20 MG: 10 INJECTION, EMULSION INTRAVENOUS at 12:27

## 2019-12-19 RX ADMIN — MORPHINE SULFATE 2 MG: 2 INJECTION, SOLUTION INTRAMUSCULAR; INTRAVENOUS at 13:39

## 2019-12-19 RX ADMIN — Medication 10 ML: at 21:40

## 2019-12-19 RX ADMIN — MORPHINE SULFATE 2 MG: 2 INJECTION, SOLUTION INTRAMUSCULAR; INTRAVENOUS at 06:02

## 2019-12-19 RX ADMIN — MEROPENEM 500 MG: 500 INJECTION, POWDER, FOR SOLUTION INTRAVENOUS at 00:16

## 2019-12-19 RX ADMIN — MORPHINE SULFATE 2 MG: 2 INJECTION, SOLUTION INTRAMUSCULAR; INTRAVENOUS at 09:07

## 2019-12-19 RX ADMIN — SODIUM CHLORIDE 75 ML/HR: 900 INJECTION, SOLUTION INTRAVENOUS at 12:07

## 2019-12-19 RX ADMIN — MEROPENEM 500 MG: 500 INJECTION, POWDER, FOR SOLUTION INTRAVENOUS at 17:24

## 2019-12-19 RX ADMIN — MORPHINE SULFATE 2 MG: 2 INJECTION, SOLUTION INTRAMUSCULAR; INTRAVENOUS at 17:22

## 2019-12-19 RX ADMIN — Medication 10 ML: at 17:25

## 2019-12-19 RX ADMIN — MORPHINE SULFATE 2 MG: 2 INJECTION, SOLUTION INTRAMUSCULAR; INTRAVENOUS at 21:35

## 2019-12-19 RX ADMIN — Medication 10 ML: at 17:24

## 2019-12-19 RX ADMIN — Medication 10 ML: at 06:06

## 2019-12-19 RX ADMIN — MEROPENEM 500 MG: 500 INJECTION, POWDER, FOR SOLUTION INTRAVENOUS at 05:59

## 2019-12-19 RX ADMIN — IPRATROPIUM BROMIDE AND ALBUTEROL SULFATE 3 ML: .5; 3 SOLUTION RESPIRATORY (INHALATION) at 07:31

## 2019-12-19 RX ADMIN — Medication 10 ML: at 21:39

## 2019-12-19 RX ADMIN — METHYLPREDNISOLONE SODIUM SUCCINATE 40 MG: 40 INJECTION, POWDER, FOR SOLUTION INTRAMUSCULAR; INTRAVENOUS at 09:07

## 2019-12-19 RX ADMIN — ENOXAPARIN SODIUM 50 MG: 60 INJECTION SUBCUTANEOUS at 21:31

## 2019-12-19 RX ADMIN — PROPOFOL 20 MG: 10 INJECTION, EMULSION INTRAVENOUS at 12:25

## 2019-12-19 RX ADMIN — PROPOFOL 40 MG: 10 INJECTION, EMULSION INTRAVENOUS at 12:22

## 2019-12-19 NOTE — PROGRESS NOTES
Speech pathology note  Reviewed chart and note patient NPO for PEG today. Will continue to follow for dysphagia treatment. Thank you.     Luisito Haile., CCC-SLP

## 2019-12-19 NOTE — PROGRESS NOTES
..PEG tube inserted by Dr Vanda Klinefelter catalog # [id number]  Product # [id number]  Lot # [id number]   Expiration date 2020/09/30

## 2019-12-19 NOTE — ANESTHESIA POSTPROCEDURE EVALUATION
Procedure(s):  ESOPHAGOGASTRODUODENOSCOPY (EGD)  PERCUTANEOUS ENDOSCOPIC GASTROSTOMY TUBE INSERTION. total IV anesthesia    Anesthesia Post Evaluation        Patient location during evaluation: PACU  Note status: Adequate. Level of consciousness: responsive to verbal stimuli and sleepy but conscious  Pain management: satisfactory to patient  Airway patency: patent  Anesthetic complications: no  Cardiovascular status: acceptable  Respiratory status: acceptable  Hydration status: acceptable  Comments: +Post-Anesthesia Evaluation and Assessment    Patient: Aubrey Jaeger MRN: [id number]  SSN: [id number]   YOB: 1935  Age: 80 y.o. Sex: female      Cardiovascular Function/Vital Signs    /83   Pulse 94   Temp 36.3 °C (97.4 °F)   Resp 16   Ht 5' 2\" (1.575 m)   Wt 54.6 kg (120 lb 4.8 oz)   SpO2 95%   BMI 22.00 kg/m²     Patient is status post Procedure(s):  ESOPHAGOGASTRODUODENOSCOPY (EGD)  PERCUTANEOUS ENDOSCOPIC GASTROSTOMY TUBE INSERTION. Nausea/Vomiting: Controlled. Postoperative hydration reviewed and adequate. Pain:  Pain Scale 1: Numeric (0 - 10) (12/19/19 1202)  Pain Intensity 1: 0 (12/19/19 1202)   Managed. Neurological Status: At baseline. Mental Status and Level of Consciousness: Arousable. Pulmonary Status:   O2 Device: Nasal cannula (12/19/19 1235)   Adequate oxygenation and airway patent. Complications related to anesthesia: None    Post-anesthesia assessment completed. No concerns.     Signed By: Karly Monroe MD    12/19/2019  Post anesthesia nausea and vomiting:  controlled      Vitals Value Taken Time   /83 12/19/2019 12:35 PM   Temp     Pulse 94 12/19/2019 12:35 PM   Resp 16 12/19/2019 12:35 PM   SpO2 95 % 12/19/2019 12:35 PM

## 2019-12-19 NOTE — PROGRESS NOTES
.. TRANSFER - IN REPORT:    Verbal report received from Radha(name) on Vilma Schmitz  being received from 2268(unit) for routine progression of care      Report consisted of patients Situation, Background, Assessment and   Recommendations(SBAR). Information from the following report(s) Procedure Summary, Accordion, Recent Results and Med Rec Status was reviewed with the receiving nurse. Opportunity for questions and clarification was provided. Assessment completed upon patients arrival to unit and care assumed.

## 2019-12-19 NOTE — PROGRESS NOTES
..Anesthesia reports 80mg Propofol, 40mg Lidocaine and 300mL NS given during procedure. Received report from anesthesia staff on vital signs and status of patient.

## 2019-12-19 NOTE — PROGRESS NOTES
Hospitalist Progress Note    NAME: Damari Humphreys   :  1935   MRN:  [id number]     Assessment / Plan:  Dysphagia with high risk aspiration  NPO  S/p PEG tube placement  Start TF once ok with GI and make sure tolerating it without residue    Acute CVA s/p tPA  CT Head: Moderate-sized area of acute subacute infarction lateral right frontal lobe. No acute hemorrhage   CT perf with CBF:  Occlusion of distal M2 branch of middle divisional right middle cerebral artery with acute bland infarct. 2. At least 70% stenosis at origin of right internal carotid artery. 3. COPD  CTA Head:  1. Occlusion of distal M2 branch of middle divisional right middle cerebral artery with acute bland infarct. 2. At least 70% stenosis at origin of right internal carotid artery. 3. COPD  Received tPA 2019  Repeat Head CT showed no change   MRI brain:  No change in size of acute, moderate-sized right MCA territory infarction. Small amount of hemosiderin deposition within the infarct suggests petechial hemorrhage. Appreciate Neurology input   Continue Lovenox while we are able to clarify PO status as above  Hold statins while NPO  PT/OT ?  Inpatient rehab, Cm on the case    Acute on chronic respiratory failure with hypoxia POA  Secondary to COPD exacerbation with acute on chronic diastolic heart failure  Suspect aspiration could be reason for COPD exacerbation  Continue Px abx, IV Steroids  Continue O2  Continue Nebs  Pulmonary input appreciated  CXR with pulmonary edema  S/p IV diruetics, hold for now considering NPO    UTI POA  ESBL per UC  Continue Meropenem     Acute complicated diverticulitis with abscess  Continue IV Merrem   Pain management PRN  Surgery input noted    Intermittent a.fib on tele  Considering holding PO meds, started Cardizem ggt  Cardiology consult  ECHO with normal EF and Aortic Valve sclerosis  Continue Lovenox, will transition to PO anticoagulation once ok with GI after PEG placement    Hx of provoked PE x3  Continue Lovenox     HTN  Holding PO meds  cardizem ggt as above    18.5 - 24.9 Normal weight / Body mass index is 22 kg/m². Code status: Full  Prophylaxis: SCD's  Recommended Disposition: TBD     Subjective: Pt seen and examined at bedside. NAD. Feels the same, decided to have PEG placement. Overnight events d/w RN     Chief Complaint / Reason for Physician Visit: f/u \"acute respiratory failure, Diverticulitis, CVA\"    Review of Systems:  Symptom Y/N Comments  Symptom Y/N Comments   Fever/Chills n   Chest Pain n    Poor Appetite n   Edema     Cough    Abdominal Pain     Sputum    Joint Pain     SOB/CRAIG n   Pruritis/Rash     Nausea/vomit n   Tolerating PT/OT     Diarrhea n   Tolerating Diet n    Constipation n   Other       Could NOT obtain due to:      Objective:     VITALS:   Last 24hrs VS reviewed since prior progress note. Most recent are:  Patient Vitals for the past 24 hrs:   Temp Pulse Resp BP SpO2   12/19/19 1310 97.6 °F (36.4 °C) 74 18 155/82 100 %   12/19/19 1307  76 17 (!) 174/105 100 %   12/19/19 1305  71 21 179/90 100 %   12/19/19 1302   23 177/70 94 %   12/19/19 1300  80  (!) 168/95    12/19/19 1257  68  173/84 (!) 74 %   12/19/19 1255  71  (!) 172/99 96 %   12/19/19 1250  70  (!) 171/92 94 %   12/19/19 1245 97.4 °F (36.3 °C) 97 20     12/19/19 1235  94 16 155/83 95 %   12/19/19 1202  79 16 147/73 90 %   12/19/19 0731     94 %   12/19/19 0730 97.4 °F (36.3 °C) 69 18 141/74 100 %   12/19/19 0253 97.6 °F (36.4 °C) 97 20 158/75 97 %   12/18/19 2305 97.7 °F (36.5 °C) 67 18 151/71 97 %   12/18/19 1953     98 %   12/18/19 1925 98.2 °F (36.8 °C) 89 18 124/78 97 %       Intake/Output Summary (Last 24 hours) at 12/19/2019 1639  Last data filed at 12/19/2019 1233  Gross per 24 hour   Intake 300 ml   Output 2 ml   Net 298 ml        PHYSICAL EXAM:  General: Elderly, frail,  female. NAD    EENT:  EOMI. Anicteric sclerae.  MMM  Resp:  Scattered wheezing in upper airways, diminished in bases. No accessory muscle use  CV:  Regular rate rhythm,  No edema  GI:  Soft, Non distended, Non tender.  +Bowel sounds  Neurologic:  Alert and oriented X 3, normal speech,   Psych:   Good insight. Not anxious nor agitated  Skin:  No rashes. No jaundice    Reviewed most current lab test results and cultures  YES  Reviewed most current radiology test results   YES  Review and summation of old records today    NO  Reviewed patient's current orders and MAR    YES  PMH/SH reviewed - no change compared to H&P  ________________________________________________________________________  Care Plan discussed with:    Comments   Patient x    Family  x At bedside   RN x    Care Manager x    Consultant                        Multidiciplinary team rounds were held today with , nursing, pharmacist and clinical coordinator. Patient's plan of care was discussed; medications were reviewed and discharge planning was addressed. ________________________________________________________________________  Total NON critical care TIME:  25  Minutes    Total CRITICAL CARE TIME Spent:   Minutes non procedure based      Comments   >50% of visit spent in counseling and coordination of care     ________________________________________________________________________  Sharri Raygoza MD     Procedures: see electronic medical records for all procedures/Xrays and details which were not copied into this note but were reviewed prior to creation of Plan. LABS:  I reviewed today's most current labs and imaging studies.   Pertinent labs include:  Recent Labs     12/18/19  0452   WBC 9.6   HGB 11.9   HCT 40.1        Recent Labs     12/19/19  0526 12/18/19  0452 12/17/19  0434    140 140   K 4.1 4.1 3.3*    101 97   CO2 37* 37* 38*   * 145* 155*   BUN 36* 29* 25*   CREA 0.66 0.48* 0.62   CA 8.8 8.8 8.6       Signed: Sharri Raygoza MD
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
## 2019-12-19 NOTE — PROCEDURES
[facility]                   Endoscopic Gastroduodenoscopy with PEG Placement Procedure Note    Sp Speak  1935  [id number]    Procedure: Endoscopic Gastroduodenoscopy with PEG placement    Indication: Oropharyngeal dysphagia. Feeding difficulties. Pre-operative Diagnosis: see indication above    Post-operative Diagnosis: Successful PEG placement    : Prosper Blake MD    Assistant: Estefany Ulloa NP    Referring Provider:  Jannet Singh MD    Anesthesia/Sedation:  MAC anesthesia Propofol    Airway assessment: No airway problems anticipated    Pre-Procedural Exam:      Airway: clear, no airway problems anticipated  Heart: RRR, without gallops or rubs  Lungs: clear bilaterally without wheezes, crackles, or rhonchi  Abdomen: soft, nontender, nondistended, bowel sounds present  Mental Status: awake, alert and oriented to person, place and time       Procedure Details     After appropriate pharyngeal anesthesia, the endoscope was passed into the esophagus without difficulty. The proximal esophagus is normal as is the distal esophagus. The fundus, body, antrum, pylorus, bulb and postbulbar area are unremarkable. On slow withdrawal of the scope, the stomach was transilluminated into the abdominal wall. Under sterile conditions and 1% Xylocaine anesthesia, a small incision was made in the abdominal wall. A needle was passed through the incision, and under direct vision into the stomach. A wire was passed through the needle, snared and brought out the mouth. The PEG tube was passed over the wire and brought out the abdominal wall without difficulty. The scope was then reinserted and the positioning of the PEG tube was excellent. He or she tolerated the procedure without complication and will return to his or her room in satisfactory condition.     Therapies:  PEG placement    Specimens: None           Complications:   None; patient tolerated the procedure well. EBL:  None. Impression:    Successful PEG placement. External bumper at 2.5 cm      Recommendations: May start tube feedings today.       Leander Suarez MD

## 2019-12-19 NOTE — PROGRESS NOTES
ADULT PROTOCOL: JET AEROSOL  REASSESSMENT    Patient  Chelita Duncan     80 y.o.   female     12/19/2019  2:56 PM    Breath Sounds Pre Procedure: Right Breath Sounds: Diminished                               Left Breath Sounds: Diminished    Breath Sounds Post Procedure: Right Breath Sounds: Diminished                                 Left Breath Sounds: Diminished    Breathing pattern: Pre procedure Breathing Pattern: Regular          Post procedure Breathing Pattern: Regular    Heart Rate: Pre procedure Pulse: 96           Post procedure Pulse: 94    Resp Rate: Pre procedure Respirations: 20           Post procedure Respirations: 20        Cough: Pre procedure Cough: Non-productive               Post procedure Cough: Non-productive    Oxygen: O2 Device: Nasal cannula        Changed:no    SpO2: Pre procedure SpO2: 94 %     Post procedure SpO2: 96 %      Nebulizer Therapy: Current medications Aerosolized Medications: DuoNeb      Changed: yes tx now q6    Problem List:   Patient Active Problem List   Diagnosis Code    Syncope and collapse R55    HTN (hypertension) I10    Hypokalemia E87.6    Dehydration E86.0    COPD exacerbation (HCC) J44.1    Diverticulitis of large intestine with abscess K57.20    Diverticular disease of large intestine with complication N71.48       Respiratory Therapist: Scott Horn

## 2019-12-19 NOTE — PROGRESS NOTES
Problem: Mobility Impaired (Adult and Pediatric)  Goal: *Acute Goals and Plan of Care (Insert Text)  Description  FUNCTIONAL STATUS PRIOR TO ADMISSION: Patient lived alone and was able to drive short distances, has a walker but doesn't often use it, reports she furniture walks at home. Has a history of falls with recent compression fx. HOME SUPPORT PRIOR TO ADMISSION: Patient was able to perform ADLs, has a daughter who would sometimes stay with her. Physical Therapy Goals  Initiated 12/13/2019  1. Patient will move from supine to sit and sit to supine  in bed with minimal assistance/contact guard assist within 7 day(s). 2.  Patient will transfer from bed to chair and chair to bed with minimal assistance/contact guard assist using the least restrictive device within 7 day(s). 3.  Patient will perform sit to stand with minimal assistance/contact guard assist within 7 day(s). 4.  Patient will ambulate with minimal assistance/contact guard assist for 50 feet with the least restrictive device within 7 day(s). Outcome: Progressing Towards Goal  PHYSICAL THERAPY TREATMENT  Patient: Annamarie Gardner (79 y.o. female)  Date: 12/19/2019  Diagnosis: Diverticulitis of large intestine with abscess [K57.20]  Diverticular disease of large intestine with complication [T99.06]   <principal problem not specified>  Procedure(s) (LRB):  ESOPHAGOGASTRODUODENOSCOPY (EGD) (N/A)  PERCUTANEOUS ENDOSCOPIC GASTROSTOMY TUBE INSERTION (N/A) Day of Surgery  Precautions: Aspiration  Chart, physical therapy assessment, plan of care and goals were reviewed. ASSESSMENT  Patient continues with skilled PT services and is progressing towards goals. Pt was received in supine on 5L and cleared by nursing to mobilize. She was able to get to the EOB and stood with RW. Ambulated into the bathroom and then back to the chair. She was SOB and fatigued. Difficulty oxygen saturation reads, provided pt with ear sensory.  She was educated on the proper technique and importance with the incentive spirometer. She was left sitting up in the chair and planned to have PEG place. Current Level of Function Impacting Discharge (mobility/balance): min A    Other factors to consider for discharge:          PLAN :  Patient continues to benefit from skilled intervention to address the above impairments. Continue treatment per established plan of care. to address goals. Recommendation for discharge: (in order for the patient to meet his/her long term goals)  Therapy 3 hours per day 5-7 days per week    This discharge recommendation:  Has not yet been discussed the attending provider and/or case management    IF patient discharges home will need the following DME: to be determined (TBD)       SUBJECTIVE:   Patient stated i am from near [facility].     OBJECTIVE DATA SUMMARY:   Critical Behavior:  Neurologic State: Alert  Orientation Level: Oriented X4  Cognition: Follows commands  Safety/Judgement: Fall prevention  Functional Mobility Training:  Bed Mobility:  Rolling: Contact guard assistance  Supine to Sit: Contact guard assistance              Transfers:  Sit to Stand: Contact guard assistance  Stand to Sit: Contact guard assistance                             Balance:  Sitting: Intact  Standing: Impaired  Standing - Static: Good;Constant support  Standing - Dynamic : Good;Constant support  Ambulation/Gait Training:  Distance (ft): 10 Feet (ft)  Assistive Device: Gait belt;Walker, rolling  Ambulation - Level of Assistance: Minimal assistance        Gait Abnormalities: Decreased step clearance;Shuffling gait        Base of Support: Narrowed     Speed/Esme: Pace decreased (<100 feet/min); Shuffled  Step Length: Left shortened;Right shortened             Activity Tolerance:   desaturates with exertion and requires oxygen  Please refer to the flowsheet for vital signs taken during this treatment.     After treatment patient left in no apparent distress: Sitting in chair and Call bell within reach    COMMUNICATION/COLLABORATION:   The patients plan of care was discussed with: Occupational Therapist and Registered Nurse    Tru Imus, PT, DPT   Time Calculation: 38 mins

## 2019-12-19 NOTE — PROGRESS NOTES
TRANSFER - IN REPORT:    Verbal report received from Puente Radhika, [place] (name) on Shon Bachelor  being received from PCU room 6380 (unit) for ordered procedure      Report consisted of patients Situation, Background, Assessment and   Recommendations(SBAR). Information from the following report(s) SBAR, Intake/Output, MAR and Recent Results was reviewed with the receiving nurse. Opportunity for questions and clarification was provided. Will give report to primary endoscopy nurse upon arrival to unit.

## 2019-12-19 NOTE — PROGRESS NOTES
Problem: Self Care Deficits Care Plan (Adult)  Goal: *Acute Goals and Plan of Care (Insert Text)  Description    FUNCTIONAL STATUS PRIOR TO ADMISSION: Per chart, Patient was modified independent using a walker for functional mobility, occasionally furniture walking in the home. Patient was independent for basic and instrumental ADLs, reporting she mostly sponge bathed and got into the tub 1x/week. At baseline patient uses supplemental oxygen. Patient reporting she still drives and cooks. Patient is blind in R eye. HOME SUPPORT: Per chart, patient lives alone, but her daughter will occasionally stay with her. Occupational Therapy Goals  Initiated 12/13/2019    1. Patient will perform grooming with supervision/set-up within 7 day(s). 2.  Patient will perform self-feeding with supervision/set-up within 7 day(s). 3.  Patient will perform upper body dressing with supervision/set-up within 7 day(s). 4.  Patient will tolerate sitting EOB for 5 minutes in preparation for OOB ADLs with supervision/set up within 7 days. 5.  Patient will perform toilet transfers with moderate assistance using least restrictive assistive device within 7 day(s). 6.  Patient will participate in upper extremity therapeutic exercise/activities with supervision/set-up for 5 minutes within 7 day(s). Outcome: Progressing Towards Goal   OCCUPATIONAL THERAPY TREATMENT  Patient: Cassidy Tolentino (2 y.o. female)  Date: 12/19/2019  Diagnosis: Diverticulitis of large intestine with abscess [K57.20]  Diverticular disease of large intestine with complication [S19.42]   <principal problem not specified>  Procedure(s) (LRB):  ESOPHAGOGASTRODUODENOSCOPY (EGD) (N/A)  PERCUTANEOUS ENDOSCOPIC GASTROSTOMY TUBE INSERTION (N/A) Day of Surgery  Precautions: Aspiration  Chart, occupational therapy assessment, plan of care, and goals were reviewed. ASSESSMENT  Patient continues with skilled OT services and is progressing towards goals.   Pt was SBA to CGA for bed mobility and was able to come to stand with CGA. With RW she was CGA for transfers to and from toilet and able to clean self when she was standing. Pt was tired after transfer and had to sit and rest.  She stated that she was having chest pain with coughing and worked on pt bracing with a pillow and PLB and use of incentive spirometer. Noted that pt does have decreased memory and will repeat statements often. Current Level of Function Impacting Discharge (ADLs): decreased ADLs, endurance, strength, functional mobility,  on 4 liters of NC             PLAN :  Patient continues to benefit from skilled intervention to address the above impairments. Continue treatment per established plan of care. to address goals. Recommend with staff: Den Driver for meals and walk to bathroom    Recommend next OT session: work on standing at the sink for bathing. Recommendation for discharge: (in order for the patient to meet his/her long term goals)  Therapy 3 hours per day 5-7 days per week    This discharge recommendation:  Has been made in collaboration with the attending provider and/or case management    IF patient discharges home will need the following DME: to be determined (TBD)       SUBJECTIVE:   Patient stated I have a pain right here when I cough.     OBJECTIVE DATA SUMMARY:   Cognitive/Behavioral Status:  Neurologic State: Alert  Orientation Level: Oriented X4  Cognition: Follows commands  Perception: Appears intact  Perseveration: No perseveration noted  Safety/Judgement: Awareness of environment    Functional Mobility and Transfers for ADLs:  Bed Mobility:  Rolling: Contact guard assistance  Supine to Sit: Contact guard assistance    Transfers:  Sit to Stand: Contact guard assistance  Functional Transfers  Bathroom Mobility: Contact guard assistance  Toilet Transfer : Contact guard assistance       Balance:  Sitting: Intact  Standing: Impaired  Standing - Static: Good;Constant support  Standing - Dynamic : Good;Constant support    ADL Intervention:  Feeding  Feeding Assistance: Set-up    Grooming  Grooming Assistance: Supervision  Position Performed: Seated in chair  Washing Face: Set-up  Washing Hands: Set-up  Brushing Teeth: Set-up    Upper Body Bathing  Bathing Assistance: Minimum assistance;Contact guard assistance  Position Performed: Seated in chair    Lower Body Bathing  Bathing Assistance: Moderate assistance       Toileting  Toileting Assistance: Contact guard assistance  Bladder Hygiene: Contact guard assistance  Bowel Hygiene: Contact guard assistance    Cognitive Retraining  Safety/Judgement: Awareness of environment        Activity Tolerance:   Fair  Please refer to the flowsheet for vital signs taken during this treatment.     After treatment patient left in no apparent distress:   Sitting in chair, Call bell within reach, and Caregiver / family present    COMMUNICATION/COLLABORATION:   The patients plan of care was discussed with: Physical Therapist, Registered Nurse, and family     Jairo Arambula OT  Time Calculation: 39 mins

## 2019-12-19 NOTE — PROGRESS NOTES
ADULT PROTOCOL: JET AEROSOL  REASSESSMENT    Patient  Baron Akhtar     80 y.o.   female     12/18/2019  9:15 PM    Breath Sounds Pre Procedure: Right Breath Sounds: Clear, Diminished                               Left Breath Sounds: Diminished    Breath Sounds Post Procedure: Right Breath Sounds: Diminished                                 Left Breath Sounds: Diminished    Breathing pattern: Pre procedure Breathing Pattern: Regular          Post procedure Breathing Pattern: Regular    Heart Rate: Pre procedure Pulse: 110           Post procedure Pulse: 105    Resp Rate: Pre procedure Respirations: 18           Post procedure Respirations: 20    Cough: Pre procedure Cough: Non-productive               Post procedure Cough: Non-productive    Oxygen: O2 Device: Nasal cannula     SpO2: Pre procedure SpO2: 98 %                 Post procedure SpO2: 97 %    Nebulizer Therapy: Current medications Aerosolized Medications: DuoNeb  Problem List:   Patient Active Problem List   Diagnosis Code    Syncope and collapse R55    HTN (hypertension) I10    Hypokalemia E87.6    Dehydration E86.0    COPD exacerbation (HCC) J44.1    Diverticulitis of large intestine with abscess K57.20    Diverticular disease of large intestine with complication X77.24       Respiratory Therapist: Ruthie Finch RT

## 2019-12-19 NOTE — PROGRESS NOTES
Initial Nutrition Assessment:    INTERVENTIONS/RECOMMENDATIONS:   · Once PEG placed, recommend Osmolite 1.2 @ 25 mL/hr and advance as tolerated by 10 mL q 8 hours to goal rate of 45 mL/hr + 100 mL water flushes q 6 hours (1296 kcal, 60g protein, 1286 mL water)  · Once tolerating continuous infusion, consider transition to bolus. Osmolite 1.2 260 mL bolus 4x/day + 50 mL water flushes before and after each bolus (1248 kcal, 58g protein, 1253 mL water). Could eventually transition to 350 mL boluses 3x/day if tolerated (1260 kcal, 58g protein)    ASSESSMENT:   Patient medically noted for diverticulitis and abscess, respiratory failure, COPD, and CVA s/p tPA. PMH HTN. Chart reviewed for length of stay. Patient NPO after MBS/SLP evaluation. Plans for PEG today. TF recommendations provided above to meet 100% of estimated kcal and protein needs. Will monitor TF tolerance, labs, and weights. Diet Order: NPO  % Eaten:    Patient Vitals for the past 72 hrs:   % Diet Eaten   12/18/19 1121 0 %   12/18/19 0800 0 %   12/17/19 1200 75 %   12/16/19 1830 30 %   12/16/19 1528 50 %       Pertinent Medications: [x]Reviewed []Other: solu-medrol, Miralax  Pertinent Labs: [x]Reviewed []Other:   Food Allergies: [x]None []Yes:    Last BM: 12/18  [x]Active     []Hyperactive  []Hypoactive       [] Absent BS  Skin:    [x] Intact   [] Incision  [] Breakdown: [] Edema []Other:    Anthropometrics:   Height: 5' 2\" (157.5 cm) Weight: 54.6 kg (120 lb 4.8 oz)   IBW (%IBW):   ( ) UBW (%UBW):   (  %)   Last Weight Metrics:  Weight Loss Metrics 12/19/2019 1/22/2019 1/21/2019 12/21/2018 12/18/2018 11/14/2018 7/3/2018   Today's Wt 120 lb 4.8 oz - 130 lb - 130 lb 130 lb 134 lb 14.7 oz   BMI 22 kg/m2 23.03 kg/m2 - 23.03 kg/m2 - 23.03 kg/m2 23.9 kg/m2       BMI: Body mass index is 22 kg/m².     This BMI is indicative of:   []Underweight    [x]Normal    []Overweight    [] Obesity   [] Extreme Obesity (BMI>40)     Estimated Nutrition Needs (Based on):   2551 Kcals/day(BMR (953) x 1. 3AF) , 55 g(-66g (1.0-1.2 g/kg bw)) Protein  Carbohydrate:  At Least 130 g/day  Fluids: 1250 mL/day (1ml/kcal)    Pt expected to meet estimated nutrient needs: [x]Yes []No    NUTRITION DIAGNOSES:   Problem:  Swallowing difficulty      Etiology: related to CVA     Signs/Symptoms: as evidenced by MBS, NPO, PEG planned      NUTRITION INTERVENTIONS:    Enteral/Parenteral Nutrition: Initiate enteral nutrition                GOAL:   PEG placed, TF started, and advanced to goal rate next 2-4 days    LEARNING NEEDS (Diet, Food/Nutrient-Drug Interaction):    [x] None Identified   [] Identified and Education Provided/Documented   [] Identified and Pt declined/was not appropriate     Cultural, Adventism, OR Ethnic Dietary Needs:    [x] None Identified   [] Identified and Addressed     [x] Interdisciplinary Care Plan Reviewed/Documented    [x] Discharge Planning: See TF recommendations above      MONITORING /EVALUATION:   Food/Nutrient Intake Outcomes: Enteral/parenteral nutrition intake  Physical Signs/Symptoms Outcomes: Weight/weight change, GI profile, Electrolyte and renal profile, Glucose profile    NUTRITION RISK:    [x] Patient At Nutritional Risk              [] Patient Not at Nutritional Risk    PT SEEN FOR:    []  MD Consult: []Calorie Count      []Diabetic Diet Education        []Diet Education     []Electrolyte Management     []General Nutrition Management and Supplements     []Management of Tube Feeding     []TPN Recommendations    []  RN Referral:  []MST score >=2     []Enteral/Parenteral Nutrition PTA     []Pregnant: Gestational DM or Multigestation     []Pressure Ulcer/Wound Care needs        []  Low BMI  [x]  [facility] [address]  Pager [phone]    Weekend Pager [phone]

## 2019-12-19 NOTE — PROGRESS NOTES
Bedside, Verbal and Written shift change report given to Radha GREENE RN  (oncoming nurse) by Pablo Alejandra (offgoing nurse).  Report included the following information SBAR, Kardex, MAR, Recent Results, Med Rec Status and Cardiac Rhythm SA.     1055 pt off floor     1310 back to floor PEG tube in place DRG D&I C/O pain Morphine 2 mg given IVP Daughter at bedside    1555 called to request Kangaroo pump for tube feeding, called to request tube feeding Osmolite 1.2 Paolo     1645 received Osmolite 1.2 Paolo

## 2019-12-19 NOTE — ANESTHESIA PREPROCEDURE EVALUATION
Anesthetic History   No history of anesthetic complications            Review of Systems / Medical History  Patient summary reviewed, nursing notes reviewed and pertinent labs reviewed    Pulmonary    COPD (stable symptoms)      Shortness of breath and smoker      Comments: On home oxygen therapy   Former Smoker   Neuro/Psych         Psychiatric history ( anxiety/ depression)    Comments: Essential tremor  Anxiety  Depression   Cardiovascular    Hypertension        Dysrhythmias (palpitations)       Exercise tolerance: <4 METS  Comments: Hx PALPITATIONS  Negative Holter     GI/Hepatic/Renal     GERD: well controlled      PUD     Endo/Other        Arthritis     Other Findings   Comments: Hx DVT           Physical Exam    Airway  Mallampati: II  TM Distance: 4 - 6 cm    Mouth opening: Normal     Cardiovascular    Rhythm: regular  Rate: normal         Dental    Dentition: Full upper dentures     Pulmonary              Pertinent negatives: Wheezes:  fine, expiratory.    Abdominal  GI exam deferred       Other Findings            Anesthetic Plan    ASA: 3  Anesthesia type: total IV anesthesia          Induction: Intravenous  Anesthetic plan and risks discussed with: Patient

## 2019-12-19 NOTE — PROGRESS NOTES
TRANSITION OF CARE PLAN:     Plan A: IP Rehab [facility] reviewing)     CM spoke with pt concerning d/c plan. Pt daughter was present at pt bedside. Pt has decided upon [facility] for IP Rehab placement. CM explained FOC. Doc signed by pt and placed in pt chart. CM sent [facility] referral via [employer] and is awaiting a response. CM will contact the patients son Dylan Zarco, at pt request, as he had some questions relating to d/c. CM will continue to follow patient for discharge planning needs and arrange for services as deemed necessary.     Angie Granger, Care Manager  [phone]

## 2019-12-20 ENCOUNTER — HOSPITAL ENCOUNTER (OUTPATIENT)
Dept: REHABILITATION | Age: 84
End: 2020-01-16
Attending: PHYSICAL MEDICINE & REHABILITATION | Admitting: PHYSICAL MEDICINE & REHABILITATION

## 2019-12-20 VITALS
WEIGHT: 122.4 LBS | HEIGHT: 62 IN | TEMPERATURE: 98 F | OXYGEN SATURATION: 96 % | HEART RATE: 58 BPM | RESPIRATION RATE: 20 BRPM | SYSTOLIC BLOOD PRESSURE: 153 MMHG | DIASTOLIC BLOOD PRESSURE: 64 MMHG | BODY MASS INDEX: 22.52 KG/M2

## 2019-12-20 DIAGNOSIS — I69.391 DYSPHAGIA FOLLOWING CEREBRAL INFARCTION: ICD-10-CM

## 2019-12-20 DIAGNOSIS — I63.319: ICD-10-CM

## 2019-12-20 DIAGNOSIS — J96.21 ACUTE AND CHRONIC RESPIRATORY FAILURE WITH HYPOXIA (HCC): ICD-10-CM

## 2019-12-20 DIAGNOSIS — J44.9 CHRONIC OBSTRUCTIVE PULMONARY DISEASE, UNSPECIFIED (HCC): ICD-10-CM

## 2019-12-20 LAB
ANION GAP SERPL CALC-SCNC: 4 MMOL/L (ref 5–15)
BUN SERPL-MCNC: 41 MG/DL (ref 6–20)
BUN/CREAT SERPL: 67 (ref 12–20)
CALCIUM SERPL-MCNC: 8.6 MG/DL (ref 8.5–10.1)
CHLORIDE SERPL-SCNC: 105 MMOL/L (ref 97–108)
CO2 SERPL-SCNC: 35 MMOL/L (ref 21–32)
CREAT SERPL-MCNC: 0.61 MG/DL (ref 0.55–1.02)
GLUCOSE SERPL-MCNC: 285 MG/DL (ref 65–100)
POTASSIUM SERPL-SCNC: 4 MMOL/L (ref 3.5–5.1)
SODIUM SERPL-SCNC: 144 MMOL/L (ref 136–145)

## 2019-12-20 PROCEDURE — 94640 AIRWAY INHALATION TREATMENT: CPT

## 2019-12-20 PROCEDURE — 74011250637 HC RX REV CODE- 250/637: Performed by: INTERNAL MEDICINE

## 2019-12-20 PROCEDURE — 80048 BASIC METABOLIC PNL TOTAL CA: CPT

## 2019-12-20 PROCEDURE — 74011250637 HC RX REV CODE- 250/637: Performed by: SURGERY

## 2019-12-20 PROCEDURE — 74011000250 HC RX REV CODE- 250: Performed by: INTERNAL MEDICINE

## 2019-12-20 PROCEDURE — 77030041247 HC PROTECTOR HEEL HEELMEDIX MDII -B

## 2019-12-20 PROCEDURE — 74011250636 HC RX REV CODE- 250/636: Performed by: INTERNAL MEDICINE

## 2019-12-20 PROCEDURE — 77010033678 HC OXYGEN DAILY

## 2019-12-20 PROCEDURE — 36415 COLL VENOUS BLD VENIPUNCTURE: CPT

## 2019-12-20 PROCEDURE — 74011000258 HC RX REV CODE- 258: Performed by: INTERNAL MEDICINE

## 2019-12-20 RX ORDER — POTASSIUM CHLORIDE 750 MG/1
10 TABLET, EXTENDED RELEASE ORAL DAILY
Qty: 30 TAB | Refills: 0 | Status: SHIPPED | OUTPATIENT
Start: 2019-12-20 | End: 2021-01-01

## 2019-12-20 RX ORDER — LEVALBUTEROL INHALATION SOLUTION 1.25 MG/3ML
1.25 SOLUTION RESPIRATORY (INHALATION)
Qty: 120 NEBULE | Refills: 0 | Status: SHIPPED | OUTPATIENT
Start: 2019-12-20 | End: 2020-03-24

## 2019-12-20 RX ORDER — SPIRONOLACTONE 25 MG/1
25 TABLET ORAL DAILY
Qty: 30 TAB | Refills: 0 | Status: SHIPPED
Start: 2019-12-20 | End: 2020-03-24

## 2019-12-20 RX ORDER — METHOCARBAMOL 750 MG/1
750 TABLET, FILM COATED ORAL
Qty: 20 TAB | Refills: 0 | Status: SHIPPED
Start: 2019-12-20 | End: 2020-03-24

## 2019-12-20 RX ORDER — BISMUTH SUBSALICYLATE 262 MG
1 TABLET,CHEWABLE ORAL DAILY
Qty: 30 TAB | Refills: 0 | Status: SHIPPED
Start: 2019-12-20 | End: 2020-09-17

## 2019-12-20 RX ORDER — AMOXICILLIN AND CLAVULANATE POTASSIUM 875; 125 MG/1; MG/1
1 TABLET, FILM COATED ORAL EVERY 12 HOURS
Qty: 14 TAB | Refills: 0 | Status: SHIPPED
Start: 2019-12-20 | End: 2019-12-27

## 2019-12-20 RX ORDER — METOPROLOL TARTRATE 25 MG/1
25 TABLET, FILM COATED ORAL EVERY 12 HOURS
Status: DISCONTINUED | OUTPATIENT
Start: 2019-12-20 | End: 2019-12-20 | Stop reason: HOSPADM

## 2019-12-20 RX ORDER — QUINAPRIL 5 1/1
5 TABLET ORAL DAILY
Qty: 30 TAB | Refills: 0 | Status: SHIPPED
Start: 2019-12-20 | End: 2020-09-17

## 2019-12-20 RX ORDER — BUMETANIDE 1 MG/1
1 TABLET ORAL DAILY
Qty: 30 TAB | Refills: 0 | Status: SHIPPED
Start: 2019-12-20 | End: 2020-04-03

## 2019-12-20 RX ORDER — METOPROLOL TARTRATE 25 MG/1
25 TABLET, FILM COATED ORAL EVERY 12 HOURS
Qty: 60 TAB | Refills: 0 | Status: SHIPPED
Start: 2019-12-20 | End: 2022-01-01 | Stop reason: ALTCHOICE

## 2019-12-20 RX ORDER — PREDNISONE 10 MG/1
TABLET ORAL
Qty: 48 TAB | Refills: 0 | Status: SHIPPED
Start: 2019-12-20 | End: 2021-01-26 | Stop reason: ALTCHOICE

## 2019-12-20 RX ORDER — ATORVASTATIN CALCIUM 80 MG/1
10 TABLET, FILM COATED ORAL
Qty: 30 TAB | Refills: 0 | Status: SHIPPED
Start: 2019-12-20 | End: 2019-12-20

## 2019-12-20 RX ORDER — ATORVASTATIN CALCIUM 10 MG/1
10 TABLET, FILM COATED ORAL
Qty: 30 TAB | Refills: 0 | Status: SHIPPED
Start: 2019-12-20 | End: 2020-05-11 | Stop reason: SDUPTHER

## 2019-12-20 RX ORDER — QUINAPRIL 5 1/1
40 TABLET ORAL 2 TIMES DAILY
Qty: 30 TAB | Refills: 0 | Status: SHIPPED
Start: 2019-12-20 | End: 2019-12-20 | Stop reason: SDUPTHER

## 2019-12-20 RX ADMIN — MEROPENEM 500 MG: 500 INJECTION, POWDER, FOR SOLUTION INTRAVENOUS at 05:47

## 2019-12-20 RX ADMIN — APIXABAN 2.5 MG: 2.5 TABLET, FILM COATED ORAL at 11:41

## 2019-12-20 RX ADMIN — Medication 10 ML: at 05:49

## 2019-12-20 RX ADMIN — POLYETHYLENE GLYCOL (3350) 17 G: 17 POWDER, FOR SOLUTION ORAL at 09:27

## 2019-12-20 RX ADMIN — METHYLPREDNISOLONE SODIUM SUCCINATE 40 MG: 40 INJECTION, POWDER, FOR SOLUTION INTRAMUSCULAR; INTRAVENOUS at 09:17

## 2019-12-20 RX ADMIN — MEROPENEM 500 MG: 500 INJECTION, POWDER, FOR SOLUTION INTRAVENOUS at 00:32

## 2019-12-20 RX ADMIN — Medication 10 ML: at 09:18

## 2019-12-20 RX ADMIN — UMECLIDINIUM 1 PUFF: 62.5 AEROSOL, POWDER ORAL at 11:45

## 2019-12-20 RX ADMIN — DEXTROSE MONOHYDRATE 2.5 MG/HR: 50 INJECTION, SOLUTION INTRAVENOUS at 03:52

## 2019-12-20 RX ADMIN — PRAMIPEXOLE DIHYDROCHLORIDE 0.5 MG: 0.25 TABLET ORAL at 11:41

## 2019-12-20 RX ADMIN — METOPROLOL TARTRATE 25 MG: 25 TABLET ORAL at 09:27

## 2019-12-20 RX ADMIN — IPRATROPIUM BROMIDE AND ALBUTEROL SULFATE 3 ML: .5; 3 SOLUTION RESPIRATORY (INHALATION) at 07:45

## 2019-12-20 RX ADMIN — MORPHINE SULFATE 3 MG: 2 INJECTION, SOLUTION INTRAMUSCULAR; INTRAVENOUS at 00:33

## 2019-12-20 RX ADMIN — MORPHINE SULFATE 2 MG: 2 INJECTION, SOLUTION INTRAMUSCULAR; INTRAVENOUS at 06:53

## 2019-12-20 RX ADMIN — MORPHINE SULFATE 3 MG: 2 INJECTION, SOLUTION INTRAMUSCULAR; INTRAVENOUS at 03:46

## 2019-12-20 RX ADMIN — IPRATROPIUM BROMIDE AND ALBUTEROL SULFATE 3 ML: .5; 3 SOLUTION RESPIRATORY (INHALATION) at 02:05

## 2019-12-20 RX ADMIN — MEROPENEM 500 MG: 500 INJECTION, POWDER, FOR SOLUTION INTRAVENOUS at 11:43

## 2019-12-20 RX ADMIN — SPIRONOLACTONE 25 MG: 25 TABLET ORAL at 11:42

## 2019-12-20 NOTE — PROGRESS NOTES
PCP BONIFACIO appt scheduled with Dr. Vinnie Antony on 1/6/2020 at 4:15pm. Appt added to AVS. Jamie Barbosa CM Specialist

## 2019-12-20 NOTE — PROGRESS NOTES
Bedside shift change report given to Ladonna Smith, (oncoming nurse) by Monica Puente RN (offgoing nurse). Report included the following information Kardex, Intake/Output and MAR.

## 2019-12-20 NOTE — DISCHARGE INSTRUCTIONS
HOSPITALIST DISCHARGE INSTRUCTIONS    NAME: Merly Taylor   :  1935   MRN:  [id number]     Date/Time:  2019 10:50 AM    ADMIT DATE: 12/10/2019     DISCHARGE DATE: 2019     DISCHARGE DIAGNOSIS:  Dysphagia    Acute CVA s/p tPA  Acute on chronic respiratory failure with hypoxia   UTI   Acute complicated diverticulitis with abscess  Intermittent a.fib on tele  Hx of provoked PE x3  HTN    MEDICATIONS:  · It is important that you take the medication exactly as they are prescribed. · Keep your medication in the bottles provided by the pharmacist and keep a list of the medication names, dosages, and times to be taken in your wallet. · Do not take other medications without consulting your doctor. Pain Management: per above medications    What to do at Home    Recommended diet:  PEG feeding with Osmolite 1.2 at 45 ml/hr with H2O flushes 100ml every 4 hours. NPO except PEG feeding. All meds via PEG tube    Recommended activity: PT/OT Eval and Treat    If you have questions regarding the hospital related prescriptions or hospital related issues please call [facility] at . If you experience any of the following symptoms then please call your primary care physician or return to the emergency room if you cannot get hold of your doctor:  Fever, chills, nausea, vomiting, diarrhea, change in mentation, falling, bleeding, shortness of breath      Information obtained by :  I understand that if any problems occur once I am at home I am to contact my physician. I understand and acknowledge receipt of the instructions indicated above.                                                                                                                                            Physician's or R.N.'s Signature                                                                  Date/Time Patient or Representative Signature                                                          Date/Time

## 2019-12-20 NOTE — PROGRESS NOTES
TRANSITION OF CARE PLAN:     Plan A: [facility] Report #: [phone] Rm. 128    Pt has been accepted to [address] [place] [address]. At this time, bed is available for pt today or anytime this weekend if medically stable. CM reached out to patient son, April Gibbons, who states that he will need to call CM back as he is at work. UPDATE 11:18AM    CM spoke with [facility] who is checking availability of rooms at their facility with a private bathroom due to pt ESBL. They will contact CM when room is located. UPDATE 11:31AM  Appropriate bed is available for pt. He may transfer anytime after lunch. [facility] has requested that pt IV be left in.     CM will continue to follow patient for discharge planning needs and arrange for services as deemed necessary. UPDATE 2:31PM  CM   Spoke with pt daughter Orestes Sutherland as pt is currently asleep. Pt family has no concerns relating to discharge at this time. Medicare pt has received, reviewed, and signed 2nd IM letter informing them of their right to appeal the discharge. Signed copy has been placed on pt bedside chart. Care Management has completed the discharge planning needs of the patient at this time. Care Management Interventions  PCP Verified by CM: Yes  Mode of Transport at Discharge:  Other (see comment)  Transition of Care Consult (CM Consult): Discharge Planning  Discharge Durable Medical Equipment: No  Physical Therapy Consult: Yes  Occupational Therapy Consult: Yes  Speech Therapy Consult: No  Current Support Network: Own Home(1 story house, 3 steps to enter)  Confirm Follow Up Transport: Other (see comment)  The Plan for Transition of Care is Related to the Following Treatment Goals : Inpatient Rehab  The Patient and/or Patient Representative was Provided with a Choice of Provider and Agrees with the Discharge Plan?: Yes  Name of the Patient Representative Who was Provided with a Choice of Provider and Agrees with the Discharge Plan: Patient signed document  Freedom of Choice List was Provided with Basic Dialogue that Supports the Patient's Individualized Plan of Care/Goals, Treatment Preferences and Shares the Quality Data Associated with the Providers?: Yes  Discharge Location  Discharge Placement: Rehab hospital/unit acute([facility])        Devin Villarreal, Care Manager  [phone]

## 2019-12-20 NOTE — DISCHARGE SUMMARY
Hospitalist Discharge Summary     Patient ID:  Judd Servin  [id number]  89 y.o.  1935  12/10/2019    PCP on record: Nash Nunez MD    Admit date: 12/10/2019  Discharge date and time: 12/20/2019    DISCHARGE DIAGNOSIS:  Dysphagia    Acute CVA s/p tPA  Acute on chronic respiratory failure with hypoxia   UTI   Acute complicated diverticulitis with abscess  Intermittent a.fib on tele  Hx of provoked PE x3  HTN    CONSULTATIONS:  IP CONSULT TO HOSPITALIST  IP CONSULT TO NEUROLOGY  IP CONSULT TO PALLIATIVE CARE - PROVIDER  IP CONSULT TO GASTROENTEROLOGY  IP CONSULT TO VASCULAR SURGERY  IP CONSULT TO CARDIOLOGY    Excerpted HPI from H&P of Gavin Dillon MD:  Judd Servin is a 80 y.o. female who is being seen for evaluation of abdominal pain. The pain is located in the left abdomen. Pain is described as dull and aching and measures 4/10 in intensity. Onset of pain was 2 months ago. Aggravating factors include movement, activity, pressure and bowel movement. Alleviating factors include none. Associated symptoms include constipation. Patient states the pain feels like something is stuck. She strains to have a BM and typically does not have success. She has been taking ex-lax without relief. She has manually disimpacted herself on several occasions with relief.     She states she has had arrhythmias for several months with palpitations and even presyncopal episodes.     She has a history of COPD as well   ______________________________________________________________________  DISCHARGE SUMMARY/HOSPITAL COURSE:  for full details see H&P, daily progress notes, labs, consult notes. Dysphagia with high risk aspiration  NPO  S/p PEG tube placement, tolerating TF fine  Appreciate GI assistance     Acute CVA s/p tPA  CT Head: Moderate-sized area of acute subacute infarction lateral right frontal lobe.  No acute hemorrhage   CT perf with CBF:  Occlusion of distal M2 branch of middle divisional right middle cerebral artery with acute bland infarct. 2. At least 70% stenosis at origin of right internal carotid artery. 3. COPD  CTA Head:  1. Occlusion of distal M2 branch of middle divisional right middle cerebral artery with acute bland infarct. 2. At least 70% stenosis at origin of right internal carotid artery. 3. COPD  Received tPA 12/12/2019  Repeat Head CT showed no change   MRI brain:  No change in size of acute, moderate-sized right MCA territory infarction. Small amount of hemosiderin deposition within the infarct suggests petechial hemorrhage. Appreciate Neurology input   Placed on therapeutic lovenox, non compliance with Eliquis at home.  Counseled compliance and restarted Eliquis  Continue statins  Pt discharged to inpatient rehab     Acute on chronic respiratory failure with hypoxia POA  Secondary to COPD exacerbation with acute on chronic diastolic heart failure  Suspect aspiration could be reason for COPD exacerbation  Continue Px abx, taper Steroids to her baseline (takes 10mg daily)  Continue O2  Continue Nebs  Pulmonary input appreciated  CXR with pulmonary edema  S/p IV diruetics, now switch back to PO    Elevated BUN related to steroids as Cr remain stable     UTI POA  ESBL per UC  Complete coarse of Merropenem     Acute complicated diverticulitis with abscess  Treated initially with IV Zosyn then IV Merrem, now will switch back to PO Augmentin to complete coarse of abx  Abcess improved  Surgery input noted, appreciate their help     Intermittent a.kusum on tele  Appreciate cardiology assistance, transitioned Cardizem drip to PO metoprolol 25mg BID for better rate controled per cardiology (was on metoprolol Xl at home)     Hx of provoked PE x3  Non compliant with Eliquis at home, counseled compliance    Chronic pain  Continue Home meds     HTN  Continue BB  Resume ACE inh at lower dose, can be titrated up as BP tolerates  _______________________________________________________________________  Patient seen and examined by me on discharge day. Pertinent Findings:  Gen:    Not in distress  Chest: Clear lungs  CVS:   Regular rhythm. No edema  Abd:  Soft, not distended, not tender  Neuro:  Alert, Dysarthria  __________________  Current Discharge Medication List      START taking these medications    Details   apixaban (ELIQUIS) 2.5 mg tablet Take 1 Tab by mouth two (2) times a day. Qty: 60 Tab, Refills: 0      levalbuterol (XOPENEX) 1.25 mg/3 mL nebu 3 mL by Nebulization route every six (6) hours as needed for Other (Shortness of breath or wheezing). Qty: 120 Nebule, Refills: 0      metoprolol tartrate (LOPRESSOR) 25 mg tablet 1 Tab by Per G Tube route every twelve (12) hours. Qty: 60 Tab, Refills: 0      amoxicillin-clavulanate (AUGMENTIN) 875-125 mg per tablet 1 Tab by Per G Tube route every twelve (12) hours for 7 days. Qty: 14 Tab, Refills: 0      L.acidoph & parac-S.therm-Bifido (ROSITA Q2/RISAQUAD-2) 16 billion cell cap cap Take 1 Cap by mouth daily. Qty: 30 Cap, Refills: 0      atorvastatin (LIPITOR) 10 mg tablet Take 1 Tab by mouth nightly. Qty: 30 Tab, Refills: 0         CONTINUE these medications which have CHANGED    Details   predniSONE (DELTASONE) 10 mg tablet 4 tabs daily for 3 days   3 tabs daily for 3 days   2 tabs daily for 3 days   1 tab   daily afterwards like you were doing before  Qty: 48 Tab, Refills: 0      quinapril (ACCUPRIL) 5 mg tablet Take 1 Tab by mouth daily. Qty: 30 Tab, Refills: 0      bumetanide (BUMEX) 1 mg tablet 1 Tab by Per G Tube route daily. Qty: 30 Tab, Refills: 0      multivitamin (ONE A DAY) tablet 1 Tab by Per G Tube route daily. Qty: 30 Tab, Refills: 0      potassium chloride (KLOR-CON) 10 mEq tablet Take 1 Tab by mouth daily.  Via PEG  Qty: 30 Tab, Refills: 0      methocarbamol (ROBAXIN-750) 750 mg tablet 1 Tab by Per G Tube route four (4) times daily as needed for Other (muscle spasm). Qty: 20 Tab, Refills: 0      spironolactone (ALDACTONE) 25 mg tablet 1 Tab by Per G Tube route daily. Qty: 30 Tab, Refills: 0         CONTINUE these medications which have NOT CHANGED    Details   acetaminophen-codeine (TYLENOL-CODEINE #3) 300-30 mg per tablet Take 1 Tab by mouth every four (4) hours as needed for Pain. pramipexole (MIRAPEX) 0.5 mg tablet Take 0.5 mg by mouth daily. tiotropium (SPIRIVA WITH HANDIHALER) 18 mcg inhalation capsule Take 1 Cap by inhalation daily. STOP taking these medications       metoprolol succinate (TOPROL-XL) 50 mg XL tablet Comments:   Reason for Stopping:              Follow-up Information     Follow up With Specialties Details Why Contact Info    Jessica Smith MD Vascular Surgery On 1/10/2020 @ 11:00 am w/ bilateral carotid duplex  [address]  [phone]      [address] [facility]      Piero Christianson MD Cardiology Schedule an appointment as soon as possible for a visit  [address]  Jennifer Cui MD Neurology Schedule an appointment as soon as possible for a visit  [address]  [phone]          ________________________________________________________________    Risk of deterioration: Moderate    Condition at Discharge:  Stable  __________________________________________________________________    Disposition  IP Rehab    ____________________________________________________________________    Code Status: DNR/DNI  ___________________________________________________________________      Total time in minutes spent coordinating this discharge (includes going over instructions, follow-up, prescriptions, and preparing report for sign off to her PCP) :  35 minutes    Signed:  Joy Flores MD
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
## 2019-12-20 NOTE — PROGRESS NOTES
0730 : Report received from Ugo, [facility]. SBAR, Kardex, MAR and Recent Results were discussed. Janne Romberg, RN     5197 : Bilateral heels red but blanchable. Placed heel pressure prevention boots. Placed wound care consult. 1345: TRANSFER - OUT REPORT:    Verbal report given to Teresa (name) on Chelita Duncan  being transferred to [address] [place] [address] (unit) for routine progression of care       Report consisted of patients Situation, Background, Assessment and   Recommendations(SBAR). Information from the following report(s) SBAR, Kardex, [facility] and Recent Results was reveiwed with the receiving nurse. Opportunity for questions and clarification was provided. 1454 : Patient transported via stretcher to [facility] with all belongings, discharge instructions and MAR.

## 2019-12-20 NOTE — PROGRESS NOTES
ADULT PROTOCOL: JET AEROSOL  REASSESSMENT    Patient  Judd Servin     80 y.o.   female     12/20/2019  8:42 AM    Breath Sounds Pre Procedure: Right Breath Sounds: Diminished                               Left Breath Sounds: Diminished    Breath Sounds Post Procedure: Right Breath Sounds: Diminished                                 Left Breath Sounds: Diminished    Breathing pattern: Pre procedure Breathing Pattern: Regular          Post procedure Breathing Pattern: Regular    Heart Rate: Pre procedure Pulse: 61           Post procedure Pulse: 61    Resp Rate: Pre procedure Respirations: 17           Post procedure Respirations: 17      Cough: Pre procedure Cough: Non-productive               Post procedure Cough: Non-productive      Oxygen: O2 Device: Nasal cannula   5L    SpO2: Pre procedure SpO2: 96 %                 Post procedure SpO2: 96 %      Nebulizer Therapy: Current medications Aerosolized Medications: DuoNeb         Smoking History:  Former Smoker     Problem List:   Patient Active Problem List   Diagnosis Code    Syncope and collapse R55    HTN (hypertension) I10    Hypokalemia E87.6    Dehydration E86.0    COPD exacerbation ([facility] [address] J44.1    Diverticulitis of large intestine with abscess K57.20    Diverticular disease of large intestine with complication R71.34       Respiratory Therapist: Tina Murray

## 2019-12-20 NOTE — PROGRESS NOTES
GI NOTE    Stable post PEG    Begun on tube feedings and tolerating    Abd soft and benign, PEG site looks ok    Impression: Stable post PEG tube placement    Will sign off, call prn

## 2019-12-21 LAB
ALBUMIN SERPL-MCNC: 2.6 G/DL (ref 3.5–5)
ALBUMIN/GLOB SERPL: 1 {RATIO} (ref 1.1–2.2)
ALP SERPL-CCNC: 76 U/L (ref 45–117)
ALT SERPL-CCNC: 26 U/L (ref 12–78)
ANION GAP SERPL CALC-SCNC: 4 MMOL/L (ref 5–15)
AST SERPL-CCNC: 15 U/L (ref 15–37)
BASOPHILS # BLD: 0 K/UL (ref 0–0.1)
BASOPHILS NFR BLD: 0 % (ref 0–1)
BILIRUB SERPL-MCNC: 0.4 MG/DL (ref 0.2–1)
BUN SERPL-MCNC: 41 MG/DL (ref 6–20)
BUN/CREAT SERPL: 72 (ref 12–20)
CALCIUM SERPL-MCNC: 8.7 MG/DL (ref 8.5–10.1)
CHLORIDE SERPL-SCNC: 108 MMOL/L (ref 97–108)
CO2 SERPL-SCNC: 33 MMOL/L (ref 21–32)
CREAT SERPL-MCNC: 0.57 MG/DL (ref 0.55–1.02)
DIFFERENTIAL METHOD BLD: ABNORMAL
EOSINOPHIL # BLD: 0 K/UL (ref 0–0.4)
EOSINOPHIL NFR BLD: 0 % (ref 0–7)
ERYTHROCYTE [DISTWIDTH] IN BLOOD BY AUTOMATED COUNT: 13.5 % (ref 11.5–14.5)
GLOBULIN SER CALC-MCNC: 2.6 G/DL (ref 2–4)
GLUCOSE SERPL-MCNC: 257 MG/DL (ref 65–100)
HCT VFR BLD AUTO: 41.2 % (ref 35–47)
HGB BLD-MCNC: 12.2 G/DL (ref 11.5–16)
IMM GRANULOCYTES # BLD AUTO: 0.1 K/UL (ref 0–0.04)
IMM GRANULOCYTES NFR BLD AUTO: 1 % (ref 0–0.5)
LYMPHOCYTES # BLD: 0.5 K/UL (ref 0.8–3.5)
LYMPHOCYTES NFR BLD: 3 % (ref 12–49)
MCH RBC QN AUTO: 25.3 PG (ref 26–34)
MCHC RBC AUTO-ENTMCNC: 29.6 G/DL (ref 30–36.5)
MCV RBC AUTO: 85.5 FL (ref 80–99)
MONOCYTES # BLD: 0.8 K/UL (ref 0–1)
MONOCYTES NFR BLD: 5 % (ref 5–13)
NEUTS SEG # BLD: 13.8 K/UL (ref 1.8–8)
NEUTS SEG NFR BLD: 91 % (ref 32–75)
NRBC # BLD: 0 K/UL (ref 0–0.01)
NRBC BLD-RTO: 0 PER 100 WBC
PLATELET # BLD AUTO: 170 K/UL (ref 150–400)
PMV BLD AUTO: 10.7 FL (ref 8.9–12.9)
POTASSIUM SERPL-SCNC: 4.2 MMOL/L (ref 3.5–5.1)
PROT SERPL-MCNC: 5.2 G/DL (ref 6.4–8.2)
RBC # BLD AUTO: 4.82 M/UL (ref 3.8–5.2)
SODIUM SERPL-SCNC: 145 MMOL/L (ref 136–145)
WBC # BLD AUTO: 15.2 K/UL (ref 3.6–11)

## 2019-12-21 PROCEDURE — 36415 COLL VENOUS BLD VENIPUNCTURE: CPT

## 2019-12-21 PROCEDURE — 80053 COMPREHEN METABOLIC PANEL: CPT

## 2019-12-21 PROCEDURE — 85025 COMPLETE CBC W/AUTO DIFF WBC: CPT

## 2019-12-24 LAB
ANION GAP SERPL CALC-SCNC: 3 MMOL/L (ref 5–15)
BUN SERPL-MCNC: 28 MG/DL (ref 6–20)
BUN/CREAT SERPL: 53 (ref 12–20)
CALCIUM SERPL-MCNC: 8.6 MG/DL (ref 8.5–10.1)
CHLORIDE SERPL-SCNC: 95 MMOL/L (ref 97–108)
CO2 SERPL-SCNC: 36 MMOL/L (ref 21–32)
CREAT SERPL-MCNC: 0.53 MG/DL (ref 0.55–1.02)
GLUCOSE SERPL-MCNC: 87 MG/DL (ref 65–100)
POTASSIUM SERPL-SCNC: 4.5 MMOL/L (ref 3.5–5.1)
SODIUM SERPL-SCNC: 134 MMOL/L (ref 136–145)

## 2019-12-24 PROCEDURE — 80048 BASIC METABOLIC PNL TOTAL CA: CPT

## 2019-12-24 PROCEDURE — 36415 COLL VENOUS BLD VENIPUNCTURE: CPT

## 2019-12-25 ENCOUNTER — APPOINTMENT (OUTPATIENT)
Dept: GENERAL RADIOLOGY | Age: 84
End: 2019-12-25
Attending: PHYSICAL MEDICINE & REHABILITATION

## 2019-12-25 PROCEDURE — 71046 X-RAY EXAM CHEST 2 VIEWS: CPT

## 2019-12-26 LAB
ANION GAP SERPL CALC-SCNC: 1 MMOL/L (ref 5–15)
BASOPHILS # BLD: 0 K/UL (ref 0–0.1)
BASOPHILS NFR BLD: 0 % (ref 0–1)
BUN SERPL-MCNC: 26 MG/DL (ref 6–20)
BUN/CREAT SERPL: 41 (ref 12–20)
CALCIUM SERPL-MCNC: 9.1 MG/DL (ref 8.5–10.1)
CHLORIDE SERPL-SCNC: 97 MMOL/L (ref 97–108)
CO2 SERPL-SCNC: 39 MMOL/L (ref 21–32)
CREAT SERPL-MCNC: 0.63 MG/DL (ref 0.55–1.02)
DIFFERENTIAL METHOD BLD: ABNORMAL
EOSINOPHIL # BLD: 0 K/UL (ref 0–0.4)
EOSINOPHIL NFR BLD: 0 % (ref 0–7)
ERYTHROCYTE [DISTWIDTH] IN BLOOD BY AUTOMATED COUNT: 13.9 % (ref 11.5–14.5)
GLUCOSE SERPL-MCNC: 79 MG/DL (ref 65–100)
HCT VFR BLD AUTO: 39.6 % (ref 35–47)
HGB BLD-MCNC: 11.7 G/DL (ref 11.5–16)
IMM GRANULOCYTES # BLD AUTO: 0.1 K/UL (ref 0–0.04)
IMM GRANULOCYTES NFR BLD AUTO: 1 % (ref 0–0.5)
LYMPHOCYTES # BLD: 0.8 K/UL (ref 0.8–3.5)
LYMPHOCYTES NFR BLD: 7 % (ref 12–49)
MCH RBC QN AUTO: 25.3 PG (ref 26–34)
MCHC RBC AUTO-ENTMCNC: 29.5 G/DL (ref 30–36.5)
MCV RBC AUTO: 85.7 FL (ref 80–99)
MONOCYTES # BLD: 1 K/UL (ref 0–1)
MONOCYTES NFR BLD: 9 % (ref 5–13)
NEUTS SEG # BLD: 9.4 K/UL (ref 1.8–8)
NEUTS SEG NFR BLD: 83 % (ref 32–75)
NRBC # BLD: 0 K/UL (ref 0–0.01)
NRBC BLD-RTO: 0 PER 100 WBC
PLATELET # BLD AUTO: 145 K/UL (ref 150–400)
PMV BLD AUTO: 11.4 FL (ref 8.9–12.9)
POTASSIUM SERPL-SCNC: 4.4 MMOL/L (ref 3.5–5.1)
RBC # BLD AUTO: 4.62 M/UL (ref 3.8–5.2)
SODIUM SERPL-SCNC: 137 MMOL/L (ref 136–145)
WBC # BLD AUTO: 11.3 K/UL (ref 3.6–11)

## 2019-12-26 PROCEDURE — 80048 BASIC METABOLIC PNL TOTAL CA: CPT

## 2019-12-26 PROCEDURE — 36415 COLL VENOUS BLD VENIPUNCTURE: CPT

## 2019-12-26 PROCEDURE — 85025 COMPLETE CBC W/AUTO DIFF WBC: CPT

## 2019-12-30 ENCOUNTER — APPOINTMENT (OUTPATIENT)
Dept: CT IMAGING | Age: 84
End: 2019-12-30
Attending: PHYSICAL MEDICINE & REHABILITATION

## 2019-12-30 LAB
ANION GAP SERPL CALC-SCNC: 2 MMOL/L (ref 5–15)
APPEARANCE UR: ABNORMAL
BACTERIA URNS QL MICRO: ABNORMAL /HPF
BILIRUB UR QL: NEGATIVE
BUN SERPL-MCNC: 31 MG/DL (ref 6–20)
BUN/CREAT SERPL: 40 (ref 12–20)
CALCIUM SERPL-MCNC: 9.3 MG/DL (ref 8.5–10.1)
CHLORIDE SERPL-SCNC: 98 MMOL/L (ref 97–108)
CO2 SERPL-SCNC: 35 MMOL/L (ref 21–32)
COLOR UR: ABNORMAL
CREAT SERPL-MCNC: 0.77 MG/DL (ref 0.55–1.02)
EPITH CASTS URNS QL MICRO: ABNORMAL /LPF
ERYTHROCYTE [DISTWIDTH] IN BLOOD BY AUTOMATED COUNT: 15.4 % (ref 11.5–14.5)
GLUCOSE SERPL-MCNC: 157 MG/DL (ref 65–100)
GLUCOSE UR STRIP.AUTO-MCNC: NEGATIVE MG/DL
HCT VFR BLD AUTO: 42.7 % (ref 35–47)
HGB BLD-MCNC: 12.4 G/DL (ref 11.5–16)
HGB UR QL STRIP: ABNORMAL
KETONES UR QL STRIP.AUTO: NEGATIVE MG/DL
LEUKOCYTE ESTERASE UR QL STRIP.AUTO: ABNORMAL
MCH RBC QN AUTO: 24.9 PG (ref 26–34)
MCHC RBC AUTO-ENTMCNC: 29 G/DL (ref 30–36.5)
MCV RBC AUTO: 85.9 FL (ref 80–99)
MUCOUS THREADS URNS QL MICRO: ABNORMAL /LPF
NITRITE UR QL STRIP.AUTO: NEGATIVE
NRBC # BLD: 0 K/UL (ref 0–0.01)
NRBC BLD-RTO: 0 PER 100 WBC
PH UR STRIP: 7 [PH] (ref 5–8)
PLATELET # BLD AUTO: 124 K/UL (ref 150–400)
PMV BLD AUTO: 11.3 FL (ref 8.9–12.9)
POTASSIUM SERPL-SCNC: 5.7 MMOL/L (ref 3.5–5.1)
PROT UR STRIP-MCNC: 30 MG/DL
RBC # BLD AUTO: 4.97 M/UL (ref 3.8–5.2)
RBC #/AREA URNS HPF: ABNORMAL /HPF (ref 0–5)
SODIUM SERPL-SCNC: 135 MMOL/L (ref 136–145)
SP GR UR REFRACTOMETRY: 1.01 (ref 1–1.03)
UROBILINOGEN UR QL STRIP.AUTO: 1 EU/DL (ref 0.2–1)
WBC # BLD AUTO: 13.6 K/UL (ref 3.6–11)
WBC URNS QL MICRO: >100 /HPF (ref 0–4)
YEAST URNS QL MICRO: PRESENT

## 2019-12-30 PROCEDURE — 85027 COMPLETE CBC AUTOMATED: CPT

## 2019-12-30 PROCEDURE — 71275 CT ANGIOGRAPHY CHEST: CPT

## 2019-12-30 PROCEDURE — 81001 URINALYSIS AUTO W/SCOPE: CPT

## 2019-12-30 PROCEDURE — 74011636320 HC RX REV CODE- 636/320: Performed by: PHYSICAL MEDICINE & REHABILITATION

## 2019-12-30 PROCEDURE — 80048 BASIC METABOLIC PNL TOTAL CA: CPT

## 2019-12-30 PROCEDURE — 36415 COLL VENOUS BLD VENIPUNCTURE: CPT

## 2019-12-30 RX ORDER — SODIUM CHLORIDE 0.9 % (FLUSH) 0.9 %
10 SYRINGE (ML) INJECTION
Status: COMPLETED | OUTPATIENT
Start: 2019-12-30 | End: 2019-12-30

## 2019-12-30 RX ADMIN — IOPAMIDOL 80 ML: 755 INJECTION, SOLUTION INTRAVENOUS at 19:00

## 2019-12-30 RX ADMIN — Medication 10 ML: at 19:00

## 2019-12-30 NOTE — PROGRESS NOTES
Patient needs 20 gauge IV access in the [facility] for CT Angio Chest - RN aware.
31.1

## 2019-12-31 ENCOUNTER — APPOINTMENT (OUTPATIENT)
Dept: GENERAL RADIOLOGY | Age: 84
End: 2019-12-31
Attending: PHYSICAL MEDICINE & REHABILITATION

## 2019-12-31 LAB
ANION GAP SERPL CALC-SCNC: 5 MMOL/L (ref 5–15)
BASOPHILS # BLD: 0 K/UL (ref 0–0.1)
BASOPHILS NFR BLD: 0 % (ref 0–1)
BNP SERPL-MCNC: 1589 PG/ML
BUN SERPL-MCNC: 31 MG/DL (ref 6–20)
BUN/CREAT SERPL: 49 (ref 12–20)
CALCIUM SERPL-MCNC: 8.8 MG/DL (ref 8.5–10.1)
CHLORIDE SERPL-SCNC: 94 MMOL/L (ref 97–108)
CO2 SERPL-SCNC: 35 MMOL/L (ref 21–32)
CREAT SERPL-MCNC: 0.63 MG/DL (ref 0.55–1.02)
DIFFERENTIAL METHOD BLD: ABNORMAL
EOSINOPHIL # BLD: 0.1 K/UL (ref 0–0.4)
EOSINOPHIL NFR BLD: 1 % (ref 0–7)
ERYTHROCYTE [DISTWIDTH] IN BLOOD BY AUTOMATED COUNT: 15.5 % (ref 11.5–14.5)
GLUCOSE SERPL-MCNC: 195 MG/DL (ref 65–100)
HCT VFR BLD AUTO: 36.6 % (ref 35–47)
HGB BLD-MCNC: 11.1 G/DL (ref 11.5–16)
IMM GRANULOCYTES # BLD AUTO: 0.1 K/UL (ref 0–0.04)
IMM GRANULOCYTES NFR BLD AUTO: 1 % (ref 0–0.5)
LYMPHOCYTES # BLD: 0.7 K/UL (ref 0.8–3.5)
LYMPHOCYTES NFR BLD: 7 % (ref 12–49)
MCH RBC QN AUTO: 25.5 PG (ref 26–34)
MCHC RBC AUTO-ENTMCNC: 30.3 G/DL (ref 30–36.5)
MCV RBC AUTO: 83.9 FL (ref 80–99)
MONOCYTES # BLD: 0.6 K/UL (ref 0–1)
MONOCYTES NFR BLD: 6 % (ref 5–13)
NEUTS SEG # BLD: 9.4 K/UL (ref 1.8–8)
NEUTS SEG NFR BLD: 86 % (ref 32–75)
NRBC # BLD: 0 K/UL (ref 0–0.01)
NRBC BLD-RTO: 0 PER 100 WBC
PLATELET # BLD AUTO: 110 K/UL (ref 150–400)
PMV BLD AUTO: 11.4 FL (ref 8.9–12.9)
POTASSIUM SERPL-SCNC: 4.2 MMOL/L (ref 3.5–5.1)
RBC # BLD AUTO: 4.36 M/UL (ref 3.8–5.2)
SODIUM SERPL-SCNC: 134 MMOL/L (ref 136–145)
WBC # BLD AUTO: 10.9 K/UL (ref 3.6–11)

## 2019-12-31 PROCEDURE — 36415 COLL VENOUS BLD VENIPUNCTURE: CPT

## 2019-12-31 PROCEDURE — 85025 COMPLETE CBC W/AUTO DIFF WBC: CPT

## 2019-12-31 PROCEDURE — 80048 BASIC METABOLIC PNL TOTAL CA: CPT

## 2019-12-31 PROCEDURE — 83880 ASSAY OF NATRIURETIC PEPTIDE: CPT

## 2019-12-31 PROCEDURE — 71046 X-RAY EXAM CHEST 2 VIEWS: CPT

## 2020-01-02 LAB
APPEARANCE UR: ABNORMAL
BACTERIA URNS QL MICRO: ABNORMAL /HPF
BILIRUB UR QL: NEGATIVE
COLOR UR: ABNORMAL
EPITH CASTS URNS QL MICRO: ABNORMAL /LPF
GLUCOSE UR STRIP.AUTO-MCNC: NEGATIVE MG/DL
HGB UR QL STRIP: ABNORMAL
KETONES UR QL STRIP.AUTO: NEGATIVE MG/DL
LEUKOCYTE ESTERASE UR QL STRIP.AUTO: ABNORMAL
NITRITE UR QL STRIP.AUTO: NEGATIVE
PH UR STRIP: 6.5 [PH] (ref 5–8)
PROT UR STRIP-MCNC: 30 MG/DL
RBC #/AREA URNS HPF: ABNORMAL /HPF (ref 0–5)
SP GR UR REFRACTOMETRY: 1.02 (ref 1–1.03)
UA: UC IF INDICATED,UAUC: ABNORMAL
UROBILINOGEN UR QL STRIP.AUTO: 0.2 EU/DL (ref 0.2–1)
WBC URNS QL MICRO: ABNORMAL /HPF (ref 0–4)

## 2020-01-02 PROCEDURE — 87086 URINE CULTURE/COLONY COUNT: CPT

## 2020-01-02 PROCEDURE — 87077 CULTURE AEROBIC IDENTIFY: CPT

## 2020-01-02 PROCEDURE — 81001 URINALYSIS AUTO W/SCOPE: CPT

## 2020-01-02 PROCEDURE — 87186 SC STD MICRODIL/AGAR DIL: CPT

## 2020-01-03 LAB
ANION GAP SERPL CALC-SCNC: 5 MMOL/L (ref 5–15)
BUN SERPL-MCNC: 35 MG/DL (ref 6–20)
BUN/CREAT SERPL: 65 (ref 12–20)
CALCIUM SERPL-MCNC: 9.7 MG/DL (ref 8.5–10.1)
CHLORIDE SERPL-SCNC: 92 MMOL/L (ref 97–108)
CO2 SERPL-SCNC: 35 MMOL/L (ref 21–32)
CREAT SERPL-MCNC: 0.54 MG/DL (ref 0.55–1.02)
GLUCOSE SERPL-MCNC: 117 MG/DL (ref 65–100)
POTASSIUM SERPL-SCNC: 4.1 MMOL/L (ref 3.5–5.1)
SODIUM SERPL-SCNC: 132 MMOL/L (ref 136–145)

## 2020-01-03 PROCEDURE — 80048 BASIC METABOLIC PNL TOTAL CA: CPT

## 2020-01-03 PROCEDURE — 36415 COLL VENOUS BLD VENIPUNCTURE: CPT

## 2020-01-04 LAB
ANION GAP SERPL CALC-SCNC: 5 MMOL/L (ref 5–15)
BASOPHILS # BLD: 0 K/UL (ref 0–0.1)
BASOPHILS NFR BLD: 0 % (ref 0–1)
BUN SERPL-MCNC: 35 MG/DL (ref 6–20)
BUN/CREAT SERPL: 53 (ref 12–20)
CALCIUM SERPL-MCNC: 9.9 MG/DL (ref 8.5–10.1)
CHLORIDE SERPL-SCNC: 90 MMOL/L (ref 97–108)
CO2 SERPL-SCNC: 37 MMOL/L (ref 21–32)
CREAT SERPL-MCNC: 0.66 MG/DL (ref 0.55–1.02)
DIFFERENTIAL METHOD BLD: ABNORMAL
EOSINOPHIL # BLD: 0 K/UL (ref 0–0.4)
EOSINOPHIL NFR BLD: 0 % (ref 0–7)
ERYTHROCYTE [DISTWIDTH] IN BLOOD BY AUTOMATED COUNT: 16.2 % (ref 11.5–14.5)
GLUCOSE SERPL-MCNC: 105 MG/DL (ref 65–100)
HCT VFR BLD AUTO: 36.1 % (ref 35–47)
HGB BLD-MCNC: 10.9 G/DL (ref 11.5–16)
IMM GRANULOCYTES # BLD AUTO: 0.1 K/UL (ref 0–0.04)
IMM GRANULOCYTES NFR BLD AUTO: 1 % (ref 0–0.5)
LYMPHOCYTES # BLD: 0.5 K/UL (ref 0.8–3.5)
LYMPHOCYTES NFR BLD: 5 % (ref 12–49)
MCH RBC QN AUTO: 25.5 PG (ref 26–34)
MCHC RBC AUTO-ENTMCNC: 30.2 G/DL (ref 30–36.5)
MCV RBC AUTO: 84.3 FL (ref 80–99)
MONOCYTES # BLD: 0.6 K/UL (ref 0–1)
MONOCYTES NFR BLD: 6 % (ref 5–13)
NEUTS SEG # BLD: 8.5 K/UL (ref 1.8–8)
NEUTS SEG NFR BLD: 88 % (ref 32–75)
NRBC # BLD: 0 K/UL (ref 0–0.01)
NRBC BLD-RTO: 0 PER 100 WBC
PLATELET # BLD AUTO: 163 K/UL (ref 150–400)
PMV BLD AUTO: 10.9 FL (ref 8.9–12.9)
POTASSIUM SERPL-SCNC: 4.6 MMOL/L (ref 3.5–5.1)
RBC # BLD AUTO: 4.28 M/UL (ref 3.8–5.2)
SODIUM SERPL-SCNC: 132 MMOL/L (ref 136–145)
WBC # BLD AUTO: 9.7 K/UL (ref 3.6–11)

## 2020-01-04 PROCEDURE — 36415 COLL VENOUS BLD VENIPUNCTURE: CPT

## 2020-01-04 PROCEDURE — 85025 COMPLETE CBC W/AUTO DIFF WBC: CPT

## 2020-01-04 PROCEDURE — 80048 BASIC METABOLIC PNL TOTAL CA: CPT

## 2020-01-06 LAB
ANION GAP SERPL CALC-SCNC: 6 MMOL/L (ref 5–15)
BACTERIA SPEC CULT: ABNORMAL
BACTERIA SPEC CULT: ABNORMAL
BUN SERPL-MCNC: 38 MG/DL (ref 6–20)
BUN/CREAT SERPL: 83 (ref 12–20)
CALCIUM SERPL-MCNC: 9.4 MG/DL (ref 8.5–10.1)
CC UR VC: ABNORMAL
CHLORIDE SERPL-SCNC: 96 MMOL/L (ref 97–108)
CO2 SERPL-SCNC: 32 MMOL/L (ref 21–32)
CREAT SERPL-MCNC: 0.46 MG/DL (ref 0.55–1.02)
GLUCOSE SERPL-MCNC: 85 MG/DL (ref 65–100)
POTASSIUM SERPL-SCNC: 4.4 MMOL/L (ref 3.5–5.1)
SERVICE CMNT-IMP: ABNORMAL
SODIUM SERPL-SCNC: 134 MMOL/L (ref 136–145)

## 2020-01-06 PROCEDURE — 36415 COLL VENOUS BLD VENIPUNCTURE: CPT

## 2020-01-06 PROCEDURE — 80048 BASIC METABOLIC PNL TOTAL CA: CPT

## 2020-01-07 ENCOUNTER — APPOINTMENT (OUTPATIENT)
Dept: GENERAL RADIOLOGY | Age: 85
End: 2020-01-07
Attending: PHYSICAL MEDICINE & REHABILITATION

## 2020-01-07 PROCEDURE — 74018 RADEX ABDOMEN 1 VIEW: CPT

## 2020-01-07 PROCEDURE — 71045 X-RAY EXAM CHEST 1 VIEW: CPT

## 2020-01-10 LAB
ALBUMIN SERPL-MCNC: 2.6 G/DL (ref 3.5–5)
ALBUMIN/GLOB SERPL: 0.7 {RATIO} (ref 1.1–2.2)
ALP SERPL-CCNC: 107 U/L (ref 45–117)
ALT SERPL-CCNC: 53 U/L (ref 12–78)
ANION GAP SERPL CALC-SCNC: 0 MMOL/L (ref 5–15)
AST SERPL-CCNC: 30 U/L (ref 15–37)
BILIRUB SERPL-MCNC: 0.8 MG/DL (ref 0.2–1)
BUN SERPL-MCNC: 24 MG/DL (ref 6–20)
BUN/CREAT SERPL: 44 (ref 12–20)
CALCIUM SERPL-MCNC: 9.5 MG/DL (ref 8.5–10.1)
CHLORIDE SERPL-SCNC: 92 MMOL/L (ref 97–108)
CO2 SERPL-SCNC: 41 MMOL/L (ref 21–32)
CREAT SERPL-MCNC: 0.55 MG/DL (ref 0.55–1.02)
ERYTHROCYTE [DISTWIDTH] IN BLOOD BY AUTOMATED COUNT: 16.6 % (ref 11.5–14.5)
GLOBULIN SER CALC-MCNC: 3.6 G/DL (ref 2–4)
GLUCOSE SERPL-MCNC: 93 MG/DL (ref 65–100)
HCT VFR BLD AUTO: 37.1 % (ref 35–47)
HGB BLD-MCNC: 10.9 G/DL (ref 11.5–16)
MCH RBC QN AUTO: 25.6 PG (ref 26–34)
MCHC RBC AUTO-ENTMCNC: 29.4 G/DL (ref 30–36.5)
MCV RBC AUTO: 87.1 FL (ref 80–99)
NRBC # BLD: 0.02 K/UL (ref 0–0.01)
NRBC BLD-RTO: 0.3 PER 100 WBC
PLATELET # BLD AUTO: 343 K/UL (ref 150–400)
PMV BLD AUTO: 9.7 FL (ref 8.9–12.9)
POTASSIUM SERPL-SCNC: 4 MMOL/L (ref 3.5–5.1)
PROT SERPL-MCNC: 6.2 G/DL (ref 6.4–8.2)
RBC # BLD AUTO: 4.26 M/UL (ref 3.8–5.2)
SODIUM SERPL-SCNC: 133 MMOL/L (ref 136–145)
WBC # BLD AUTO: 7.4 K/UL (ref 3.6–11)

## 2020-01-10 PROCEDURE — 80053 COMPREHEN METABOLIC PANEL: CPT

## 2020-01-10 PROCEDURE — 85027 COMPLETE CBC AUTOMATED: CPT

## 2020-01-10 PROCEDURE — 36415 COLL VENOUS BLD VENIPUNCTURE: CPT

## 2020-01-11 ENCOUNTER — APPOINTMENT (OUTPATIENT)
Dept: GENERAL RADIOLOGY | Age: 85
End: 2020-01-11
Payer: MEDICARE

## 2020-01-11 ENCOUNTER — APPOINTMENT (OUTPATIENT)
Dept: CT IMAGING | Age: 85
End: 2020-01-11
Payer: MEDICARE

## 2020-01-11 ENCOUNTER — HOSPITAL ENCOUNTER (EMERGENCY)
Age: 85
Discharge: NURSING, REHAB OR LONG-TERM CARE | End: 2020-01-11
Attending: EMERGENCY MEDICINE
Payer: MEDICARE

## 2020-01-11 VITALS
BODY MASS INDEX: 21.01 KG/M2 | DIASTOLIC BLOOD PRESSURE: 67 MMHG | TEMPERATURE: 98 F | OXYGEN SATURATION: 99 % | SYSTOLIC BLOOD PRESSURE: 102 MMHG | WEIGHT: 126.1 LBS | HEIGHT: 65 IN | HEART RATE: 112 BPM | RESPIRATION RATE: 20 BRPM

## 2020-01-11 DIAGNOSIS — J43.9 PULMONARY EMPHYSEMA, UNSPECIFIED EMPHYSEMA TYPE (HCC): ICD-10-CM

## 2020-01-11 DIAGNOSIS — K57.20 DIVERTICULITIS OF LARGE INTESTINE WITH ABSCESS WITHOUT BLEEDING: ICD-10-CM

## 2020-01-11 DIAGNOSIS — R06.02 SOB (SHORTNESS OF BREATH): Primary | ICD-10-CM

## 2020-01-11 LAB
ALBUMIN SERPL-MCNC: 2.6 G/DL (ref 3.5–5)
ALBUMIN/GLOB SERPL: 0.7 {RATIO} (ref 1.1–2.2)
ALP SERPL-CCNC: 105 U/L (ref 45–117)
ALT SERPL-CCNC: 62 U/L (ref 12–78)
ANION GAP SERPL CALC-SCNC: 0 MMOL/L (ref 5–15)
APPEARANCE UR: CLEAR
AST SERPL-CCNC: 46 U/L (ref 15–37)
BACTERIA URNS QL MICRO: NEGATIVE /HPF
BASOPHILS # BLD: 0 K/UL (ref 0–0.1)
BASOPHILS NFR BLD: 0 % (ref 0–1)
BILIRUB SERPL-MCNC: 0.3 MG/DL (ref 0.2–1)
BILIRUB UR QL: NEGATIVE
BNP SERPL-MCNC: 765 PG/ML
BUN SERPL-MCNC: 24 MG/DL (ref 6–20)
BUN/CREAT SERPL: 39 (ref 12–20)
CALCIUM SERPL-MCNC: 9.3 MG/DL (ref 8.5–10.1)
CHLORIDE SERPL-SCNC: 93 MMOL/L (ref 97–108)
CK SERPL-CCNC: 22 U/L (ref 26–192)
CO2 SERPL-SCNC: 41 MMOL/L (ref 21–32)
COLOR UR: ABNORMAL
CREAT SERPL-MCNC: 0.61 MG/DL (ref 0.55–1.02)
DIFFERENTIAL METHOD BLD: ABNORMAL
EOSINOPHIL # BLD: 0 K/UL (ref 0–0.4)
EOSINOPHIL NFR BLD: 0 % (ref 0–7)
EPITH CASTS URNS QL MICRO: ABNORMAL /LPF
ERYTHROCYTE [DISTWIDTH] IN BLOOD BY AUTOMATED COUNT: 17.1 % (ref 11.5–14.5)
GLOBULIN SER CALC-MCNC: 3.5 G/DL (ref 2–4)
GLUCOSE SERPL-MCNC: 181 MG/DL (ref 65–100)
GLUCOSE UR STRIP.AUTO-MCNC: NEGATIVE MG/DL
HCT VFR BLD AUTO: 38.1 % (ref 35–47)
HGB BLD-MCNC: 11.1 G/DL (ref 11.5–16)
HGB UR QL STRIP: NEGATIVE
IMM GRANULOCYTES # BLD AUTO: 0.2 K/UL (ref 0–0.04)
IMM GRANULOCYTES NFR BLD AUTO: 2 % (ref 0–0.5)
KETONES UR QL STRIP.AUTO: NEGATIVE MG/DL
LEUKOCYTE ESTERASE UR QL STRIP.AUTO: ABNORMAL
LYMPHOCYTES # BLD: 1.2 K/UL (ref 0.8–3.5)
LYMPHOCYTES NFR BLD: 13 % (ref 12–49)
MCH RBC QN AUTO: 25.2 PG (ref 26–34)
MCHC RBC AUTO-ENTMCNC: 29.1 G/DL (ref 30–36.5)
MCV RBC AUTO: 86.4 FL (ref 80–99)
MONOCYTES # BLD: 0.4 K/UL (ref 0–1)
MONOCYTES NFR BLD: 5 % (ref 5–13)
NEUTS SEG # BLD: 7.4 K/UL (ref 1.8–8)
NEUTS SEG NFR BLD: 80 % (ref 32–75)
NITRITE UR QL STRIP.AUTO: NEGATIVE
NRBC # BLD: 0 K/UL (ref 0–0.01)
NRBC BLD-RTO: 0 PER 100 WBC
PH UR STRIP: 8.5 [PH] (ref 5–8)
PLATELET # BLD AUTO: 338 K/UL (ref 150–400)
PMV BLD AUTO: 9.9 FL (ref 8.9–12.9)
POTASSIUM SERPL-SCNC: 4.7 MMOL/L (ref 3.5–5.1)
PROT SERPL-MCNC: 6.1 G/DL (ref 6.4–8.2)
PROT UR STRIP-MCNC: NEGATIVE MG/DL
RBC # BLD AUTO: 4.41 M/UL (ref 3.8–5.2)
RBC #/AREA URNS HPF: ABNORMAL /HPF (ref 0–5)
SODIUM SERPL-SCNC: 134 MMOL/L (ref 136–145)
SP GR UR REFRACTOMETRY: 1.01 (ref 1–1.03)
TROPONIN I SERPL-MCNC: <0.05 NG/ML
UA: UC IF INDICATED,UAUC: ABNORMAL
UROBILINOGEN UR QL STRIP.AUTO: 0.2 EU/DL (ref 0.2–1)
WBC # BLD AUTO: 9.2 K/UL (ref 3.6–11)
WBC URNS QL MICRO: ABNORMAL /HPF (ref 0–4)

## 2020-01-11 PROCEDURE — 74011636320 HC RX REV CODE- 636/320: Performed by: EMERGENCY MEDICINE

## 2020-01-11 PROCEDURE — 84484 ASSAY OF TROPONIN QUANT: CPT

## 2020-01-11 PROCEDURE — 81001 URINALYSIS AUTO W/SCOPE: CPT

## 2020-01-11 PROCEDURE — 80053 COMPREHEN METABOLIC PANEL: CPT

## 2020-01-11 PROCEDURE — 36415 COLL VENOUS BLD VENIPUNCTURE: CPT

## 2020-01-11 PROCEDURE — 74011250636 HC RX REV CODE- 250/636: Performed by: PHYSICIAN ASSISTANT

## 2020-01-11 PROCEDURE — 74011250637 HC RX REV CODE- 250/637: Performed by: PHYSICIAN ASSISTANT

## 2020-01-11 PROCEDURE — 99285 EMERGENCY DEPT VISIT HI MDM: CPT

## 2020-01-11 PROCEDURE — 93005 ELECTROCARDIOGRAM TRACING: CPT

## 2020-01-11 PROCEDURE — 96375 TX/PRO/DX INJ NEW DRUG ADDON: CPT

## 2020-01-11 PROCEDURE — 74011000250 HC RX REV CODE- 250: Performed by: PHYSICIAN ASSISTANT

## 2020-01-11 PROCEDURE — 83880 ASSAY OF NATRIURETIC PEPTIDE: CPT

## 2020-01-11 PROCEDURE — 71046 X-RAY EXAM CHEST 2 VIEWS: CPT

## 2020-01-11 PROCEDURE — 82550 ASSAY OF CK (CPK): CPT

## 2020-01-11 PROCEDURE — 71275 CT ANGIOGRAPHY CHEST: CPT

## 2020-01-11 PROCEDURE — 96374 THER/PROPH/DIAG INJ IV PUSH: CPT

## 2020-01-11 PROCEDURE — 85025 COMPLETE CBC W/AUTO DIFF WBC: CPT

## 2020-01-11 RX ORDER — LEVALBUTEROL INHALATION SOLUTION 1.25 MG/3ML
1.25 SOLUTION RESPIRATORY (INHALATION)
Status: COMPLETED | OUTPATIENT
Start: 2020-01-11 | End: 2020-01-11

## 2020-01-11 RX ORDER — LEVALBUTEROL INHALATION SOLUTION 1.25 MG/3ML
SOLUTION RESPIRATORY (INHALATION)
Status: DISPENSED
Start: 2020-01-11 | End: 2020-01-12

## 2020-01-11 RX ORDER — DEXAMETHASONE SODIUM PHOSPHATE 4 MG/ML
10 INJECTION, SOLUTION INTRA-ARTICULAR; INTRALESIONAL; INTRAMUSCULAR; INTRAVENOUS; SOFT TISSUE
Status: COMPLETED | OUTPATIENT
Start: 2020-01-11 | End: 2020-01-11

## 2020-01-11 RX ORDER — ONDANSETRON 4 MG/1
4 TABLET, ORALLY DISINTEGRATING ORAL
Status: DISCONTINUED | OUTPATIENT
Start: 2020-01-11 | End: 2020-01-12 | Stop reason: HOSPADM

## 2020-01-11 RX ORDER — DEXAMETHASONE SODIUM PHOSPHATE 4 MG/ML
INJECTION, SOLUTION INTRA-ARTICULAR; INTRALESIONAL; INTRAMUSCULAR; INTRAVENOUS; SOFT TISSUE
Status: DISPENSED
Start: 2020-01-11 | End: 2020-01-12

## 2020-01-11 RX ORDER — TRAMADOL HYDROCHLORIDE 50 MG/1
50 TABLET ORAL
Status: COMPLETED | OUTPATIENT
Start: 2020-01-11 | End: 2020-01-11

## 2020-01-11 RX ORDER — SODIUM CHLORIDE 0.9 % (FLUSH) 0.9 %
10 SYRINGE (ML) INJECTION
Status: COMPLETED | OUTPATIENT
Start: 2020-01-11 | End: 2020-01-11

## 2020-01-11 RX ORDER — DIPHENHYDRAMINE HYDROCHLORIDE 50 MG/ML
25 INJECTION, SOLUTION INTRAMUSCULAR; INTRAVENOUS
Status: COMPLETED | OUTPATIENT
Start: 2020-01-11 | End: 2020-01-11

## 2020-01-11 RX ORDER — DIPHENHYDRAMINE HYDROCHLORIDE 50 MG/ML
INJECTION, SOLUTION INTRAMUSCULAR; INTRAVENOUS
Status: DISPENSED
Start: 2020-01-11 | End: 2020-01-12

## 2020-01-11 RX ORDER — TRAMADOL HYDROCHLORIDE 50 MG/1
TABLET ORAL
Status: DISPENSED
Start: 2020-01-11 | End: 2020-01-12

## 2020-01-11 RX ADMIN — DEXAMETHASONE SODIUM PHOSPHATE 10 MG: 4 INJECTION, SOLUTION INTRAMUSCULAR; INTRAVENOUS at 16:26

## 2020-01-11 RX ADMIN — Medication 10 ML: at 15:37

## 2020-01-11 RX ADMIN — LEVALBUTEROL HYDROCHLORIDE 1.25 MG: 1.25 SOLUTION RESPIRATORY (INHALATION) at 12:47

## 2020-01-11 RX ADMIN — DIPHENHYDRAMINE HYDROCHLORIDE 25 MG: 50 INJECTION, SOLUTION INTRAMUSCULAR; INTRAVENOUS at 16:26

## 2020-01-11 RX ADMIN — TRAMADOL HYDROCHLORIDE 50 MG: 50 TABLET, FILM COATED ORAL at 18:49

## 2020-01-11 RX ADMIN — IOPAMIDOL 100 ML: 755 INJECTION, SOLUTION INTRAVENOUS at 15:37

## 2020-01-11 NOTE — ED NOTES
1115 Report received from [facility] at [facility]. Pt sent to ED for evaluation of SOB and tachycardia this morning of approximately 130s-140s. S/p CVA 12/12/19 with residual Left sided weakness.      1736 Report given to Sabrina Justice RN at [address] [place] [address] for D/C back to facility    1800 Pending transport to [address] [place] [address]
For information on Fall & Injury Prevention, visit [email or web address]

## 2020-01-11 NOTE — DISCHARGE INSTRUCTIONS
Rest, continue breathing treatments as prescribed. Follow up with primary care for recheck. Return to the Emergency Dept for any worsening shortness of breath, fatigue, weakness.
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
## 2020-01-11 NOTE — ED TRIAGE NOTES
Pt sent from [facility] for SOB and tachycarida in the 130s since this morning. Pt c/o 'not feeling well' and generalized back pain.   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

## 2020-01-12 LAB
ATRIAL RATE: 106 BPM
CALCULATED P AXIS, ECG09: 43 DEGREES
CALCULATED R AXIS, ECG10: -49 DEGREES
CALCULATED T AXIS, ECG11: 126 DEGREES
DIAGNOSIS, 93000: NORMAL
P-R INTERVAL, ECG05: 166 MS
Q-T INTERVAL, ECG07: 342 MS
QRS DURATION, ECG06: 92 MS
QTC CALCULATION (BEZET), ECG08: 454 MS
VENTRICULAR RATE, ECG03: 106 BPM

## 2020-01-13 ENCOUNTER — HOSPITAL ENCOUNTER (OUTPATIENT)
Dept: GENERAL RADIOLOGY | Age: 85
Discharge: HOME | End: 2020-01-13
Attending: PHYSICAL MEDICINE & REHABILITATION
Payer: COMMERCIAL

## 2020-01-13 PROCEDURE — 92611 MOTION FLUOROSCOPY/SWALLOW: CPT

## 2020-03-16 ENCOUNTER — HOME HEALTH ADMISSION (OUTPATIENT)
Dept: HOME HEALTH SERVICES | Facility: HOME HEALTH | Age: 85
End: 2020-03-16
Payer: MEDICARE

## 2020-03-17 ENCOUNTER — HOSPITAL ENCOUNTER (OUTPATIENT)
Dept: GENERAL RADIOLOGY | Age: 85
Discharge: HOME | End: 2020-03-17
Payer: MEDICARE

## 2020-03-17 ENCOUNTER — TELEPHONE (OUTPATIENT)
Dept: INTERNAL MEDICINE CLINIC | Age: 85
End: 2020-03-17

## 2020-03-17 DIAGNOSIS — R07.9 CHEST PAIN: ICD-10-CM

## 2020-03-17 PROCEDURE — 71046 X-RAY EXAM CHEST 2 VIEWS: CPT

## 2020-03-17 NOTE — TELEPHONE ENCOUNTER
Called, spoke to [employer] (son). Two pt identifiers confirmed. Informed Marcello that Dr. Marlene Torres is booked out but couldbe seen w/ Dr. Della Scott 3/24/20 at 0830--check in [address]. Advised Marcello that if pt is having any COVID19 sx (gave son sx to monitor for), that pt will need to be rescheduled. Marcello verbalized understanding of information discussed w/ no further questions at this time.

## 2020-03-17 NOTE — TELEPHONE ENCOUNTER
Caller's first and last name and relationship to patient (if not the patient): Gaby Allred, Son   Best contact number: [phone] -son   Preferred date and time: Before May 12th, Pt needs an appt within 60 days due to her insurance.    Scheduled appointment date and time: N/A   Reason for appointment: NP est PCP   Details to clarify the request: N/A

## 2020-03-19 ENCOUNTER — HOME CARE VISIT (OUTPATIENT)
Dept: HOME HEALTH SERVICES | Facility: HOME HEALTH | Age: 85
End: 2020-03-19

## 2020-03-19 ENCOUNTER — HOME CARE VISIT (OUTPATIENT)
Dept: SCHEDULING | Facility: HOME HEALTH | Age: 85
End: 2020-03-19
Payer: MEDICARE

## 2020-03-19 PROCEDURE — G0299 HHS/HOSPICE OF RN EA 15 MIN: HCPCS

## 2020-03-19 PROCEDURE — 400013 HH SOC

## 2020-03-19 PROCEDURE — 3331090002 HH PPS REVENUE DEBIT

## 2020-03-19 PROCEDURE — 3331090001 HH PPS REVENUE CREDIT

## 2020-03-19 PROCEDURE — G0151 HHCP-SERV OF PT,EA 15 MIN: HCPCS

## 2020-03-20 VITALS
TEMPERATURE: 97 F | BODY MASS INDEX: 18.25 KG/M2 | SYSTOLIC BLOOD PRESSURE: 105 MMHG | OXYGEN SATURATION: 92 % | WEIGHT: 103 LBS | HEIGHT: 63 IN | DIASTOLIC BLOOD PRESSURE: 62 MMHG | RESPIRATION RATE: 18 BRPM | HEART RATE: 98 BPM

## 2020-03-20 PROCEDURE — 3331090001 HH PPS REVENUE CREDIT

## 2020-03-20 PROCEDURE — 3331090002 HH PPS REVENUE DEBIT

## 2020-03-21 ENCOUNTER — HOME CARE VISIT (OUTPATIENT)
Dept: SCHEDULING | Facility: HOME HEALTH | Age: 85
End: 2020-03-21
Payer: MEDICARE

## 2020-03-21 ENCOUNTER — HOME CARE VISIT (OUTPATIENT)
Dept: HOME HEALTH SERVICES | Facility: HOME HEALTH | Age: 85
End: 2020-03-21
Payer: MEDICARE

## 2020-03-21 VITALS
RESPIRATION RATE: 18 BRPM | SYSTOLIC BLOOD PRESSURE: 122 MMHG | OXYGEN SATURATION: 97 % | DIASTOLIC BLOOD PRESSURE: 70 MMHG | HEART RATE: 75 BPM | TEMPERATURE: 97.8 F

## 2020-03-21 VITALS
OXYGEN SATURATION: 95 % | HEART RATE: 100 BPM | DIASTOLIC BLOOD PRESSURE: 70 MMHG | TEMPERATURE: 98 F | SYSTOLIC BLOOD PRESSURE: 130 MMHG

## 2020-03-21 PROCEDURE — 3331090001 HH PPS REVENUE CREDIT

## 2020-03-21 PROCEDURE — 3331090002 HH PPS REVENUE DEBIT

## 2020-03-21 PROCEDURE — G0300 HHS/HOSPICE OF LPN EA 15 MIN: HCPCS

## 2020-03-21 PROCEDURE — G0152 HHCP-SERV OF OT,EA 15 MIN: HCPCS

## 2020-03-22 ENCOUNTER — HOME CARE VISIT (OUTPATIENT)
Dept: HOME HEALTH SERVICES | Facility: HOME HEALTH | Age: 85
End: 2020-03-22
Payer: MEDICARE

## 2020-03-22 PROCEDURE — 3331090002 HH PPS REVENUE DEBIT

## 2020-03-22 PROCEDURE — 3331090001 HH PPS REVENUE CREDIT

## 2020-03-23 ENCOUNTER — HOME CARE VISIT (OUTPATIENT)
Dept: SCHEDULING | Facility: HOME HEALTH | Age: 85
End: 2020-03-23
Payer: MEDICARE

## 2020-03-23 PROCEDURE — 3331090002 HH PPS REVENUE DEBIT

## 2020-03-23 PROCEDURE — 3331090001 HH PPS REVENUE CREDIT

## 2020-03-23 PROCEDURE — G0151 HHCP-SERV OF PT,EA 15 MIN: HCPCS

## 2020-03-24 ENCOUNTER — HOME CARE VISIT (OUTPATIENT)
Dept: HOME HEALTH SERVICES | Facility: HOME HEALTH | Age: 85
End: 2020-03-24
Payer: MEDICARE

## 2020-03-24 ENCOUNTER — OFFICE VISIT (OUTPATIENT)
Dept: INTERNAL MEDICINE CLINIC | Age: 85
End: 2020-03-24

## 2020-03-24 ENCOUNTER — HOSPITAL ENCOUNTER (OUTPATIENT)
Dept: LAB | Age: 85
Discharge: HOME | End: 2020-03-24
Payer: MEDICARE

## 2020-03-24 VITALS
DIASTOLIC BLOOD PRESSURE: 68 MMHG | SYSTOLIC BLOOD PRESSURE: 120 MMHG | RESPIRATION RATE: 18 BRPM | HEART RATE: 82 BPM | TEMPERATURE: 97.9 F | OXYGEN SATURATION: 95 %

## 2020-03-24 VITALS
DIASTOLIC BLOOD PRESSURE: 66 MMHG | SYSTOLIC BLOOD PRESSURE: 106 MMHG | HEART RATE: 88 BPM | OXYGEN SATURATION: 95 % | TEMPERATURE: 99.7 F

## 2020-03-24 VITALS
SYSTOLIC BLOOD PRESSURE: 110 MMHG | HEART RATE: 92 BPM | DIASTOLIC BLOOD PRESSURE: 74 MMHG | OXYGEN SATURATION: 99 % | TEMPERATURE: 97.8 F | RESPIRATION RATE: 18 BRPM | HEIGHT: 63 IN | WEIGHT: 103 LBS | BODY MASS INDEX: 18.25 KG/M2

## 2020-03-24 DIAGNOSIS — Z86.73 HX OF COMPLETED STROKE: ICD-10-CM

## 2020-03-24 DIAGNOSIS — F51.01 PRIMARY INSOMNIA: Primary | ICD-10-CM

## 2020-03-24 DIAGNOSIS — K57.32 DIVERTICULITIS OF COLON: ICD-10-CM

## 2020-03-24 DIAGNOSIS — R73.9 BLOOD GLUCOSE ELEVATED: ICD-10-CM

## 2020-03-24 PROCEDURE — 3331090001 HH PPS REVENUE CREDIT

## 2020-03-24 PROCEDURE — 36415 COLL VENOUS BLD VENIPUNCTURE: CPT

## 2020-03-24 PROCEDURE — 3331090002 HH PPS REVENUE DEBIT

## 2020-03-24 PROCEDURE — 80053 COMPREHEN METABOLIC PANEL: CPT

## 2020-03-24 PROCEDURE — 85025 COMPLETE CBC W/AUTO DIFF WBC: CPT

## 2020-03-24 PROCEDURE — 83036 HEMOGLOBIN GLYCOSYLATED A1C: CPT

## 2020-03-24 RX ORDER — DICLOFENAC SODIUM 10 MG/G
GEL TOPICAL 4 TIMES DAILY
COMMUNITY
End: 2020-09-17
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
## 2020-03-24 NOTE — PATIENT INSTRUCTIONS
Start with one half of a pill of belsomra at bedtime for one week if not effective and tolerating the medicine increase to a full pill ( 10mg) Office Policies Phone calls/patient messages: Please allow up to 24 hours for someone in the office to contact you about your call or message. Be mindful your provider may be out of the office or your message may require further review. We encourage you to use [employer] for your messages as this is a faster, more efficient way to communicate with our office Medication Refills: 
         
Prescription medications require 48-72 business hours to process. We encourage you to use [employer] for your refills. For controlled medications: Please allow 72 business hours to process. Certain medications may require you to  a written prescription at our office. NO narcotic/controlled medications will be prescribed after 4pm Monday through Friday or on weekends Form/Paperwork Completion: 
         
Please note a $25 fee may incur for all paperwork for completed by our providers. We ask that you allow 7-10 business days. Pre-payment is due prior to picking up/faxing the completed form. You may also download your forms to [employer] to have your doctor print off.

## 2020-03-24 NOTE — PROGRESS NOTES
Reviewed record in preparation for visit and have obtained necessary documentation. Identified pt with two pt identifiers(name and ). Chief Complaint   Patient presents with   Critical access hospital    Sleep Problem       Health Maintenance Due   Topic Date Due    Diabetic Foot Care  1945    Albumin Urine Test  1945    Eye Exam  1945    DTaP/Tdap/Td  (1 - Tdap) 1956    Shingles Vaccine (1 of 2) 1985    Glaucoma Screening   2000    Bone Mineral Density   2000    Pneumococcal Vaccine (1 of 1 - PPSV23) 10/05/2015    Annual Well Visit  2018       Ms. Rahel Parish has a reminder for a \"due or due soon\" health maintenance. I have asked that she discuss this further with her primary care provider for follow-up on this health maintenance. Coordination of Care Questionnaire:  :     1) Have you been to an emergency room, urgent care clinic since your last visit? no   Hospitalized since your last visit? no             2) Have you seen or consulted any other health care providers outside of [address] [place] [address] since your last visit? no  (Include any pap smears or colon screenings in this section.)    3) In the event something were to happen to you and you were unable to speak on your behalf, do you have an Advance Directive/ Living Will in place stating your wishes? NO    Do you have an Advance Directive on file? yes    4) Are you interested in receiving information on Advance Directives? NO    Patient is accompanied by son I have received verbal consent from [employer] to discuss any/all medical information while they are present in the room.

## 2020-03-25 ENCOUNTER — HOME CARE VISIT (OUTPATIENT)
Dept: SCHEDULING | Facility: HOME HEALTH | Age: 85
End: 2020-03-25
Payer: MEDICARE

## 2020-03-25 ENCOUNTER — HOME CARE VISIT (OUTPATIENT)
Dept: HOME HEALTH SERVICES | Facility: HOME HEALTH | Age: 85
End: 2020-03-25
Payer: MEDICARE

## 2020-03-25 VITALS — DIASTOLIC BLOOD PRESSURE: 68 MMHG | TEMPERATURE: 98.7 F | SYSTOLIC BLOOD PRESSURE: 116 MMHG

## 2020-03-25 LAB
ALBUMIN SERPL-MCNC: 3.8 G/DL (ref 3.6–4.6)
ALBUMIN/GLOB SERPL: 2 {RATIO} (ref 1.2–2.2)
ALP SERPL-CCNC: 111 IU/L (ref 39–117)
ALT SERPL-CCNC: 24 IU/L (ref 0–32)
AST SERPL-CCNC: 20 IU/L (ref 0–40)
BASOPHILS # BLD AUTO: 0 X10E3/UL (ref 0–0.2)
BASOPHILS NFR BLD AUTO: 0 %
BILIRUB SERPL-MCNC: 0.2 MG/DL (ref 0–1.2)
BUN SERPL-MCNC: 23 MG/DL (ref 8–27)
BUN/CREAT SERPL: 30 (ref 12–28)
CALCIUM SERPL-MCNC: 9.4 MG/DL (ref 8.7–10.3)
CHLORIDE SERPL-SCNC: 101 MMOL/L (ref 96–106)
CO2 SERPL-SCNC: 27 MMOL/L (ref 20–29)
CREAT SERPL-MCNC: 0.76 MG/DL (ref 0.57–1)
EOSINOPHIL # BLD AUTO: 0 X10E3/UL (ref 0–0.4)
EOSINOPHIL NFR BLD AUTO: 0 %
ERYTHROCYTE [DISTWIDTH] IN BLOOD BY AUTOMATED COUNT: 14.4 % (ref 11.7–15.4)
GLOBULIN SER CALC-MCNC: 1.9 G/DL (ref 1.5–4.5)
GLUCOSE SERPL-MCNC: 131 MG/DL (ref 65–99)
HBA1C MFR BLD: 6.3 % (ref 4.8–5.6)
HCT VFR BLD AUTO: 32.7 % (ref 34–46.6)
HGB BLD-MCNC: 10.2 G/DL (ref 11.1–15.9)
IMM GRANULOCYTES # BLD AUTO: 0 X10E3/UL (ref 0–0.1)
IMM GRANULOCYTES NFR BLD AUTO: 0 %
LYMPHOCYTES # BLD AUTO: 1.5 X10E3/UL (ref 0.7–3.1)
LYMPHOCYTES NFR BLD AUTO: 12 %
MCH RBC QN AUTO: 26 PG (ref 26.6–33)
MCHC RBC AUTO-ENTMCNC: 31.2 G/DL (ref 31.5–35.7)
MCV RBC AUTO: 83 FL (ref 79–97)
MONOCYTES # BLD AUTO: 0.5 X10E3/UL (ref 0.1–0.9)
MONOCYTES NFR BLD AUTO: 4 %
NEUTROPHILS # BLD AUTO: 10.2 X10E3/UL (ref 1.4–7)
NEUTROPHILS NFR BLD AUTO: 84 %
PLATELET # BLD AUTO: 262 X10E3/UL (ref 150–450)
POTASSIUM SERPL-SCNC: 4.2 MMOL/L (ref 3.5–5.2)
PROT SERPL-MCNC: 5.7 G/DL (ref 6–8.5)
RBC # BLD AUTO: 3.92 X10E6/UL (ref 3.77–5.28)
SODIUM SERPL-SCNC: 144 MMOL/L (ref 134–144)
WBC # BLD AUTO: 12.2 X10E3/UL (ref 3.4–10.8)

## 2020-03-25 PROCEDURE — G0157 HHC PT ASSISTANT EA 15: HCPCS

## 2020-03-25 PROCEDURE — 3331090001 HH PPS REVENUE CREDIT

## 2020-03-25 PROCEDURE — 3331090002 HH PPS REVENUE DEBIT

## 2020-03-25 PROCEDURE — G0152 HHCP-SERV OF OT,EA 15 MIN: HCPCS

## 2020-03-26 ENCOUNTER — HOME CARE VISIT (OUTPATIENT)
Dept: HOME HEALTH SERVICES | Facility: HOME HEALTH | Age: 85
End: 2020-03-26
Payer: MEDICARE

## 2020-03-26 VITALS
DIASTOLIC BLOOD PRESSURE: 60 MMHG | SYSTOLIC BLOOD PRESSURE: 118 MMHG | OXYGEN SATURATION: 94 % | HEART RATE: 100 BPM | RESPIRATION RATE: 16 BRPM | TEMPERATURE: 97.5 F

## 2020-03-26 PROCEDURE — 3331090001 HH PPS REVENUE CREDIT

## 2020-03-26 PROCEDURE — 3331090002 HH PPS REVENUE DEBIT

## 2020-03-27 ENCOUNTER — HOME CARE VISIT (OUTPATIENT)
Dept: HOME HEALTH SERVICES | Facility: HOME HEALTH | Age: 85
End: 2020-03-27
Payer: MEDICARE

## 2020-03-27 ENCOUNTER — HOME CARE VISIT (OUTPATIENT)
Dept: SCHEDULING | Facility: HOME HEALTH | Age: 85
End: 2020-03-27
Payer: MEDICARE

## 2020-03-27 VITALS
OXYGEN SATURATION: 95 % | SYSTOLIC BLOOD PRESSURE: 125 MMHG | DIASTOLIC BLOOD PRESSURE: 65 MMHG | RESPIRATION RATE: 18 BRPM | TEMPERATURE: 98.3 F | HEART RATE: 94 BPM

## 2020-03-27 VITALS
RESPIRATION RATE: 18 BRPM | SYSTOLIC BLOOD PRESSURE: 125 MMHG | TEMPERATURE: 98.3 F | OXYGEN SATURATION: 95 % | DIASTOLIC BLOOD PRESSURE: 65 MMHG | HEART RATE: 94 BPM

## 2020-03-27 PROCEDURE — 3331090002 HH PPS REVENUE DEBIT

## 2020-03-27 PROCEDURE — G0152 HHCP-SERV OF OT,EA 15 MIN: HCPCS

## 2020-03-27 PROCEDURE — G0157 HHC PT ASSISTANT EA 15: HCPCS

## 2020-03-27 PROCEDURE — G0299 HHS/HOSPICE OF RN EA 15 MIN: HCPCS

## 2020-03-27 PROCEDURE — 3331090001 HH PPS REVENUE CREDIT

## 2020-03-28 VITALS
DIASTOLIC BLOOD PRESSURE: 65 MMHG | HEART RATE: 94 BPM | TEMPERATURE: 98.3 F | OXYGEN SATURATION: 95 % | SYSTOLIC BLOOD PRESSURE: 125 MMHG | RESPIRATION RATE: 18 BRPM

## 2020-03-28 PROCEDURE — 3331090001 HH PPS REVENUE CREDIT

## 2020-03-28 PROCEDURE — 3331090002 HH PPS REVENUE DEBIT

## 2020-03-29 PROCEDURE — 3331090002 HH PPS REVENUE DEBIT

## 2020-03-29 PROCEDURE — 3331090001 HH PPS REVENUE CREDIT

## 2020-03-30 ENCOUNTER — HOME CARE VISIT (OUTPATIENT)
Dept: HOME HEALTH SERVICES | Facility: HOME HEALTH | Age: 85
End: 2020-03-30
Payer: MEDICARE

## 2020-03-30 PROCEDURE — 3331090001 HH PPS REVENUE CREDIT

## 2020-03-30 PROCEDURE — 3331090002 HH PPS REVENUE DEBIT

## 2020-03-31 ENCOUNTER — TELEPHONE (OUTPATIENT)
Dept: INTERNAL MEDICINE CLINIC | Age: 85
End: 2020-03-31

## 2020-03-31 ENCOUNTER — HOME CARE VISIT (OUTPATIENT)
Dept: SCHEDULING | Facility: HOME HEALTH | Age: 85
End: 2020-03-31
Payer: MEDICARE

## 2020-03-31 PROCEDURE — 3331090001 HH PPS REVENUE CREDIT

## 2020-03-31 PROCEDURE — 3331090002 HH PPS REVENUE DEBIT

## 2020-03-31 RX ORDER — SERTRALINE HYDROCHLORIDE 25 MG/1
25 TABLET, FILM COATED ORAL DAILY
Qty: 30 TAB | Refills: 1 | Status: SHIPPED | OUTPATIENT
Start: 2020-03-31 | End: 2020-05-21
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
## 2020-03-31 NOTE — TELEPHONE ENCOUNTER
MD Alejandro Quezada LPN   Caller: Unspecified (Today,  9:00 AM)             Was patient able to start medication belsomra ? ? Is she sleeping more? I would like for her to start zoloft 25mg -prescription sent   Follow up virtual in one to two weeks      Spoke with patients son Yaron Garcia   Two pt identifiers confirmed. Yaron Garcia states that patient did start the belsomra and has been sleeping better. Yaron Garcia advised that the prescription for zoloft was sent to patients pharmacy. Yaron Garcia states that patient has a virtual appointment scheduled for 04/14/2020  Yaron Garcia states that the patient keeps asking for a patch to help with her drooling. Cody Hutson that I will check with Dr. Jeet Laughlin about the patch and will give him a call back as soon as I can. Yaron Garcia verbalized understanding of information discussed w/ no further questions at this time.

## 2020-04-01 ENCOUNTER — HOME CARE VISIT (OUTPATIENT)
Dept: HOME HEALTH SERVICES | Facility: HOME HEALTH | Age: 85
End: 2020-04-01
Payer: MEDICARE

## 2020-04-01 ENCOUNTER — TELEPHONE (OUTPATIENT)
Dept: INTERNAL MEDICINE CLINIC | Age: 85
End: 2020-04-01

## 2020-04-01 ENCOUNTER — HOME CARE VISIT (OUTPATIENT)
Dept: SCHEDULING | Facility: HOME HEALTH | Age: 85
End: 2020-04-01
Payer: MEDICARE

## 2020-04-01 PROCEDURE — G0157 HHC PT ASSISTANT EA 15: HCPCS

## 2020-04-01 PROCEDURE — G0152 HHCP-SERV OF OT,EA 15 MIN: HCPCS

## 2020-04-01 PROCEDURE — 3331090002 HH PPS REVENUE DEBIT

## 2020-04-01 PROCEDURE — 3331090001 HH PPS REVENUE CREDIT

## 2020-04-01 NOTE — TELEPHONE ENCOUNTER
Patient's son, Samul Nissen" states he needs a call back to discuss patient's cough & medical condition that patient is needing to be seen. Please call to advise.  Thank you

## 2020-04-01 NOTE — TELEPHONE ENCOUNTER
MD Abigail Tipton LPN   Caller: Unspecified (Yesterday,  9:00 AM)             Scopolamine patch for drooling has anticholinergic side effects and can cause falls not recommended for her age. She can start the zoloft half of a pill to start should help with mood in 3 weeks      Spoke with patients son Randi Stubbs. Two pt identifiers confirmed. Randi Stubbs advised of the above message from Dr. Margie Knox. Pt verbalized understanding of information discussed w/ no further questions at this time.

## 2020-04-01 NOTE — TELEPHONE ENCOUNTER
Patient was exposed to Covid-19 on the 18th and now has started with a cough. No other issues  at this time. [facility] wants to know know if they should stop seeing her or what they should do. They seen her last week for home health. [phone] [facility] needs to have a call back.

## 2020-04-02 ENCOUNTER — HOME CARE VISIT (OUTPATIENT)
Dept: HOME HEALTH SERVICES | Facility: HOME HEALTH | Age: 85
End: 2020-04-02
Payer: MEDICARE

## 2020-04-02 ENCOUNTER — TELEPHONE (OUTPATIENT)
Dept: INTERNAL MEDICINE CLINIC | Age: 85
End: 2020-04-02

## 2020-04-02 VITALS — TEMPERATURE: 99.3 F

## 2020-04-02 PROCEDURE — 3331090002 HH PPS REVENUE DEBIT

## 2020-04-02 PROCEDURE — 3331090001 HH PPS REVENUE CREDIT

## 2020-04-02 NOTE — TELEPHONE ENCOUNTER
----- Message from Citlali Elaine sent at 4/2/2020 10:35 AM EDT -----  Regarding: Dr. Pardeep Wooten first and last name: Ema Butler  Reason for call:   returning phone call from [facility]; wants to talk about some health issues as soon as possible.  Caller's phone won't ring for some reason when the office call  Callback required yes/no and why: yes  Best contact number(s):  [phone]  Details to clarify the request:

## 2020-04-02 NOTE — TELEPHONE ENCOUNTER
Spoke with patients daughter. Two pt identifiers confirmed. Offered to schedule the patient for a virtual visit. Agreeable. Patient has been scheduled for a virtual visit with Dr. Andrew Gloria on 04/03/2020. Appointment accepted. Pt verbalized understanding of information discussed w/ no further questions at this time.    Patient advised if anything changes or if unable to keep this appointment to call the office

## 2020-04-02 NOTE — TELEPHONE ENCOUNTER
#[phone] Yadira [facility] is requesting a call back from [place] as she states she is waiting on her call from 4-1-20

## 2020-04-02 NOTE — TELEPHONE ENCOUNTER
Janette Bob MD  You 18 hours ago (2:51 PM)      We have been in communication about this this week, Sadi add her to the schedule this week since there seems to be a lot of questions and calls       Left message for patients olimpia Renner to return the call

## 2020-04-02 NOTE — TELEPHONE ENCOUNTER
Left message for patients son to call the office to schedule a virtual visit with Dr. Amanda Joyner.

## 2020-04-03 ENCOUNTER — HOME CARE VISIT (OUTPATIENT)
Dept: SCHEDULING | Facility: HOME HEALTH | Age: 85
End: 2020-04-03
Payer: MEDICARE

## 2020-04-03 ENCOUNTER — VIRTUAL VISIT (OUTPATIENT)
Dept: INTERNAL MEDICINE CLINIC | Age: 85
End: 2020-04-03

## 2020-04-03 ENCOUNTER — HOME CARE VISIT (OUTPATIENT)
Dept: HOME HEALTH SERVICES | Facility: HOME HEALTH | Age: 85
End: 2020-04-03
Payer: MEDICARE

## 2020-04-03 DIAGNOSIS — R60.0 EDEMA OF BOTH FEET: ICD-10-CM

## 2020-04-03 DIAGNOSIS — F03.90 DEMENTIA WITHOUT BEHAVIORAL DISTURBANCE, UNSPECIFIED DEMENTIA TYPE: ICD-10-CM

## 2020-04-03 DIAGNOSIS — R05.3 CHRONIC COUGH: ICD-10-CM

## 2020-04-03 DIAGNOSIS — R73.9 BLOOD GLUCOSE ELEVATED: ICD-10-CM

## 2020-04-03 DIAGNOSIS — K57.30 DIVERTICULOSIS OF COLON: Primary | ICD-10-CM

## 2020-04-03 DIAGNOSIS — D64.9 ANEMIA, UNSPECIFIED TYPE: ICD-10-CM

## 2020-04-03 DIAGNOSIS — F51.01 PRIMARY INSOMNIA: ICD-10-CM

## 2020-04-03 PROCEDURE — 3331090001 HH PPS REVENUE CREDIT

## 2020-04-03 PROCEDURE — 3331090002 HH PPS REVENUE DEBIT

## 2020-04-03 RX ORDER — BUMETANIDE 1 MG/1
1 TABLET ORAL 2 TIMES DAILY
Qty: 60 TAB | Refills: 0
Start: 2020-04-03 | End: 2020-05-05 | Stop reason: SDUPTHER
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
## 2020-04-03 NOTE — PROGRESS NOTES
Reviewed record in preparation for visit and have obtained necessary documentation. Identified pt with two pt identifiers(name and ). Chief Complaint   Patient presents with    Cough       Health Maintenance Due   Topic Date Due    DTaP/Tdap/Td  (1 - Tdap) 1956    Shingles Vaccine (1 of 2) 1985    Bone Mineral Density   2000    Pneumococcal Vaccine (1 of 1 - PPSV23) 10/05/2015    Annual Well Visit  2018       Ms. Will Peterson has a reminder for a \"due or due soon\" health maintenance. I have asked that she discuss this further with her primary care provider for follow-up on this health maintenance. Coordination of Care Questionnaire:  :     1) Have you been to an emergency room, urgent care clinic since your last visit? no   Hospitalized since your last visit? no             2) Have you seen or consulted any other health care providers outside of [address] [place] [address] since your last visit? no  (Include any pap smears or colon screenings in this section.)    3) In the event something were to happen to you and you were unable to speak on your behalf, do you have an Advance Directive/ Living Will in place stating your wishes? NO    Do you have an Advance Directive on file? no    4) Are you interested in receiving information on Advance Directives? NO    Patient is accompanied by self I have received verbal consent from Ronnie Vale to discuss any/all medical information while they are present in the room.

## 2020-04-04 PROCEDURE — 3331090002 HH PPS REVENUE DEBIT

## 2020-04-04 PROCEDURE — 3331090001 HH PPS REVENUE CREDIT

## 2020-04-05 PROCEDURE — 3331090001 HH PPS REVENUE CREDIT

## 2020-04-05 PROCEDURE — 3331090002 HH PPS REVENUE DEBIT

## 2020-04-06 ENCOUNTER — HOME CARE VISIT (OUTPATIENT)
Dept: HOME HEALTH SERVICES | Facility: HOME HEALTH | Age: 85
End: 2020-04-06
Payer: MEDICARE

## 2020-04-06 ENCOUNTER — HOME CARE VISIT (OUTPATIENT)
Dept: SCHEDULING | Facility: HOME HEALTH | Age: 85
End: 2020-04-06
Payer: MEDICARE

## 2020-04-06 VITALS
DIASTOLIC BLOOD PRESSURE: 78 MMHG | OXYGEN SATURATION: 95 % | RESPIRATION RATE: 17 BRPM | SYSTOLIC BLOOD PRESSURE: 124 MMHG | TEMPERATURE: 98.1 F | HEART RATE: 78 BPM

## 2020-04-06 PROCEDURE — G0157 HHC PT ASSISTANT EA 15: HCPCS

## 2020-04-06 PROCEDURE — 3331090002 HH PPS REVENUE DEBIT

## 2020-04-06 PROCEDURE — 3331090001 HH PPS REVENUE CREDIT

## 2020-04-07 ENCOUNTER — HOME CARE VISIT (OUTPATIENT)
Dept: SCHEDULING | Facility: HOME HEALTH | Age: 85
End: 2020-04-07
Payer: MEDICARE

## 2020-04-07 PROCEDURE — 3331090001 HH PPS REVENUE CREDIT

## 2020-04-07 PROCEDURE — G0152 HHCP-SERV OF OT,EA 15 MIN: HCPCS

## 2020-04-07 PROCEDURE — 3331090002 HH PPS REVENUE DEBIT

## 2020-04-08 ENCOUNTER — HOME CARE VISIT (OUTPATIENT)
Dept: SCHEDULING | Facility: HOME HEALTH | Age: 85
End: 2020-04-08
Payer: MEDICARE

## 2020-04-08 ENCOUNTER — HOME CARE VISIT (OUTPATIENT)
Dept: HOME HEALTH SERVICES | Facility: HOME HEALTH | Age: 85
End: 2020-04-08
Payer: MEDICARE

## 2020-04-08 VITALS — OXYGEN SATURATION: 94 % | HEART RATE: 90 BPM | TEMPERATURE: 98.3 F | RESPIRATION RATE: 18 BRPM

## 2020-04-08 PROCEDURE — 3331090002 HH PPS REVENUE DEBIT

## 2020-04-08 PROCEDURE — 3331090001 HH PPS REVENUE CREDIT

## 2020-04-08 PROCEDURE — G0157 HHC PT ASSISTANT EA 15: HCPCS

## 2020-04-09 ENCOUNTER — HOME CARE VISIT (OUTPATIENT)
Dept: HOME HEALTH SERVICES | Facility: HOME HEALTH | Age: 85
End: 2020-04-09
Payer: MEDICARE

## 2020-04-09 ENCOUNTER — HOME CARE VISIT (OUTPATIENT)
Dept: SCHEDULING | Facility: HOME HEALTH | Age: 85
End: 2020-04-09
Payer: MEDICARE

## 2020-04-09 VITALS
SYSTOLIC BLOOD PRESSURE: 123 MMHG | HEART RATE: 76 BPM | TEMPERATURE: 98.8 F | DIASTOLIC BLOOD PRESSURE: 70 MMHG | OXYGEN SATURATION: 98 %

## 2020-04-09 VITALS
RESPIRATION RATE: 17 BRPM | HEART RATE: 86 BPM | TEMPERATURE: 96.9 F | DIASTOLIC BLOOD PRESSURE: 60 MMHG | OXYGEN SATURATION: 94 % | SYSTOLIC BLOOD PRESSURE: 116 MMHG

## 2020-04-09 VITALS
TEMPERATURE: 97 F | DIASTOLIC BLOOD PRESSURE: 70 MMHG | OXYGEN SATURATION: 94 % | SYSTOLIC BLOOD PRESSURE: 116 MMHG | HEART RATE: 95 BPM | RESPIRATION RATE: 18 BRPM

## 2020-04-09 PROCEDURE — G0300 HHS/HOSPICE OF LPN EA 15 MIN: HCPCS

## 2020-04-09 PROCEDURE — G0152 HHCP-SERV OF OT,EA 15 MIN: HCPCS

## 2020-04-09 PROCEDURE — 3331090001 HH PPS REVENUE CREDIT

## 2020-04-09 PROCEDURE — 3331090002 HH PPS REVENUE DEBIT

## 2020-04-10 PROCEDURE — 3331090001 HH PPS REVENUE CREDIT

## 2020-04-10 PROCEDURE — 3331090002 HH PPS REVENUE DEBIT

## 2020-04-11 PROCEDURE — 3331090002 HH PPS REVENUE DEBIT

## 2020-04-11 PROCEDURE — 3331090001 HH PPS REVENUE CREDIT

## 2020-04-12 PROCEDURE — 3331090001 HH PPS REVENUE CREDIT

## 2020-04-12 PROCEDURE — 3331090002 HH PPS REVENUE DEBIT

## 2020-04-13 ENCOUNTER — HOME CARE VISIT (OUTPATIENT)
Dept: HOME HEALTH SERVICES | Facility: HOME HEALTH | Age: 85
End: 2020-04-13
Payer: MEDICARE

## 2020-04-13 ENCOUNTER — HOME CARE VISIT (OUTPATIENT)
Dept: SCHEDULING | Facility: HOME HEALTH | Age: 85
End: 2020-04-13
Payer: MEDICARE

## 2020-04-13 VITALS
HEART RATE: 84 BPM | SYSTOLIC BLOOD PRESSURE: 104 MMHG | TEMPERATURE: 97.8 F | OXYGEN SATURATION: 94 % | DIASTOLIC BLOOD PRESSURE: 62 MMHG | RESPIRATION RATE: 17 BRPM

## 2020-04-13 PROCEDURE — G0157 HHC PT ASSISTANT EA 15: HCPCS

## 2020-04-13 PROCEDURE — 3331090001 HH PPS REVENUE CREDIT

## 2020-04-13 PROCEDURE — 3331090002 HH PPS REVENUE DEBIT

## 2020-04-14 ENCOUNTER — TELEPHONE (OUTPATIENT)
Dept: INTERNAL MEDICINE CLINIC | Age: 85
End: 2020-04-14

## 2020-04-14 ENCOUNTER — VIRTUAL VISIT (OUTPATIENT)
Dept: INTERNAL MEDICINE CLINIC | Age: 85
End: 2020-04-14

## 2020-04-14 DIAGNOSIS — Z00.00 MEDICARE ANNUAL WELLNESS VISIT, SUBSEQUENT: Primary | ICD-10-CM

## 2020-04-14 DIAGNOSIS — R73.9 BLOOD GLUCOSE ELEVATED: ICD-10-CM

## 2020-04-14 DIAGNOSIS — D64.9 ANEMIA, UNSPECIFIED TYPE: ICD-10-CM

## 2020-04-14 DIAGNOSIS — F51.01 PRIMARY INSOMNIA: ICD-10-CM

## 2020-04-14 PROCEDURE — 3331090002 HH PPS REVENUE DEBIT

## 2020-04-14 PROCEDURE — 3331090001 HH PPS REVENUE CREDIT
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
## 2020-04-14 NOTE — TELEPHONE ENCOUNTER
----- Message from Awilda Nunes MD sent at 4/14/2020  9:14 AM EDT -----  Needs records of vaccines from Dr Yarely Pete or the database

## 2020-04-14 NOTE — PROGRESS NOTES
Discussed with patient. Mild anemia therefore I ordered a work-up for the anemia including iron studies and B12. Patient denies bleeding.

## 2020-04-14 NOTE — PROGRESS NOTES
Reviewed record in preparation for visit and have obtained necessary documentation. Identified pt with two pt identifiers(name and ). Chief Complaint   Patient presents with    Sleep Problem     follow up     Diverticulitis       Health Maintenance Due   Topic Date Due    DTaP/Tdap/Td  (1 - Tdap) 1956    Shingles Vaccine (1 of 2) 1985    Bone Mineral Density   2000    Pneumococcal Vaccine (1 of 1 - PPSV23) 10/05/2015    Annual Well Visit  2018       Ms. Mariola Mckeon has a reminder for a \"due or due soon\" health maintenance. I have asked that she discuss this further with her primary care provider for follow-up on this health maintenance. Coordination of Care Questionnaire:  :     1) Have you been to an emergency room, urgent care clinic since your last visit? no   Hospitalized since your last visit? no             2) Have you seen or consulted any other health care providers outside of [address] [place] [address] since your last visit? no  (Include any pap smears or colon screenings in this section.)    3) In the event something were to happen to you and you were unable to speak on your behalf, do you have an Advance Directive/ Living Will in place stating your wishes? NO    Do you have an Advance Directive on file? no    4) Are you interested in receiving information on Advance Directives? NO    Patient is accompanied by self I have received verbal consent from Bryon Miller to discuss any/all medical information while they are present in the room.

## 2020-04-14 NOTE — PROGRESS NOTES
I noted small pressure ulcers on R post calf area, no drainage or redness observed. Notified son so they can monitor and advised them I would let SN know so they can assess at next visit.

## 2020-04-14 NOTE — PATIENT INSTRUCTIONS
Medicare Wellness Visit, Female The best way to live healthy is to have a lifestyle where you eat a well-balanced diet, exercise regularly, limit alcohol use, and quit all forms of tobacco/nicotine, if applicable. Regular preventive services are another way to keep healthy. Preventive services (vaccines, screening tests, monitoring & exams) can help personalize your care plan, which helps you manage your own care. Screening tests can find health problems at the earliest stages, when they are easiest to treat. [facility] follows the current, evidence-based guidelines published by the [facility] ([facility]) when recommending preventive services for our patients. Because we follow these guidelines, sometimes recommendations change over time as research supports it. (For example, mammograms used to be recommended annually. Even though Medicare will still pay for an annual mammogram, the newer guidelines recommend a mammogram every two years for women of average risk). Of course, you and your doctor may decide to screen more often for some diseases, based on your risk and your co-morbidities (chronic disease you are already diagnosed with). Preventive services for you include: - Medicare offers their members a free annual wellness visit, which is time for you and your primary care provider to discuss and plan for your preventive service needs. Take advantage of this benefit every year! 
-All adults over the age of 72 should receive the recommended pneumonia vaccines. Current USPSTF guidelines recommend a series of two vaccines for the best pneumonia protection.  
-All adults should have a flu vaccine yearly and a tetanus vaccine every 10 years.  
-All adults age 48 and older should receive the shingles vaccines (series of two vaccines). -All adults age 38-68 who are overweight should have a diabetes screening test once every three years. -All adults born between [birth year] and 1965 should be screened once for Hepatitis C. 
-Other screening tests and preventive services for persons with diabetes include: an eye exam to screen for diabetic retinopathy, a kidney function test, a foot exam, and stricter control over your cholesterol.  
-Cardiovascular screening for adults with routine risk involves an electrocardiogram (ECG) at intervals determined by your doctor.  
-Colorectal cancer screenings should be done for adults age 54-65 with no increased risk factors for colorectal cancer. There are a number of acceptable methods of screening for this type of cancer. Each test has its own benefits and drawbacks. Discuss with your doctor what is most appropriate for you during your annual wellness visit. The different tests include: colonoscopy (considered the best screening method), a fecal occult blood test, a fecal DNA test, and sigmoidoscopy. 
 
-A bone mass density test is recommended when a woman turns 65 to screen for osteoporosis. This test is only recommended one time, as a screening. Some providers will use this same test as a disease monitoring tool if you already have osteoporosis. -Breast cancer screenings are recommended every other year for women of normal risk, age 54-69. 
-Cervical cancer screenings for women over age 72 are only recommended with certain risk factors. Here is a list of your current Health Maintenance items (your personalized list of preventive services) with a due date: 
Health Maintenance Due Topic Date Due  
 DTaP/Tdap/Td  (1 - Tdap) 04/09/1956  Shingles Vaccine (1 of 2) 04/09/1985  Bone Mineral Density   04/09/2000  Pneumococcal Vaccine (1 of 1 - PPSV23) 10/05/2015 [address] [place] [address] Annual Well Visit  03/20/2018

## 2020-04-15 ENCOUNTER — HOME CARE VISIT (OUTPATIENT)
Dept: HOME HEALTH SERVICES | Facility: HOME HEALTH | Age: 85
End: 2020-04-15
Payer: MEDICARE

## 2020-04-15 ENCOUNTER — TELEPHONE (OUTPATIENT)
Dept: INTERNAL MEDICINE CLINIC | Age: 85
End: 2020-04-15

## 2020-04-15 ENCOUNTER — HOME CARE VISIT (OUTPATIENT)
Dept: SCHEDULING | Facility: HOME HEALTH | Age: 85
End: 2020-04-15
Payer: MEDICARE

## 2020-04-15 PROCEDURE — 3331090002 HH PPS REVENUE DEBIT

## 2020-04-15 PROCEDURE — G0299 HHS/HOSPICE OF RN EA 15 MIN: HCPCS

## 2020-04-15 PROCEDURE — 3331090001 HH PPS REVENUE CREDIT

## 2020-04-15 NOTE — TELEPHONE ENCOUNTER
#[phone] Kirt Andrade states she would like a verbal for speech referral for cognition/history stroke. All she needs is a verbal.  Thanks.

## 2020-04-16 ENCOUNTER — HOME CARE VISIT (OUTPATIENT)
Dept: SCHEDULING | Facility: HOME HEALTH | Age: 85
End: 2020-04-16
Payer: MEDICARE

## 2020-04-16 ENCOUNTER — HOME CARE VISIT (OUTPATIENT)
Dept: HOME HEALTH SERVICES | Facility: HOME HEALTH | Age: 85
End: 2020-04-16
Payer: MEDICARE

## 2020-04-16 VITALS
SYSTOLIC BLOOD PRESSURE: 116 MMHG | HEART RATE: 70 BPM | RESPIRATION RATE: 18 BRPM | OXYGEN SATURATION: 98 % | DIASTOLIC BLOOD PRESSURE: 70 MMHG | TEMPERATURE: 98.1 F

## 2020-04-16 PROCEDURE — G0151 HHCP-SERV OF PT,EA 15 MIN: HCPCS

## 2020-04-16 PROCEDURE — 3331090002 HH PPS REVENUE DEBIT

## 2020-04-16 PROCEDURE — 3331090001 HH PPS REVENUE CREDIT

## 2020-04-17 VITALS
SYSTOLIC BLOOD PRESSURE: 108 MMHG | HEART RATE: 66 BPM | RESPIRATION RATE: 18 BRPM | OXYGEN SATURATION: 92 % | TEMPERATURE: 97.8 F | DIASTOLIC BLOOD PRESSURE: 62 MMHG

## 2020-04-17 PROCEDURE — 3331090001 HH PPS REVENUE CREDIT

## 2020-04-17 PROCEDURE — 3331090002 HH PPS REVENUE DEBIT

## 2020-04-18 PROCEDURE — 3331090001 HH PPS REVENUE CREDIT

## 2020-04-18 PROCEDURE — 3331090002 HH PPS REVENUE DEBIT

## 2020-04-19 PROCEDURE — 3331090002 HH PPS REVENUE DEBIT

## 2020-04-19 PROCEDURE — 3331090001 HH PPS REVENUE CREDIT

## 2020-04-20 ENCOUNTER — HOME CARE VISIT (OUTPATIENT)
Dept: SCHEDULING | Facility: HOME HEALTH | Age: 85
End: 2020-04-20
Payer: MEDICARE

## 2020-04-20 ENCOUNTER — HOME CARE VISIT (OUTPATIENT)
Dept: HOME HEALTH SERVICES | Facility: HOME HEALTH | Age: 85
End: 2020-04-20
Payer: MEDICARE

## 2020-04-20 PROCEDURE — 3331090002 HH PPS REVENUE DEBIT

## 2020-04-20 PROCEDURE — 400013 HH SOC

## 2020-04-20 PROCEDURE — G0153 HHCP-SVS OF S/L PATH,EA 15MN: HCPCS

## 2020-04-20 PROCEDURE — 3331090001 HH PPS REVENUE CREDIT

## 2020-04-21 ENCOUNTER — HOME CARE VISIT (OUTPATIENT)
Dept: SCHEDULING | Facility: HOME HEALTH | Age: 85
End: 2020-04-21
Payer: MEDICARE

## 2020-04-21 ENCOUNTER — HOME CARE VISIT (OUTPATIENT)
Dept: HOME HEALTH SERVICES | Facility: HOME HEALTH | Age: 85
End: 2020-04-21
Payer: MEDICARE

## 2020-04-21 PROCEDURE — G0299 HHS/HOSPICE OF RN EA 15 MIN: HCPCS

## 2020-04-21 PROCEDURE — 3331090002 HH PPS REVENUE DEBIT

## 2020-04-21 PROCEDURE — 3331090001 HH PPS REVENUE CREDIT

## 2020-04-22 ENCOUNTER — HOME CARE VISIT (OUTPATIENT)
Dept: SCHEDULING | Facility: HOME HEALTH | Age: 85
End: 2020-04-22
Payer: MEDICARE

## 2020-04-22 ENCOUNTER — HOME CARE VISIT (OUTPATIENT)
Dept: HOME HEALTH SERVICES | Facility: HOME HEALTH | Age: 85
End: 2020-04-22
Payer: MEDICARE

## 2020-04-22 VITALS
TEMPERATURE: 98.2 F | DIASTOLIC BLOOD PRESSURE: 80 MMHG | SYSTOLIC BLOOD PRESSURE: 128 MMHG | OXYGEN SATURATION: 98 % | RESPIRATION RATE: 18 BRPM | HEART RATE: 70 BPM

## 2020-04-22 PROCEDURE — 3331090002 HH PPS REVENUE DEBIT

## 2020-04-22 PROCEDURE — G0157 HHC PT ASSISTANT EA 15: HCPCS

## 2020-04-22 PROCEDURE — 3331090001 HH PPS REVENUE CREDIT

## 2020-04-23 VITALS
HEART RATE: 76 BPM | SYSTOLIC BLOOD PRESSURE: 122 MMHG | TEMPERATURE: 98.9 F | OXYGEN SATURATION: 96 % | DIASTOLIC BLOOD PRESSURE: 60 MMHG | RESPIRATION RATE: 17 BRPM

## 2020-04-23 VITALS
TEMPERATURE: 98.4 F | OXYGEN SATURATION: 90 % | DIASTOLIC BLOOD PRESSURE: 66 MMHG | HEART RATE: 64 BPM | SYSTOLIC BLOOD PRESSURE: 104 MMHG

## 2020-04-23 PROCEDURE — 3331090001 HH PPS REVENUE CREDIT

## 2020-04-23 PROCEDURE — 3331090002 HH PPS REVENUE DEBIT

## 2020-04-24 ENCOUNTER — HOME CARE VISIT (OUTPATIENT)
Dept: SCHEDULING | Facility: HOME HEALTH | Age: 85
End: 2020-04-24
Payer: MEDICARE

## 2020-04-24 ENCOUNTER — HOME CARE VISIT (OUTPATIENT)
Dept: HOME HEALTH SERVICES | Facility: HOME HEALTH | Age: 85
End: 2020-04-24
Payer: MEDICARE

## 2020-04-24 VITALS — TEMPERATURE: 97.9 F | DIASTOLIC BLOOD PRESSURE: 62 MMHG | SYSTOLIC BLOOD PRESSURE: 122 MMHG

## 2020-04-24 PROCEDURE — 3331090002 HH PPS REVENUE DEBIT

## 2020-04-24 PROCEDURE — G0157 HHC PT ASSISTANT EA 15: HCPCS

## 2020-04-24 PROCEDURE — 3331090001 HH PPS REVENUE CREDIT

## 2020-04-25 PROCEDURE — 3331090002 HH PPS REVENUE DEBIT

## 2020-04-25 PROCEDURE — 3331090001 HH PPS REVENUE CREDIT

## 2020-04-26 PROCEDURE — 3331090002 HH PPS REVENUE DEBIT

## 2020-04-26 PROCEDURE — 3331090001 HH PPS REVENUE CREDIT

## 2020-04-27 ENCOUNTER — HOME CARE VISIT (OUTPATIENT)
Dept: HOME HEALTH SERVICES | Facility: HOME HEALTH | Age: 85
End: 2020-04-27
Payer: MEDICARE

## 2020-04-27 ENCOUNTER — HOME CARE VISIT (OUTPATIENT)
Dept: SCHEDULING | Facility: HOME HEALTH | Age: 85
End: 2020-04-27
Payer: MEDICARE

## 2020-04-27 VITALS
HEART RATE: 75 BPM | SYSTOLIC BLOOD PRESSURE: 118 MMHG | OXYGEN SATURATION: 95 % | TEMPERATURE: 98.7 F | DIASTOLIC BLOOD PRESSURE: 76 MMHG | RESPIRATION RATE: 17 BRPM

## 2020-04-27 PROCEDURE — 3331090002 HH PPS REVENUE DEBIT

## 2020-04-27 PROCEDURE — 3331090001 HH PPS REVENUE CREDIT

## 2020-04-27 PROCEDURE — G0157 HHC PT ASSISTANT EA 15: HCPCS

## 2020-04-28 PROCEDURE — 3331090001 HH PPS REVENUE CREDIT

## 2020-04-28 PROCEDURE — 3331090002 HH PPS REVENUE DEBIT

## 2020-04-29 ENCOUNTER — HOME CARE VISIT (OUTPATIENT)
Dept: SCHEDULING | Facility: HOME HEALTH | Age: 85
End: 2020-04-29
Payer: MEDICARE

## 2020-04-29 PROCEDURE — 3331090001 HH PPS REVENUE CREDIT

## 2020-04-29 PROCEDURE — 3331090002 HH PPS REVENUE DEBIT

## 2020-04-30 ENCOUNTER — HOME CARE VISIT (OUTPATIENT)
Dept: HOME HEALTH SERVICES | Facility: HOME HEALTH | Age: 85
End: 2020-04-30
Payer: MEDICARE

## 2020-04-30 ENCOUNTER — HOME CARE VISIT (OUTPATIENT)
Dept: SCHEDULING | Facility: HOME HEALTH | Age: 85
End: 2020-04-30
Payer: MEDICARE

## 2020-04-30 PROCEDURE — G0157 HHC PT ASSISTANT EA 15: HCPCS

## 2020-04-30 PROCEDURE — 3331090002 HH PPS REVENUE DEBIT

## 2020-04-30 PROCEDURE — 3331090001 HH PPS REVENUE CREDIT

## 2020-05-01 ENCOUNTER — HOME CARE VISIT (OUTPATIENT)
Dept: SCHEDULING | Facility: HOME HEALTH | Age: 85
End: 2020-05-01
Payer: MEDICARE

## 2020-05-01 ENCOUNTER — HOME CARE VISIT (OUTPATIENT)
Dept: HOME HEALTH SERVICES | Facility: HOME HEALTH | Age: 85
End: 2020-05-01
Payer: MEDICARE

## 2020-05-01 ENCOUNTER — TELEPHONE (OUTPATIENT)
Dept: INTERNAL MEDICINE CLINIC | Age: 85
End: 2020-05-01

## 2020-05-01 VITALS
TEMPERATURE: 98.6 F | DIASTOLIC BLOOD PRESSURE: 82 MMHG | OXYGEN SATURATION: 95 % | SYSTOLIC BLOOD PRESSURE: 128 MMHG | HEART RATE: 97 BPM

## 2020-05-01 PROCEDURE — G0299 HHS/HOSPICE OF RN EA 15 MIN: HCPCS

## 2020-05-01 PROCEDURE — 3331090001 HH PPS REVENUE CREDIT

## 2020-05-01 PROCEDURE — 3331090002 HH PPS REVENUE DEBIT

## 2020-05-01 NOTE — TELEPHONE ENCOUNTER
[address] [facility]             [place], [place], is requesting a call and is stating that pt recently had a stroke and he is noticing that pt does not want to eat. Best contact number [phone].      Copy/paste  ENVERA

## 2020-05-02 PROCEDURE — 3331090001 HH PPS REVENUE CREDIT

## 2020-05-02 PROCEDURE — 3331090002 HH PPS REVENUE DEBIT

## 2020-05-03 VITALS
TEMPERATURE: 98.1 F | DIASTOLIC BLOOD PRESSURE: 80 MMHG | OXYGEN SATURATION: 98 % | RESPIRATION RATE: 18 BRPM | HEART RATE: 70 BPM | SYSTOLIC BLOOD PRESSURE: 126 MMHG

## 2020-05-03 PROCEDURE — 3331090002 HH PPS REVENUE DEBIT

## 2020-05-03 PROCEDURE — 3331090001 HH PPS REVENUE CREDIT

## 2020-05-04 ENCOUNTER — HOME CARE VISIT (OUTPATIENT)
Dept: SCHEDULING | Facility: HOME HEALTH | Age: 85
End: 2020-05-04
Payer: MEDICARE

## 2020-05-04 ENCOUNTER — HOME CARE VISIT (OUTPATIENT)
Dept: HOME HEALTH SERVICES | Facility: HOME HEALTH | Age: 85
End: 2020-05-04
Payer: MEDICARE

## 2020-05-04 PROCEDURE — G0153 HHCP-SVS OF S/L PATH,EA 15MN: HCPCS

## 2020-05-04 PROCEDURE — G0157 HHC PT ASSISTANT EA 15: HCPCS

## 2020-05-04 PROCEDURE — 3331090002 HH PPS REVENUE DEBIT

## 2020-05-04 PROCEDURE — 3331090001 HH PPS REVENUE CREDIT

## 2020-05-04 RX ORDER — BUMETANIDE 1 MG/1
1 TABLET ORAL 2 TIMES DAILY
Qty: 60 TAB | Refills: 0 | Status: CANCELLED | OUTPATIENT
Start: 2020-05-04

## 2020-05-04 NOTE — TELEPHONE ENCOUNTER
Justus Sicard, MD Climmibenedicto Brewer LPN   Caller: Unspecified (3 days ago,  4:00 PM)             Virtual visit with me first      Spoke with patients son Saide Avitia. Marcello states that when he called, the patient was refusing to eat. Marcello states that he thinks that patient was upset with him and was just refusing to eat. Marcello states that patient is eating fine and acting fine. Marcello advised to contact the office if anything changes. Marcello verbalized understanding of information discussed w/ no further questions at this time.
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
## 2020-05-05 ENCOUNTER — HOME CARE VISIT (OUTPATIENT)
Dept: HOME HEALTH SERVICES | Facility: HOME HEALTH | Age: 85
End: 2020-05-05
Payer: MEDICARE

## 2020-05-05 VITALS
HEART RATE: 92 BPM | TEMPERATURE: 99.1 F | SYSTOLIC BLOOD PRESSURE: 100 MMHG | OXYGEN SATURATION: 92 % | DIASTOLIC BLOOD PRESSURE: 62 MMHG

## 2020-05-05 PROCEDURE — 3331090002 HH PPS REVENUE DEBIT

## 2020-05-05 PROCEDURE — 3331090001 HH PPS REVENUE CREDIT

## 2020-05-05 RX ORDER — BUMETANIDE 1 MG/1
1 TABLET ORAL 2 TIMES DAILY
Qty: 60 TAB | Refills: 0 | Status: SHIPPED | OUTPATIENT
Start: 2020-05-05 | End: 2020-06-03

## 2020-05-06 ENCOUNTER — HOME CARE VISIT (OUTPATIENT)
Dept: HOME HEALTH SERVICES | Facility: HOME HEALTH | Age: 85
End: 2020-05-06
Payer: MEDICARE

## 2020-05-06 VITALS
DIASTOLIC BLOOD PRESSURE: 64 MMHG | OXYGEN SATURATION: 94 % | TEMPERATURE: 98.3 F | HEART RATE: 81 BPM | SYSTOLIC BLOOD PRESSURE: 118 MMHG

## 2020-05-06 PROCEDURE — 3331090001 HH PPS REVENUE CREDIT

## 2020-05-06 PROCEDURE — 3331090002 HH PPS REVENUE DEBIT

## 2020-05-06 PROCEDURE — G0155 HHCP-SVS OF CSW,EA 15 MIN: HCPCS

## 2020-05-07 ENCOUNTER — HOME CARE VISIT (OUTPATIENT)
Dept: SCHEDULING | Facility: HOME HEALTH | Age: 85
End: 2020-05-07
Payer: MEDICARE

## 2020-05-07 PROCEDURE — 3331090001 HH PPS REVENUE CREDIT

## 2020-05-07 PROCEDURE — 3331090002 HH PPS REVENUE DEBIT

## 2020-05-08 ENCOUNTER — HOME CARE VISIT (OUTPATIENT)
Dept: HOME HEALTH SERVICES | Facility: HOME HEALTH | Age: 85
End: 2020-05-08
Payer: MEDICARE

## 2020-05-08 ENCOUNTER — HOME CARE VISIT (OUTPATIENT)
Dept: SCHEDULING | Facility: HOME HEALTH | Age: 85
End: 2020-05-08
Payer: MEDICARE

## 2020-05-08 PROCEDURE — G0157 HHC PT ASSISTANT EA 15: HCPCS

## 2020-05-08 PROCEDURE — 3331090001 HH PPS REVENUE CREDIT

## 2020-05-08 PROCEDURE — 3331090002 HH PPS REVENUE DEBIT

## 2020-05-09 VITALS
OXYGEN SATURATION: 94 % | RESPIRATION RATE: 17 BRPM | HEART RATE: 74 BPM | SYSTOLIC BLOOD PRESSURE: 122 MMHG | TEMPERATURE: 98.2 F | DIASTOLIC BLOOD PRESSURE: 70 MMHG

## 2020-05-09 PROCEDURE — 3331090002 HH PPS REVENUE DEBIT

## 2020-05-09 PROCEDURE — 3331090001 HH PPS REVENUE CREDIT

## 2020-05-10 ENCOUNTER — HOME CARE VISIT (OUTPATIENT)
Dept: HOME HEALTH SERVICES | Facility: HOME HEALTH | Age: 85
End: 2020-05-10
Payer: MEDICARE

## 2020-05-10 PROCEDURE — 3331090001 HH PPS REVENUE CREDIT

## 2020-05-10 PROCEDURE — 3331090002 HH PPS REVENUE DEBIT

## 2020-05-11 ENCOUNTER — HOME CARE VISIT (OUTPATIENT)
Dept: SCHEDULING | Facility: HOME HEALTH | Age: 85
End: 2020-05-11
Payer: MEDICARE

## 2020-05-11 PROCEDURE — 3331090002 HH PPS REVENUE DEBIT

## 2020-05-11 PROCEDURE — G0151 HHCP-SERV OF PT,EA 15 MIN: HCPCS

## 2020-05-11 PROCEDURE — 3331090001 HH PPS REVENUE CREDIT

## 2020-05-11 NOTE — TELEPHONE ENCOUNTER
PCP: Nolan Lockhart MD    Last appt: 4/14/2020  Future Appointments   Date Time Provider David Pérez   5/12/2020 12:00 PM [address]   5/14/2020 To Be Determined [facility]       Requested Prescriptions     Pending Prescriptions Disp Refills    atorvastatin (LIPITOR) 10 mg tablet 30 Tab 0     Sig: Take 1 Tab by mouth nightly.  apixaban (ELIQUIS) 2.5 mg tablet 60 Tab 0     Sig: Take 1 Tab by mouth two (2) times a day.

## 2020-05-12 ENCOUNTER — HOME CARE VISIT (OUTPATIENT)
Dept: HOME HEALTH SERVICES | Facility: HOME HEALTH | Age: 85
End: 2020-05-12
Payer: MEDICARE

## 2020-05-12 ENCOUNTER — HOME CARE VISIT (OUTPATIENT)
Dept: SCHEDULING | Facility: HOME HEALTH | Age: 85
End: 2020-05-12
Payer: MEDICARE

## 2020-05-12 VITALS
TEMPERATURE: 97.6 F | HEART RATE: 76 BPM | OXYGEN SATURATION: 98 % | SYSTOLIC BLOOD PRESSURE: 122 MMHG | DIASTOLIC BLOOD PRESSURE: 72 MMHG | RESPIRATION RATE: 18 BRPM

## 2020-05-12 PROCEDURE — G0153 HHCP-SVS OF S/L PATH,EA 15MN: HCPCS

## 2020-05-12 PROCEDURE — 3331090001 HH PPS REVENUE CREDIT

## 2020-05-12 PROCEDURE — 3331090002 HH PPS REVENUE DEBIT

## 2020-05-12 RX ORDER — ATORVASTATIN CALCIUM 10 MG/1
10 TABLET, FILM COATED ORAL
Qty: 30 TAB | Refills: 0 | Status: SHIPPED | OUTPATIENT
Start: 2020-05-12 | End: 2020-06-03

## 2020-05-12 NOTE — TELEPHONE ENCOUNTER
[address] states that pt used to get from another physician. Pt is out of this medication and needs as soon as possible. Can these both be 90 day scripts? This would be easier on daughter for picking up. Please call Rosemary Marroquin when this has been sent to the pharmacy.

## 2020-05-13 VITALS
TEMPERATURE: 98.6 F | HEART RATE: 82 BPM | SYSTOLIC BLOOD PRESSURE: 90 MMHG | OXYGEN SATURATION: 92 % | DIASTOLIC BLOOD PRESSURE: 50 MMHG

## 2020-05-13 PROCEDURE — 3331090001 HH PPS REVENUE CREDIT

## 2020-05-13 PROCEDURE — 3331090002 HH PPS REVENUE DEBIT

## 2020-05-14 PROCEDURE — 3331090001 HH PPS REVENUE CREDIT

## 2020-05-14 PROCEDURE — 3331090002 HH PPS REVENUE DEBIT

## 2020-05-18 ENCOUNTER — TELEPHONE (OUTPATIENT)
Dept: INTERNAL MEDICINE CLINIC | Age: 85
End: 2020-05-18

## 2020-05-18 DIAGNOSIS — R32 URINARY INCONTINENCE, UNSPECIFIED TYPE: Primary | ICD-10-CM

## 2020-05-18 NOTE — TELEPHONE ENCOUNTER
Needs adult diapers sent to home care delivered, they need a order faxed to them for this. [phone]  She is having trouble sleeping asking what to do about this.

## 2020-05-19 NOTE — TELEPHONE ENCOUNTER
I ordered diapers please fax to patient  As fasr as insomnia please advise patient to try 10mg of belsomra ( currently on 5 mg) and if effective I can change prescription

## 2020-05-21 RX ORDER — SERTRALINE HYDROCHLORIDE 25 MG/1
TABLET, FILM COATED ORAL
Qty: 30 TAB | Refills: 0 | Status: SHIPPED | OUTPATIENT
Start: 2020-05-21 | End: 2020-06-03

## 2020-05-24 DIAGNOSIS — F51.01 PRIMARY INSOMNIA: Primary | ICD-10-CM

## 2020-05-26 RX ORDER — SUVOREXANT 10 MG/1
TABLET, FILM COATED ORAL
Qty: 30 TAB | Refills: 0 | Status: SHIPPED | OUTPATIENT
Start: 2020-05-26 | End: 2020-06-22

## 2020-05-27 ENCOUNTER — VIRTUAL VISIT (OUTPATIENT)
Dept: INTERNAL MEDICINE CLINIC | Age: 85
End: 2020-05-27

## 2020-05-27 ENCOUNTER — TELEPHONE (OUTPATIENT)
Dept: INTERNAL MEDICINE CLINIC | Age: 85
End: 2020-05-27

## 2020-05-27 DIAGNOSIS — R60.9 EDEMA, UNSPECIFIED TYPE: Primary | ICD-10-CM

## 2020-05-27 NOTE — TELEPHONE ENCOUNTER
Patient's daughter, Bladimir Forman, is requesting another call/return call back before the end of the day today please.  Thank you

## 2020-05-27 NOTE — TELEPHONE ENCOUNTER
Patient's daughter, Guera Hubbard, needs a call back to discuss patient's Legs & Feet swelling. Guera Hubbard states patient's legs are swelling so bad this morning that they're oozing & would like to know what can be done. Guera Hubbard states \"patient is not very cooperative when it comes to Elevating her legs for the Swelling\". Please call to advise.  Thank you

## 2020-05-27 NOTE — TELEPHONE ENCOUNTER
Called patient's daughter back. No answer. Left voicemail for patients daughter to call the office back.

## 2020-05-27 NOTE — TELEPHONE ENCOUNTER
Spoke with patients daughter Ciarra. Two pt identifiers confirmed. [facility] states that patient is having bilateral lower extremity edema with weeping. Advised that Dr. Ceci Lozano is not in the office today. Offered an appointment with Dr. Mccauley Client. Appointment accepted. Patient advised if anything changes or if unable to keep this appointment to call the office  Pt verbalized understanding of information discussed w/ no further questions at this time.
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
## 2020-06-01 RX ORDER — PRAMIPEXOLE DIHYDROCHLORIDE 0.5 MG/1
0.5 TABLET ORAL DAILY
Qty: 60 TAB | Refills: 1 | Status: SHIPPED | OUTPATIENT
Start: 2020-06-01 | End: 2020-09-24 | Stop reason: SDUPTHER

## 2020-06-01 NOTE — TELEPHONE ENCOUNTER
PCP: Lashae Longo MD    Last appt: 5/27/2020  No future appointments. Requested Prescriptions     Pending Prescriptions Disp Refills    pramipexole (MIRAPEX) 0.5 mg tablet 60 Tab 1     Sig: Take 1 Tab by mouth daily.

## 2020-06-03 RX ORDER — ATORVASTATIN CALCIUM 10 MG/1
TABLET, FILM COATED ORAL
Qty: 30 TAB | Refills: 0 | Status: SHIPPED | OUTPATIENT
Start: 2020-06-03 | End: 2020-07-03

## 2020-06-03 RX ORDER — SERTRALINE HYDROCHLORIDE 25 MG/1
TABLET, FILM COATED ORAL
Qty: 30 TAB | Refills: 0 | Status: SHIPPED | OUTPATIENT
Start: 2020-06-03 | End: 2020-07-03

## 2020-06-03 RX ORDER — BUMETANIDE 1 MG/1
TABLET ORAL
Qty: 60 TAB | Refills: 0 | Status: SHIPPED | OUTPATIENT
Start: 2020-06-03 | End: 2020-07-03

## 2020-06-03 RX ORDER — APIXABAN 2.5 MG/1
TABLET, FILM COATED ORAL
Qty: 60 TAB | Refills: 0 | Status: SHIPPED | OUTPATIENT
Start: 2020-06-03 | End: 2020-07-03

## 2020-06-08 ENCOUNTER — TELEPHONE (OUTPATIENT)
Dept: INTERNAL MEDICINE CLINIC | Age: 85
End: 2020-06-08

## 2020-06-08 DIAGNOSIS — M79.89 LEG SWELLING: ICD-10-CM

## 2020-06-08 DIAGNOSIS — I10 ESSENTIAL HYPERTENSION: ICD-10-CM

## 2020-06-08 DIAGNOSIS — R60.9 EDEMA, UNSPECIFIED TYPE: Primary | ICD-10-CM

## 2020-06-08 NOTE — TELEPHONE ENCOUNTER
Daughter, Dimitrios Anguiano states pt Won't stay of feet/legs. Legs are swelling. What does Dimitrios Anguiano do? Pt goes to bed and then sneaks up later on. Nothing is helping as she won't stay off her legs. Her legs are oozing with bed being wet in the morning and she is so concerned as to what to do for her. Please call to advise.     #[phone]

## 2020-06-09 NOTE — TELEPHONE ENCOUNTER
MD Reji London, LPN   Caller: Unspecified Trevon Arroyo, 12:24 PM)             Is she taking the second dose of bumex? Per chart she should be taking the second dose of fluid pill      Spoke with patients daughter Susan Tomlinson. Two pt identifiers confirmed. Susan Tomlinson verified that patient is taking the Bumex twice a day. Susan Tomlinson states that she can not get patient to stay off of her legs during the day. Susan Tomlinson verified that patient does not consume salt. Sonya Prescotts that I will check with Dr. Ginny Gay and will give her a call back as soon as I can. Susan Tomlinson verbalized understanding of information discussed w/ no further questions at this time.

## 2020-06-09 NOTE — TELEPHONE ENCOUNTER
MD Vera Roberson, LPN   Caller: Unspecified Leobardo Whitaker, 12:24 PM)             Will she tolerate wrapping her legs if we send a nurse out to the home to do it? Spoke with [address] [place] [address]  Two pt identifiers confirmed. [address] [place] [address] states that she thinks that patient would do fine with having home health come out and wrap her legs. [address] [place] [address] would like to have her number put on the home health referral at the contact. [phone]. Advised that I will make Dr. Bogdan Villarreal aware. Pt verbalized understanding of information discussed w/ no further questions at this time.

## 2020-06-10 ENCOUNTER — TELEPHONE (OUTPATIENT)
Dept: INTERNAL MEDICINE CLINIC | Age: 85
End: 2020-06-10

## 2020-06-10 DIAGNOSIS — I10 ESSENTIAL HYPERTENSION: Primary | ICD-10-CM

## 2020-06-10 DIAGNOSIS — R60.9 EDEMA, UNSPECIFIED TYPE: ICD-10-CM

## 2020-06-10 DIAGNOSIS — I50.9 CONGESTIVE HEART FAILURE, UNSPECIFIED HF CHRONICITY, UNSPECIFIED HEART FAILURE TYPE (HCC): ICD-10-CM

## 2020-06-10 NOTE — TELEPHONE ENCOUNTER
Spoke with Bárbara with [facility] [address] states that edema is not a covered diagnosis for [facility]. Diagnosis needs to be changed to something that represents the cause of the edema.

## 2020-06-10 NOTE — TELEPHONE ENCOUNTER
[facility] states she's calling to advise that patient Does Not have Insurance Coverage for Incontinence Supplies requested for the patient. Please call if any further questions or information needed.  Thank you      Ph# is [phone]  Ext. [phone]

## 2020-06-10 NOTE — TELEPHONE ENCOUNTER
Spoke with [facility]. Two pt identifiers confirmed. [facility] states that medicare does not cover incontinence supplies. [facility] states that if patient has medicaid and/or a medicaid supplement they will cover these supplies. [facility] provided with patients Medicaid information and will call back if there are any more issues.

## 2020-06-10 NOTE — TELEPHONE ENCOUNTER
[facility] called to advise that Incontinence supplies shipped out. Please call if any questions. Thank you.       # [phone]   Ext.  [phone]

## 2020-06-11 NOTE — TELEPHONE ENCOUNTER
Redone please let home health know  Hopefully heart failure is accepted  Can they send us a list of codes that are acceptable to avoid this back and forth seems to be happening with every request

## 2020-06-12 ENCOUNTER — TELEPHONE (OUTPATIENT)
Dept: INTERNAL MEDICINE CLINIC | Age: 85
End: 2020-06-12

## 2020-06-12 ENCOUNTER — VIRTUAL VISIT (OUTPATIENT)
Dept: INTERNAL MEDICINE CLINIC | Age: 85
End: 2020-06-12

## 2020-06-12 DIAGNOSIS — F41.9 ANXIETY: ICD-10-CM

## 2020-06-12 DIAGNOSIS — F51.01 PRIMARY INSOMNIA: ICD-10-CM

## 2020-06-12 DIAGNOSIS — J44.9 CHRONIC OBSTRUCTIVE PULMONARY DISEASE, UNSPECIFIED COPD TYPE (HCC): ICD-10-CM

## 2020-06-12 DIAGNOSIS — I27.20 PULMONARY HTN (HCC): Primary | ICD-10-CM

## 2020-06-12 DIAGNOSIS — M79.89 LEG SWELLING: ICD-10-CM

## 2020-06-12 DIAGNOSIS — E78.2 MIXED HYPERLIPIDEMIA: ICD-10-CM
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
## 2020-06-12 NOTE — TELEPHONE ENCOUNTER
Received this message this morning from [facility] with MICHAEL PINK [facility]:    Good morning Harriet Castorena, for Mrs. Anil Yarbrough. Dr. Ceci Lozano put in a new order but if you see notes on 5.27.20 her dx is not mentioned there. Please have PCP do a video virtual visit with pt. DX and notes must be match. on visit done 5.27.20 when you see the assessment plan: the only mention is leg jorge. no other dx mention. Sorry. This is not our rules. This is per CMS compliance.

## 2020-06-12 NOTE — TELEPHONE ENCOUNTER
Spoke with patients daughter Kaleb Dillard. Two pt identifiers confirmed. Kaleb Dillard advised that the patient needs to have a virtual visit with Dr. Saran Brandt, we need an up to date office note on the patient for [facility]. Kaleb Dillard offered an appointment for the patient this afternoon, 06/12/2020. Appointment accepted. Patient advised if anything changes or if unable to keep this appointment to call the office  Pt verbalized understanding of information discussed w/ no further questions at this time.

## 2020-06-12 NOTE — PROGRESS NOTES
Reviewed record in preparation for visit and have obtained necessary documentation. Identified pt with two pt identifiers(name and ). Chief Complaint   Patient presents with    Leg Swelling     follow up        Health Maintenance Due   Topic Date Due    DTaP/Tdap/Td  (1 - Tdap) 1956    Shingles Vaccine (1 of 2) 1985    Bone Mineral Density   2000    Pneumococcal Vaccine (1 of 1 - PPSV23) 10/05/2015       Ms. Elliot Alberto has a reminder for a \"due or due soon\" health maintenance. I have asked that she discuss this further with her primary care provider for follow-up on this health maintenance. Coordination of Care Questionnaire:  :     1) Have you been to an emergency room, urgent care clinic since your last visit? no   Hospitalized since your last visit? no             2) Have you seen or consulted any other health care providers outside of [address] [place] [address] since your last visit? no  (Include any pap smears or colon screenings in this section.)    3) In the event something were to happen to you and you were unable to speak on your behalf, do you have an Advance Directive/ Living Will in place stating your wishes? NO    Do you have an Advance Directive on file? no    4) Are you interested in receiving information on Advance Directives? NO    Patient is accompanied by self I have received verbal consent from Lawerance Skill to discuss any/all medical information while they are present in the room.

## 2020-06-15 ENCOUNTER — HOME HEALTH ADMISSION (OUTPATIENT)
Dept: HOME HEALTH SERVICES | Facility: HOME HEALTH | Age: 85
End: 2020-06-15
Payer: MEDICARE

## 2020-06-15 ENCOUNTER — TELEPHONE (OUTPATIENT)
Dept: INTERNAL MEDICINE CLINIC | Age: 85
End: 2020-06-15

## 2020-06-16 ENCOUNTER — HOME CARE VISIT (OUTPATIENT)
Dept: HOME HEALTH SERVICES | Facility: HOME HEALTH | Age: 85
End: 2020-06-16

## 2020-06-16 LAB
BASOPHILS # BLD AUTO: 0 X10E3/UL (ref 0–0.2)
BASOPHILS NFR BLD AUTO: 0 %
BUN SERPL-MCNC: 35 MG/DL (ref 8–27)
BUN/CREAT SERPL: 29 (ref 12–28)
CALCIUM SERPL-MCNC: 9.3 MG/DL (ref 8.7–10.3)
CHLORIDE SERPL-SCNC: 100 MMOL/L (ref 96–106)
CO2 SERPL-SCNC: 26 MMOL/L (ref 20–29)
CREAT SERPL-MCNC: 1.19 MG/DL (ref 0.57–1)
EOSINOPHIL # BLD AUTO: 0 X10E3/UL (ref 0–0.4)
EOSINOPHIL NFR BLD AUTO: 0 %
ERYTHROCYTE [DISTWIDTH] IN BLOOD BY AUTOMATED COUNT: 14.4 % (ref 11.7–15.4)
FERRITIN SERPL-MCNC: 20 NG/ML (ref 15–150)
GLUCOSE SERPL-MCNC: 183 MG/DL (ref 65–99)
HBA1C MFR BLD: 6.9 % (ref 4.8–5.6)
HCT VFR BLD AUTO: 30.3 % (ref 34–46.6)
HGB BLD-MCNC: 8.7 G/DL (ref 11.1–15.9)
IMM GRANULOCYTES # BLD AUTO: 0 X10E3/UL (ref 0–0.1)
IMM GRANULOCYTES NFR BLD AUTO: 0 %
IRON SATN MFR SERPL: 4 % (ref 15–55)
IRON SERPL-MCNC: 19 UG/DL (ref 27–139)
LYMPHOCYTES # BLD AUTO: 2.1 X10E3/UL (ref 0.7–3.1)
LYMPHOCYTES NFR BLD AUTO: 19 %
MCH RBC QN AUTO: 22.6 PG (ref 26.6–33)
MCHC RBC AUTO-ENTMCNC: 28.7 G/DL (ref 31.5–35.7)
MCV RBC AUTO: 79 FL (ref 79–97)
MONOCYTES # BLD AUTO: 0.3 X10E3/UL (ref 0.1–0.9)
MONOCYTES NFR BLD AUTO: 2 %
NEUTROPHILS # BLD AUTO: 9 X10E3/UL (ref 1.4–7)
NEUTROPHILS NFR BLD AUTO: 79 %
PLATELET # BLD AUTO: 278 X10E3/UL (ref 150–450)
POTASSIUM SERPL-SCNC: 5 MMOL/L (ref 3.5–5.2)
RBC # BLD AUTO: 3.85 X10E6/UL (ref 3.77–5.28)
SODIUM SERPL-SCNC: 142 MMOL/L (ref 134–144)
TIBC SERPL-MCNC: 432 UG/DL (ref 250–450)
UIBC SERPL-MCNC: 413 UG/DL (ref 118–369)
VIT B12 SERPL-MCNC: 366 PG/ML (ref 232–1245)
WBC # BLD AUTO: 11.4 X10E3/UL (ref 3.4–10.8)

## 2020-06-18 ENCOUNTER — TELEPHONE (OUTPATIENT)
Dept: INTERNAL MEDICINE CLINIC | Age: 85
End: 2020-06-18

## 2020-06-18 ENCOUNTER — HOME CARE VISIT (OUTPATIENT)
Dept: SCHEDULING | Facility: HOME HEALTH | Age: 85
End: 2020-06-18
Payer: MEDICARE

## 2020-06-18 VITALS
OXYGEN SATURATION: 91 % | TEMPERATURE: 98.4 F | DIASTOLIC BLOOD PRESSURE: 54 MMHG | SYSTOLIC BLOOD PRESSURE: 88 MMHG | HEIGHT: 63 IN | WEIGHT: 112 LBS | BODY MASS INDEX: 19.84 KG/M2 | HEART RATE: 81 BPM | RESPIRATION RATE: 18 BRPM

## 2020-06-18 DIAGNOSIS — J44.9 CHRONIC OBSTRUCTIVE PULMONARY DISEASE, UNSPECIFIED COPD TYPE (HCC): ICD-10-CM

## 2020-06-18 DIAGNOSIS — M79.89 LEG SWELLING: ICD-10-CM

## 2020-06-18 DIAGNOSIS — I27.20 PULMONARY HTN (HCC): Primary | ICD-10-CM

## 2020-06-18 PROCEDURE — 3331090001 HH PPS REVENUE CREDIT

## 2020-06-18 PROCEDURE — 400013 HH SOC

## 2020-06-18 PROCEDURE — 3331090002 HH PPS REVENUE DEBIT

## 2020-06-18 PROCEDURE — G0299 HHS/HOSPICE OF RN EA 15 MIN: HCPCS

## 2020-06-18 NOTE — TELEPHONE ENCOUNTER
----- [facility] at 2020  4:27 PM EDT -----  Regarding: Dr. Selena Pearson: [phone]  Caller's first and last name: Lioneldayan Butcher   Reason for call:  Pt fell, needs order for hospital bed. Insurance co-ordinator Roland Ang can be reached VU:[phone]   Callback required yes/no and why: Yes, to inform.    Best contact number(s): [phone]  Details to clarify the request: N/A        Copy/paste envera

## 2020-06-18 NOTE — TELEPHONE ENCOUNTER
----- Message from Vicente Beyer sent at 6/18/2020 10:40 AM EDT -----  Regarding: Dr. Vickie Beach: [phone]  Trent Sequeira Rn with [facility] is calling to secure wound care orders for patient's R wrist and R lower leg. She is also requesting an order to use tuba  for lower patient's lower extremities.       Copy/paste envera

## 2020-06-18 NOTE — TELEPHONE ENCOUNTER
Spoke with [place] with [facility]. Verbal order given to use tuba .

## 2020-06-19 PROCEDURE — 3331090001 HH PPS REVENUE CREDIT

## 2020-06-19 PROCEDURE — 3331090002 HH PPS REVENUE DEBIT

## 2020-06-20 ENCOUNTER — HOME CARE VISIT (OUTPATIENT)
Dept: SCHEDULING | Facility: HOME HEALTH | Age: 85
End: 2020-06-20
Payer: MEDICARE

## 2020-06-20 VITALS
HEART RATE: 78 BPM | OXYGEN SATURATION: 96 % | DIASTOLIC BLOOD PRESSURE: 62 MMHG | WEIGHT: 111 LBS | BODY MASS INDEX: 19.66 KG/M2 | SYSTOLIC BLOOD PRESSURE: 104 MMHG

## 2020-06-20 DIAGNOSIS — F51.01 PRIMARY INSOMNIA: ICD-10-CM

## 2020-06-20 PROCEDURE — G0300 HHS/HOSPICE OF LPN EA 15 MIN: HCPCS

## 2020-06-20 PROCEDURE — 3331090002 HH PPS REVENUE DEBIT

## 2020-06-20 PROCEDURE — 3331090001 HH PPS REVENUE CREDIT

## 2020-06-21 PROCEDURE — 3331090001 HH PPS REVENUE CREDIT

## 2020-06-21 PROCEDURE — 3331090002 HH PPS REVENUE DEBIT

## 2020-06-22 ENCOUNTER — HOME CARE VISIT (OUTPATIENT)
Dept: SCHEDULING | Facility: HOME HEALTH | Age: 85
End: 2020-06-22
Payer: MEDICARE

## 2020-06-22 PROCEDURE — 3331090002 HH PPS REVENUE DEBIT

## 2020-06-22 PROCEDURE — 3331090001 HH PPS REVENUE CREDIT

## 2020-06-22 PROCEDURE — G0299 HHS/HOSPICE OF RN EA 15 MIN: HCPCS

## 2020-06-22 RX ORDER — SUVOREXANT 10 MG/1
TABLET, FILM COATED ORAL
Qty: 30 TAB | Refills: 0 | Status: SHIPPED | OUTPATIENT
Start: 2020-06-22 | End: 2020-06-30 | Stop reason: DRUGHIGH

## 2020-06-23 VITALS
TEMPERATURE: 96.9 F | HEART RATE: 86 BPM | RESPIRATION RATE: 18 BRPM | DIASTOLIC BLOOD PRESSURE: 62 MMHG | SYSTOLIC BLOOD PRESSURE: 105 MMHG | OXYGEN SATURATION: 95 %

## 2020-06-23 PROCEDURE — 3331090001 HH PPS REVENUE CREDIT

## 2020-06-23 PROCEDURE — 3331090002 HH PPS REVENUE DEBIT

## 2020-06-24 ENCOUNTER — HOME CARE VISIT (OUTPATIENT)
Dept: SCHEDULING | Facility: HOME HEALTH | Age: 85
End: 2020-06-24
Payer: MEDICARE

## 2020-06-24 VITALS
TEMPERATURE: 97.7 F | HEART RATE: 63 BPM | OXYGEN SATURATION: 94 % | DIASTOLIC BLOOD PRESSURE: 72 MMHG | RESPIRATION RATE: 20 BRPM | SYSTOLIC BLOOD PRESSURE: 120 MMHG

## 2020-06-24 PROCEDURE — 3331090001 HH PPS REVENUE CREDIT

## 2020-06-24 PROCEDURE — 3331090002 HH PPS REVENUE DEBIT

## 2020-06-24 PROCEDURE — G0300 HHS/HOSPICE OF LPN EA 15 MIN: HCPCS

## 2020-06-25 ENCOUNTER — TELEPHONE (OUTPATIENT)
Dept: INTERNAL MEDICINE CLINIC | Age: 85
End: 2020-06-25

## 2020-06-25 PROCEDURE — 3331090001 HH PPS REVENUE CREDIT

## 2020-06-25 PROCEDURE — 3331090002 HH PPS REVENUE DEBIT

## 2020-06-25 NOTE — TELEPHONE ENCOUNTER
Anemia is more pronounced than previous we need a virtual visit to discuss ASAP  Diabetes control and B12 are ok

## 2020-06-25 NOTE — TELEPHONE ENCOUNTER
----- Message from [place] sent at 6/25/2020  9:38 AM EDT -----  Regarding: Lashae Longo MD  Level 1/Escalated Issue      Caller's first and last name and relationship (if not the patient):  [place] contact number(s): [phone]        What are the symptoms: Lower Abdominal Pains       Transfer successful - yes/no (include outcome): N/A      Transfer declined - yes/no (include reason): N/A      Was caller advised to seek appropriate level of care - yes/no: Yes      Details to clarify the request: caller requesting to have in-office appt scheduled before 07.02.2020        Roshan Fisher      Copy/paste envera

## 2020-06-26 ENCOUNTER — HOME CARE VISIT (OUTPATIENT)
Dept: SCHEDULING | Facility: HOME HEALTH | Age: 85
End: 2020-06-26
Payer: MEDICARE

## 2020-06-26 PROCEDURE — 3331090001 HH PPS REVENUE CREDIT

## 2020-06-26 PROCEDURE — 3331090002 HH PPS REVENUE DEBIT

## 2020-06-26 PROCEDURE — G0299 HHS/HOSPICE OF RN EA 15 MIN: HCPCS

## 2020-06-26 NOTE — TELEPHONE ENCOUNTER
Spoke with patients son Gurvinder Lim. Two pt identifiers confirmed. Advised Marcello that per Dr. Dee Hou we can work the patient in for an in office visit on 06/30/2020. Discussed protocol for in office visit. Marcello verbalized understanding of information discussed w/ no further questions at this time.

## 2020-06-27 PROCEDURE — 3331090001 HH PPS REVENUE CREDIT

## 2020-06-27 PROCEDURE — 3331090002 HH PPS REVENUE DEBIT

## 2020-06-28 VITALS
DIASTOLIC BLOOD PRESSURE: 72 MMHG | RESPIRATION RATE: 18 BRPM | TEMPERATURE: 98.5 F | SYSTOLIC BLOOD PRESSURE: 150 MMHG | HEART RATE: 80 BPM | OXYGEN SATURATION: 99 %

## 2020-06-28 PROCEDURE — 3331090001 HH PPS REVENUE CREDIT

## 2020-06-28 PROCEDURE — 3331090002 HH PPS REVENUE DEBIT

## 2020-06-29 ENCOUNTER — HOME CARE VISIT (OUTPATIENT)
Dept: SCHEDULING | Facility: HOME HEALTH | Age: 85
End: 2020-06-29
Payer: MEDICARE

## 2020-06-29 PROCEDURE — G0299 HHS/HOSPICE OF RN EA 15 MIN: HCPCS

## 2020-06-29 PROCEDURE — 3331090001 HH PPS REVENUE CREDIT

## 2020-06-29 PROCEDURE — 3331090002 HH PPS REVENUE DEBIT

## 2020-06-30 ENCOUNTER — OFFICE VISIT (OUTPATIENT)
Dept: INTERNAL MEDICINE CLINIC | Age: 85
End: 2020-06-30

## 2020-06-30 VITALS
BODY MASS INDEX: 20.02 KG/M2 | WEIGHT: 113 LBS | TEMPERATURE: 98.2 F | DIASTOLIC BLOOD PRESSURE: 56 MMHG | HEART RATE: 80 BPM | OXYGEN SATURATION: 96 % | SYSTOLIC BLOOD PRESSURE: 82 MMHG | RESPIRATION RATE: 18 BRPM

## 2020-06-30 VITALS
OXYGEN SATURATION: 93 % | RESPIRATION RATE: 18 BRPM | DIASTOLIC BLOOD PRESSURE: 56 MMHG | BODY MASS INDEX: 20.02 KG/M2 | TEMPERATURE: 98.5 F | SYSTOLIC BLOOD PRESSURE: 101 MMHG | HEIGHT: 63 IN | WEIGHT: 113 LBS | HEART RATE: 68 BPM

## 2020-06-30 DIAGNOSIS — K59.00 CONSTIPATION, UNSPECIFIED CONSTIPATION TYPE: ICD-10-CM

## 2020-06-30 DIAGNOSIS — J44.9 CHRONIC OBSTRUCTIVE PULMONARY DISEASE, UNSPECIFIED COPD TYPE (HCC): ICD-10-CM

## 2020-06-30 DIAGNOSIS — F51.01 PRIMARY INSOMNIA: Primary | ICD-10-CM

## 2020-06-30 DIAGNOSIS — D64.9 ANEMIA, UNSPECIFIED TYPE: ICD-10-CM

## 2020-06-30 DIAGNOSIS — R10.30 LOWER ABDOMINAL PAIN: ICD-10-CM

## 2020-06-30 PROCEDURE — 3331090002 HH PPS REVENUE DEBIT

## 2020-06-30 PROCEDURE — 3331090001 HH PPS REVENUE CREDIT

## 2020-06-30 RX ORDER — FAMOTIDINE 20 MG/1
20 TABLET, FILM COATED ORAL 2 TIMES DAILY
Qty: 180 TAB | Refills: 2 | Status: SHIPPED | OUTPATIENT
Start: 2020-06-30 | End: 2021-03-11

## 2020-06-30 NOTE — PROGRESS NOTES
Reviewed record in preparation for visit and have obtained necessary documentation. Identified pt with two pt identifiers(name and ). Chief Complaint   Patient presents with    Abdominal Pain    Sleep Problem       Health Maintenance Due   Topic Date Due    Diabetic Foot Care  1945    Albumin Urine Test  1945    Eye Exam  1945    DTaP/Tdap/Td  (1 - Tdap) 1956    Shingles Vaccine (1 of 2) 1985    Bone Mineral Density   2000    Pneumococcal Vaccine (1 of 1 - PPSV23) 10/05/2015       Ms. Dorsi Olivier has a reminder for a \"due or due soon\" health maintenance. I have asked that she discuss this further with her primary care provider for follow-up on this health maintenance. Coordination of Care Questionnaire:  :     1) Have you been to an emergency room, urgent care clinic since your last visit? no   Hospitalized since your last visit? no             2) Have you seen or consulted any other health care providers outside of [address] [place] [address] since your last visit? no  (Include any pap smears or colon screenings in this section.)    3) In the event something were to happen to you and you were unable to speak on your behalf, do you have an Advance Directive/ Living Will in place stating your wishes? NO    Do you have an Advance Directive on file? no    4) Are you interested in receiving information on Advance Directives? NO    Patient is accompanied by self I have received verbal consent from Garrison Mcfarlane to discuss any/all medical information while they are present in the room.
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
## 2020-06-30 NOTE — PATIENT INSTRUCTIONS
Lets increase the belsomra to 15mg daily Will start iron once a day if causes constipation take it every other day Anemia looks like iron deficient anemia Will obtain x ray of abdomen and chest today Repeat blood count next week Talk to Dr Selin Lynch about lowering the dose of prednisone to eventually come off 
 
Start a medication to protect her stomach -famotidine twice a da Stool test is negative for blood

## 2020-07-01 ENCOUNTER — TELEPHONE (OUTPATIENT)
Dept: INTERNAL MEDICINE CLINIC | Age: 85
End: 2020-07-01

## 2020-07-01 ENCOUNTER — HOME CARE VISIT (OUTPATIENT)
Dept: SCHEDULING | Facility: HOME HEALTH | Age: 85
End: 2020-07-01
Payer: MEDICARE

## 2020-07-01 VITALS
HEART RATE: 60 BPM | TEMPERATURE: 96.9 F | DIASTOLIC BLOOD PRESSURE: 78 MMHG | RESPIRATION RATE: 18 BRPM | OXYGEN SATURATION: 97 % | SYSTOLIC BLOOD PRESSURE: 122 MMHG

## 2020-07-01 PROCEDURE — 3331090001 HH PPS REVENUE CREDIT

## 2020-07-01 PROCEDURE — A6446 CONFORM BAND S W>=3" <5"/YD: HCPCS

## 2020-07-01 PROCEDURE — G0300 HHS/HOSPICE OF LPN EA 15 MIN: HCPCS

## 2020-07-01 PROCEDURE — 3331090002 HH PPS REVENUE DEBIT

## 2020-07-01 PROCEDURE — A6260 WOUND CLEANSER ANY TYPE/SIZE: HCPCS

## 2020-07-01 NOTE — TELEPHONE ENCOUNTER
Lo//Jose  states she needs a call back to discuss \"Request that was made per patient's son for [address] for patient when patient fell a couple of weeks ago\". Beecher Sicard states patient's son advised he requested & has not gotten a response or update. Please call Beecher Sicard to discuss.  Thank you

## 2020-07-02 PROCEDURE — 3331090002 HH PPS REVENUE DEBIT

## 2020-07-02 PROCEDURE — 3331090001 HH PPS REVENUE CREDIT

## 2020-07-03 ENCOUNTER — HOME CARE VISIT (OUTPATIENT)
Dept: SCHEDULING | Facility: HOME HEALTH | Age: 85
End: 2020-07-03
Payer: MEDICARE

## 2020-07-03 PROCEDURE — G0299 HHS/HOSPICE OF RN EA 15 MIN: HCPCS

## 2020-07-03 PROCEDURE — 3331090001 HH PPS REVENUE CREDIT

## 2020-07-03 PROCEDURE — 3331090002 HH PPS REVENUE DEBIT

## 2020-07-03 RX ORDER — BUMETANIDE 1 MG/1
TABLET ORAL
Qty: 60 TAB | Refills: 0 | Status: SHIPPED | OUTPATIENT
Start: 2020-07-03 | End: 2020-08-02

## 2020-07-03 RX ORDER — SERTRALINE HYDROCHLORIDE 25 MG/1
TABLET, FILM COATED ORAL
Qty: 30 TAB | Refills: 0 | Status: SHIPPED | OUTPATIENT
Start: 2020-07-03 | End: 2020-08-02

## 2020-07-03 RX ORDER — ATORVASTATIN CALCIUM 10 MG/1
TABLET, FILM COATED ORAL
Qty: 30 TAB | Refills: 0 | Status: SHIPPED | OUTPATIENT
Start: 2020-07-03 | End: 2020-08-02

## 2020-07-03 RX ORDER — APIXABAN 2.5 MG/1
TABLET, FILM COATED ORAL
Qty: 60 TAB | Refills: 0 | Status: SHIPPED | OUTPATIENT
Start: 2020-07-03 | End: 2020-08-02

## 2020-07-04 VITALS
RESPIRATION RATE: 18 BRPM | SYSTOLIC BLOOD PRESSURE: 149 MMHG | TEMPERATURE: 98.6 F | DIASTOLIC BLOOD PRESSURE: 81 MMHG | OXYGEN SATURATION: 98 % | HEART RATE: 87 BPM

## 2020-07-04 PROCEDURE — 3331090002 HH PPS REVENUE DEBIT

## 2020-07-04 PROCEDURE — 3331090001 HH PPS REVENUE CREDIT

## 2020-07-05 PROCEDURE — 3331090001 HH PPS REVENUE CREDIT

## 2020-07-05 PROCEDURE — 3331090002 HH PPS REVENUE DEBIT

## 2020-07-06 PROCEDURE — 3331090002 HH PPS REVENUE DEBIT

## 2020-07-06 PROCEDURE — 3331090001 HH PPS REVENUE CREDIT

## 2020-07-07 ENCOUNTER — HOME CARE VISIT (OUTPATIENT)
Dept: SCHEDULING | Facility: HOME HEALTH | Age: 85
End: 2020-07-07
Payer: MEDICARE

## 2020-07-07 VITALS
WEIGHT: 112.8 LBS | DIASTOLIC BLOOD PRESSURE: 64 MMHG | TEMPERATURE: 97.8 F | HEART RATE: 66 BPM | OXYGEN SATURATION: 94 % | SYSTOLIC BLOOD PRESSURE: 114 MMHG | BODY MASS INDEX: 19.98 KG/M2

## 2020-07-07 LAB
ALBUMIN SERPL-MCNC: 3.6 G/DL (ref 3.6–4.6)
ALBUMIN/GLOB SERPL: 2.1 {RATIO} (ref 1.2–2.2)
ALP SERPL-CCNC: 86 IU/L (ref 39–117)
ALT SERPL-CCNC: 9 IU/L (ref 0–32)
AST SERPL-CCNC: 14 IU/L (ref 0–40)
BASOPHILS # BLD AUTO: 0 X10E3/UL (ref 0–0.2)
BASOPHILS NFR BLD AUTO: 0 %
BILIRUB SERPL-MCNC: 0.2 MG/DL (ref 0–1.2)
BUN SERPL-MCNC: 23 MG/DL (ref 8–27)
BUN/CREAT SERPL: 19 (ref 12–28)
CALCIUM SERPL-MCNC: 8.8 MG/DL (ref 8.7–10.3)
CHLORIDE SERPL-SCNC: 103 MMOL/L (ref 96–106)
CO2 SERPL-SCNC: 29 MMOL/L (ref 20–29)
CREAT SERPL-MCNC: 1.22 MG/DL (ref 0.57–1)
EOSINOPHIL # BLD AUTO: 0 X10E3/UL (ref 0–0.4)
EOSINOPHIL NFR BLD AUTO: 0 %
ERYTHROCYTE [DISTWIDTH] IN BLOOD BY AUTOMATED COUNT: 15.5 % (ref 11.7–15.4)
GLOBULIN SER CALC-MCNC: 1.7 G/DL (ref 1.5–4.5)
GLUCOSE SERPL-MCNC: 211 MG/DL (ref 65–99)
HCT VFR BLD AUTO: 30 % (ref 34–46.6)
HGB BLD-MCNC: 8.8 G/DL (ref 11.1–15.9)
IMM GRANULOCYTES # BLD AUTO: 0 X10E3/UL (ref 0–0.1)
IMM GRANULOCYTES NFR BLD AUTO: 0 %
LYMPHOCYTES # BLD AUTO: 1.3 X10E3/UL (ref 0.7–3.1)
LYMPHOCYTES NFR BLD AUTO: 10 %
MCH RBC QN AUTO: 22.5 PG (ref 26.6–33)
MCHC RBC AUTO-ENTMCNC: 29.3 G/DL (ref 31.5–35.7)
MCV RBC AUTO: 77 FL (ref 79–97)
MONOCYTES # BLD AUTO: 0.3 X10E3/UL (ref 0.1–0.9)
MONOCYTES NFR BLD AUTO: 2 %
NEUTROPHILS # BLD AUTO: 11.7 X10E3/UL (ref 1.4–7)
NEUTROPHILS NFR BLD AUTO: 88 %
PLATELET # BLD AUTO: 242 X10E3/UL (ref 150–450)
POTASSIUM SERPL-SCNC: 4.1 MMOL/L (ref 3.5–5.2)
PROT SERPL-MCNC: 5.3 G/DL (ref 6–8.5)
RBC # BLD AUTO: 3.91 X10E6/UL (ref 3.77–5.28)
SODIUM SERPL-SCNC: 146 MMOL/L (ref 134–144)
WBC # BLD AUTO: 13.3 X10E3/UL (ref 3.4–10.8)

## 2020-07-07 PROCEDURE — G0300 HHS/HOSPICE OF LPN EA 15 MIN: HCPCS

## 2020-07-07 PROCEDURE — 3331090002 HH PPS REVENUE DEBIT

## 2020-07-07 PROCEDURE — 3331090001 HH PPS REVENUE CREDIT

## 2020-07-08 PROCEDURE — 3331090001 HH PPS REVENUE CREDIT

## 2020-07-08 PROCEDURE — 3331090002 HH PPS REVENUE DEBIT

## 2020-07-09 ENCOUNTER — HOME CARE VISIT (OUTPATIENT)
Dept: HOME HEALTH SERVICES | Facility: HOME HEALTH | Age: 85
End: 2020-07-09
Payer: MEDICARE

## 2020-07-09 PROCEDURE — 3331090002 HH PPS REVENUE DEBIT

## 2020-07-09 PROCEDURE — 3331090001 HH PPS REVENUE CREDIT

## 2020-07-10 PROCEDURE — 3331090002 HH PPS REVENUE DEBIT

## 2020-07-10 PROCEDURE — 3331090001 HH PPS REVENUE CREDIT

## 2020-07-11 PROCEDURE — 3331090002 HH PPS REVENUE DEBIT

## 2020-07-11 PROCEDURE — 3331090001 HH PPS REVENUE CREDIT

## 2020-07-12 PROCEDURE — 3331090002 HH PPS REVENUE DEBIT

## 2020-07-12 PROCEDURE — 3331090001 HH PPS REVENUE CREDIT

## 2020-07-13 PROCEDURE — 3331090002 HH PPS REVENUE DEBIT

## 2020-07-13 PROCEDURE — 3331090001 HH PPS REVENUE CREDIT

## 2020-07-13 NOTE — TELEPHONE ENCOUNTER
#[phone] please call Patricia Heard with [facility] to let her know if the bed has been ordered. The bed needs to be ordered from:  [employer] fax [phone]    Please call Patricia Heard to let her know what has been done. Thanks.

## 2020-07-13 NOTE — TELEPHONE ENCOUNTER
Faxed the order to [phone]. Spoke with Tracee Rosales to inform her that it has been faxed. No other questions at this time.

## 2020-07-14 ENCOUNTER — HOME CARE VISIT (OUTPATIENT)
Dept: SCHEDULING | Facility: HOME HEALTH | Age: 85
End: 2020-07-14
Payer: MEDICARE

## 2020-07-14 VITALS
SYSTOLIC BLOOD PRESSURE: 114 MMHG | RESPIRATION RATE: 14 BRPM | TEMPERATURE: 96.3 F | HEART RATE: 73 BPM | DIASTOLIC BLOOD PRESSURE: 60 MMHG

## 2020-07-14 PROCEDURE — 3331090001 HH PPS REVENUE CREDIT

## 2020-07-14 PROCEDURE — 3331090002 HH PPS REVENUE DEBIT

## 2020-07-14 PROCEDURE — G0299 HHS/HOSPICE OF RN EA 15 MIN: HCPCS

## 2020-07-15 PROCEDURE — 3331090001 HH PPS REVENUE CREDIT

## 2020-07-15 PROCEDURE — 3331090002 HH PPS REVENUE DEBIT

## 2020-07-16 PROCEDURE — 3331090002 HH PPS REVENUE DEBIT

## 2020-07-16 PROCEDURE — 3331090001 HH PPS REVENUE CREDIT

## 2020-07-16 NOTE — PROGRESS NOTES
Anemia persists I would like to re check in two weeks since she started taking iron   Also checking for multiple myeloma on labs

## 2020-07-17 ENCOUNTER — HOME CARE VISIT (OUTPATIENT)
Dept: SCHEDULING | Facility: HOME HEALTH | Age: 85
End: 2020-07-17
Payer: MEDICARE

## 2020-07-17 PROCEDURE — 3331090002 HH PPS REVENUE DEBIT

## 2020-07-17 PROCEDURE — 3331090001 HH PPS REVENUE CREDIT

## 2020-07-17 PROCEDURE — G0299 HHS/HOSPICE OF RN EA 15 MIN: HCPCS

## 2020-07-17 NOTE — PROGRESS NOTES
HIPAA verified with patient' son Nataliia Kong, made aware of lab results and new lab orders. Lab orders mailed  And lab letter.

## 2020-07-20 VITALS
SYSTOLIC BLOOD PRESSURE: 105 MMHG | TEMPERATURE: 98.1 F | OXYGEN SATURATION: 91 % | DIASTOLIC BLOOD PRESSURE: 78 MMHG | RESPIRATION RATE: 18 BRPM | HEART RATE: 78 BPM

## 2020-07-27 ENCOUNTER — TELEPHONE (OUTPATIENT)
Dept: INTERNAL MEDICINE CLINIC | Age: 85
End: 2020-07-27

## 2020-07-27 NOTE — TELEPHONE ENCOUNTER
----- Message from Antonio Urbano sent at 7/27/2020  4:28 PM EDT -----  Regarding: Dr. Warnell Primrose: [phone]  Caller's first and last name: Roberto [facility]  Callback required yes/no and why: yes  Best contact number(s): [phone]  Details to clarify the request: They are expecting the lab order for her in the mail and they haven't received anything yet. They'd like a call back with a status update because her lab appoints are coming up.          Copy/paste envera

## 2020-07-28 NOTE — TELEPHONE ENCOUNTER
Lab orders faxed to [facility] at sons request.  Confirmation received

## 2020-07-31 DIAGNOSIS — F51.01 PRIMARY INSOMNIA: ICD-10-CM

## 2020-07-31 NOTE — TELEPHONE ENCOUNTER
----- Message from Kaye Breana sent at 7/31/2020  1:50 PM EDT -----  Regarding: /Telephone  General Message/Vendor Calls    Caller's first and last name:olimpia Conte       Reason for call:Rx       Callback required yes/no and why:Yes      Best contact number(s):[phone]      Details to clarify the request:[facility] requesting Rx for \"Belsomra\" to be call into [facility] pharmacy on file due to [address] did not have the Rx.       Leatha Varghese

## 2020-07-31 NOTE — TELEPHONE ENCOUNTER
PCP: Low Lu MD    Last appt: Visit date not found  No future appointments. Requested Prescriptions     Pending Prescriptions Disp Refills    suvorexant (BELSOMRA) 15 mg tablet 30 Tab 1     Sig: Take 1 Tab by mouth nightly as needed for Insomnia. Max Daily Amount: 15 mg.

## 2020-08-02 RX ORDER — BUMETANIDE 1 MG/1
TABLET ORAL
Qty: 60 TAB | Refills: 0 | Status: SHIPPED | OUTPATIENT
Start: 2020-08-02 | End: 2020-09-01

## 2020-08-05 LAB
ALBUMIN SERPL ELPH-MCNC: 2.9 G/DL (ref 2.9–4.4)
ALBUMIN/GLOB SERPL: 1.2 {RATIO} (ref 0.7–1.7)
ALPHA1 GLOB SERPL ELPH-MCNC: 0.3 G/DL (ref 0–0.4)
ALPHA2 GLOB SERPL ELPH-MCNC: 0.8 G/DL (ref 0.4–1)
B-GLOBULIN SERPL ELPH-MCNC: 0.8 G/DL (ref 0.7–1.3)
COMMENT: NORMAL
ERYTHROCYTE [DISTWIDTH] IN BLOOD BY AUTOMATED COUNT: 18.9 % (ref 11.7–15.4)
GAMMA GLOB SERPL ELPH-MCNC: 0.4 G/DL (ref 0.4–1.8)
GLOBULIN SER CALC-MCNC: 2.4 G/DL (ref 2.2–3.9)
HCT VFR BLD AUTO: 31.4 % (ref 34–46.6)
HGB BLD-MCNC: 9.4 G/DL (ref 11.1–15.9)
KAPPA LC FREE SER-MCNC: 25.3 MG/L (ref 3.3–19.4)
KAPPA LC FREE/LAMBDA FREE SER: 0.84 {RATIO} (ref 0.26–1.65)
LAMBDA LC FREE SERPL-MCNC: 30.1 MG/L (ref 5.7–26.3)
M PROTEIN SERPL ELPH-MCNC: ABNORMAL G/DL
MCH RBC QN AUTO: 24 PG (ref 26.6–33)
MCHC RBC AUTO-ENTMCNC: 29.9 G/DL (ref 31.5–35.7)
MCV RBC AUTO: 80 FL (ref 79–97)
PLATELET # BLD AUTO: 218 X10E3/UL (ref 150–450)
PLEASE NOTE, 011150: ABNORMAL
PROT SERPL-MCNC: 5.3 G/DL (ref 6–8.5)
RBC # BLD AUTO: 3.92 X10E6/UL (ref 3.77–5.28)
REFLEX TESTING: ABNORMAL
WBC # BLD AUTO: 7.8 X10E3/UL (ref 3.4–10.8)

## 2020-08-19 DIAGNOSIS — D64.9 ANEMIA, UNSPECIFIED TYPE: Primary | ICD-10-CM

## 2020-08-19 NOTE — PROGRESS NOTES
Anemia slightly better but still present would like to refer to hematologist, please give patient information

## 2020-08-20 ENCOUNTER — TELEPHONE (OUTPATIENT)
Dept: ONCOLOGY | Age: 85
End: 2020-08-20

## 2020-08-20 NOTE — TELEPHONE ENCOUNTER
Called patient to schedule appointment w/Dr. Sorin Castillo, spoke with son. He stated they do not know why she is being referred and would like to know her lab results before scheduling. Please return call to discuss [phone].   steven Baby is a 40.2 wk GA male born to a 30 y/o  mother via . Maternal history uncomplicated. Prenatal history uncomplicated. Maternal BT O+. PNL neg, NR, and immune. GBS neg on  (GBS  for purposes of EOS calculations). SROM at 1900 on , clear fluids. Baby born vigorous and crying spontaneously. WDSS. Apgars 9/9. EOS 0.86. (Highest temp 37.9) Mom plans to breastfeed, would like hepB and circ. Baby is a 40.2 wk GA male born to a 32 y/o  mother via . Maternal history uncomplicated. Prenatal history uncomplicated. Maternal BT O+. PNL neg, NR, and immune. GBS neg on  (GBS  for purposes of EOS calculations). SROM at 1900 on , clear fluids. Baby born vigorous and crying spontaneously. WDSS. Apgars 9/9. EOS 0.86. (Highest temp 37.9) Mom plans to breastfeed, would like hepB and circ.  Maternal aunt with spina bifida    Drug Dosing Weight  Height (cm): 48.25 (14 Aug 2020 22:34)  Weight (kg): 3.426 (14 Aug 2020 22:34)  BMI (kg/m2): 14.7 (14 Aug 2020 22:34)  BSA (m2): 0.2 (14 Aug 2020 22:34)  Head Circumference (cm): 36 (14 Aug 2020 22:11)      Pediatric Attending Addendum as of 08-15-20 @ 11:31:  I have read and agree with surrounding PGY1 Note except for any edits above or changes detailed below.   I have spent > 30 minutes with the patient and/or the patient's family on direct patient care.      GEN: NAD alert active  HEENT: MMM, AFOF, no cleft appreciated, +red reflex bilaterally  CHEST: nml s1/s2, RRR, no m, lcta bl  Abd: s/nt/nd +bs no hsm  umb c/d/i  Neuro: +grasp/suck/carole, tone wnl  Skin: milia, mild erythematous fanny on forehead  Musculoskeletal: negative Ortalani/Brambila, no clavicular crepitus appreciated, FROM  : external genitalia wnl, anus appears wnl    Clau White MD Pediatric Hospitalist

## 2020-08-21 ENCOUNTER — TELEPHONE (OUTPATIENT)
Dept: ONCOLOGY | Age: 85
End: 2020-08-21

## 2020-08-21 NOTE — TELEPHONE ENCOUNTER
----- Message from Vee Rodriguez sent at 8/21/2020  9:37 AM EDT -----  Regarding: Dr. Hemant Jacobo: [phone]  Caller's first and last name and relationship (if not the patient): Linh Murillo (son)  Best contact number(s): [phone] (son)  Whose call is being returned: Nurse   Details to clarify the request: Caller states pt. had lab work done a couple of weeks ago, and just received a message stating practice is referring pt to an oncologist. Son would like to know why pt. is being referred to that specialist. Tiff Belcher would like to know what lab results were.

## 2020-08-21 NOTE — TELEPHONE ENCOUNTER
New Patient    Patient: Isa Earl      : 35    Referring Physician: Dr Lisa Brush    Reason:  Hematology - Already Anemic but labs are showing something else    Phone: [phone]    Records: in System    Note: Patient's son called and said they could do VV but she has some dementia and not sure how well that would go.   Thought you would want to review records to see her actual diagnosis before deciding how to sched visit

## 2020-08-21 NOTE — TELEPHONE ENCOUNTER
Spoke with patients olimpia Mustafa. Two pt identifiers confirmed. Marcello advised of patients recent lab results per Dr. Ana Arredondo. Provided with contact information for Dr. Michelle Adams. Marcello verbalized understanding of information discussed w/ no further questions at this time.

## 2020-08-30 DIAGNOSIS — F51.01 PRIMARY INSOMNIA: ICD-10-CM

## 2020-08-31 ENCOUNTER — TELEPHONE (OUTPATIENT)
Dept: INTERNAL MEDICINE CLINIC | Age: 85
End: 2020-08-31

## 2020-08-31 RX ORDER — SUVOREXANT 15 MG/1
TABLET, FILM COATED ORAL
Qty: 30 TAB | Refills: 1 | Status: SHIPPED | OUTPATIENT
Start: 2020-08-31 | End: 2020-11-30
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
## 2020-09-01 RX ORDER — BUMETANIDE 1 MG/1
TABLET ORAL
Qty: 60 TAB | Refills: 0 | Status: SHIPPED | OUTPATIENT
Start: 2020-09-01 | End: 2020-09-24 | Stop reason: SDUPTHER

## 2020-09-01 NOTE — TELEPHONE ENCOUNTER
Patients son Sherman De Luna would like to know if there is any way that he can speak with you without the patient present?

## 2020-09-03 ENCOUNTER — VIRTUAL VISIT (OUTPATIENT)
Dept: INTERNAL MEDICINE CLINIC | Age: 85
End: 2020-09-03
Payer: MEDICARE

## 2020-09-03 DIAGNOSIS — T14.8XXA WOUND INFECTION: ICD-10-CM

## 2020-09-03 DIAGNOSIS — D64.9 ANEMIA, UNSPECIFIED TYPE: ICD-10-CM

## 2020-09-03 DIAGNOSIS — L08.9 WOUND INFECTION: ICD-10-CM

## 2020-09-03 DIAGNOSIS — F33.1 MODERATE EPISODE OF RECURRENT MAJOR DEPRESSIVE DISORDER (HCC): ICD-10-CM

## 2020-09-03 DIAGNOSIS — I10 ESSENTIAL HYPERTENSION: Primary | ICD-10-CM

## 2020-09-03 DIAGNOSIS — J44.9 CHRONIC OBSTRUCTIVE PULMONARY DISEASE, UNSPECIFIED COPD TYPE (HCC): ICD-10-CM

## 2020-09-03 DIAGNOSIS — L03.116 CELLULITIS OF LEFT LOWER EXTREMITY: ICD-10-CM

## 2020-09-03 PROCEDURE — G8756 NO BP MEASURE DOC: HCPCS | Performed by: INTERNAL MEDICINE

## 2020-09-03 PROCEDURE — 3288F FALL RISK ASSESSMENT DOCD: CPT | Performed by: INTERNAL MEDICINE

## 2020-09-03 PROCEDURE — 99215 OFFICE O/P EST HI 40 MIN: CPT | Performed by: INTERNAL MEDICINE

## 2020-09-03 PROCEDURE — G8427 DOCREV CUR MEDS BY ELIG CLIN: HCPCS | Performed by: INTERNAL MEDICINE

## 2020-09-03 PROCEDURE — 1100F PTFALLS ASSESS-DOCD GE2>/YR: CPT | Performed by: INTERNAL MEDICINE

## 2020-09-03 PROCEDURE — 1090F PRES/ABSN URINE INCON ASSESS: CPT | Performed by: INTERNAL MEDICINE

## 2020-09-03 PROCEDURE — G8400 PT W/DXA NO RESULTS DOC: HCPCS | Performed by: INTERNAL MEDICINE

## 2020-09-03 PROCEDURE — G8432 DEP SCR NOT DOC, RNG: HCPCS | Performed by: INTERNAL MEDICINE

## 2020-09-03 RX ORDER — CEPHALEXIN 500 MG/1
500 CAPSULE ORAL 3 TIMES DAILY
Qty: 30 CAP | Refills: 0 | Status: SHIPPED | OUTPATIENT
Start: 2020-09-03 | End: 2020-09-17

## 2020-09-03 RX ORDER — SERTRALINE HYDROCHLORIDE 50 MG/1
50 TABLET, FILM COATED ORAL DAILY
Qty: 90 TAB | Refills: 1 | Status: SHIPPED | OUTPATIENT
Start: 2020-09-03 | End: 2021-02-09

## 2020-09-03 NOTE — TELEPHONE ENCOUNTER
Spoke with patients olimpia Zarco. Two pt identifiers confirmed. Marcello offered an appointment for a virtual visit with Dr. John Paul Romero today, 09/03/2020. Appointment accepted. Patient advised if anything changes or if unable to keep this appointment to call the office  Pt verbalized understanding of information discussed w/ no further questions at this time.

## 2020-09-03 NOTE — PROGRESS NOTES
Reviewed record in preparation for visit and have obtained necessary documentation. Identified pt with two pt identifiers(name and ). Chief Complaint   Patient presents with    Wound Infection    Dementia       Health Maintenance Due   Topic Date Due    Diabetic Foot Care  1945    Albumin Urine Test  1945    Eye Exam  1945    DTaP/Tdap/Td  (1 - Tdap) 1956    Shingles Vaccine (1 of 2) 1985    Bone Mineral Density   2000    Pneumococcal Vaccine (1 of 1 - PPSV23) 10/05/2015    Yearly Flu Vaccine (1) 2020       Ms. Meghan Bryan has a reminder for a \"due or due soon\" health maintenance. I have asked that she discuss this further with her primary care provider for follow-up on this health maintenance. Coordination of Care Questionnaire:  :     1) Have you been to an emergency room, urgent care clinic since your last visit? no   Hospitalized since your last visit? no             2) Have you seen or consulted any other health care providers outside of [address] [place] [address] since your last visit? no  (Include any pap smears or colon screenings in this section.)    3) In the event something were to happen to you and you were unable to speak on your behalf, do you have an Advance Directive/ Living Will in place stating your wishes? NO    Do you have an Advance Directive on file? no    4) Are you interested in receiving information on Advance Directives? NO    Patient is accompanied by self I have received verbal consent from Cassidy Tolentino to discuss any/all medical information while they are present in the room.
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
## 2020-09-11 ENCOUNTER — TELEPHONE (OUTPATIENT)
Dept: INTERNAL MEDICINE CLINIC | Age: 85
End: 2020-09-11

## 2020-09-11 NOTE — TELEPHONE ENCOUNTER
MD Baron Nickie Reynolds LPN    Caller: Unspecified (Today, 12:15 PM)               Any changes in mental status? Would take her to ER for evaluation for possible underlying infection such as UTI or another infection     Spoke with patients son Lizette Noe. Two pt identifiers confirmed. Marcello advised of the above suggestion from Dr. Nadeem Fay. Marcello states that he would really prefer not to take the patient to the ED. Advised that he could contact [employer] to come out to see the patient. Provided contact information,  Lizette Noe states that he will give them a call.

## 2020-09-11 NOTE — TELEPHONE ENCOUNTER
[facility]. [facility]               Caller's first and last name: Benjie Siddiqi   Reason for call: to report mother's symptoms   Callback required yes/no and why: yes   Best contact number(s): [phone]   Details to clarify the request: Pt is refusing to take medications and refusing to eat. Starting yesterday evening 9/10.      Copy/Paste   ENVERA

## 2020-09-17 ENCOUNTER — VIRTUAL VISIT (OUTPATIENT)
Dept: ONCOLOGY | Age: 85
End: 2020-09-17
Payer: MEDICARE

## 2020-09-17 DIAGNOSIS — D50.0 IRON DEFICIENCY ANEMIA DUE TO CHRONIC BLOOD LOSS: Primary | ICD-10-CM

## 2020-09-17 PROBLEM — D50.9 IRON DEFICIENCY ANEMIA: Status: ACTIVE | Noted: 2020-09-17

## 2020-09-17 PROCEDURE — 99205 OFFICE O/P NEW HI 60 MIN: CPT | Performed by: INTERNAL MEDICINE

## 2020-09-17 RX ORDER — SODIUM CHLORIDE 9 MG/ML
10 INJECTION INTRAMUSCULAR; INTRAVENOUS; SUBCUTANEOUS AS NEEDED
Status: CANCELLED | OUTPATIENT
Start: 2020-10-06

## 2020-09-17 RX ORDER — DIPHENHYDRAMINE HYDROCHLORIDE 50 MG/ML
25 INJECTION, SOLUTION INTRAMUSCULAR; INTRAVENOUS AS NEEDED
Status: CANCELLED
Start: 2020-09-29

## 2020-09-17 RX ORDER — EPINEPHRINE 1 MG/ML
0.3 INJECTION, SOLUTION, CONCENTRATE INTRAVENOUS AS NEEDED
Status: CANCELLED | OUTPATIENT
Start: 2020-10-06

## 2020-09-17 RX ORDER — ACETAMINOPHEN 325 MG/1
650 TABLET ORAL AS NEEDED
Status: CANCELLED
Start: 2020-10-06

## 2020-09-17 RX ORDER — HEPARIN 100 UNIT/ML
300-500 SYRINGE INTRAVENOUS AS NEEDED
Status: CANCELLED
Start: 2020-10-06

## 2020-09-17 RX ORDER — HEPARIN 100 UNIT/ML
300-500 SYRINGE INTRAVENOUS AS NEEDED
Status: CANCELLED
Start: 2020-09-29

## 2020-09-17 RX ORDER — EPINEPHRINE 1 MG/ML
0.3 INJECTION, SOLUTION, CONCENTRATE INTRAVENOUS AS NEEDED
Status: CANCELLED | OUTPATIENT
Start: 2020-09-29

## 2020-09-17 RX ORDER — ONDANSETRON 2 MG/ML
8 INJECTION INTRAMUSCULAR; INTRAVENOUS AS NEEDED
Status: CANCELLED | OUTPATIENT
Start: 2020-09-29

## 2020-09-17 RX ORDER — SODIUM CHLORIDE 0.9 % (FLUSH) 0.9 %
10 SYRINGE (ML) INJECTION AS NEEDED
Status: CANCELLED | OUTPATIENT
Start: 2020-09-29 | End: 2020-09-30

## 2020-09-17 RX ORDER — DIPHENHYDRAMINE HYDROCHLORIDE 50 MG/ML
50 INJECTION, SOLUTION INTRAMUSCULAR; INTRAVENOUS AS NEEDED
Status: CANCELLED
Start: 2020-10-06

## 2020-09-17 RX ORDER — ALBUTEROL SULFATE 0.83 MG/ML
2.5 SOLUTION RESPIRATORY (INHALATION) AS NEEDED
Status: CANCELLED
Start: 2020-09-29

## 2020-09-17 RX ORDER — HYDROCORTISONE SODIUM SUCCINATE 100 MG/2ML
100 INJECTION, POWDER, FOR SOLUTION INTRAMUSCULAR; INTRAVENOUS AS NEEDED
Status: CANCELLED | OUTPATIENT
Start: 2020-10-06

## 2020-09-17 RX ORDER — HYDROCORTISONE SODIUM SUCCINATE 100 MG/2ML
100 INJECTION, POWDER, FOR SOLUTION INTRAMUSCULAR; INTRAVENOUS AS NEEDED
Status: CANCELLED | OUTPATIENT
Start: 2020-09-29

## 2020-09-17 RX ORDER — SODIUM CHLORIDE 0.9 % (FLUSH) 0.9 %
10 SYRINGE (ML) INJECTION AS NEEDED
Status: CANCELLED | OUTPATIENT
Start: 2020-10-06 | End: 2020-10-07

## 2020-09-17 RX ORDER — ONDANSETRON 2 MG/ML
8 INJECTION INTRAMUSCULAR; INTRAVENOUS AS NEEDED
Status: CANCELLED | OUTPATIENT
Start: 2020-10-06

## 2020-09-17 RX ORDER — ALBUTEROL SULFATE 0.83 MG/ML
2.5 SOLUTION RESPIRATORY (INHALATION) AS NEEDED
Status: CANCELLED
Start: 2020-10-06

## 2020-09-17 RX ORDER — DIPHENHYDRAMINE HYDROCHLORIDE 50 MG/ML
25 INJECTION, SOLUTION INTRAMUSCULAR; INTRAVENOUS AS NEEDED
Status: CANCELLED
Start: 2020-10-06

## 2020-09-17 RX ORDER — SODIUM CHLORIDE 9 MG/ML
10 INJECTION INTRAMUSCULAR; INTRAVENOUS; SUBCUTANEOUS AS NEEDED
Status: CANCELLED | OUTPATIENT
Start: 2020-09-29

## 2020-09-17 RX ORDER — ACETAMINOPHEN 325 MG/1
650 TABLET ORAL AS NEEDED
Status: CANCELLED
Start: 2020-09-29

## 2020-09-17 RX ORDER — DIPHENHYDRAMINE HYDROCHLORIDE 50 MG/ML
50 INJECTION, SOLUTION INTRAMUSCULAR; INTRAVENOUS AS NEEDED
Status: CANCELLED
Start: 2020-09-29
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
## 2020-09-17 NOTE — PROGRESS NOTES
Frederich Hammans is a 80 y.o. female here for new patient appt for:  Chief Complaint   Patient presents with    Referral / Consult     Dr Hooks Saliva Patient     Anemia   *Pt on Eliquis    1. Have you been to the ER, urgent care clinic since your last visit? Hospitalized since your last visit? New Pt    2. Have you seen or consulted any other health care providers outside of the [address] [place] [address] since your last visit? Include any pap smears or colon screening. New Pt    Colonoscopy 4-5 years ago. Never had endoscopy. Never had blood or iron transfusion. She was having a lot of blood during bowel movements. Started about 5-6 month before stroke in January . Started iron supplements for about 2 months.

## 2020-09-24 RX ORDER — BUMETANIDE 1 MG/1
TABLET ORAL
Qty: 60 TAB | Refills: 2 | Status: SHIPPED | OUTPATIENT
Start: 2020-09-24 | End: 2021-03-26

## 2020-09-24 RX ORDER — PRAMIPEXOLE DIHYDROCHLORIDE 0.5 MG/1
0.5 TABLET ORAL DAILY
Qty: 60 TAB | Refills: 1 | Status: SHIPPED | OUTPATIENT
Start: 2020-09-24 | End: 2020-11-30 | Stop reason: SDUPTHER

## 2020-09-29 ENCOUNTER — HOSPITAL ENCOUNTER (OUTPATIENT)
Dept: INFUSION THERAPY | Age: 85
Discharge: HOME | End: 2020-09-29
Payer: MEDICARE

## 2020-09-29 VITALS
OXYGEN SATURATION: 96 % | HEART RATE: 59 BPM | DIASTOLIC BLOOD PRESSURE: 79 MMHG | TEMPERATURE: 97.9 F | BODY MASS INDEX: 19.13 KG/M2 | SYSTOLIC BLOOD PRESSURE: 149 MMHG | RESPIRATION RATE: 16 BRPM | WEIGHT: 108 LBS

## 2020-09-29 DIAGNOSIS — D50.9 IRON DEFICIENCY ANEMIA, UNSPECIFIED IRON DEFICIENCY ANEMIA TYPE: Primary | ICD-10-CM

## 2020-09-29 PROCEDURE — 74011250636 HC RX REV CODE- 250/636: Performed by: INTERNAL MEDICINE

## 2020-09-29 PROCEDURE — 96365 THER/PROPH/DIAG IV INF INIT: CPT

## 2020-09-29 RX ORDER — BUMETANIDE 1 MG/1
TABLET ORAL
Qty: 60 TAB | Refills: 2 | OUTPATIENT
Start: 2020-09-29

## 2020-09-29 RX ORDER — SODIUM CHLORIDE 0.9 % (FLUSH) 0.9 %
10 SYRINGE (ML) INJECTION AS NEEDED
Status: DISCONTINUED | OUTPATIENT
Start: 2020-09-29 | End: 2020-09-30 | Stop reason: HOSPADM

## 2020-09-29 RX ORDER — PRAMIPEXOLE DIHYDROCHLORIDE 0.5 MG/1
0.5 TABLET ORAL DAILY
Qty: 60 TAB | Refills: 1 | OUTPATIENT
Start: 2020-09-29

## 2020-09-29 RX ADMIN — Medication 20 ML: at 14:07

## 2020-09-29 RX ADMIN — SODIUM CHLORIDE 750 MG: 900 INJECTION, SOLUTION INTRAVENOUS at 13:43

## 2020-09-29 RX ADMIN — Medication 10 ML: at 13:39

## 2020-09-29 NOTE — PROGRESS NOTES
Pt arrived to [facility] in wheelchair in no acute distress at 1315 for Injectafer dose 1 of 2. Assessment unremarkable except dyspnea with exertion, numbness and 1+ edema in feet bilaterally, right leg ulcer, and generalized weakness and fatigue. #24g PIV established in right [facility] without issue and positive blood return noted. Labs obtained 6/15/20. Patient Vitals for the past 12 hrs:   Temp Pulse Resp BP SpO2   09/29/20 1433  (!) 59 16 (!) 149/79    09/29/20 1319 97.9 °F (36.6 °C) 75 16 115/64 96 %       The following medications administered:  Injectafer 750 mg IV over 20 minutes    Pt tolerated treatment well. No adverse reaction noted after monitoring patient for 30 minutes. IV flushed per policy and removed, 2x2 and coban placed. Pt discharged via personal wheelchair in no acute distress at 1440, accompanied by son. Next appointment 10/6/20 @ 1300.

## 2020-10-02 DIAGNOSIS — D64.9 ANEMIA, UNSPECIFIED TYPE: ICD-10-CM

## 2020-10-02 NOTE — TELEPHONE ENCOUNTER
Future Appointments:  Future Appointments   Date Time Provider David Pérez   10/6/2020  1:00 PM CHAIR 2 [address] REG   10/16/2020  2:00 PM MD OSMAN Bianchi BS AMB   3/22/2021  1:15 PM Janis Lindo MD [facility]NORA CARDENAS AMB        Last Appointment With Me:  9/3/2020     Requested Prescriptions     Pending Prescriptions Disp Refills    ferrous sulfate (SLOW FE) 142 mg (45 mg iron) ER tablet 30 Tab 2     Sig: Take 1 Tab by mouth Daily (before breakfast).

## 2020-10-06 ENCOUNTER — HOSPITAL ENCOUNTER (OUTPATIENT)
Dept: INFUSION THERAPY | Age: 85
Discharge: HOME | End: 2020-10-06
Payer: MEDICARE

## 2020-10-06 VITALS
RESPIRATION RATE: 18 BRPM | DIASTOLIC BLOOD PRESSURE: 64 MMHG | HEART RATE: 77 BPM | TEMPERATURE: 98 F | SYSTOLIC BLOOD PRESSURE: 114 MMHG | OXYGEN SATURATION: 99 %

## 2020-10-06 DIAGNOSIS — D50.9 IRON DEFICIENCY ANEMIA, UNSPECIFIED IRON DEFICIENCY ANEMIA TYPE: Primary | ICD-10-CM

## 2020-10-06 PROCEDURE — 96365 THER/PROPH/DIAG IV INF INIT: CPT

## 2020-10-06 PROCEDURE — 74011250636 HC RX REV CODE- 250/636: Performed by: INTERNAL MEDICINE

## 2020-10-06 RX ORDER — SODIUM CHLORIDE 0.9 % (FLUSH) 0.9 %
10 SYRINGE (ML) INJECTION AS NEEDED
Status: DISCONTINUED | OUTPATIENT
Start: 2020-10-06 | End: 2020-10-07 | Stop reason: HOSPADM

## 2020-10-06 RX ADMIN — FERRIC CARBOXYMALTOSE INJECTION 750 MG: 50 INJECTION, SOLUTION INTRAVENOUS at 14:06

## 2020-10-06 RX ADMIN — Medication 30 ML: at 14:06

## 2020-10-15 ENCOUNTER — TELEPHONE (OUTPATIENT)
Dept: INTERNAL MEDICINE CLINIC | Age: 85
End: 2020-10-15

## 2020-10-15 NOTE — TELEPHONE ENCOUNTER
----- Message from Mert Borges sent at 10/15/2020  2:40 PM EDT -----  Regarding: Dr. Reuben Esparza first and last name:  Dwight SOLARES    Reason for call: any mental health concerns with the patient      Callback required yes/no and why:yes      Best contact number(s):[phone]      Details to clarify the request:any mental health concerns with the patient      Mert Borges

## 2020-10-16 ENCOUNTER — OFFICE VISIT (OUTPATIENT)
Dept: NEUROLOGY | Age: 85
End: 2020-10-16
Payer: MEDICARE

## 2020-10-16 ENCOUNTER — TELEPHONE (OUTPATIENT)
Dept: INTERNAL MEDICINE CLINIC | Age: 85
End: 2020-10-16

## 2020-10-16 VITALS
OXYGEN SATURATION: 94 % | WEIGHT: 105 LBS | RESPIRATION RATE: 16 BRPM | SYSTOLIC BLOOD PRESSURE: 143 MMHG | HEIGHT: 63 IN | DIASTOLIC BLOOD PRESSURE: 67 MMHG | HEART RATE: 56 BPM | TEMPERATURE: 97.7 F | BODY MASS INDEX: 18.61 KG/M2

## 2020-10-16 DIAGNOSIS — G25.0 BENIGN ESSENTIAL TREMOR SYNDROME: ICD-10-CM

## 2020-10-16 DIAGNOSIS — G30.1 LATE ONSET ALZHEIMER'S DISEASE WITH BEHAVIORAL DISTURBANCE (HCC): ICD-10-CM

## 2020-10-16 DIAGNOSIS — E55.9 VITAMIN D DEFICIENCY: ICD-10-CM

## 2020-10-16 DIAGNOSIS — E53.8 B12 DEFICIENCY: Primary | ICD-10-CM

## 2020-10-16 DIAGNOSIS — E11.42 DIABETIC PERIPHERAL NEUROPATHY ASSOCIATED WITH TYPE 2 DIABETES MELLITUS (HCC): ICD-10-CM

## 2020-10-16 DIAGNOSIS — M47.22 CERVICAL RADICULOPATHY DUE TO DEGENERATIVE JOINT DISEASE OF SPINE: ICD-10-CM

## 2020-10-16 DIAGNOSIS — I65.23 BILATERAL CAROTID ARTERY STENOSIS: ICD-10-CM

## 2020-10-16 DIAGNOSIS — Z86.73 HISTORY OF STROKE: ICD-10-CM

## 2020-10-16 DIAGNOSIS — E03.4 HYPOTHYROIDISM DUE TO ACQUIRED ATROPHY OF THYROID: ICD-10-CM

## 2020-10-16 DIAGNOSIS — F02.818 LATE ONSET ALZHEIMER'S DISEASE WITH BEHAVIORAL DISTURBANCE (HCC): ICD-10-CM

## 2020-10-16 PROCEDURE — G8400 PT W/DXA NO RESULTS DOC: HCPCS | Performed by: PSYCHIATRY & NEUROLOGY

## 2020-10-16 PROCEDURE — G8420 CALC BMI NORM PARAMETERS: HCPCS | Performed by: PSYCHIATRY & NEUROLOGY

## 2020-10-16 PROCEDURE — G8536 NO DOC ELDER MAL SCRN: HCPCS | Performed by: PSYCHIATRY & NEUROLOGY

## 2020-10-16 PROCEDURE — G8427 DOCREV CUR MEDS BY ELIG CLIN: HCPCS | Performed by: PSYCHIATRY & NEUROLOGY

## 2020-10-16 PROCEDURE — G8754 DIAS BP LESS 90: HCPCS | Performed by: PSYCHIATRY & NEUROLOGY

## 2020-10-16 PROCEDURE — 1100F PTFALLS ASSESS-DOCD GE2>/YR: CPT | Performed by: PSYCHIATRY & NEUROLOGY

## 2020-10-16 PROCEDURE — 99205 OFFICE O/P NEW HI 60 MIN: CPT | Performed by: PSYCHIATRY & NEUROLOGY

## 2020-10-16 PROCEDURE — G8432 DEP SCR NOT DOC, RNG: HCPCS | Performed by: PSYCHIATRY & NEUROLOGY

## 2020-10-16 PROCEDURE — 3288F FALL RISK ASSESSMENT DOCD: CPT | Performed by: PSYCHIATRY & NEUROLOGY

## 2020-10-16 PROCEDURE — 1090F PRES/ABSN URINE INCON ASSESS: CPT | Performed by: PSYCHIATRY & NEUROLOGY

## 2020-10-16 PROCEDURE — G8753 SYS BP > OR = 140: HCPCS | Performed by: PSYCHIATRY & NEUROLOGY

## 2020-10-16 RX ORDER — ONDANSETRON 4 MG/1
4 TABLET, ORALLY DISINTEGRATING ORAL
Qty: 30 TAB | Refills: 5 | Status: SHIPPED | OUTPATIENT
Start: 2020-10-16 | End: 2021-02-17

## 2020-10-16 RX ORDER — MEMANTINE HYDROCHLORIDE 5 MG/1
TABLET ORAL
Qty: 70 TAB | Refills: 0 | Status: SHIPPED | OUTPATIENT
Start: 2020-10-16 | End: 2020-11-14

## 2020-10-16 RX ORDER — MEMANTINE HYDROCHLORIDE 10 MG/1
10 TABLET ORAL 2 TIMES DAILY
Qty: 60 TAB | Refills: 5 | Status: SHIPPED | OUTPATIENT
Start: 2020-10-16 | End: 2021-04-08

## 2020-10-16 RX ORDER — ONDANSETRON 4 MG/1
4 TABLET, ORALLY DISINTEGRATING ORAL
Qty: 30 TAB | Refills: 5 | Status: SHIPPED | OUTPATIENT
Start: 2020-10-16 | End: 2020-10-16 | Stop reason: CLARIF

## 2020-10-16 RX ORDER — MEMANTINE HYDROCHLORIDE 5 MG/1
TABLET ORAL
Qty: 70 TAB | Refills: 0 | Status: SHIPPED | OUTPATIENT
Start: 2020-10-16 | End: 2020-10-16 | Stop reason: CLARIF

## 2020-10-16 NOTE — TELEPHONE ENCOUNTER
Spoke with Mr. Inna Avery with [facility]. Emailed provided to send required information.   Advised will call back once this has been received
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
## 2020-10-16 NOTE — TELEPHONE ENCOUNTER
Left message for Varun Hawkins with [facility] [employer] regarding information that is needed prior to releasing any HPI to him on this patient.

## 2020-10-16 NOTE — TELEPHONE ENCOUNTER
Christen Zheng [facility]               General Message/Vendor Calls     Caller's first and last name: [id number]       Reason for call:   2nd request for documentation from Dr. Taiwo Chinchilla required yes/no and why:   yes       Best contact number(s):    [phone]       Details to clarify the request:   Mr. Zarcobenedicto Blayne requested if  had any concerns with care in home and mental health.  Information needed as soon as possible       Christen Zheng
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
## 2020-10-26 ENCOUNTER — APPOINTMENT (OUTPATIENT)
Dept: GENERAL RADIOLOGY | Age: 85
End: 2020-10-26
Payer: MEDICARE

## 2020-10-26 ENCOUNTER — HOSPITAL ENCOUNTER (EMERGENCY)
Age: 85
Discharge: HOME | End: 2020-10-26
Attending: EMERGENCY MEDICINE
Payer: MEDICARE

## 2020-10-26 VITALS
BODY MASS INDEX: 18.43 KG/M2 | HEART RATE: 66 BPM | HEIGHT: 63 IN | RESPIRATION RATE: 18 BRPM | SYSTOLIC BLOOD PRESSURE: 163 MMHG | TEMPERATURE: 98.3 F | OXYGEN SATURATION: 98 % | DIASTOLIC BLOOD PRESSURE: 79 MMHG | WEIGHT: 104 LBS

## 2020-10-26 DIAGNOSIS — R60.0 BILATERAL EDEMA OF LOWER EXTREMITY: Primary | ICD-10-CM

## 2020-10-26 LAB
ALBUMIN SERPL-MCNC: 3.1 G/DL (ref 3.5–5)
ALBUMIN/GLOB SERPL: 0.9 {RATIO} (ref 1.1–2.2)
ALP SERPL-CCNC: 147 U/L (ref 45–117)
ALT SERPL-CCNC: 16 U/L (ref 12–78)
ANION GAP SERPL CALC-SCNC: 1 MMOL/L (ref 5–15)
AST SERPL-CCNC: 13 U/L (ref 15–37)
BASOPHILS # BLD: 0 K/UL (ref 0–0.1)
BASOPHILS NFR BLD: 0 % (ref 0–1)
BILIRUB SERPL-MCNC: 0.5 MG/DL (ref 0.2–1)
BNP SERPL-MCNC: 2512 PG/ML
BUN SERPL-MCNC: 14 MG/DL (ref 6–20)
BUN/CREAT SERPL: 20 (ref 12–20)
CALCIUM SERPL-MCNC: 8.6 MG/DL (ref 8.5–10.1)
CHLORIDE SERPL-SCNC: 110 MMOL/L (ref 97–108)
CK SERPL-CCNC: 47 U/L (ref 26–192)
CO2 SERPL-SCNC: 31 MMOL/L (ref 21–32)
COMMENT, HOLDF: NORMAL
CREAT SERPL-MCNC: 0.69 MG/DL (ref 0.55–1.02)
DIFFERENTIAL METHOD BLD: ABNORMAL
EOSINOPHIL # BLD: 0 K/UL (ref 0–0.4)
EOSINOPHIL NFR BLD: 0 % (ref 0–7)
ERYTHROCYTE [DISTWIDTH] IN BLOOD BY AUTOMATED COUNT: 18.8 % (ref 11.5–14.5)
GLOBULIN SER CALC-MCNC: 3.3 G/DL (ref 2–4)
GLUCOSE SERPL-MCNC: 87 MG/DL (ref 65–100)
HCT VFR BLD AUTO: 39.7 % (ref 35–47)
HGB BLD-MCNC: 11.8 G/DL (ref 11.5–16)
IMM GRANULOCYTES # BLD AUTO: 0 K/UL (ref 0–0.04)
IMM GRANULOCYTES NFR BLD AUTO: 0 % (ref 0–0.5)
LYMPHOCYTES # BLD: 2.2 K/UL (ref 0.8–3.5)
LYMPHOCYTES NFR BLD: 23 % (ref 12–49)
MCH RBC QN AUTO: 27.8 PG (ref 26–34)
MCHC RBC AUTO-ENTMCNC: 29.7 G/DL (ref 30–36.5)
MCV RBC AUTO: 93.4 FL (ref 80–99)
MONOCYTES # BLD: 0.5 K/UL (ref 0–1)
MONOCYTES NFR BLD: 6 % (ref 5–13)
NEUTS SEG # BLD: 6.9 K/UL (ref 1.8–8)
NEUTS SEG NFR BLD: 71 % (ref 32–75)
NRBC # BLD: 0 K/UL (ref 0–0.01)
NRBC BLD-RTO: 0 PER 100 WBC
PLATELET # BLD AUTO: 152 K/UL (ref 150–400)
PMV BLD AUTO: 11.2 FL (ref 8.9–12.9)
POTASSIUM SERPL-SCNC: 4.3 MMOL/L (ref 3.5–5.1)
PROT SERPL-MCNC: 6.4 G/DL (ref 6.4–8.2)
RBC # BLD AUTO: 4.25 M/UL (ref 3.8–5.2)
SAMPLES BEING HELD,HOLD: NORMAL
SODIUM SERPL-SCNC: 142 MMOL/L (ref 136–145)
TROPONIN I SERPL-MCNC: <0.05 NG/ML
WBC # BLD AUTO: 9.7 K/UL (ref 3.6–11)

## 2020-10-26 PROCEDURE — 80053 COMPREHEN METABOLIC PANEL: CPT

## 2020-10-26 PROCEDURE — 84484 ASSAY OF TROPONIN QUANT: CPT

## 2020-10-26 PROCEDURE — 71045 X-RAY EXAM CHEST 1 VIEW: CPT

## 2020-10-26 PROCEDURE — 85025 COMPLETE CBC W/AUTO DIFF WBC: CPT

## 2020-10-26 PROCEDURE — 82550 ASSAY OF CK (CPK): CPT

## 2020-10-26 PROCEDURE — 93005 ELECTROCARDIOGRAM TRACING: CPT

## 2020-10-26 PROCEDURE — 36415 COLL VENOUS BLD VENIPUNCTURE: CPT

## 2020-10-26 PROCEDURE — 83880 ASSAY OF NATRIURETIC PEPTIDE: CPT

## 2020-10-26 PROCEDURE — 99284 EMERGENCY DEPT VISIT MOD MDM: CPT

## 2020-10-26 NOTE — ED TRIAGE NOTES
Patient arrives by EMS for weakness and dizziness since December. Increased swelling for awhile. Home Health RN had her come to be checked out because throwing PVCs. EMS states NSR for them. 3-4 days of increased shortness of breath and leg swelling.

## 2020-10-27 ENCOUNTER — PATIENT OUTREACH (OUTPATIENT)
Dept: CASE MANAGEMENT | Age: 85
End: 2020-10-27

## 2020-10-27 LAB
ATRIAL RATE: 62 BPM
CALCULATED P AXIS, ECG09: 72 DEGREES
CALCULATED R AXIS, ECG10: -73 DEGREES
CALCULATED T AXIS, ECG11: 75 DEGREES
DIAGNOSIS, 93000: NORMAL
P-R INTERVAL, ECG05: 162 MS
Q-T INTERVAL, ECG07: 430 MS
QRS DURATION, ECG06: 90 MS
QTC CALCULATION (BEZET), ECG08: 436 MS
VENTRICULAR RATE, ECG03: 62 BPM

## 2020-10-27 NOTE — DISCHARGE INSTRUCTIONS
It was a pleasure taking care of you tonight. Overall your examination is reassuring, and the minor wound on your leg, although persistent, does not appear to be significantly concerning or infected. We recommend that you continue taking all medications as prescribed. You can apply Ace wraps to the legs, applying gentle compression, to help reduce swelling and pain in the legs.

## 2020-10-27 NOTE — ED TRIAGE NOTES
Patient arrives to ER with complains of bilateral lower leg swelling that is worsening. Patient complains of tender lower legs, difficulty walking, and \"ache\" in feet. Patient has 1+ pitting edema to both legs, states this is chronic but worsening. Patient does not like to wear compression stockings. Patient has sore on right lateral aspect of leg that is \"to the bone\", which she states the doctor has looked at today. Patient states she hit her leg x2 mos ago and it hasn't healed completely. Patient is A&Ox4. Patient's daughter is at bedside.

## 2020-10-28 ENCOUNTER — PATIENT OUTREACH (OUTPATIENT)
Dept: CASE MANAGEMENT | Age: 85
End: 2020-10-28

## 2020-10-28 NOTE — PROGRESS NOTES
Second attempt to contact for follow up. Unable to leave message requesting call back at either phone number listed in chart.  Resolving episode of care
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
## 2020-11-04 DIAGNOSIS — D64.9 ANEMIA, UNSPECIFIED TYPE: ICD-10-CM

## 2020-11-04 NOTE — TELEPHONE ENCOUNTER
Future Appointments:  Future Appointments   Date Time Provider David Pérez   11/17/2020  1:00 PM Kavya Benitez, PHD [facility]   1/26/2021  2:40 PM MD [facility] Salomon [facility] AM[facility]   3/22/2021  1:15 PM Deuce Ortiz MD [facility]        Last Appointment With Me:  9/3/2020     Requested Prescriptions     Pending Prescriptions Disp Refills    ferrous sulfate (SLOW FE) 142 mg (45 mg iron) ER tablet 30 Tab 2     Sig: Take 1 Tab by mouth Daily (before breakfast).

## 2020-11-13 DIAGNOSIS — F02.818 LATE ONSET ALZHEIMER'S DISEASE WITH BEHAVIORAL DISTURBANCE (HCC): ICD-10-CM

## 2020-11-13 DIAGNOSIS — E55.9 VITAMIN D DEFICIENCY: ICD-10-CM

## 2020-11-13 DIAGNOSIS — Z86.73 HISTORY OF STROKE: ICD-10-CM

## 2020-11-13 DIAGNOSIS — M47.22 CERVICAL RADICULOPATHY DUE TO DEGENERATIVE JOINT DISEASE OF SPINE: ICD-10-CM

## 2020-11-13 DIAGNOSIS — E53.8 B12 DEFICIENCY: Primary | ICD-10-CM

## 2020-11-13 DIAGNOSIS — E03.4 HYPOTHYROIDISM DUE TO ACQUIRED ATROPHY OF THYROID: ICD-10-CM

## 2020-11-13 DIAGNOSIS — G30.1 LATE ONSET ALZHEIMER'S DISEASE WITH BEHAVIORAL DISTURBANCE (HCC): ICD-10-CM

## 2020-11-13 DIAGNOSIS — I65.23 BILATERAL CAROTID ARTERY STENOSIS: ICD-10-CM

## 2020-11-14 RX ORDER — MEMANTINE HYDROCHLORIDE 5 MG/1
TABLET ORAL
Qty: 70 TAB | Refills: 0 | Status: SHIPPED | OUTPATIENT
Start: 2020-11-14 | End: 2021-01-26 | Stop reason: ALTCHOICE

## 2020-11-17 ENCOUNTER — TELEPHONE (OUTPATIENT)
Dept: NEUROLOGY | Age: 85
End: 2020-11-17

## 2020-11-17 NOTE — TELEPHONE ENCOUNTER
----- Message from [facility] sent at 11/17/2020  9:43 AM EST -----  Regarding: Dr. Carisa Guerin  Appointment not available    Caller's first and last name and relationship to patient (if not the patient): Santiago Littlejohn (Daughter)      Best contact number: [phone]      Preferred date and time: Next available      Scheduled appointment date and time: 11/17/2020 at 1:00p.m. Reason for appointment: New patient       Details to clarify the request:  Patient is not feeling well this morning, not COVID-related. Ms. Barger He is requesting a call back to reschedule this appointment.       [facility]

## 2020-11-29 DIAGNOSIS — F51.01 PRIMARY INSOMNIA: ICD-10-CM

## 2020-11-30 RX ORDER — PRAMIPEXOLE DIHYDROCHLORIDE 0.5 MG/1
0.5 TABLET ORAL DAILY
Qty: 60 TAB | Refills: 1 | Status: SHIPPED | OUTPATIENT
Start: 2020-11-30 | End: 2021-02-10

## 2020-11-30 RX ORDER — SUVOREXANT 15 MG/1
TABLET, FILM COATED ORAL
Qty: 30 TAB | Refills: 1 | Status: SHIPPED | OUTPATIENT
Start: 2020-11-30 | End: 2021-01-21

## 2020-11-30 NOTE — TELEPHONE ENCOUNTER
PCP: Carter Grant MD    Last appt: 9/3/2020  Future Appointments   Date Time Provider David Pérez   12/22/2020  1:00 PM Nevin Martinez, PHD [facility]   1/26/2021  2:40 PM MD [facility] Bailey [facility] AM[facility]   3/22/2021  1:15 PM Bridgette Ansari MD [facility]       Requested Prescriptions     Pending Prescriptions Disp Refills    pramipexole (MIRAPEX) 0.5 mg tablet 60 Tab 1     Sig: Take 1 Tab by mouth daily.

## 2020-12-17 RX ORDER — ATORVASTATIN CALCIUM 10 MG/1
TABLET, FILM COATED ORAL
Qty: 90 TAB | Refills: 1 | Status: SHIPPED | OUTPATIENT
Start: 2020-12-17 | End: 2021-01-01

## 2020-12-22 ENCOUNTER — OFFICE VISIT (OUTPATIENT)
Dept: NEUROLOGY | Age: 85
End: 2020-12-22
Payer: MEDICARE

## 2020-12-22 DIAGNOSIS — F32.A ANXIETY AND DEPRESSION: ICD-10-CM

## 2020-12-22 DIAGNOSIS — G31.84 MILD COGNITIVE IMPAIRMENT WITH MEMORY LOSS: Primary | ICD-10-CM

## 2020-12-22 DIAGNOSIS — F41.9 ANXIETY AND DEPRESSION: ICD-10-CM

## 2020-12-22 PROCEDURE — 96116 NUBHVL XM PHYS/QHP 1ST HR: CPT | Performed by: PSYCHOLOGIST
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
## 2021-01-01 ENCOUNTER — PATIENT OUTREACH (OUTPATIENT)
Dept: CASE MANAGEMENT | Age: 86
End: 2021-01-01

## 2021-01-01 ENCOUNTER — APPOINTMENT (OUTPATIENT)
Dept: ULTRASOUND IMAGING | Age: 86
DRG: 871 | End: 2021-01-01
Attending: HOSPITALIST
Payer: MEDICARE

## 2021-01-01 ENCOUNTER — OFFICE VISIT (OUTPATIENT)
Dept: INTERNAL MEDICINE CLINIC | Age: 86
End: 2021-01-01
Payer: MEDICARE

## 2021-01-01 ENCOUNTER — TELEPHONE (OUTPATIENT)
Dept: INTERNAL MEDICINE CLINIC | Age: 86
End: 2021-01-01

## 2021-01-01 ENCOUNTER — HOSPITAL ENCOUNTER (INPATIENT)
Age: 86
LOS: 8 days | Discharge: NURSING, REHAB OR LONG-TERM CARE | DRG: 871 | End: 2021-09-18
Attending: EMERGENCY MEDICINE | Admitting: INTERNAL MEDICINE
Payer: MEDICARE

## 2021-01-01 ENCOUNTER — TELEPHONE (OUTPATIENT)
Dept: NEUROLOGY | Age: 86
End: 2021-01-01

## 2021-01-01 ENCOUNTER — APPOINTMENT (OUTPATIENT)
Dept: GENERAL RADIOLOGY | Age: 86
DRG: 871 | End: 2021-01-01
Attending: HOSPITALIST
Payer: MEDICARE

## 2021-01-01 ENCOUNTER — TELEPHONE (OUTPATIENT)
Dept: ONCOLOGY | Age: 86
End: 2021-01-01

## 2021-01-01 ENCOUNTER — VIRTUAL VISIT (OUTPATIENT)
Dept: INTERNAL MEDICINE CLINIC | Age: 86
End: 2021-01-01
Payer: MEDICARE

## 2021-01-01 ENCOUNTER — APPOINTMENT (OUTPATIENT)
Dept: GENERAL RADIOLOGY | Age: 86
DRG: 871 | End: 2021-01-01
Attending: EMERGENCY MEDICINE
Payer: MEDICARE

## 2021-01-01 ENCOUNTER — VIRTUAL VISIT (OUTPATIENT)
Dept: NEUROLOGY | Age: 86
End: 2021-01-01

## 2021-01-01 ENCOUNTER — VIRTUAL VISIT (OUTPATIENT)
Dept: NEUROLOGY | Age: 86
End: 2021-01-01
Payer: MEDICARE

## 2021-01-01 VITALS
RESPIRATION RATE: 18 BRPM | OXYGEN SATURATION: 94 % | WEIGHT: 108.8 LBS | BODY MASS INDEX: 19.28 KG/M2 | HEIGHT: 63 IN | DIASTOLIC BLOOD PRESSURE: 74 MMHG | TEMPERATURE: 98.8 F | SYSTOLIC BLOOD PRESSURE: 119 MMHG | HEART RATE: 65 BPM

## 2021-01-01 VITALS
HEIGHT: 63 IN | DIASTOLIC BLOOD PRESSURE: 65 MMHG | WEIGHT: 113.76 LBS | TEMPERATURE: 97.5 F | HEART RATE: 93 BPM | OXYGEN SATURATION: 94 % | BODY MASS INDEX: 20.16 KG/M2 | RESPIRATION RATE: 20 BRPM | SYSTOLIC BLOOD PRESSURE: 116 MMHG

## 2021-01-01 DIAGNOSIS — Z86.73 HISTORY OF STROKE: ICD-10-CM

## 2021-01-01 DIAGNOSIS — E53.8 B12 DEFICIENCY: ICD-10-CM

## 2021-01-01 DIAGNOSIS — E03.4 HYPOTHYROIDISM DUE TO ACQUIRED ATROPHY OF THYROID: ICD-10-CM

## 2021-01-01 DIAGNOSIS — E11.42 DIABETIC PERIPHERAL NEUROPATHY ASSOCIATED WITH TYPE 2 DIABETES MELLITUS (HCC): ICD-10-CM

## 2021-01-01 DIAGNOSIS — K58.1 IRRITABLE BOWEL SYNDROME WITH CONSTIPATION: ICD-10-CM

## 2021-01-01 DIAGNOSIS — Z00.00 MEDICARE ANNUAL WELLNESS VISIT, SUBSEQUENT: ICD-10-CM

## 2021-01-01 DIAGNOSIS — M47.22 CERVICAL RADICULOPATHY DUE TO DEGENERATIVE JOINT DISEASE OF SPINE: ICD-10-CM

## 2021-01-01 DIAGNOSIS — F51.01 PRIMARY INSOMNIA: ICD-10-CM

## 2021-01-01 DIAGNOSIS — I10 ESSENTIAL HYPERTENSION: ICD-10-CM

## 2021-01-01 DIAGNOSIS — F02.818 LATE ONSET ALZHEIMER'S DISEASE WITH BEHAVIORAL DISTURBANCE (HCC): ICD-10-CM

## 2021-01-01 DIAGNOSIS — I65.23 BILATERAL CAROTID ARTERY STENOSIS: ICD-10-CM

## 2021-01-01 DIAGNOSIS — Z13.820 SCREENING FOR OSTEOPOROSIS: ICD-10-CM

## 2021-01-01 DIAGNOSIS — M54.9 SEVERE BACK PAIN: Primary | ICD-10-CM

## 2021-01-01 DIAGNOSIS — K59.09 CHRONIC CONSTIPATION: Primary | ICD-10-CM

## 2021-01-01 DIAGNOSIS — G30.1 LATE ONSET ALZHEIMER'S DISEASE WITH BEHAVIORAL DISTURBANCE (HCC): Primary | ICD-10-CM

## 2021-01-01 DIAGNOSIS — E78.00 HIGH CHOLESTEROL: ICD-10-CM

## 2021-01-01 DIAGNOSIS — J44.9 CHRONIC OBSTRUCTIVE PULMONARY DISEASE, UNSPECIFIED COPD TYPE (HCC): ICD-10-CM

## 2021-01-01 DIAGNOSIS — F33.1 MODERATE EPISODE OF RECURRENT MAJOR DEPRESSIVE DISORDER (HCC): ICD-10-CM

## 2021-01-01 DIAGNOSIS — G30.1 LATE ONSET ALZHEIMER'S DISEASE WITH BEHAVIORAL DISTURBANCE (HCC): ICD-10-CM

## 2021-01-01 DIAGNOSIS — N39.0 URINARY TRACT INFECTION WITHOUT HEMATURIA, SITE UNSPECIFIED: ICD-10-CM

## 2021-01-01 DIAGNOSIS — E11.49 OTHER DIABETIC NEUROLOGICAL COMPLICATION ASSOCIATED WITH TYPE 2 DIABETES MELLITUS (HCC): ICD-10-CM

## 2021-01-01 DIAGNOSIS — G25.0 BENIGN ESSENTIAL TREMOR SYNDROME: ICD-10-CM

## 2021-01-01 DIAGNOSIS — T68.XXXA HYPOTHERMIA, INITIAL ENCOUNTER: ICD-10-CM

## 2021-01-01 DIAGNOSIS — N17.9 ACUTE RENAL FAILURE, UNSPECIFIED ACUTE RENAL FAILURE TYPE (HCC): Primary | ICD-10-CM

## 2021-01-01 DIAGNOSIS — I27.20 PULMONARY HTN (HCC): ICD-10-CM

## 2021-01-01 DIAGNOSIS — D64.9 ANEMIA, UNSPECIFIED TYPE: ICD-10-CM

## 2021-01-01 DIAGNOSIS — R10.30 LOWER ABDOMINAL PAIN: ICD-10-CM

## 2021-01-01 DIAGNOSIS — E55.9 VITAMIN D DEFICIENCY: ICD-10-CM

## 2021-01-01 DIAGNOSIS — M54.89 OTHER BACK PAIN, UNSPECIFIED CHRONICITY: ICD-10-CM

## 2021-01-01 DIAGNOSIS — F02.818 LATE ONSET ALZHEIMER'S DISEASE WITH BEHAVIORAL DISTURBANCE (HCC): Primary | ICD-10-CM

## 2021-01-01 DIAGNOSIS — A41.9 SEPSIS, DUE TO UNSPECIFIED ORGANISM, UNSPECIFIED WHETHER ACUTE ORGAN DYSFUNCTION PRESENT (HCC): ICD-10-CM

## 2021-01-01 DIAGNOSIS — Z23 ENCOUNTER FOR IMMUNIZATION: ICD-10-CM

## 2021-01-01 DIAGNOSIS — Z78.0 POST-MENOPAUSAL: ICD-10-CM

## 2021-01-01 DIAGNOSIS — M54.89 OTHER BACK PAIN, UNSPECIFIED CHRONICITY: Primary | ICD-10-CM

## 2021-01-01 LAB
ALBUMIN SERPL-MCNC: 2.1 G/DL (ref 3.5–5)
ALBUMIN SERPL-MCNC: 2.9 G/DL (ref 3.5–5)
ALBUMIN SERPL-MCNC: 3.6 G/DL (ref 3.6–4.6)
ALBUMIN/CREAT UR: 40 MG/G CREAT (ref 0–29)
ALBUMIN/GLOB SERPL: 0.6 {RATIO} (ref 1.1–2.2)
ALBUMIN/GLOB SERPL: 1.1 {RATIO} (ref 1.1–2.2)
ALBUMIN/GLOB SERPL: 1.6 {RATIO} (ref 1.2–2.2)
ALP SERPL-CCNC: 117 U/L (ref 45–117)
ALP SERPL-CCNC: 134 IU/L (ref 48–121)
ALP SERPL-CCNC: 205 U/L (ref 45–117)
ALT SERPL-CCNC: 12 U/L (ref 12–78)
ALT SERPL-CCNC: 18 U/L (ref 12–78)
ALT SERPL-CCNC: 8 IU/L (ref 0–32)
ANION GAP SERPL CALC-SCNC: 5 MMOL/L (ref 5–15)
ANION GAP SERPL CALC-SCNC: 6 MMOL/L (ref 5–15)
ANION GAP SERPL CALC-SCNC: 7 MMOL/L (ref 5–15)
ANION GAP SERPL CALC-SCNC: 8 MMOL/L (ref 5–15)
APPEARANCE UR: ABNORMAL
AST SERPL-CCNC: 11 U/L (ref 15–37)
AST SERPL-CCNC: 13 IU/L (ref 0–40)
AST SERPL-CCNC: 17 U/L (ref 15–37)
ATRIAL RATE: 111 BPM
ATRIAL RATE: 62 BPM
BACTERIA SPEC CULT: ABNORMAL
BACTERIA SPEC CULT: ABNORMAL
BACTERIA SPEC CULT: NORMAL
BACTERIA SPEC CULT: NORMAL
BACTERIA URNS QL MICRO: ABNORMAL /HPF
BASOPHILS # BLD AUTO: 0 X10E3/UL (ref 0–0.2)
BASOPHILS # BLD: 0 K/UL (ref 0–0.1)
BASOPHILS NFR BLD AUTO: 0 %
BASOPHILS NFR BLD: 0 % (ref 0–1)
BILIRUB SERPL-MCNC: 0.3 MG/DL (ref 0.2–1)
BILIRUB SERPL-MCNC: 0.4 MG/DL (ref 0.2–1)
BILIRUB SERPL-MCNC: <0.2 MG/DL (ref 0–1.2)
BILIRUB UR QL: NEGATIVE
BUN SERPL-MCNC: 16 MG/DL (ref 6–20)
BUN SERPL-MCNC: 16 MG/DL (ref 6–20)
BUN SERPL-MCNC: 22 MG/DL (ref 8–27)
BUN SERPL-MCNC: 23 MG/DL (ref 6–20)
BUN SERPL-MCNC: 31 MG/DL (ref 6–20)
BUN SERPL-MCNC: 33 MG/DL (ref 6–20)
BUN SERPL-MCNC: 44 MG/DL (ref 6–20)
BUN SERPL-MCNC: 55 MG/DL (ref 6–20)
BUN/CREAT SERPL: 21 (ref 12–20)
BUN/CREAT SERPL: 25 (ref 12–20)
BUN/CREAT SERPL: 25 (ref 12–28)
BUN/CREAT SERPL: 29 (ref 12–20)
BUN/CREAT SERPL: 29 (ref 12–20)
BUN/CREAT SERPL: 31 (ref 12–20)
BUN/CREAT SERPL: 31 (ref 12–20)
BUN/CREAT SERPL: 34 (ref 12–20)
CALCIUM SERPL-MCNC: 8.5 MG/DL (ref 8.5–10.1)
CALCIUM SERPL-MCNC: 8.7 MG/DL (ref 8.5–10.1)
CALCIUM SERPL-MCNC: 8.7 MG/DL (ref 8.7–10.3)
CALCIUM SERPL-MCNC: 8.8 MG/DL (ref 8.5–10.1)
CALCIUM SERPL-MCNC: 8.9 MG/DL (ref 8.5–10.1)
CALCIUM SERPL-MCNC: 9 MG/DL (ref 8.5–10.1)
CALCIUM SERPL-MCNC: 9.2 MG/DL (ref 8.5–10.1)
CALCIUM SERPL-MCNC: 9.4 MG/DL (ref 8.5–10.1)
CALCULATED P AXIS, ECG09: 77 DEGREES
CALCULATED R AXIS, ECG10: -46 DEGREES
CALCULATED R AXIS, ECG10: -62 DEGREES
CALCULATED T AXIS, ECG11: -174 DEGREES
CALCULATED T AXIS, ECG11: 89 DEGREES
CC UR VC: ABNORMAL
CHLORIDE SERPL-SCNC: 106 MMOL/L (ref 96–106)
CHLORIDE SERPL-SCNC: 107 MMOL/L (ref 97–108)
CHLORIDE SERPL-SCNC: 111 MMOL/L (ref 97–108)
CHLORIDE SERPL-SCNC: 112 MMOL/L (ref 97–108)
CHLORIDE SERPL-SCNC: 115 MMOL/L (ref 97–108)
CHLORIDE SERPL-SCNC: 116 MMOL/L (ref 97–108)
CHLORIDE SERPL-SCNC: 117 MMOL/L (ref 97–108)
CHLORIDE SERPL-SCNC: 117 MMOL/L (ref 97–108)
CHOLEST SERPL-MCNC: 156 MG/DL (ref 100–199)
CO2 SERPL-SCNC: 21 MMOL/L (ref 21–32)
CO2 SERPL-SCNC: 23 MMOL/L (ref 21–32)
CO2 SERPL-SCNC: 23 MMOL/L (ref 21–32)
CO2 SERPL-SCNC: 24 MMOL/L (ref 21–32)
CO2 SERPL-SCNC: 26 MMOL/L (ref 21–32)
CO2 SERPL-SCNC: 28 MMOL/L (ref 20–29)
CO2 SERPL-SCNC: 29 MMOL/L (ref 21–32)
CO2 SERPL-SCNC: 29 MMOL/L (ref 21–32)
COLOR UR: ABNORMAL
COMMENT, HOLDF: NORMAL
COMMENT, HOLDF: NORMAL
COVID-19 RAPID TEST, COVR: NOT DETECTED
CREAT SERPL-MCNC: 0.55 MG/DL (ref 0.55–1.02)
CREAT SERPL-MCNC: 0.75 MG/DL (ref 0.55–1.02)
CREAT SERPL-MCNC: 0.75 MG/DL (ref 0.55–1.02)
CREAT SERPL-MCNC: 0.89 MG/DL (ref 0.57–1)
CREAT SERPL-MCNC: 1 MG/DL (ref 0.55–1.02)
CREAT SERPL-MCNC: 1.12 MG/DL (ref 0.55–1.02)
CREAT SERPL-MCNC: 1.31 MG/DL (ref 0.55–1.02)
CREAT SERPL-MCNC: 2.16 MG/DL (ref 0.55–1.02)
CREAT UR-MCNC: 95.5 MG/DL
DIAGNOSIS, 93000: NORMAL
DIAGNOSIS, 93000: NORMAL
DIFFERENTIAL METHOD BLD: ABNORMAL
EOSINOPHIL # BLD AUTO: 0 X10E3/UL (ref 0–0.4)
EOSINOPHIL # BLD: 0 K/UL (ref 0–0.4)
EOSINOPHIL NFR BLD AUTO: 0 %
EOSINOPHIL NFR BLD: 0 % (ref 0–7)
EOSINOPHIL NFR BLD: 0 % (ref 0–7)
EOSINOPHIL NFR BLD: 1 % (ref 0–7)
EPITH CASTS URNS QL MICRO: ABNORMAL /LPF
ERYTHROCYTE [DISTWIDTH] IN BLOOD BY AUTOMATED COUNT: 13.1 % (ref 11.7–15.4)
ERYTHROCYTE [DISTWIDTH] IN BLOOD BY AUTOMATED COUNT: 13.6 % (ref 11.5–14.5)
ERYTHROCYTE [DISTWIDTH] IN BLOOD BY AUTOMATED COUNT: 13.8 % (ref 11.5–14.5)
ERYTHROCYTE [DISTWIDTH] IN BLOOD BY AUTOMATED COUNT: 13.9 % (ref 11.5–14.5)
EST. AVERAGE GLUCOSE BLD GHB EST-MCNC: 126 MG/DL
GLOBULIN SER CALC-MCNC: 2.2 G/DL (ref 1.5–4.5)
GLOBULIN SER CALC-MCNC: 2.7 G/DL (ref 2–4)
GLOBULIN SER CALC-MCNC: 3.7 G/DL (ref 2–4)
GLUCOSE BLD STRIP.AUTO-MCNC: 112 MG/DL (ref 65–117)
GLUCOSE BLD STRIP.AUTO-MCNC: 139 MG/DL (ref 65–117)
GLUCOSE BLD STRIP.AUTO-MCNC: 73 MG/DL (ref 65–117)
GLUCOSE BLD STRIP.AUTO-MCNC: 87 MG/DL (ref 65–117)
GLUCOSE SERPL-MCNC: 110 MG/DL (ref 65–100)
GLUCOSE SERPL-MCNC: 145 MG/DL (ref 65–100)
GLUCOSE SERPL-MCNC: 184 MG/DL (ref 65–99)
GLUCOSE SERPL-MCNC: 73 MG/DL (ref 65–100)
GLUCOSE SERPL-MCNC: 74 MG/DL (ref 65–100)
GLUCOSE SERPL-MCNC: 76 MG/DL (ref 65–100)
GLUCOSE SERPL-MCNC: 85 MG/DL (ref 65–100)
GLUCOSE SERPL-MCNC: 93 MG/DL (ref 65–100)
GLUCOSE UR STRIP.AUTO-MCNC: NEGATIVE MG/DL
HBA1C MFR BLD: 6 % (ref 4.8–5.6)
HCT VFR BLD AUTO: 26.9 % (ref 35–47)
HCT VFR BLD AUTO: 28.4 % (ref 35–47)
HCT VFR BLD AUTO: 28.6 % (ref 35–47)
HCT VFR BLD AUTO: 30.1 % (ref 35–47)
HCT VFR BLD AUTO: 31.5 % (ref 35–47)
HCT VFR BLD AUTO: 33 % (ref 35–47)
HCT VFR BLD AUTO: 35.4 % (ref 35–47)
HCT VFR BLD AUTO: 36.1 % (ref 34–46.6)
HCT VFR BLD AUTO: 37.7 % (ref 35–47)
HDLC SERPL-MCNC: 63 MG/DL
HGB BLD-MCNC: 10.2 G/DL (ref 11.5–16)
HGB BLD-MCNC: 11.5 G/DL (ref 11.1–15.9)
HGB BLD-MCNC: 11.7 G/DL (ref 11.5–16)
HGB BLD-MCNC: 7.9 G/DL (ref 11.5–16)
HGB BLD-MCNC: 8.5 G/DL (ref 11.5–16)
HGB BLD-MCNC: 8.7 G/DL (ref 11.5–16)
HGB BLD-MCNC: 9 G/DL (ref 11.5–16)
HGB BLD-MCNC: 9.3 G/DL (ref 11.5–16)
HGB BLD-MCNC: 9.7 G/DL (ref 11.5–16)
HGB UR QL STRIP: ABNORMAL
IMM GRANULOCYTES # BLD AUTO: 0 X10E3/UL (ref 0–0.1)
IMM GRANULOCYTES # BLD AUTO: 0.1 K/UL (ref 0–0.04)
IMM GRANULOCYTES NFR BLD AUTO: 0 %
IMM GRANULOCYTES NFR BLD AUTO: 1 % (ref 0–0.5)
KETONES UR QL STRIP.AUTO: NEGATIVE MG/DL
LACTATE SERPL-SCNC: 0.8 MMOL/L (ref 0.4–2)
LACTATE SERPL-SCNC: 2.1 MMOL/L (ref 0.4–2)
LDLC SERPL CALC-MCNC: 58 MG/DL (ref 0–99)
LEUKOCYTE ESTERASE UR QL STRIP.AUTO: ABNORMAL
LYMPHOCYTES # BLD AUTO: 2 X10E3/UL (ref 0.7–3.1)
LYMPHOCYTES # BLD: 0.3 K/UL (ref 0.8–3.5)
LYMPHOCYTES # BLD: 0.3 K/UL (ref 0.8–3.5)
LYMPHOCYTES # BLD: 1 K/UL (ref 0.8–3.5)
LYMPHOCYTES NFR BLD AUTO: 20 %
LYMPHOCYTES NFR BLD: 17 % (ref 12–49)
LYMPHOCYTES NFR BLD: 2 % (ref 12–49)
LYMPHOCYTES NFR BLD: 5 % (ref 12–49)
MAGNESIUM SERPL-MCNC: 2.1 MG/DL (ref 1.6–2.4)
MCH RBC QN AUTO: 27.5 PG (ref 26–34)
MCH RBC QN AUTO: 27.7 PG (ref 26–34)
MCH RBC QN AUTO: 28.1 PG (ref 26–34)
MCH RBC QN AUTO: 29.1 PG (ref 26.6–33)
MCHC RBC AUTO-ENTMCNC: 29.4 G/DL (ref 30–36.5)
MCHC RBC AUTO-ENTMCNC: 30.4 G/DL (ref 30–36.5)
MCHC RBC AUTO-ENTMCNC: 31 G/DL (ref 30–36.5)
MCHC RBC AUTO-ENTMCNC: 31.9 G/DL (ref 31.5–35.7)
MCV RBC AUTO: 90.4 FL (ref 80–99)
MCV RBC AUTO: 91 FL (ref 79–97)
MCV RBC AUTO: 91.1 FL (ref 80–99)
MCV RBC AUTO: 93.5 FL (ref 80–99)
MICROALBUMIN UR-MCNC: 38.6 UG/ML
MONOCYTES # BLD AUTO: 0.6 X10E3/UL (ref 0.1–0.9)
MONOCYTES # BLD: 0.2 K/UL (ref 0–1)
MONOCYTES # BLD: 0.3 K/UL (ref 0–1)
MONOCYTES # BLD: 0.4 K/UL (ref 0–1)
MONOCYTES NFR BLD AUTO: 6 %
MONOCYTES NFR BLD: 2 % (ref 5–13)
MONOCYTES NFR BLD: 3 % (ref 5–13)
MONOCYTES NFR BLD: 7 % (ref 5–13)
NEUTROPHILS # BLD AUTO: 7.5 X10E3/UL (ref 1.4–7)
NEUTROPHILS NFR BLD AUTO: 74 %
NEUTS SEG # BLD: 12.2 K/UL (ref 1.8–8)
NEUTS SEG # BLD: 4.3 K/UL (ref 1.8–8)
NEUTS SEG # BLD: 6.2 K/UL (ref 1.8–8)
NEUTS SEG NFR BLD: 74 % (ref 32–75)
NEUTS SEG NFR BLD: 91 % (ref 32–75)
NEUTS SEG NFR BLD: 95 % (ref 32–75)
NITRITE UR QL STRIP.AUTO: POSITIVE
NRBC # BLD: 0 K/UL (ref 0–0.01)
NRBC BLD-RTO: 0 PER 100 WBC
P-R INTERVAL, ECG05: 144 MS
PH UR STRIP: 5 [PH] (ref 5–8)
PHOSPHATE SERPL-MCNC: 4.7 MG/DL (ref 2.6–4.7)
PLATELET # BLD AUTO: 123 K/UL (ref 150–400)
PLATELET # BLD AUTO: 126 K/UL (ref 150–400)
PLATELET # BLD AUTO: 174 K/UL (ref 150–400)
PLATELET # BLD AUTO: 203 X10E3/UL (ref 150–450)
PMV BLD AUTO: 10.4 FL (ref 8.9–12.9)
PMV BLD AUTO: 10.6 FL (ref 8.9–12.9)
PMV BLD AUTO: 10.6 FL (ref 8.9–12.9)
POTASSIUM SERPL-SCNC: 3.6 MMOL/L (ref 3.5–5.1)
POTASSIUM SERPL-SCNC: 3.8 MMOL/L (ref 3.5–5.1)
POTASSIUM SERPL-SCNC: 3.8 MMOL/L (ref 3.5–5.2)
POTASSIUM SERPL-SCNC: 4.1 MMOL/L (ref 3.5–5.1)
POTASSIUM SERPL-SCNC: 4.6 MMOL/L (ref 3.5–5.1)
POTASSIUM SERPL-SCNC: 5 MMOL/L (ref 3.5–5.1)
PROT SERPL-MCNC: 5.6 G/DL (ref 6.4–8.2)
PROT SERPL-MCNC: 5.8 G/DL (ref 6.4–8.2)
PROT SERPL-MCNC: 5.8 G/DL (ref 6–8.5)
PROT UR STRIP-MCNC: 30 MG/DL
Q-T INTERVAL, ECG07: 312 MS
Q-T INTERVAL, ECG07: 446 MS
QRS DURATION, ECG06: 102 MS
QRS DURATION, ECG06: 104 MS
QTC CALCULATION (BEZET), ECG08: 424 MS
QTC CALCULATION (BEZET), ECG08: 452 MS
RBC # BLD AUTO: 3.14 M/UL (ref 3.8–5.2)
RBC # BLD AUTO: 3.53 M/UL (ref 3.8–5.2)
RBC # BLD AUTO: 3.95 X10E6/UL (ref 3.77–5.28)
RBC # BLD AUTO: 4.17 M/UL (ref 3.8–5.2)
RBC #/AREA URNS HPF: ABNORMAL /HPF (ref 0–5)
RBC MORPH BLD: ABNORMAL
SAMPLES BEING HELD,HOLD: NORMAL
SAMPLES BEING HELD,HOLD: NORMAL
SERVICE CMNT-IMP: ABNORMAL
SERVICE CMNT-IMP: ABNORMAL
SERVICE CMNT-IMP: NORMAL
SODIUM SERPL-SCNC: 138 MMOL/L (ref 136–145)
SODIUM SERPL-SCNC: 143 MMOL/L (ref 136–145)
SODIUM SERPL-SCNC: 145 MMOL/L (ref 136–145)
SODIUM SERPL-SCNC: 146 MMOL/L (ref 136–145)
SODIUM SERPL-SCNC: 147 MMOL/L (ref 136–145)
SODIUM SERPL-SCNC: 148 MMOL/L (ref 134–144)
SOURCE, COVRS: NORMAL
SP GR UR REFRACTOMETRY: 1.01 (ref 1–1.03)
TRIGL SERPL-MCNC: 221 MG/DL (ref 0–149)
TROPONIN I SERPL-MCNC: <0.05 NG/ML
UR CULT HOLD, URHOLD: NORMAL
UROBILINOGEN UR QL STRIP.AUTO: 0.2 EU/DL (ref 0.2–1)
VENTRICULAR RATE, ECG03: 111 BPM
VENTRICULAR RATE, ECG03: 62 BPM
VLDLC SERPL CALC-MCNC: 35 MG/DL (ref 5–40)
WBC # BLD AUTO: 10.1 X10E3/UL (ref 3.4–10.8)
WBC # BLD AUTO: 12.9 K/UL (ref 3.6–11)
WBC # BLD AUTO: 5.7 K/UL (ref 3.6–11)
WBC # BLD AUTO: 6.8 K/UL (ref 3.6–11)
WBC URNS QL MICRO: >100 /HPF (ref 0–4)

## 2021-01-01 PROCEDURE — 94640 AIRWAY INHALATION TREATMENT: CPT

## 2021-01-01 PROCEDURE — 74011636637 HC RX REV CODE- 636/637: Performed by: INTERNAL MEDICINE

## 2021-01-01 PROCEDURE — 36415 COLL VENOUS BLD VENIPUNCTURE: CPT

## 2021-01-01 PROCEDURE — 74011000258 HC RX REV CODE- 258: Performed by: HOSPITALIST

## 2021-01-01 PROCEDURE — P9047 ALBUMIN (HUMAN), 25%, 50ML: HCPCS | Performed by: INTERNAL MEDICINE

## 2021-01-01 PROCEDURE — G8432 DEP SCR NOT DOC, RNG: HCPCS | Performed by: PSYCHIATRY & NEUROLOGY

## 2021-01-01 PROCEDURE — 80053 COMPREHEN METABOLIC PANEL: CPT

## 2021-01-01 PROCEDURE — 97161 PT EVAL LOW COMPLEX 20 MIN: CPT

## 2021-01-01 PROCEDURE — G0009 ADMIN PNEUMOCOCCAL VACCINE: HCPCS | Performed by: INTERNAL MEDICINE

## 2021-01-01 PROCEDURE — 3288F FALL RISK ASSESSMENT DOCD: CPT | Performed by: INTERNAL MEDICINE

## 2021-01-01 PROCEDURE — 74011000250 HC RX REV CODE- 250: Performed by: HOSPITALIST

## 2021-01-01 PROCEDURE — 99214 OFFICE O/P EST MOD 30 MIN: CPT | Performed by: INTERNAL MEDICINE

## 2021-01-01 PROCEDURE — G8428 CUR MEDS NOT DOCUMENT: HCPCS | Performed by: INTERNAL MEDICINE

## 2021-01-01 PROCEDURE — 65660000000 HC RM CCU STEPDOWN

## 2021-01-01 PROCEDURE — 74011250637 HC RX REV CODE- 250/637: Performed by: INTERNAL MEDICINE

## 2021-01-01 PROCEDURE — 74011250636 HC RX REV CODE- 250/636: Performed by: INTERNAL MEDICINE

## 2021-01-01 PROCEDURE — 77010033678 HC OXYGEN DAILY

## 2021-01-01 PROCEDURE — 65660000001 HC RM ICU INTERMED STEPDOWN

## 2021-01-01 PROCEDURE — 85018 HEMOGLOBIN: CPT

## 2021-01-01 PROCEDURE — 83605 ASSAY OF LACTIC ACID: CPT

## 2021-01-01 PROCEDURE — 71045 X-RAY EXAM CHEST 1 VIEW: CPT

## 2021-01-01 PROCEDURE — 74011250636 HC RX REV CODE- 250/636: Performed by: HOSPITALIST

## 2021-01-01 PROCEDURE — P9047 ALBUMIN (HUMAN), 25%, 50ML: HCPCS | Performed by: HOSPITALIST

## 2021-01-01 PROCEDURE — 97530 THERAPEUTIC ACTIVITIES: CPT

## 2021-01-01 PROCEDURE — 1100F PTFALLS ASSESS-DOCD GE2>/YR: CPT | Performed by: PSYCHIATRY & NEUROLOGY

## 2021-01-01 PROCEDURE — 87040 BLOOD CULTURE FOR BACTERIA: CPT

## 2021-01-01 PROCEDURE — 84484 ASSAY OF TROPONIN QUANT: CPT

## 2021-01-01 PROCEDURE — 87635 SARS-COV-2 COVID-19 AMP PRB: CPT

## 2021-01-01 PROCEDURE — 87086 URINE CULTURE/COLONY COUNT: CPT

## 2021-01-01 PROCEDURE — 3288F FALL RISK ASSESSMENT DOCD: CPT | Performed by: PSYCHIATRY & NEUROLOGY

## 2021-01-01 PROCEDURE — 74011250637 HC RX REV CODE- 250/637: Performed by: HOSPITALIST

## 2021-01-01 PROCEDURE — 83735 ASSAY OF MAGNESIUM: CPT

## 2021-01-01 PROCEDURE — 93005 ELECTROCARDIOGRAM TRACING: CPT

## 2021-01-01 PROCEDURE — 84100 ASSAY OF PHOSPHORUS: CPT

## 2021-01-01 PROCEDURE — 87186 SC STD MICRODIL/AGAR DIL: CPT

## 2021-01-01 PROCEDURE — 80048 BASIC METABOLIC PNL TOTAL CA: CPT

## 2021-01-01 PROCEDURE — 74011250636 HC RX REV CODE- 250/636: Performed by: EMERGENCY MEDICINE

## 2021-01-01 PROCEDURE — 87077 CULTURE AEROBIC IDENTIFY: CPT

## 2021-01-01 PROCEDURE — 82962 GLUCOSE BLOOD TEST: CPT

## 2021-01-01 PROCEDURE — 74011250636 HC RX REV CODE- 250/636: Performed by: NURSE PRACTITIONER

## 2021-01-01 PROCEDURE — 90732 PPSV23 VACC 2 YRS+ SUBQ/IM: CPT | Performed by: INTERNAL MEDICINE

## 2021-01-01 PROCEDURE — 96365 THER/PROPH/DIAG IV INF INIT: CPT

## 2021-01-01 PROCEDURE — 1100F PTFALLS ASSESS-DOCD GE2>/YR: CPT | Performed by: INTERNAL MEDICINE

## 2021-01-01 PROCEDURE — 94760 N-INVAS EAR/PLS OXIMETRY 1: CPT

## 2021-01-01 PROCEDURE — 74011000250 HC RX REV CODE- 250: Performed by: INTERNAL MEDICINE

## 2021-01-01 PROCEDURE — 94664 DEMO&/EVAL PT USE INHALER: CPT

## 2021-01-01 PROCEDURE — 99213 OFFICE O/P EST LOW 20 MIN: CPT | Performed by: PSYCHIATRY & NEUROLOGY

## 2021-01-01 PROCEDURE — 85025 COMPLETE CBC W/AUTO DIFF WBC: CPT

## 2021-01-01 PROCEDURE — 81001 URINALYSIS AUTO W/SCOPE: CPT

## 2021-01-01 PROCEDURE — G8432 DEP SCR NOT DOC, RNG: HCPCS | Performed by: INTERNAL MEDICINE

## 2021-01-01 PROCEDURE — 96367 TX/PROPH/DG ADDL SEQ IV INF: CPT

## 2021-01-01 PROCEDURE — G0463 HOSPITAL OUTPT CLINIC VISIT: HCPCS | Performed by: INTERNAL MEDICINE

## 2021-01-01 PROCEDURE — G8536 NO DOC ELDER MAL SCRN: HCPCS | Performed by: PSYCHIATRY & NEUROLOGY

## 2021-01-01 PROCEDURE — G8419 CALC BMI OUT NRM PARAM NOF/U: HCPCS | Performed by: PSYCHIATRY & NEUROLOGY

## 2021-01-01 PROCEDURE — 76770 US EXAM ABDO BACK WALL COMP: CPT

## 2021-01-01 PROCEDURE — 1090F PRES/ABSN URINE INCON ASSESS: CPT | Performed by: PSYCHIATRY & NEUROLOGY

## 2021-01-01 PROCEDURE — 85014 HEMATOCRIT: CPT

## 2021-01-01 PROCEDURE — 1090F PRES/ABSN URINE INCON ASSESS: CPT | Performed by: INTERNAL MEDICINE

## 2021-01-01 PROCEDURE — 72100 X-RAY EXAM L-S SPINE 2/3 VWS: CPT

## 2021-01-01 PROCEDURE — 99285 EMERGENCY DEPT VISIT HI MDM: CPT

## 2021-01-01 PROCEDURE — G8427 DOCREV CUR MEDS BY ELIG CLIN: HCPCS | Performed by: PSYCHIATRY & NEUROLOGY

## 2021-01-01 PROCEDURE — 97166 OT EVAL MOD COMPLEX 45 MIN: CPT

## 2021-01-01 PROCEDURE — 77030019905 HC CATH URETH INTMIT MDII -A

## 2021-01-01 PROCEDURE — 92610 EVALUATE SWALLOWING FUNCTION: CPT

## 2021-01-01 PROCEDURE — 74011000258 HC RX REV CODE- 258: Performed by: EMERGENCY MEDICINE

## 2021-01-01 PROCEDURE — 77030040922 HC BLNKT HYPOTHRM STRY -A

## 2021-01-01 RX ORDER — IPRATROPIUM BROMIDE AND ALBUTEROL SULFATE 2.5; .5 MG/3ML; MG/3ML
3 SOLUTION RESPIRATORY (INHALATION)
Status: DISCONTINUED | OUTPATIENT
Start: 2021-01-01 | End: 2021-01-01 | Stop reason: HOSPADM

## 2021-01-01 RX ORDER — PREDNISONE 5 MG/1
5 TABLET ORAL
Qty: 30 TABLET | Refills: 0 | Status: SHIPPED
Start: 2021-01-01 | End: 2022-01-01 | Stop reason: ALTCHOICE

## 2021-01-01 RX ORDER — IPRATROPIUM BROMIDE AND ALBUTEROL SULFATE 2.5; .5 MG/3ML; MG/3ML
3 SOLUTION RESPIRATORY (INHALATION)
Qty: 60 NEBULE | Refills: 0 | Status: SHIPPED
Start: 2021-01-01 | End: 2022-01-01 | Stop reason: ALTCHOICE

## 2021-01-01 RX ORDER — VANCOMYCIN HYDROCHLORIDE
1250 ONCE
Status: COMPLETED | OUTPATIENT
Start: 2021-01-01 | End: 2021-01-01

## 2021-01-01 RX ORDER — ATORVASTATIN CALCIUM 10 MG/1
TABLET, FILM COATED ORAL
Qty: 90 TABLET | Refills: 1 | Status: SHIPPED | OUTPATIENT
Start: 2021-01-01 | End: 2022-01-01 | Stop reason: ALTCHOICE

## 2021-01-01 RX ORDER — LIDOCAINE 50 MG/G
1 PATCH TOPICAL EVERY 24 HOURS
Qty: 15 EACH | Refills: 0 | Status: SHIPPED | OUTPATIENT
Start: 2021-01-01 | End: 2022-01-01 | Stop reason: ALTCHOICE

## 2021-01-01 RX ORDER — ACETAMINOPHEN 500 MG
1000 TABLET ORAL 2 TIMES DAILY
Status: DISPENSED | OUTPATIENT
Start: 2021-01-01 | End: 2021-01-01

## 2021-01-01 RX ORDER — ACETAMINOPHEN 325 MG/1
650 TABLET ORAL
Status: DISCONTINUED | OUTPATIENT
Start: 2021-01-01 | End: 2021-01-01 | Stop reason: HOSPADM

## 2021-01-01 RX ORDER — ACETAMINOPHEN 325 MG/1
650 TABLET ORAL EVERY 6 HOURS
Qty: 120 TABLET | Refills: 0 | Status: SHIPPED
Start: 2021-01-01 | End: 2022-01-01 | Stop reason: ALTCHOICE

## 2021-01-01 RX ORDER — DIGOXIN 0.25 MG/ML
250 INJECTION INTRAMUSCULAR; INTRAVENOUS
Status: COMPLETED | OUTPATIENT
Start: 2021-01-01 | End: 2021-01-01

## 2021-01-01 RX ORDER — PREDNISONE 10 MG/1
TABLET ORAL
COMMUNITY
Start: 2021-04-08 | End: 2021-01-01

## 2021-01-01 RX ORDER — MEMANTINE HYDROCHLORIDE 10 MG/1
10 TABLET ORAL DAILY
Status: DISCONTINUED | OUTPATIENT
Start: 2021-01-01 | End: 2021-01-01 | Stop reason: HOSPADM

## 2021-01-01 RX ORDER — ONDANSETRON 2 MG/ML
4 INJECTION INTRAMUSCULAR; INTRAVENOUS
Status: DISCONTINUED | OUTPATIENT
Start: 2021-01-01 | End: 2021-01-01 | Stop reason: HOSPADM

## 2021-01-01 RX ORDER — SODIUM CHLORIDE 0.9 % (FLUSH) 0.9 %
5-40 SYRINGE (ML) INJECTION AS NEEDED
Status: DISCONTINUED | OUTPATIENT
Start: 2021-01-01 | End: 2021-01-01 | Stop reason: HOSPADM

## 2021-01-01 RX ORDER — SODIUM CHLORIDE 0.9 % (FLUSH) 0.9 %
5-10 SYRINGE (ML) INJECTION AS NEEDED
Status: DISCONTINUED | OUTPATIENT
Start: 2021-01-01 | End: 2021-01-01 | Stop reason: HOSPADM

## 2021-01-01 RX ORDER — ALBUMIN HUMAN 250 G/1000ML
25 SOLUTION INTRAVENOUS EVERY 6 HOURS
Status: COMPLETED | OUTPATIENT
Start: 2021-01-01 | End: 2021-01-01

## 2021-01-01 RX ORDER — ALBUTEROL SULFATE 90 UG/1
AEROSOL, METERED RESPIRATORY (INHALATION)
COMMUNITY
End: 2022-01-01

## 2021-01-01 RX ORDER — ATORVASTATIN CALCIUM 10 MG/1
10 TABLET, FILM COATED ORAL
Status: DISCONTINUED | OUTPATIENT
Start: 2021-01-01 | End: 2021-01-01 | Stop reason: HOSPADM

## 2021-01-01 RX ORDER — ACETAMINOPHEN 650 MG/1
650 SUPPOSITORY RECTAL
Status: DISCONTINUED | OUTPATIENT
Start: 2021-01-01 | End: 2021-01-01 | Stop reason: HOSPADM

## 2021-01-01 RX ORDER — SERTRALINE HYDROCHLORIDE 50 MG/1
TABLET, FILM COATED ORAL
Qty: 90 TABLET | Refills: 1 | Status: SHIPPED | OUTPATIENT
Start: 2021-01-01 | End: 2022-01-01 | Stop reason: ALTCHOICE

## 2021-01-01 RX ORDER — ACETAMINOPHEN AND CODEINE PHOSPHATE 300; 30 MG/1; MG/1
TABLET ORAL
COMMUNITY
Start: 2021-02-02 | End: 2021-01-01 | Stop reason: SDUPTHER

## 2021-01-01 RX ORDER — APIXABAN 2.5 MG/1
TABLET, FILM COATED ORAL
Qty: 60 TABLET | Refills: 3 | Status: SHIPPED | OUTPATIENT
Start: 2021-01-01 | End: 2022-01-01 | Stop reason: ALTCHOICE

## 2021-01-01 RX ORDER — BUMETANIDE 1 MG/1
TABLET ORAL
Qty: 90 TABLET | Refills: 0 | Status: SHIPPED | OUTPATIENT
Start: 2021-01-01 | End: 2021-01-01

## 2021-01-01 RX ORDER — IBUPROFEN 400 MG/1
400 TABLET ORAL
COMMUNITY
End: 2021-01-01

## 2021-01-01 RX ORDER — MORPHINE SULFATE 2 MG/ML
1 INJECTION, SOLUTION INTRAMUSCULAR; INTRAVENOUS
Status: DISCONTINUED | OUTPATIENT
Start: 2021-01-01 | End: 2021-01-01 | Stop reason: HOSPADM

## 2021-01-01 RX ORDER — METOPROLOL TARTRATE 5 MG/5ML
2.5 INJECTION INTRAVENOUS
Status: DISCONTINUED | OUTPATIENT
Start: 2021-01-01 | End: 2021-01-01 | Stop reason: HOSPADM

## 2021-01-01 RX ORDER — SODIUM CHLORIDE 9 MG/ML
50 INJECTION, SOLUTION INTRAVENOUS CONTINUOUS
Status: DISCONTINUED | OUTPATIENT
Start: 2021-01-01 | End: 2021-01-01

## 2021-01-01 RX ORDER — ONDANSETRON 4 MG/1
TABLET, ORALLY DISINTEGRATING ORAL
Qty: 30 TABLET | Refills: 5 | Status: SHIPPED | OUTPATIENT
Start: 2021-01-01 | End: 2021-01-01

## 2021-01-01 RX ORDER — POLYETHYLENE GLYCOL 3350 17 G/17G
17 POWDER, FOR SOLUTION ORAL DAILY PRN
Status: DISCONTINUED | OUTPATIENT
Start: 2021-01-01 | End: 2021-01-01 | Stop reason: HOSPADM

## 2021-01-01 RX ORDER — OXYCODONE AND ACETAMINOPHEN 5; 325 MG/1; MG/1
1 TABLET ORAL
Status: DISCONTINUED | OUTPATIENT
Start: 2021-01-01 | End: 2021-01-01 | Stop reason: HOSPADM

## 2021-01-01 RX ORDER — ZOSTER VACCINE RECOMBINANT, ADJUVANTED 50 MCG/0.5
KIT INTRAMUSCULAR
Qty: 0.5 ML | Refills: 1 | Status: SHIPPED | OUTPATIENT
Start: 2021-01-01 | End: 2021-01-01

## 2021-01-01 RX ORDER — APIXABAN 2.5 MG/1
TABLET, FILM COATED ORAL
Qty: 60 TAB | Refills: 3 | Status: SHIPPED | OUTPATIENT
Start: 2021-01-01 | End: 2021-01-01

## 2021-01-01 RX ORDER — METOPROLOL SUCCINATE 50 MG/1
TABLET, EXTENDED RELEASE ORAL
COMMUNITY
Start: 2021-03-29 | End: 2021-01-01 | Stop reason: ALTCHOICE

## 2021-01-01 RX ORDER — POLYETHYLENE GLYCOL 3350 17 G/17G
17 POWDER, FOR SOLUTION ORAL
Qty: 30 PACKET | Refills: 0 | Status: SHIPPED
Start: 2021-01-01 | End: 2022-01-01 | Stop reason: ALTCHOICE

## 2021-01-01 RX ORDER — ACETAMINOPHEN 500 MG
1000 TABLET ORAL 2 TIMES DAILY
Status: DISCONTINUED | OUTPATIENT
Start: 2021-01-01 | End: 2021-01-01 | Stop reason: SDUPTHER

## 2021-01-01 RX ORDER — ACETAMINOPHEN AND CODEINE PHOSPHATE 300; 30 MG/1; MG/1
1 TABLET ORAL
Qty: 60 TABLET | Refills: 0 | Status: SHIPPED | OUTPATIENT
Start: 2021-01-01 | End: 2021-01-01

## 2021-01-01 RX ORDER — SUVOREXANT 15 MG/1
TABLET, FILM COATED ORAL
Qty: 30 TABLET | Refills: 1 | Status: SHIPPED | OUTPATIENT
Start: 2021-01-01 | End: 2021-01-01

## 2021-01-01 RX ORDER — DIGOXIN 125 MCG
0.12 TABLET ORAL DAILY
Qty: 30 TABLET | Refills: 0 | Status: SHIPPED
Start: 2021-01-01 | End: 2022-01-01 | Stop reason: ALTCHOICE

## 2021-01-01 RX ORDER — PREDNISONE 5 MG/1
5 TABLET ORAL
Status: DISCONTINUED | OUTPATIENT
Start: 2021-01-01 | End: 2021-01-01 | Stop reason: HOSPADM

## 2021-01-01 RX ORDER — DIGOXIN 125 MCG
0.12 TABLET ORAL DAILY
Status: DISCONTINUED | OUTPATIENT
Start: 2021-01-01 | End: 2021-01-01 | Stop reason: HOSPADM

## 2021-01-01 RX ORDER — PRAMIPEXOLE DIHYDROCHLORIDE 0.25 MG/1
0.5 TABLET ORAL
Status: DISCONTINUED | OUTPATIENT
Start: 2021-01-01 | End: 2021-01-01 | Stop reason: HOSPADM

## 2021-01-01 RX ORDER — ONDANSETRON 4 MG/1
TABLET, ORALLY DISINTEGRATING ORAL
Qty: 30 TABLET | Refills: 5 | Status: SHIPPED | OUTPATIENT
Start: 2021-01-01 | End: 2022-01-01 | Stop reason: ALTCHOICE

## 2021-01-01 RX ORDER — LIDOCAINE 50 MG/G
1 PATCH TOPICAL EVERY 24 HOURS
Qty: 5 EACH | Refills: 0 | Status: SHIPPED | OUTPATIENT
Start: 2021-01-01 | End: 2021-01-01 | Stop reason: SDUPTHER

## 2021-01-01 RX ORDER — ONDANSETRON 4 MG/1
TABLET, ORALLY DISINTEGRATING ORAL
Qty: 30 TAB | Refills: 5 | Status: SHIPPED | OUTPATIENT
Start: 2021-01-01 | End: 2021-01-01

## 2021-01-01 RX ORDER — LIDOCAINE 50 MG/G
OINTMENT TOPICAL
Status: DISCONTINUED | OUTPATIENT
Start: 2021-01-01 | End: 2021-01-01 | Stop reason: HOSPADM

## 2021-01-01 RX ORDER — BALSAM PERU/CASTOR OIL
OINTMENT (GRAM) TOPICAL 2 TIMES DAILY
Status: DISCONTINUED | OUTPATIENT
Start: 2021-01-01 | End: 2021-01-01 | Stop reason: HOSPADM

## 2021-01-01 RX ORDER — CEPHALEXIN 500 MG/1
CAPSULE ORAL
COMMUNITY
Start: 2021-02-09 | End: 2021-01-01 | Stop reason: ALTCHOICE

## 2021-01-01 RX ORDER — FAMOTIDINE 20 MG/1
TABLET, FILM COATED ORAL
Qty: 180 TABLET | Refills: 1 | Status: SHIPPED | OUTPATIENT
Start: 2021-01-01 | End: 2022-01-01 | Stop reason: ALTCHOICE

## 2021-01-01 RX ORDER — IPRATROPIUM BROMIDE AND ALBUTEROL SULFATE 2.5; .5 MG/3ML; MG/3ML
3 SOLUTION RESPIRATORY (INHALATION) 2 TIMES DAILY
Qty: 60 NEBULE | Refills: 0 | Status: SHIPPED
Start: 2021-01-01 | End: 2021-01-01

## 2021-01-01 RX ORDER — IPRATROPIUM BROMIDE AND ALBUTEROL SULFATE 2.5; .5 MG/3ML; MG/3ML
3 SOLUTION RESPIRATORY (INHALATION)
Status: DISCONTINUED | OUTPATIENT
Start: 2021-01-01 | End: 2021-01-01

## 2021-01-01 RX ORDER — NITROFURANTOIN 25; 75 MG/1; MG/1
CAPSULE ORAL
COMMUNITY
Start: 2021-02-24 | End: 2021-01-01 | Stop reason: ALTCHOICE

## 2021-01-01 RX ORDER — SODIUM CHLORIDE 0.9 % (FLUSH) 0.9 %
5-40 SYRINGE (ML) INJECTION EVERY 8 HOURS
Status: DISCONTINUED | OUTPATIENT
Start: 2021-01-01 | End: 2021-01-01 | Stop reason: HOSPADM

## 2021-01-01 RX ORDER — PRAMIPEXOLE DIHYDROCHLORIDE 0.25 MG/1
0.5 TABLET ORAL DAILY
Status: DISCONTINUED | OUTPATIENT
Start: 2021-01-01 | End: 2021-01-01

## 2021-01-01 RX ORDER — BALSAM PERU/CASTOR OIL
OINTMENT (GRAM) TOPICAL 2 TIMES DAILY
Qty: 200 G | Refills: 0 | Status: SHIPPED
Start: 2021-01-01 | End: 2022-01-01 | Stop reason: ALTCHOICE

## 2021-01-01 RX ORDER — ONDANSETRON 4 MG/1
4 TABLET, ORALLY DISINTEGRATING ORAL
Status: DISCONTINUED | OUTPATIENT
Start: 2021-01-01 | End: 2021-01-01 | Stop reason: HOSPADM

## 2021-01-01 RX ORDER — SERTRALINE HYDROCHLORIDE 50 MG/1
50 TABLET, FILM COATED ORAL DAILY
Status: DISCONTINUED | OUTPATIENT
Start: 2021-01-01 | End: 2021-01-01 | Stop reason: HOSPADM

## 2021-01-01 RX ORDER — PRAMIPEXOLE DIHYDROCHLORIDE 0.5 MG/1
TABLET ORAL
Qty: 90 TABLET | Refills: 1 | Status: SHIPPED | OUTPATIENT
Start: 2021-01-01 | End: 2022-01-01 | Stop reason: ALTCHOICE

## 2021-01-01 RX ORDER — MEMANTINE HYDROCHLORIDE 10 MG/1
TABLET ORAL
Qty: 180 TABLET | Refills: 1 | Status: SHIPPED | OUTPATIENT
Start: 2021-01-01 | End: 2022-01-01 | Stop reason: ALTCHOICE

## 2021-01-01 RX ORDER — ARFORMOTEROL TARTRATE 15 UG/2ML
15 SOLUTION RESPIRATORY (INHALATION) 2 TIMES DAILY
Qty: 60 EACH | Refills: 0 | Status: SHIPPED
Start: 2021-01-01 | End: 2022-01-01 | Stop reason: ALTCHOICE

## 2021-01-01 RX ORDER — METOPROLOL TARTRATE 25 MG/1
25 TABLET, FILM COATED ORAL EVERY 12 HOURS
Status: DISCONTINUED | OUTPATIENT
Start: 2021-01-01 | End: 2021-01-01 | Stop reason: HOSPADM

## 2021-01-01 RX ORDER — FLUTICASONE FUROATE AND VILANTEROL 200; 25 UG/1; UG/1
POWDER RESPIRATORY (INHALATION)
COMMUNITY
End: 2022-01-01

## 2021-01-01 RX ADMIN — ACETAMINOPHEN 1000 MG: 500 TABLET ORAL at 10:10

## 2021-01-01 RX ADMIN — METOPROLOL TARTRATE 25 MG: 25 TABLET, FILM COATED ORAL at 08:22

## 2021-01-01 RX ADMIN — ALBUMIN (HUMAN) 25 G: 0.25 INJECTION, SOLUTION INTRAVENOUS at 04:03

## 2021-01-01 RX ADMIN — DIGOXIN 250 MCG: 0.25 INJECTION INTRAMUSCULAR; INTRAVENOUS at 11:56

## 2021-01-01 RX ADMIN — OXYCODONE HYDROCHLORIDE AND ACETAMINOPHEN 1 TABLET: 5; 325 TABLET ORAL at 22:28

## 2021-01-01 RX ADMIN — Medication 10 ML: at 07:20

## 2021-01-01 RX ADMIN — PRAMIPEXOLE DIHYDROCHLORIDE 0.5 MG: 0.25 TABLET ORAL at 20:57

## 2021-01-01 RX ADMIN — ALBUMIN (HUMAN) 25 G: 0.25 INJECTION, SOLUTION INTRAVENOUS at 22:23

## 2021-01-01 RX ADMIN — Medication 10 ML: at 22:00

## 2021-01-01 RX ADMIN — ACETAMINOPHEN 1000 MG: 500 TABLET ORAL at 11:22

## 2021-01-01 RX ADMIN — APIXABAN 2.5 MG: 2.5 TABLET, FILM COATED ORAL at 19:12

## 2021-01-01 RX ADMIN — ALBUMIN (HUMAN) 25 G: 0.25 INJECTION, SOLUTION INTRAVENOUS at 10:10

## 2021-01-01 RX ADMIN — ALBUMIN (HUMAN) 25 G: 0.25 INJECTION, SOLUTION INTRAVENOUS at 22:55

## 2021-01-01 RX ADMIN — SERTRALINE 50 MG: 50 TABLET, FILM COATED ORAL at 11:12

## 2021-01-01 RX ADMIN — METOPROLOL TARTRATE 2.5 MG: 5 INJECTION INTRAVENOUS at 16:04

## 2021-01-01 RX ADMIN — SERTRALINE 50 MG: 50 TABLET, FILM COATED ORAL at 08:50

## 2021-01-01 RX ADMIN — DIGOXIN 0.12 MG: 125 TABLET ORAL at 08:36

## 2021-01-01 RX ADMIN — Medication 10 ML: at 13:52

## 2021-01-01 RX ADMIN — PRAMIPEXOLE DIHYDROCHLORIDE 0.5 MG: 0.25 TABLET ORAL at 21:21

## 2021-01-01 RX ADMIN — MORPHINE SULFATE 1 MG: 2 INJECTION, SOLUTION INTRAMUSCULAR; INTRAVENOUS at 22:52

## 2021-01-01 RX ADMIN — Medication 10 ML: at 21:08

## 2021-01-01 RX ADMIN — SERTRALINE 50 MG: 50 TABLET, FILM COATED ORAL at 08:35

## 2021-01-01 RX ADMIN — OXYCODONE HYDROCHLORIDE AND ACETAMINOPHEN 1 TABLET: 5; 325 TABLET ORAL at 19:12

## 2021-01-01 RX ADMIN — CASTOR OIL AND BALSAM, PERU: 788; 87 OINTMENT TOPICAL at 08:38

## 2021-01-01 RX ADMIN — MEROPENEM 500 MG: 500 INJECTION, POWDER, FOR SOLUTION INTRAVENOUS at 20:56

## 2021-01-01 RX ADMIN — Medication 5 ML: at 05:29

## 2021-01-01 RX ADMIN — OXYCODONE HYDROCHLORIDE AND ACETAMINOPHEN 1 TABLET: 5; 325 TABLET ORAL at 23:29

## 2021-01-01 RX ADMIN — Medication 5 ML: at 05:52

## 2021-01-01 RX ADMIN — ACETAMINOPHEN 650 MG: 325 TABLET ORAL at 10:08

## 2021-01-01 RX ADMIN — PRAMIPEXOLE DIHYDROCHLORIDE 0.5 MG: 0.25 TABLET ORAL at 21:34

## 2021-01-01 RX ADMIN — MEMANTINE HYDROCHLORIDE 10 MG: 10 TABLET ORAL at 08:35

## 2021-01-01 RX ADMIN — MEMANTINE HYDROCHLORIDE 10 MG: 10 TABLET ORAL at 08:50

## 2021-01-01 RX ADMIN — CASTOR OIL AND BALSAM, PERU: 788; 87 OINTMENT TOPICAL at 19:12

## 2021-01-01 RX ADMIN — MORPHINE SULFATE 1 MG: 2 INJECTION, SOLUTION INTRAMUSCULAR; INTRAVENOUS at 16:00

## 2021-01-01 RX ADMIN — MEROPENEM 500 MG: 500 INJECTION, POWDER, FOR SOLUTION INTRAVENOUS at 04:59

## 2021-01-01 RX ADMIN — APIXABAN 2.5 MG: 2.5 TABLET, FILM COATED ORAL at 18:51

## 2021-01-01 RX ADMIN — METOPROLOL TARTRATE 25 MG: 25 TABLET, FILM COATED ORAL at 11:12

## 2021-01-01 RX ADMIN — APIXABAN 2.5 MG: 2.5 TABLET, FILM COATED ORAL at 08:22

## 2021-01-01 RX ADMIN — Medication 10 ML: at 05:00

## 2021-01-01 RX ADMIN — CASTOR OIL AND BALSAM, PERU: 788; 87 OINTMENT TOPICAL at 09:00

## 2021-01-01 RX ADMIN — OXYCODONE HYDROCHLORIDE AND ACETAMINOPHEN 1 TABLET: 5; 325 TABLET ORAL at 03:40

## 2021-01-01 RX ADMIN — ACETAMINOPHEN 650 MG: 325 TABLET ORAL at 21:23

## 2021-01-01 RX ADMIN — METOPROLOL TARTRATE 25 MG: 25 TABLET, FILM COATED ORAL at 08:35

## 2021-01-01 RX ADMIN — OXYCODONE HYDROCHLORIDE AND ACETAMINOPHEN 1 TABLET: 5; 325 TABLET ORAL at 18:04

## 2021-01-01 RX ADMIN — MORPHINE SULFATE 1 MG: 2 INJECTION, SOLUTION INTRAMUSCULAR; INTRAVENOUS at 04:39

## 2021-01-01 RX ADMIN — PREDNISONE 5 MG: 5 TABLET ORAL at 08:50

## 2021-01-01 RX ADMIN — ATORVASTATIN CALCIUM 10 MG: 10 TABLET, FILM COATED ORAL at 21:23

## 2021-01-01 RX ADMIN — METOPROLOL TARTRATE 2.5 MG: 5 INJECTION INTRAVENOUS at 17:44

## 2021-01-01 RX ADMIN — MEROPENEM 500 MG: 500 INJECTION, POWDER, FOR SOLUTION INTRAVENOUS at 16:32

## 2021-01-01 RX ADMIN — ATORVASTATIN CALCIUM 10 MG: 10 TABLET, FILM COATED ORAL at 21:34

## 2021-01-01 RX ADMIN — ACETAMINOPHEN 1000 MG: 500 TABLET ORAL at 18:50

## 2021-01-01 RX ADMIN — OXYCODONE HYDROCHLORIDE AND ACETAMINOPHEN 1 TABLET: 5; 325 TABLET ORAL at 10:14

## 2021-01-01 RX ADMIN — METOPROLOL TARTRATE 2.5 MG: 5 INJECTION INTRAVENOUS at 10:18

## 2021-01-01 RX ADMIN — CASTOR OIL AND BALSAM, PERU: 788; 87 OINTMENT TOPICAL at 19:53

## 2021-01-01 RX ADMIN — PREDNISONE 5 MG: 5 TABLET ORAL at 11:23

## 2021-01-01 RX ADMIN — ALBUMIN (HUMAN) 25 G: 0.25 INJECTION, SOLUTION INTRAVENOUS at 16:47

## 2021-01-01 RX ADMIN — ATORVASTATIN CALCIUM 10 MG: 10 TABLET, FILM COATED ORAL at 20:57

## 2021-01-01 RX ADMIN — APIXABAN 2.5 MG: 2.5 TABLET, FILM COATED ORAL at 18:27

## 2021-01-01 RX ADMIN — IPRATROPIUM BROMIDE AND ALBUTEROL SULFATE 3 ML: .5; 3 SOLUTION RESPIRATORY (INHALATION) at 08:49

## 2021-01-01 RX ADMIN — MORPHINE SULFATE 1 MG: 2 INJECTION, SOLUTION INTRAMUSCULAR; INTRAVENOUS at 02:58

## 2021-01-01 RX ADMIN — APIXABAN 2.5 MG: 2.5 TABLET, FILM COATED ORAL at 17:40

## 2021-01-01 RX ADMIN — SODIUM CHLORIDE 1000 ML: 900 INJECTION, SOLUTION INTRAVENOUS at 21:19

## 2021-01-01 RX ADMIN — CASTOR OIL AND BALSAM, PERU: 788; 87 OINTMENT TOPICAL at 18:00

## 2021-01-01 RX ADMIN — SERTRALINE 50 MG: 50 TABLET, FILM COATED ORAL at 10:10

## 2021-01-01 RX ADMIN — MEMANTINE HYDROCHLORIDE 10 MG: 10 TABLET ORAL at 10:08

## 2021-01-01 RX ADMIN — MORPHINE SULFATE 1 MG: 2 INJECTION, SOLUTION INTRAMUSCULAR; INTRAVENOUS at 11:02

## 2021-01-01 RX ADMIN — MORPHINE SULFATE 1 MG: 2 INJECTION, SOLUTION INTRAMUSCULAR; INTRAVENOUS at 10:05

## 2021-01-01 RX ADMIN — Medication 10 ML: at 21:30

## 2021-01-01 RX ADMIN — MEROPENEM 500 MG: 500 INJECTION, POWDER, FOR SOLUTION INTRAVENOUS at 03:14

## 2021-01-01 RX ADMIN — SERTRALINE 50 MG: 50 TABLET, FILM COATED ORAL at 08:36

## 2021-01-01 RX ADMIN — Medication 10 ML: at 06:12

## 2021-01-01 RX ADMIN — PRAMIPEXOLE DIHYDROCHLORIDE 0.5 MG: 0.25 TABLET ORAL at 21:23

## 2021-01-01 RX ADMIN — Medication 10 ML: at 14:05

## 2021-01-01 RX ADMIN — APIXABAN 2.5 MG: 2.5 TABLET, FILM COATED ORAL at 10:10

## 2021-01-01 RX ADMIN — PRAMIPEXOLE DIHYDROCHLORIDE 0.5 MG: 0.25 TABLET ORAL at 21:08

## 2021-01-01 RX ADMIN — OXYCODONE HYDROCHLORIDE AND ACETAMINOPHEN 1 TABLET: 5; 325 TABLET ORAL at 15:37

## 2021-01-01 RX ADMIN — Medication 10 ML: at 14:00

## 2021-01-01 RX ADMIN — MEROPENEM 500 MG: 500 INJECTION, POWDER, FOR SOLUTION INTRAVENOUS at 21:07

## 2021-01-01 RX ADMIN — ALBUMIN (HUMAN) 25 G: 0.25 INJECTION, SOLUTION INTRAVENOUS at 10:07

## 2021-01-01 RX ADMIN — PRAMIPEXOLE DIHYDROCHLORIDE 0.5 MG: 0.25 TABLET ORAL at 21:39

## 2021-01-01 RX ADMIN — PREDNISONE 5 MG: 5 TABLET ORAL at 11:12

## 2021-01-01 RX ADMIN — OXYCODONE HYDROCHLORIDE AND ACETAMINOPHEN 1 TABLET: 5; 325 TABLET ORAL at 14:00

## 2021-01-01 RX ADMIN — Medication 10 ML: at 21:21

## 2021-01-01 RX ADMIN — METOPROLOL TARTRATE 25 MG: 25 TABLET, FILM COATED ORAL at 21:23

## 2021-01-01 RX ADMIN — ACETAMINOPHEN 1000 MG: 500 TABLET ORAL at 18:33

## 2021-01-01 RX ADMIN — OXYCODONE HYDROCHLORIDE AND ACETAMINOPHEN 1 TABLET: 5; 325 TABLET ORAL at 10:10

## 2021-01-01 RX ADMIN — METOPROLOL TARTRATE 25 MG: 25 TABLET, FILM COATED ORAL at 08:50

## 2021-01-01 RX ADMIN — PREDNISONE 5 MG: 5 TABLET ORAL at 10:08

## 2021-01-01 RX ADMIN — APIXABAN 2.5 MG: 2.5 TABLET, FILM COATED ORAL at 08:35

## 2021-01-01 RX ADMIN — SODIUM CHLORIDE 75 ML/HR: 9 INJECTION, SOLUTION INTRAVENOUS at 04:00

## 2021-01-01 RX ADMIN — APIXABAN 2.5 MG: 2.5 TABLET, FILM COATED ORAL at 08:50

## 2021-01-01 RX ADMIN — APIXABAN 2.5 MG: 2.5 TABLET, FILM COATED ORAL at 11:23

## 2021-01-01 RX ADMIN — APIXABAN 2.5 MG: 2.5 TABLET, FILM COATED ORAL at 18:04

## 2021-01-01 RX ADMIN — Medication 10 ML: at 08:24

## 2021-01-01 RX ADMIN — MEMANTINE HYDROCHLORIDE 10 MG: 10 TABLET ORAL at 10:10

## 2021-01-01 RX ADMIN — DIGOXIN 250 MCG: 250 INJECTION, SOLUTION INTRAMUSCULAR; INTRAVENOUS; PARENTERAL at 18:04

## 2021-01-01 RX ADMIN — Medication 5 ML: at 14:00

## 2021-01-01 RX ADMIN — PREDNISONE 5 MG: 5 TABLET ORAL at 08:33

## 2021-01-01 RX ADMIN — SERTRALINE 50 MG: 50 TABLET, FILM COATED ORAL at 08:22

## 2021-01-01 RX ADMIN — IPRATROPIUM BROMIDE AND ALBUTEROL SULFATE 3 ML: .5; 3 SOLUTION RESPIRATORY (INHALATION) at 23:01

## 2021-01-01 RX ADMIN — Medication 10 ML: at 16:32

## 2021-01-01 RX ADMIN — CASTOR OIL AND BALSAM, PERU: 788; 87 OINTMENT TOPICAL at 18:51

## 2021-01-01 RX ADMIN — MEROPENEM 500 MG: 500 INJECTION, POWDER, FOR SOLUTION INTRAVENOUS at 05:28

## 2021-01-01 RX ADMIN — ATORVASTATIN CALCIUM 10 MG: 10 TABLET, FILM COATED ORAL at 21:21

## 2021-01-01 RX ADMIN — Medication 10 ML: at 07:06

## 2021-01-01 RX ADMIN — MEROPENEM 500 MG: 500 INJECTION, POWDER, FOR SOLUTION INTRAVENOUS at 03:01

## 2021-01-01 RX ADMIN — MEMANTINE HYDROCHLORIDE 10 MG: 10 TABLET ORAL at 11:23

## 2021-01-01 RX ADMIN — SERTRALINE 50 MG: 50 TABLET, FILM COATED ORAL at 11:23

## 2021-01-01 RX ADMIN — IPRATROPIUM BROMIDE AND ALBUTEROL SULFATE 3 ML: .5; 3 SOLUTION RESPIRATORY (INHALATION) at 08:06

## 2021-01-01 RX ADMIN — METOPROLOL TARTRATE 25 MG: 25 TABLET, FILM COATED ORAL at 08:36

## 2021-01-01 RX ADMIN — PIPERACILLIN AND TAZOBACTAM 3.38 G: 3; .375 INJECTION, POWDER, LYOPHILIZED, FOR SOLUTION INTRAVENOUS at 18:39

## 2021-01-01 RX ADMIN — MEROPENEM 500 MG: 500 INJECTION, POWDER, FOR SOLUTION INTRAVENOUS at 13:00

## 2021-01-01 RX ADMIN — CASTOR OIL AND BALSAM, PERU: 788; 87 OINTMENT TOPICAL at 13:51

## 2021-01-01 RX ADMIN — CASTOR OIL AND BALSAM, PERU: 788; 87 OINTMENT TOPICAL at 17:55

## 2021-01-01 RX ADMIN — ALBUMIN (HUMAN) 25 G: 0.25 INJECTION, SOLUTION INTRAVENOUS at 16:32

## 2021-01-01 RX ADMIN — PRAMIPEXOLE DIHYDROCHLORIDE 0.5 MG: 0.25 TABLET ORAL at 22:24

## 2021-01-01 RX ADMIN — MEMANTINE HYDROCHLORIDE 10 MG: 10 TABLET ORAL at 08:22

## 2021-01-01 RX ADMIN — APIXABAN 2.5 MG: 2.5 TABLET, FILM COATED ORAL at 10:08

## 2021-01-01 RX ADMIN — ACETAMINOPHEN 650 MG: 325 TABLET ORAL at 09:05

## 2021-01-01 RX ADMIN — MORPHINE SULFATE 1 MG: 2 INJECTION, SOLUTION INTRAMUSCULAR; INTRAVENOUS at 11:12

## 2021-01-01 RX ADMIN — Medication 10 ML: at 21:35

## 2021-01-01 RX ADMIN — APIXABAN 2.5 MG: 2.5 TABLET, FILM COATED ORAL at 08:36

## 2021-01-01 RX ADMIN — ACETAMINOPHEN 650 MG: 325 TABLET ORAL at 17:00

## 2021-01-01 RX ADMIN — METOPROLOL TARTRATE 25 MG: 25 TABLET, FILM COATED ORAL at 21:08

## 2021-01-01 RX ADMIN — CEFTRIAXONE SODIUM 1 G: 1 INJECTION, POWDER, FOR SOLUTION INTRAMUSCULAR; INTRAVENOUS at 08:23

## 2021-01-01 RX ADMIN — METOPROLOL TARTRATE 25 MG: 25 TABLET, FILM COATED ORAL at 20:56

## 2021-01-01 RX ADMIN — APIXABAN 2.5 MG: 2.5 TABLET, FILM COATED ORAL at 11:12

## 2021-01-01 RX ADMIN — MEMANTINE HYDROCHLORIDE 10 MG: 10 TABLET ORAL at 11:12

## 2021-01-01 RX ADMIN — MORPHINE SULFATE 1 MG: 2 INJECTION, SOLUTION INTRAMUSCULAR; INTRAVENOUS at 17:36

## 2021-01-01 RX ADMIN — MEROPENEM 500 MG: 500 INJECTION, POWDER, FOR SOLUTION INTRAVENOUS at 14:10

## 2021-01-01 RX ADMIN — PREDNISONE 5 MG: 5 TABLET ORAL at 08:22

## 2021-01-01 RX ADMIN — Medication 5 ML: at 05:40

## 2021-01-01 RX ADMIN — SERTRALINE 50 MG: 50 TABLET, FILM COATED ORAL at 10:08

## 2021-01-01 RX ADMIN — ATORVASTATIN CALCIUM 10 MG: 10 TABLET, FILM COATED ORAL at 21:39

## 2021-01-01 RX ADMIN — ALBUMIN (HUMAN) 25 G: 0.25 INJECTION, SOLUTION INTRAVENOUS at 03:14

## 2021-01-01 RX ADMIN — MORPHINE SULFATE 1 MG: 2 INJECTION, SOLUTION INTRAMUSCULAR; INTRAVENOUS at 18:05

## 2021-01-01 RX ADMIN — APIXABAN 2.5 MG: 2.5 TABLET, FILM COATED ORAL at 18:34

## 2021-01-01 RX ADMIN — IPRATROPIUM BROMIDE AND ALBUTEROL SULFATE 3 ML: .5; 3 SOLUTION RESPIRATORY (INHALATION) at 19:56

## 2021-01-01 RX ADMIN — VANCOMYCIN HYDROCHLORIDE 1250 MG: 10 INJECTION, POWDER, LYOPHILIZED, FOR SOLUTION INTRAVENOUS at 17:41

## 2021-01-01 RX ADMIN — IPRATROPIUM BROMIDE AND ALBUTEROL SULFATE 3 ML: .5; 3 SOLUTION RESPIRATORY (INHALATION) at 07:47

## 2021-01-01 RX ADMIN — IPRATROPIUM BROMIDE AND ALBUTEROL SULFATE 3 ML: .5; 3 SOLUTION RESPIRATORY (INHALATION) at 22:06

## 2021-01-01 RX ADMIN — SODIUM CHLORIDE 1000 ML: 9 INJECTION, SOLUTION INTRAVENOUS at 17:31

## 2021-01-01 RX ADMIN — MEMANTINE HYDROCHLORIDE 10 MG: 10 TABLET ORAL at 08:36

## 2021-01-01 RX ADMIN — APIXABAN 2.5 MG: 2.5 TABLET, FILM COATED ORAL at 17:54

## 2021-01-01 RX ADMIN — Medication 5 ML: at 21:39

## 2021-01-01 RX ADMIN — Medication 5 ML: at 22:25

## 2021-01-01 RX ADMIN — Medication 10 ML: at 13:02

## 2021-01-01 RX ADMIN — MORPHINE SULFATE 1 MG: 2 INJECTION, SOLUTION INTRAMUSCULAR; INTRAVENOUS at 00:55

## 2021-01-01 RX ADMIN — ATORVASTATIN CALCIUM 10 MG: 10 TABLET, FILM COATED ORAL at 22:24

## 2021-01-01 RX ADMIN — METOPROLOL TARTRATE 25 MG: 25 TABLET, FILM COATED ORAL at 21:34

## 2021-01-01 RX ADMIN — OXYCODONE HYDROCHLORIDE AND ACETAMINOPHEN 1 TABLET: 5; 325 TABLET ORAL at 05:27

## 2021-01-01 RX ADMIN — OXYCODONE HYDROCHLORIDE AND ACETAMINOPHEN 1 TABLET: 5; 325 TABLET ORAL at 04:54

## 2021-01-01 RX ADMIN — MEROPENEM 500 MG: 500 INJECTION, POWDER, FOR SOLUTION INTRAVENOUS at 13:51

## 2021-01-01 RX ADMIN — MEROPENEM 500 MG: 500 INJECTION, POWDER, FOR SOLUTION INTRAVENOUS at 21:39

## 2021-01-01 RX ADMIN — MORPHINE SULFATE 1 MG: 2 INJECTION, SOLUTION INTRAMUSCULAR; INTRAVENOUS at 21:07

## 2021-01-01 RX ADMIN — CASTOR OIL AND BALSAM, PERU: 788; 87 OINTMENT TOPICAL at 08:55

## 2021-01-01 RX ADMIN — SODIUM CHLORIDE 100 ML/HR: 9 INJECTION, SOLUTION INTRAVENOUS at 21:27

## 2021-01-01 RX ADMIN — IPRATROPIUM BROMIDE AND ALBUTEROL SULFATE 3 ML: .5; 3 SOLUTION RESPIRATORY (INHALATION) at 15:01

## 2021-01-01 RX ADMIN — OXYCODONE HYDROCHLORIDE AND ACETAMINOPHEN 1 TABLET: 5; 325 TABLET ORAL at 12:57

## 2021-01-01 RX ADMIN — PREDNISONE 5 MG: 5 TABLET ORAL at 10:10

## 2021-01-01 RX ADMIN — ATORVASTATIN CALCIUM 10 MG: 10 TABLET, FILM COATED ORAL at 21:08

## 2021-01-01 RX ADMIN — MEROPENEM 500 MG: 500 INJECTION, POWDER, FOR SOLUTION INTRAVENOUS at 14:04

## 2021-01-01 RX ADMIN — PREDNISONE 5 MG: 5 TABLET ORAL at 08:35

## 2021-01-21 DIAGNOSIS — F51.01 PRIMARY INSOMNIA: ICD-10-CM

## 2021-01-21 RX ORDER — APIXABAN 2.5 MG/1
TABLET, FILM COATED ORAL
Qty: 60 TAB | Refills: 3 | Status: SHIPPED | OUTPATIENT
Start: 2021-01-21 | End: 2021-01-01

## 2021-01-21 RX ORDER — SUVOREXANT 15 MG/1
TABLET, FILM COATED ORAL
Qty: 30 TAB | Refills: 1 | Status: SHIPPED | OUTPATIENT
Start: 2021-01-21 | End: 2021-03-24

## 2021-01-26 ENCOUNTER — OFFICE VISIT (OUTPATIENT)
Dept: NEUROLOGY | Age: 86
End: 2021-01-26
Payer: MEDICARE

## 2021-01-26 DIAGNOSIS — I65.23 BILATERAL CAROTID ARTERY STENOSIS: ICD-10-CM

## 2021-01-26 DIAGNOSIS — Z86.73 HISTORY OF STROKE: ICD-10-CM

## 2021-01-26 DIAGNOSIS — G25.0 BENIGN ESSENTIAL TREMOR SYNDROME: ICD-10-CM

## 2021-01-26 DIAGNOSIS — G30.1 LATE ONSET ALZHEIMER'S DISEASE WITH BEHAVIORAL DISTURBANCE (HCC): Primary | ICD-10-CM

## 2021-01-26 DIAGNOSIS — F02.818 LATE ONSET ALZHEIMER'S DISEASE WITH BEHAVIORAL DISTURBANCE (HCC): Primary | ICD-10-CM

## 2021-01-26 DIAGNOSIS — M47.22 CERVICAL RADICULOPATHY DUE TO DEGENERATIVE JOINT DISEASE OF SPINE: ICD-10-CM

## 2021-01-26 PROCEDURE — 99443 PR PHYS/QHP TELEPHONE EVALUATION 21-30 MIN: CPT | Performed by: PSYCHIATRY & NEUROLOGY
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
## 2021-02-08 DIAGNOSIS — F02.818 LATE ONSET ALZHEIMER'S DISEASE WITH BEHAVIORAL DISTURBANCE (HCC): ICD-10-CM

## 2021-02-08 DIAGNOSIS — Z86.73 HISTORY OF STROKE: ICD-10-CM

## 2021-02-08 DIAGNOSIS — G30.1 LATE ONSET ALZHEIMER'S DISEASE WITH BEHAVIORAL DISTURBANCE (HCC): ICD-10-CM

## 2021-02-08 DIAGNOSIS — E53.8 B12 DEFICIENCY: ICD-10-CM

## 2021-02-08 DIAGNOSIS — E03.4 HYPOTHYROIDISM DUE TO ACQUIRED ATROPHY OF THYROID: ICD-10-CM

## 2021-02-08 DIAGNOSIS — I65.23 BILATERAL CAROTID ARTERY STENOSIS: ICD-10-CM

## 2021-02-08 DIAGNOSIS — E55.9 VITAMIN D DEFICIENCY: ICD-10-CM

## 2021-02-08 DIAGNOSIS — M47.22 CERVICAL RADICULOPATHY DUE TO DEGENERATIVE JOINT DISEASE OF SPINE: ICD-10-CM

## 2021-02-08 RX ORDER — MEMANTINE HYDROCHLORIDE 5 MG/1
TABLET ORAL
Qty: 70 TAB | Refills: 0 | Status: SHIPPED | OUTPATIENT
Start: 2021-02-08 | End: 2021-01-01 | Stop reason: ALTCHOICE

## 2021-02-09 DIAGNOSIS — F33.1 MODERATE EPISODE OF RECURRENT MAJOR DEPRESSIVE DISORDER (HCC): ICD-10-CM

## 2021-02-09 RX ORDER — SERTRALINE HYDROCHLORIDE 50 MG/1
TABLET, FILM COATED ORAL
Qty: 90 TAB | Refills: 1 | Status: SHIPPED | OUTPATIENT
Start: 2021-02-09 | End: 2021-01-01

## 2021-02-17 DIAGNOSIS — M47.22 CERVICAL RADICULOPATHY DUE TO DEGENERATIVE JOINT DISEASE OF SPINE: ICD-10-CM

## 2021-02-17 DIAGNOSIS — I65.23 BILATERAL CAROTID ARTERY STENOSIS: ICD-10-CM

## 2021-02-17 DIAGNOSIS — E55.9 VITAMIN D DEFICIENCY: ICD-10-CM

## 2021-02-17 DIAGNOSIS — E53.8 B12 DEFICIENCY: ICD-10-CM

## 2021-02-17 DIAGNOSIS — Z86.73 HISTORY OF STROKE: ICD-10-CM

## 2021-02-17 DIAGNOSIS — G30.1 LATE ONSET ALZHEIMER'S DISEASE WITH BEHAVIORAL DISTURBANCE (HCC): ICD-10-CM

## 2021-02-17 DIAGNOSIS — E03.4 HYPOTHYROIDISM DUE TO ACQUIRED ATROPHY OF THYROID: ICD-10-CM

## 2021-02-17 DIAGNOSIS — F02.818 LATE ONSET ALZHEIMER'S DISEASE WITH BEHAVIORAL DISTURBANCE (HCC): ICD-10-CM

## 2021-02-17 RX ORDER — ONDANSETRON 4 MG/1
4 TABLET, ORALLY DISINTEGRATING ORAL
Qty: 30 TAB | Refills: 5 | Status: SHIPPED | OUTPATIENT
Start: 2021-02-17 | End: 2021-01-01

## 2021-03-10 DIAGNOSIS — R10.30 LOWER ABDOMINAL PAIN: ICD-10-CM

## 2021-03-11 RX ORDER — FAMOTIDINE 20 MG/1
TABLET, FILM COATED ORAL
Qty: 180 TAB | Refills: 1 | Status: SHIPPED | OUTPATIENT
Start: 2021-03-11 | End: 2021-01-01

## 2021-03-16 ENCOUNTER — TELEPHONE (OUTPATIENT)
Dept: ONCOLOGY | Age: 86
End: 2021-03-16

## 2021-03-16 DIAGNOSIS — D50.0 IRON DEFICIENCY ANEMIA DUE TO CHRONIC BLOOD LOSS: Primary | ICD-10-CM

## 2021-03-16 NOTE — TELEPHONE ENCOUNTER
Called pt. Spoke with son. HIPAA verified by two patient identifiers. He is aware she willneed labs before we see her. Will mail out to her. OR she can go to [place] [address] for labs. He will reschedule appointment with us. RITO FROM DR Padilla Iha CBC WITH DIFF FERRITIN IRON PROFILE.

## 2021-03-23 DIAGNOSIS — F51.01 PRIMARY INSOMNIA: ICD-10-CM

## 2021-03-24 RX ORDER — SUVOREXANT 15 MG/1
TABLET, FILM COATED ORAL
Qty: 30 TAB | Refills: 1 | Status: SHIPPED | OUTPATIENT
Start: 2021-03-24 | End: 2021-01-01

## 2021-03-26 RX ORDER — BUMETANIDE 1 MG/1
TABLET ORAL
Qty: 3 TAB | Refills: 4 | Status: SHIPPED | OUTPATIENT
Start: 2021-03-26 | End: 2021-01-01

## 2021-03-29 ENCOUNTER — TELEPHONE (OUTPATIENT)
Dept: INTERNAL MEDICINE CLINIC | Age: 86
End: 2021-03-29

## 2021-03-29 NOTE — TELEPHONE ENCOUNTER
Spoke with [facility] pharmacy  Calling to see if the doctor meant to only send over 3 tablets for the patient. Pharmacy states that patient takes her bumex daily and usually gets a 90 day supply. Gave verbal authorization to fill 90 day supply with no additional refills, patient needs to keep upcoming appointment on 04/26/2021.

## 2021-04-05 ENCOUNTER — TELEPHONE (OUTPATIENT)
Dept: ONCOLOGY | Age: 86
End: 2021-04-05

## 2021-04-05 DIAGNOSIS — D50.0 IRON DEFICIENCY ANEMIA DUE TO CHRONIC BLOOD LOSS: Primary | ICD-10-CM

## 2021-04-05 NOTE — TELEPHONE ENCOUNTER
Called patient's son to check missed appt/  Chris Net he was instructed they would receive labs before this r/s appt but never received. Would like us to mail to his address and will take her and then call us to r/s .

## 2021-04-05 NOTE — TELEPHONE ENCOUNTER
Pt forgot her appointment today. Will print off labs to be done and Shelbi Dykes will mail to pt. RITO FROM DR Don Smoke CBC WITH DIFF FERRITIN AND IRON PROFILE.

## 2021-04-08 DIAGNOSIS — G30.1 LATE ONSET ALZHEIMER'S DISEASE WITH BEHAVIORAL DISTURBANCE (HCC): ICD-10-CM

## 2021-04-08 DIAGNOSIS — Z86.73 HISTORY OF STROKE: ICD-10-CM

## 2021-04-08 DIAGNOSIS — I65.23 BILATERAL CAROTID ARTERY STENOSIS: ICD-10-CM

## 2021-04-08 DIAGNOSIS — E55.9 VITAMIN D DEFICIENCY: ICD-10-CM

## 2021-04-08 DIAGNOSIS — E03.4 HYPOTHYROIDISM DUE TO ACQUIRED ATROPHY OF THYROID: ICD-10-CM

## 2021-04-08 DIAGNOSIS — F02.818 LATE ONSET ALZHEIMER'S DISEASE WITH BEHAVIORAL DISTURBANCE (HCC): ICD-10-CM

## 2021-04-08 DIAGNOSIS — M47.22 CERVICAL RADICULOPATHY DUE TO DEGENERATIVE JOINT DISEASE OF SPINE: ICD-10-CM

## 2021-04-08 DIAGNOSIS — E53.8 B12 DEFICIENCY: ICD-10-CM

## 2021-04-08 RX ORDER — MEMANTINE HYDROCHLORIDE 10 MG/1
TABLET ORAL
Qty: 60 TAB | Refills: 5 | Status: SHIPPED | OUTPATIENT
Start: 2021-04-08 | End: 2021-01-01 | Stop reason: SDUPTHER

## 2021-04-14 ENCOUNTER — HOSPITAL ENCOUNTER (OUTPATIENT)
Dept: LAB | Age: 86
Discharge: HOME | End: 2021-04-14
Payer: MEDICARE

## 2021-04-14 PROCEDURE — 85025 COMPLETE CBC W/AUTO DIFF WBC: CPT

## 2021-04-14 PROCEDURE — 36415 COLL VENOUS BLD VENIPUNCTURE: CPT

## 2021-04-14 PROCEDURE — 83550 IRON BINDING TEST: CPT

## 2021-04-14 PROCEDURE — 82728 ASSAY OF FERRITIN: CPT

## 2021-04-15 LAB
BASOPHILS # BLD AUTO: 0 X10E3/UL (ref 0–0.2)
BASOPHILS NFR BLD AUTO: 0 %
EOSINOPHIL # BLD AUTO: 0 X10E3/UL (ref 0–0.4)
EOSINOPHIL NFR BLD AUTO: 0 %
ERYTHROCYTE [DISTWIDTH] IN BLOOD BY AUTOMATED COUNT: 12.8 % (ref 11.7–15.4)
FERRITIN SERPL-MCNC: 196 NG/ML (ref 15–150)
HCT VFR BLD AUTO: 43.1 % (ref 34–46.6)
HGB BLD-MCNC: 13.9 G/DL (ref 11.1–15.9)
IMM GRANULOCYTES # BLD AUTO: 0.1 X10E3/UL (ref 0–0.1)
IMM GRANULOCYTES NFR BLD AUTO: 1 %
IRON SATN MFR SERPL: 21 % (ref 15–55)
IRON SERPL-MCNC: 64 UG/DL (ref 27–139)
LYMPHOCYTES # BLD AUTO: 1.2 X10E3/UL (ref 0.7–3.1)
LYMPHOCYTES NFR BLD AUTO: 12 %
MCH RBC QN AUTO: 29.8 PG (ref 26.6–33)
MCHC RBC AUTO-ENTMCNC: 32.3 G/DL (ref 31.5–35.7)
MCV RBC AUTO: 92 FL (ref 79–97)
MONOCYTES # BLD AUTO: 0.3 X10E3/UL (ref 0.1–0.9)
MONOCYTES NFR BLD AUTO: 3 %
NEUTROPHILS # BLD AUTO: 8.5 X10E3/UL (ref 1.4–7)
NEUTROPHILS NFR BLD AUTO: 84 %
PLATELET # BLD AUTO: 201 X10E3/UL (ref 150–450)
RBC # BLD AUTO: 4.67 X10E6/UL (ref 3.77–5.28)
TIBC SERPL-MCNC: 303 UG/DL (ref 250–450)
UIBC SERPL-MCNC: 239 UG/DL (ref 118–369)
WBC # BLD AUTO: 10.1 X10E3/UL (ref 3.4–10.8)
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
## 2021-05-12 NOTE — TELEPHONE ENCOUNTER
Patient wanted to cancel appt - didn't know why she needed it - thought he was the neuro. Explained to her that she was seen here for IV Iron which she seemed to understand. I asked if she had gotten labs but she said no. Looks like she had labs done in April. Please look at labs to see if patient needs to r/s. She said she had too many appts and didn't want to r/s at this time.
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
## 2021-06-11 NOTE — PROGRESS NOTES
Reviewed record in preparation for visit and have obtained necessary documentation. Identified pt with two pt identifiers(name and ). Chief Complaint   Patient presents with    Sleep Problem     follow up        Health Maintenance Due   Topic Date Due    Diabetic Foot Care  Never done    Eye Exam  Never done    DTaP/Tdap/Td  (1 - Tdap) Never done    Shingles Vaccine (1 of 2) Never done    Bone Mineral Density   Never done    Pneumococcal Vaccine (1 of 1 - PPSV23) Never done    Annual Well Visit  04/15/2021       Ms. Harpal Olmos has a reminder for a \"due or due soon\" health maintenance. I have asked that she discuss this further with her primary care provider for follow-up on this health maintenance. Coordination of Care Questionnaire:  :     1) Have you been to an emergency room, urgent care clinic since your last visit? no   Hospitalized since your last visit? no             2) Have you seen or consulted any other health care providers outside of [address] [place] [address] since your last visit? no  (Include any pap smears or colon screenings in this section.)    3) In the event something were to happen to you and you were unable to speak on your behalf, do you have an Advance Directive/ Living Will in place stating your wishes? NO    Do you have an Advance Directive on file? no    4) Are you interested in receiving information on Advance Directives? NO    Patient is accompanied by self I have received verbal consent from Therese Ni to discuss any/all medical information while they are present in the room.

## 2021-06-11 NOTE — PATIENT INSTRUCTIONS
Medicare Wellness Visit, Female The best way to live healthy is to have a lifestyle where you eat a well-balanced diet, exercise regularly, limit alcohol use, and quit all forms of tobacco/nicotine, if applicable. Regular preventive services are another way to keep healthy. Preventive services (vaccines, screening tests, monitoring & exams) can help personalize your care plan, which helps you manage your own care. Screening tests can find health problems at the earliest stages, when they are easiest to treat. [facility] follows the current, evidence-based guidelines published by the [facility] ([facility]) when recommending preventive services for our patients. Because we follow these guidelines, sometimes recommendations change over time as research supports it. (For example, mammograms used to be recommended annually. Even though Medicare will still pay for an annual mammogram, the newer guidelines recommend a mammogram every two years for women of average risk). Of course, you and your doctor may decide to screen more often for some diseases, based on your risk and your co-morbidities (chronic disease you are already diagnosed with). Preventive services for you include: - Medicare offers their members a free annual wellness visit, which is time for you and your primary care provider to discuss and plan for your preventive service needs. Take advantage of this benefit every year! 
-All adults over the age of 72 should receive the recommended pneumonia vaccines. Current USPSTF guidelines recommend a series of two vaccines for the best pneumonia protection.  
-All adults should have a flu vaccine yearly and a tetanus vaccine every 10 years.  
-All adults age 48 and older should receive the shingles vaccines (series of two vaccines).      
-All adults age 38-68 who are overweight should have a diabetes screening test once every three years.  
-All adults born between [birth year] and 1965 should be screened once for Hepatitis C. 
-Other screening tests and preventive services for persons with diabetes include: an eye exam to screen for diabetic retinopathy, a kidney function test, a foot exam, and stricter control over your cholesterol.  
-Cardiovascular screening for adults with routine risk involves an electrocardiogram (ECG) at intervals determined by your doctor.  
-Colorectal cancer screenings should be done for adults age 54-65 with no increased risk factors for colorectal cancer. There are a number of acceptable methods of screening for this type of cancer. Each test has its own benefits and drawbacks. Discuss with your doctor what is most appropriate for you during your annual wellness visit. The different tests include: colonoscopy (considered the best screening method), a fecal occult blood test, a fecal DNA test, and sigmoidoscopy. 
 
-A bone mass density test is recommended when a woman turns 65 to screen for osteoporosis. This test is only recommended one time, as a screening. Some providers will use this same test as a disease monitoring tool if you already have osteoporosis. -Breast cancer screenings are recommended every other year for women of normal risk, age 54-69. 
-Cervical cancer screenings for women over age 72 are only recommended with certain risk factors. Here is a list of your current Health Maintenance items (your personalized list of preventive services) with a due date: 
Health Maintenance Due Topic Date Due  Eye Exam  Never done  DTaP/Tdap/Td  (1 - Tdap) Never done  Shingles Vaccine (1 of 2) Never done  Bone Mineral Density   Ordered previously  Pneumococcal Vaccine (1 of 1 - PPSV23) today

## 2021-06-11 NOTE — TELEPHONE ENCOUNTER
----- Message from [facility] sent at 6/11/2021  9:11 AM EDT -----  Regarding: /Telephone     Caller's first and last name:Marcello Schmidt-son  Reason for call: Marcello wants to discuss with the doctor about his mothers mental health.   Callback required yes/no and why:Yes  Best contact number(s):[phone]  Details to clarify the request: n/a

## 2021-06-11 NOTE — TELEPHONE ENCOUNTER
Wanting to know the name of the company that referred the patient to Dr. Yola Boston. States it is a couple that helps place long term care and thinks Dr. Yola Boston might know who this is.     Please let me know if you are aware of this small company

## 2021-06-12 NOTE — TELEPHONE ENCOUNTER
I called the patient's son, advised him the referring physician to us was Dr. Karina Romero and I know of nobody else.   He said thank you and he will keep looking

## 2021-06-28 NOTE — TELEPHONE ENCOUNTER
Future Appointments   Date Time Provider David Mani   11/2/2021  3:20 PM Wilner Ortega MD NEUM BS AMB                         Last Appointment My Department:  4/30/2021    Please review and send in refill below if warranted.  90 day request    Requested Prescriptions     Pending Prescriptions Disp Refills    memantine (NAMENDA) 10 mg tablet 180 Tablet 1     Sig: TAKE 1 TABLET BY MOUTH TWICE A DAY

## 2021-09-06 NOTE — TELEPHONE ENCOUNTER
On-call message: This patient calls in with complaints of severe back pain. She is requesting lidocaine patches. A few patches were sent to her pharmacy. Please contact patient about further evaluation.

## 2021-09-07 NOTE — TELEPHONE ENCOUNTER
--- Message -----  From: Magda Larson  Sent: 9/4/2021   9:29 AM EDT  To: [facility]  Subject: Dr. Imani Johns                        Level 1/Escalated Issue      Caller's first and last name and relationship (if not the patient): pt      Best contact number(s): [phone]       What are the symptoms: back and leg pain, rated at a 9, trouble walking      Transfer successful - yes/no (include outcome): yes/answering service      Transfer declined - yes/no (include reason): no      Was caller advised to seek appropriate level of care - yes/no: yes    Message from [facility] [place]

## 2021-09-09 NOTE — PROGRESS NOTES
Chief Complaint   Patient presents with    Back Pain   1. Have you been to the ER, urgent care clinic since your last visit? Hospitalized since your last visit? No    2. Have you seen or consulted any other health care providers outside of the [address] [place] [address] since your last visit? Include any pap smears or colon screening.  Yes When: [facility] about 1 month ago son reports he doesnt know why
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
## 2021-09-10 PROBLEM — A41.9 SEVERE SEPSIS (HCC): Status: ACTIVE | Noted: 2021-01-01

## 2021-09-10 PROBLEM — R65.20 SEVERE SEPSIS (HCC): Status: ACTIVE | Noted: 2021-01-01

## 2021-09-10 NOTE — PROGRESS NOTES
Day #1 of Zosyn  Indication:  bacteremia  Current regimen:  3.375 G q8hr  Abx regimen: zosyn+Roberto  Recent Labs     09/10/21  1647   WBC 12.9*   CREA 2.16*   BUN 55*     Est CrCl: <20 ml/min;   Temp (24hrs), Av.3 °F (36.3 °C), Min:97.3 °F (36.3 °C), Max:97.3 °F (36.3 °C)    Cultures: 9/10 blood in process    Plan: Change to 3.375 gm Q12hr

## 2021-09-10 NOTE — ED TRIAGE NOTES
Arrives by EMS after a visit from [facility]. She called for back pain down to her feet. [facility] reported she was declining in her ADLs. She lives with her son. [facility] sent her here to rule out sepsis. She had a low heart rate and low sats on no oxygen. She was placed on 3L nasal cannula by EMS. She uses oxygen prn and at night. She has hx of DVT and takes eliquis.
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
## 2021-09-10 NOTE — ED NOTES
Assumed care of pt. Pt here for weakness. Pt noted w/ reddened and draining L lower calf area. MD aware. Pt straight cathed for urine at this time. Pt tolerated well. Specimen sent to lab. Pt's linens and brief changed. Pt helped into position of comfort at this time. PIV started by Delaney Tomlin RN in R outer FA. No complications on flush noted. Pt noted to be lethargic but responds to voice, garbled and lowed speech noted. Pt answering questions, speech slowed and somewhat garbled. Pt responding to pain and voice at this time.

## 2021-09-11 NOTE — ED NOTES
New brief applied on pt. Med rec updated. Rectal temp obtained and bear hugger taken off for the moment. Pt placed into position of comfort. Will continue to monitor pt.

## 2021-09-11 NOTE — PROGRESS NOTES
Day #1 of Meropenem  Indication:  UTI  - Hx of recurrent ESBL E coli UTI  Current regimen:  1 g IV every 8 hours  Abx regimen: Meropenem monotherapy  Recent Labs     09/10/21  1647   WBC 12.9*   CREA 2.16*   BUN 55*     Est CrCl: 14.9 ml/min; UO: -- ml/kg/hr  Temp (24hrs), Av.7 °F (35.4 °C), Min:94.2 °F (34.6 °C), Max:97.6 °F (36.4 °C)    Cultures:   9/10 Blood x 2: NGTD (prelim)  9/10 Urine: pending    Plan: Change to meropenem 500 mg IV every 12 hours per [address] [place] [address] P&T Committee Protocol with respect to renal function (CrCl 10-25 ml/min). Pharmacy will continue to monitor patient daily and will make dosage adjustments based upon changing renal function.

## 2021-09-11 NOTE — PROGRESS NOTES
TRANSFER - IN REPORT:    Verbal report received from ESME Montague(name) on Sumaya rCuz  being received from [facility](unit) for routine progression of care      Report consisted of patients Situation, Background, Assessment and   Recommendations(SBAR). Information from the following report(s) SBAR, Kardex, ED Summary, Procedure Summary, Intake/Output, MAR, Recent Results and Med Rec Status was reviewed with the receiving nurse. Opportunity for questions and clarification was provided. Assessment completed upon patients arrival to unit and care assumed. 2000:Bedside shift change report given to [facility] (oncoming nurse) by Marty Simon (offgoing nurse). Report included the following information SBAR, Kardex, ED Summary, Procedure Summary, MAR, Recent Results and Med Rec Status.

## 2021-09-11 NOTE — PROGRESS NOTES
[facility]                                                                                          Hospitalist Progress Note  Bradley Frey MD  Answering service: [phone] OR [phone] from in house phone        Date of Service:  2021  NAME:  Pat Palmer  :  1935  MRN:  [id number]    This documentation was facilitated by a Voice Recognition software and may contain inadvertent typographical errors. Admission Summary:   From [facility]  Tyler Hsieh is a 80 y.o. female dementia who lives at home. Patient has a caretaker in the daytime, and furthermore, her son stays with her most nights. Per son who is at bedside, patient has been sleeping more for the last 2 days, and not been eating very much. He states that she does have baseline dementia which waxes and wanes, but she is verbal.  He states that if she does not take her insomnia medication at night, she can get very agitated. Patient has not had any vomiting. Per son, they have a home health nurse who did come to their house today. He was not advised of any fever. Patient has not complained of any abdominal pain. Patient has not exhibited any upper respiratory infection symptoms like rhinorrhea or cough. She does not use home oxygen. Per son, patient always has lower extremity edema. He feels that this worsens because she tends to want to walk around the house. Patient uses a walker. As far as the son knows, he has not had any recent falls. As mentioned above, she has been in bed most of the time the last 2 days. '      Interval history / Subjective:        Ms Prosper Harrell is alert,oriented to place and person. Only complains she admits to is back pain. She denies sob or chest pressure. She is on oxygen via nasal canula,mnot in distress  Noted low temp from 10 am lab,pt helped with  Multiple layers of blanket. Asked RN to check temp and if still low to place [employer].    Assessment & Plan:   Severe sepsis(meets sepsis criteria with hypothermia, leukocytosis, and hypotension),POA presumed to be secondary to UTI  --Sepsis reassessment completed. BP improved,increase maintenance to 100 ml/hr. Continue holding BP meds. --Recent UTIs were due to ESBL E coli in 12/2019 and 1/2020,switch ceftriaxone to Merrem pending urine and blood cx in process. Lactic acid normalized. --Noted low temp from 10 am lab,pt helped with  Multiple layers of blanket. Asked RN to check temp and if still low to place [employer]. LILLIAN prerenal possibly as well as ATN from UTI  --continue iv fluids,avoid nephrotoxic agents. Hold accupril,bumex. Monitor BMP daily. Dementia. Continue Namenda. Per son, patient tends to get agitated nightly, and based on this information, we suspect sundowning. --Pt calm,alert and oriented to PPT during rounds on 9/11    H/o DVT in 2018. Patient remains on Eliquis. Patient may also be on Eliquis for suspected paroxysmal atrial fibrillation. COPD steroid dependent,chronci oxygen dependent respiratory  Failure  --Its reported prednisone was recently reduced from 10 mg to 5 mg daily ,maintain home dose,I don't suspect adrenal insufficieny,BP appropriately responded to volume expansion. --Patient uses oxygen at 2-3 L/min nightly at home, and as needed. --Bronchodilators     Low back pain. Moves legs fine. Check L XR. Code status: DNR  DVT prophylaxis: Apixaban  Care Plan discussed with: Patient/Family and Nurse  Anticipated Disposition: Home w/Family  Anticipated Discharge: Greater than 48 hours  Hospital Problems  Date Reviewed: 4/30/2021        Codes Class Noted POA    Severe sepsis ([facility] [address] ICD-10-CM: A41.9, R65.20  ICD-9-CM: 038.9, 995.92  9/10/2021 Unknown                Review of Systems:   A comprehensive review of systems was negative except for that written in the HPI. Vital Signs:    Last 24hrs VS reviewed since prior progress note.  Most recent are:  Visit Vitals  /66 (BP 1 Location: Left arm, BP Patient Position: Lying)   Pulse 86   Temp (!) 94.2 °F (34.6 °C)   Resp 16   Ht 5' 2.99\" (1.6 m)   Wt 50.6 kg (111 lb 8.8 oz)   SpO2 97%   BMI 19.77 kg/m²         Intake/Output Summary (Last 24 hours) at 9/11/2021 1411  Last data filed at 9/10/2021 1816  Gross per 24 hour   Intake    Output 150 ml   Net -150 ml        Physical Examination:     I had a face to face encounter with this patient and independently examined them on9/11/2021 as outlined below:        Constitutional:  chronically sick looking elderly,alert,not in distress   HEENT: Slightly dry buccal mucosa. Resp:  On oxygen via nasal canula. Diminished air entry at bases, no wheezing. Chest Wall: No deformity   CV:  Regular rhythm, normal rate, no murmurs, gallops, rubs    GI:  Soft, non distended, non tender. normoactive bowel sounds, no hepatosplenomegaly    :  No CVA or suprapubic tenderness    Musculoskeletal:  Pedal edema,with signs of vascular stasis on the left lower leg. Neurologic:  Mental status:Alert and oriented to PP,she knew she is in [facility] of president. Cranial nerves II-XII : WNL  Motor exam:Moves all extremities symmetrically              Data Review:    Review and/or order of clinical lab test  Review and/or order of tests in the radiology section of CPT  Review and/or order of tests in the medicine section of [facility]      Labs:     Recent Labs     09/10/21  1647   WBC 12.9*   HGB 11.7   HCT 37.7        Recent Labs     09/10/21  1647      K 4.6      CO2 23   BUN 55*   CREA 2.16*   *   CA 8.8   MG 2.1   PHOS 4.7     Recent Labs     09/10/21  1647   ALT 18   *   TBILI 0.4   TP 5.8*   ALB 2.1*   GLOB 3.7     No results for input(s): INR, PTP, APTT, INREXT in the last 72 hours. No results for input(s): FE, TIBC, PSAT, FERR in the last 72 hours.    Lab Results   Component Value Date/Time    Folate 18.1 11/07/2010 03:40 AM      No results for input(s): PH, PCO2, PO2 in the last 72 hours.   Recent Labs     09/10/21  1647   TROIQ <0.05     Lab Results   Component Value Date/Time    Cholesterol, total 156 06/09/2021 04:14 PM    HDL Cholesterol 63 06/09/2021 04:14 PM    LDL, calculated 58 06/09/2021 04:14 PM    LDL, calculated 84.4 12/13/2019 06:22 AM    Triglyceride 221 (H) 06/09/2021 04:14 PM    CHOL/HDL Ratio 3.6 12/13/2019 06:22 AM     Lab Results   Component Value Date/Time    Glucose (POC) 125 (H) 12/12/2019 05:41 AM    Glucose (POC) 133 (H) 07/03/2018 02:49 PM    Glucose (POC) 137 (H) 02/28/2016 07:34 AM    Glucose (POC) 245 (H) 02/27/2016 08:54 PM    Glucose (POC) 150 (H) 02/27/2016 04:58 PM     Lab Results   Component Value Date/Time    Color YELLOW/STRAW 09/10/2021 06:25 PM    Appearance TURBID (A) 09/10/2021 06:25 PM    Specific gravity 1.011 09/10/2021 06:25 PM    Specific gravity 1.010 01/11/2020 04:08 PM    pH (UA) 5.0 09/10/2021 06:25 PM    Protein 30 (A) 09/10/2021 06:25 PM    Glucose Negative 09/10/2021 06:25 PM    Ketone Negative 09/10/2021 06:25 PM    Bilirubin Negative 09/10/2021 06:25 PM    Urobilinogen 0.2 09/10/2021 06:25 PM    Nitrites Positive (A) 09/10/2021 06:25 PM    Leukocyte Esterase LARGE (A) 09/10/2021 06:25 PM    Epithelial cells FEW 09/10/2021 06:25 PM    Bacteria 3+ (A) 09/10/2021 06:25 PM    WBC >100 (H) 09/10/2021 06:25 PM    RBC 0-5 09/10/2021 06:25 PM         Medications Reviewed:     Current Facility-Administered Medications   Medication Dose Route Frequency    0.9% sodium chloride infusion  100 mL/hr IntraVENous CONTINUOUS    cefTRIAXone (ROCEPHIN) 1 g in 0.9% sodium chloride (MBP/ADV) 50 mL MBP  1 g IntraVENous Q24H    predniSONE (DELTASONE) tablet 5 mg  5 mg Oral DAILY WITH BREAKFAST    sodium chloride (NS) flush 5-10 mL  5-10 mL IntraVENous PRN    sodium chloride (NS) flush 5-40 mL  5-40 mL IntraVENous Q8H    sodium chloride (NS) flush 5-40 mL  5-40 mL IntraVENous PRN    acetaminophen (TYLENOL) tablet 650 mg  650 mg Oral Q6H PRN    Or    acetaminophen (TYLENOL) suppository 650 mg  650 mg Rectal Q6H PRN    polyethylene glycol (MIRALAX) packet 17 g  17 g Oral DAILY PRN    ondansetron (ZOFRAN ODT) tablet 4 mg  4 mg Oral Q8H PRN    Or    ondansetron (ZOFRAN) injection 4 mg  4 mg IntraVENous Q6H PRN    apixaban (ELIQUIS) tablet 2.5 mg  2.5 mg Oral BID    atorvastatin (LIPITOR) tablet 10 mg  10 mg Oral QHS    lidocaine (XYLOCAINE) 5 % ointment   Topical BID PRN    memantine (NAMENDA) tablet 10 mg  10 mg Oral DAILY    sertraline (ZOLOFT) tablet 50 mg  50 mg Oral DAILY    pramipexole (MIRAPEX) tablet 0.5 mg  0.5 mg Oral QHS    albumin human 25% (BUMINATE) solution 25 g  25 g IntraVENous Q6H     ______________________________________________________________________  EXPECTED LENGTH OF STAY: - - -  ACTUAL LENGTH OF STAY:          1                 Bradley Frey MD

## 2021-09-11 NOTE — ED NOTES
TRANSFER - OUT REPORT:    Verbal report given to Hung Borja on Aleena Galarza  being transferred to [facility] [id number] for routine progression of care       Report consisted of patients Situation, Background, Assessment and   Recommendations(SBAR). Information from the following report(s) SBAR was reviewed with the receiving nurse. Lines:   Peripheral IV 09/10/21 Left Hand (Active)   Site Assessment Clean, dry, & intact 09/10/21 1651   Phlebitis Assessment 0 09/10/21 1651   Infiltration Assessment 0 09/10/21 1651   Dressing Status Clean, dry, & intact 09/10/21 1651   Dressing Type Transparent 09/10/21 1651   Hub Color/Line Status Blue 09/10/21 1651   Action Taken Blood drawn 09/10/21 1651   Alcohol Cap Used No 09/10/21 1651       Peripheral IV 09/10/21 Right Forearm (Active)        Opportunity for questions and clarification was provided.       Patient transported with:   O2 @ 2 liters

## 2021-09-11 NOTE — PROGRESS NOTES
Primary Nurse Florentin Kaufman RN and Kaye Jin RN performed a dual skin assessment on this patient Impairment noted- see wound doc flow sheet  Reynaldo score is 13      Redness with swelling noted on LLE specifically on calf. Skin tear/scar noted behind left calf. Blister noted on calf. RLE less than 3 secs. Skin tear on right upper calf closer to right ankle. Weepy with scab. Multiple abrasions noted on left upper extremity. Skin darkening on all extremies  Wound noted on RUE under bandage. Bandage with wound underneath and multiple scabs noted on LUE.
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
## 2021-09-12 NOTE — PROGRESS NOTES
Bedside shift change report given to Mary Kate (oncoming nurse) by [facility] (offgoing nurse). Report included the following information SBAR, Procedure Summary, Recent Results and Cardiac Rhythm Afib.

## 2021-09-12 NOTE — PROGRESS NOTES
[address] [facility] Adult  Hospitalist Group                                                                                          Hospitalist Progress Note  Lynda Jackson MD  Answering service: [phone] OR [phone] from in house phone        Date of Service:  2021  NAME:  Helen Leger  :  1935  MRN:  [id number]    This documentation was facilitated by a Voice Recognition software and may contain inadvertent typographical errors. Admission Summary:   From HPI  Oliver Torres is a 80 y.o. female dementia who lives at home. Patient has a caretaker in the daytime, and furthermore, her son stays with her most nights. Per son who is at bedside, patient has been sleeping more for the last 2 days, and not been eating very much. He states that she does have baseline dementia which waxes and wanes, but she is verbal.  He states that if she does not take her insomnia medication at night, she can get very agitated. Patient has not had any vomiting. Per son, they have a home health nurse who did come to their house today. He was not advised of any fever. Patient has not complained of any abdominal pain. Patient has not exhibited any upper respiratory infection symptoms like rhinorrhea or cough. She does not use home oxygen. Per son, patient always has lower extremity edema. He feels that this worsens because she tends to want to walk around the house. Patient uses a walker. As far as the son knows, he has not had any recent falls. As mentioned above, she has been in bed most of the time the last 2 days. '      Interval history / Subjective:        Ms Marshal Ortega is alert,awake. Mark Christine off at this time. She has no complaints. Assessment & Plan:   Severe sepsis(meets sepsis criteria with hypothermia, leukocytosis, and hypotension),POA presumed to be secondary to UTI  --Sepsis reassessment completed. BP improved,increase maintenance to 100 ml/hr. Continue holding BP meds.   --Recent UTIs were due to ESBL E coli in 12/2019 and 1/2020,switch ceftriaxone to Merrem pending urine and blood cx in process. Lactic acid normalized. --Noted low temp from 10 am lab,pt helped with  Multiple layers of blanket. Asked RN to check temp and if still low to place [employer]. LILLIAN prerenal possibly as well as ATN from UTI. Creatinine improving.  --continue iv fluids,avoid nephrotoxic agents. Hold accupril,bumex. Monitor BMP daily. Dementia. Continue Namenda. Per son, patient tends to get agitated nightly, and based on this information, we suspect sundowning. --Pt calm,alert and oriented to PPT during rounds on 9/12    H/o DVT in 2018. Patient remains on Eliquis. Patient may also be on Eliquis for suspected paroxysmal atrial fibrillation. COPD steroid dependent,chronci oxygen dependent respiratory  Failure  --Its reported prednisone was recently reduced from 10 mg to 5 mg daily ,maintain home dose,I don't suspect adrenal insufficieny,BP appropriately responded to volume expansion. --Patient uses oxygen at 2-3 L/min nightly at home, and as needed. --Bronchodilators     Low back pain. Moves legs fine. --Lumbar xray with acute bony abnormality of the lumbar spine. Multilevel degenerative change and levocurvature with grade 1 anterolisthesis at L4-5. --Pain control: scheduled tylenol, lidocaine patch,PT AND OT         Updated son Sierra Márquez  on the phone 9/11      Code status: DNR  DVT prophylaxis: [address] discussed with: Patient/Family and Nurse  Anticipated Disposition: Home w/Family  Anticipated Discharge: Greater than 48 hours  Hospital Problems  Date Reviewed: 4/30/2021        Codes Class Noted POA    Severe sepsis ([facility] [address] ICD-10-CM: A41.9, R65.20  ICD-9-CM: 038.9, 995.92  9/10/2021 Unknown                Review of Systems:   A comprehensive review of systems was negative except for that written in the HPI. Vital Signs:    Last 24hrs VS reviewed since prior progress note.  Most recent are:  Visit Vitals  BP (!) 91/49 (BP 1 Location: Left upper arm, BP Patient Position: At rest)   Pulse 94   Temp 98.6 °F (37 °C)   Resp 14   Ht 5' 2.99\" (1.6 m)   Wt 50.6 kg (111 lb 8.8 oz)   SpO2 96%   BMI 19.77 kg/m²         Intake/Output Summary (Last 24 hours) at 9/12/2021 8188  Last data filed at 9/12/2021 6460  Gross per 24 hour   Intake 995 ml   Output 300 ml   Net 695 ml        Physical Examination:     I had a face to face encounter with this patient and independently examined them on9/12/2021 as outlined below:        Constitutional:  chronically sick looking elderly,alert,not in distress   HEENT: Slightly dry buccal mucosa. Resp:  On oxygen via nasal canula. Diminished air entry at bases, no wheezing. Chest Wall: No deformity   CV:  Regular rhythm, normal rate, no murmurs, gallops, rubs    GI:  Soft, non distended, non tender. normoactive bowel sounds, no hepatosplenomegaly    :  No CVA or suprapubic tenderness    Musculoskeletal:  Pedal edema,with signs of vascular stasis on the left lower leg. Neurologic:  Mental status:Alert and oriented to PP,she knew she is in [employer] of president. Cranial nerves II-XII : WNL  Motor exam:Moves all extremities symmetrically              Data Review:    Review and/or order of clinical lab test  Review and/or order of tests in the radiology section of CPT  Review and/or order of tests in the medicine section of CPT      Labs:     Recent Labs     09/12/21  0319 09/11/21 0745 09/10/21  1647 09/10/21  1647   WBC  --  6.8  --  12.9*   HGB 8.5* 8.7*   < > 11.7   HCT 28.4* 28.6*   < > 37.7   PLT  --  123*  --  174    < > = values in this interval not displayed.      Recent Labs     09/11/21 0745 09/10/21  1647   * 138   K 3.6 4.6   * 107   CO2 24 23   BUN 44* 55*   CREA 1.31* 2.16*   GLU 76 110*   CA 8.5 8.8   MG  --  2.1   PHOS  --  4.7     Recent Labs     09/11/21  0745 09/10/21  1647   ALT 12 18    205*   TBILI 0.3 0.4   TP 5. 6* 5.8*   ALB 2.9* 2.1*   GLOB 2.7 3.7     No results for input(s): INR, PTP, APTT, INREXT, INREXT in the last 72 hours. No results for input(s): FE, TIBC, PSAT, FERR in the last 72 hours. Lab Results   Component Value Date/Time    Folate 18.1 11/07/2010 03:40 AM      No results for input(s): PH, PCO2, PO2 in the last 72 hours.   Recent Labs     09/10/21  1647   TROIQ <0.05     Lab Results   Component Value Date/Time    Cholesterol, total 156 06/09/2021 04:14 PM    HDL Cholesterol 63 06/09/2021 04:14 PM    LDL, calculated 58 06/09/2021 04:14 PM    LDL, calculated 84.4 12/13/2019 06:22 AM    Triglyceride 221 (H) 06/09/2021 04:14 PM    CHOL/HDL Ratio 3.6 12/13/2019 06:22 AM     Lab Results   Component Value Date/Time    Glucose (POC) 73 09/12/2021 03:12 AM    Glucose (POC) 125 (H) 12/12/2019 05:41 AM    Glucose (POC) 133 (H) 07/03/2018 02:49 PM    Glucose (POC) 137 (H) 02/28/2016 07:34 AM    Glucose (POC) 245 (H) 02/27/2016 08:54 PM     Lab Results   Component Value Date/Time    Color YELLOW/STRAW 09/10/2021 06:25 PM    Appearance TURBID (A) 09/10/2021 06:25 PM    Specific gravity 1.011 09/10/2021 06:25 PM    Specific gravity 1.010 01/11/2020 04:08 PM    pH (UA) 5.0 09/10/2021 06:25 PM    Protein 30 (A) 09/10/2021 06:25 PM    Glucose Negative 09/10/2021 06:25 PM    Ketone Negative 09/10/2021 06:25 PM    Bilirubin Negative 09/10/2021 06:25 PM    Urobilinogen 0.2 09/10/2021 06:25 PM    Nitrites Positive (A) 09/10/2021 06:25 PM    Leukocyte Esterase LARGE (A) 09/10/2021 06:25 PM    Epithelial cells FEW 09/10/2021 06:25 PM    Bacteria 3+ (A) 09/10/2021 06:25 PM    WBC >100 (H) 09/10/2021 06:25 PM    RBC 0-5 09/10/2021 06:25 PM         Medications Reviewed:     Current Facility-Administered Medications   Medication Dose Route Frequency    0.9% sodium chloride infusion  100 mL/hr IntraVENous CONTINUOUS    predniSONE (DELTASONE) tablet 5 mg  5 mg Oral DAILY WITH BREAKFAST    meropenem (MERREM) 500 mg in 0.9% sodium chloride (MBP/ADV) 50 mL MBP  500 mg IntraVENous Q12H    acetaminophen (TYLENOL) tablet 1,000 mg  1,000 mg Oral BID    sodium chloride (NS) flush 5-10 mL  5-10 mL IntraVENous PRN    sodium chloride (NS) flush 5-40 mL  5-40 mL IntraVENous Q8H    sodium chloride (NS) flush 5-40 mL  5-40 mL IntraVENous PRN    acetaminophen (TYLENOL) tablet 650 mg  650 mg Oral Q6H PRN    Or    acetaminophen (TYLENOL) suppository 650 mg  650 mg Rectal Q6H PRN    polyethylene glycol (MIRALAX) packet 17 g  17 g Oral DAILY PRN    ondansetron (ZOFRAN ODT) tablet 4 mg  4 mg Oral Q8H PRN    Or    ondansetron (ZOFRAN) injection 4 mg  4 mg IntraVENous Q6H PRN    apixaban (ELIQUIS) tablet 2.5 mg  2.5 mg Oral BID    atorvastatin (LIPITOR) tablet 10 mg  10 mg Oral QHS    lidocaine (XYLOCAINE) 5 % ointment   Topical BID PRN    memantine (NAMENDA) tablet 10 mg  10 mg Oral DAILY    sertraline (ZOLOFT) tablet 50 mg  50 mg Oral DAILY    pramipexole (MIRAPEX) tablet 0.5 mg  0.5 mg Oral QHS     ______________________________________________________________________  EXPECTED LENGTH OF STAY: - - -  ACTUAL LENGTH OF STAY:          2                 Mayuri Hall MD

## 2021-09-12 NOTE — PROGRESS NOTES
Received patient via stretcher. Alert and oriented, 3L NC,  no distress noted. Patient denies of any pain or discomfort at this time. Continous rectal probe inserted, ailyn hugger applied. Core temp 94.5. Patient oriented to unit and staff, verbalizes understanding. Bed in lowest locked position, call bell and personal items within reach.

## 2021-09-12 NOTE — PROGRESS NOTES
Patient core temp is now stabilized, 98.6. Patient starting to remove ailyn hugger. Ailyn hugger has been removed, at bedside if needed. Patient educated on hypothermia precautions, verbalized understanding.

## 2021-09-13 NOTE — PROGRESS NOTES
Renal Dosing/Monitoring  Medication: Meropenem  Current regimen:  500 mg every 12 hrs  Recent Labs     09/13/21  0410 09/11/21  0745 09/10/21  1647   CREA 1.12* 1.31* 2.16*   BUN 33* 44* 55*     Estimated CrCl:  29.8 ml/min  Plan: Change to meropenem 500 mg every 8 hrs per [address] [place] [address] P&T Committee Protocol with respect to renal function. Pharmacy will continue to monitor patient daily and will make dosage adjustments based upon changing renal function.

## 2021-09-13 NOTE — PROGRESS NOTES
Physician Progress Note      Sandy Washington  CSN #:                  [id number]  :                       1935  ADMIT DATE:       9/10/2021 4:58 PM  [address] [place] DATE:  RESPONDING  PROVIDER #:        Karey Cardenas MD          QUERY TEXT:    Dear Attending,    Pt admitted with sepsis due to UTI and has chronic oxygen dependent respiratory failure documented. H&P and Hospitalist progress notes state that pt does not use home O2, but also that pt uses 2-3 LPM home O2 at night and PRN. If possible, please document in progress notes and discharge summary further specificity regarding the type and acuity of respiratory failure: The medical record reflects the following:    Risk Factors: COPD (steroid dependent), dysphagia from previous CVA, h/o PEG tube    Clinical Indicators:  -- H&P & Hospitalist progress notes state she does not use home oxygen; subsequently the notes then state patient uses oxygen at 2-3 L/min nightly at home, and as needed  -- H&P & Hospitalist progress notes document steroid dependent COPD and chronic oxygen dependent respiratory failure    Treatment: Supplemental O2, prednisone, vitals monitoring    Thank-you,  Khalida Burroughs RN, [facility]  Clinical   [phone]  Options provided:  -- Chronic respiratory failure with hypoxia  -- Other - I will add my own diagnosis  -- Disagree - Not applicable / Not valid  -- Disagree - Clinically unable to determine / Unknown  -- Refer to Clinical Documentation Reviewer    PROVIDER RESPONSE TEXT:    This patient has chronic respiratory failure with hypoxia.     Query created by: Shannan Blackmon on 2021 12:20 PM      Electronically signed by:  Karey Cardenas MD 2021 5:19 PM

## 2021-09-13 NOTE — PROGRESS NOTES
Patient has c/o pain to LLE, site assessed. Warm, weeping, site cleansed with NS and applied dressing. Medicated patient per MAR. Core temp 99.6, ailyn hugger removed, at  bedside if needed. Assessment shows new JVD present. Patient is currently saline locked, with the exception of IV ABX and scheduled albumin. [address] made aware of JVD, no new orders received.

## 2021-09-13 NOTE — PROGRESS NOTES
Assumed care of patient. Upon assessment, patient core temp is 95.3. Alisia hugger placed on patient. Patient tolerating well. Patient educated on keeping alisia hugger in place, patient verbalizes understanding.

## 2021-09-13 NOTE — PROGRESS NOTES
Bedside shift change report given to Mitra (oncoming nurse) by Forrest Aleman (offgoing nurse). Report included the following information SBAR, Intake/Output and Cardiac Rhythm Afib.

## 2021-09-13 NOTE — PROGRESS NOTES
[address] [facility] Adult  Hospitalist Group                                                                                          Hospitalist Progress Note  Rere Alas MD  Answering service: [phone] OR [phone] from in house phone        Date of Service:  2021  NAME:  Jagruti Rodriguez  :  1935  MRN:  [id number]    This documentation was facilitated by a Voice Recognition software and may contain inadvertent typographical errors. Admission Summary:   From [facility] [place]  Ramsey Osuna is a 80 y.o. female dementia who lives at home. Patient has a caretaker in the daytime, and furthermore, her son stays with her most nights. Per son who is at bedside, patient has been sleeping more for the last 2 days, and not been eating very much. He states that she does have baseline dementia which waxes and wanes, but she is verbal.  He states that if she does not take her insomnia medication at night, she can get very agitated. Patient has not had any vomiting. Per son, they have a home health nurse who did come to their house today. He was not advised of any fever. Patient has not complained of any abdominal pain. Patient has not exhibited any upper respiratory infection symptoms like rhinorrhea or cough. She does not use home oxygen. Per son, patient always has lower extremity edema. He feels that this worsens because she tends to want to walk around the house. Patient uses a walker. As far as the son knows, he has not had any recent falls. As mentioned above, she has been in bed most of the time the last 2 days. '      Interval history / Subjective:        Ms Ricarda Gutierrez is perky today,no complains other than asking when she can go home. Assessment & Plan:   Severe sepsis(meets sepsis criteria with hypothermia, leukocytosis, and hypotension),POA presumed to be secondary to UTI  --Sepsis reassessment completed. BP improved,increase maintenance to 100 ml/hr. Continue holding BP meds.   --Recent UTIs were due to ESBL E coli in 12/2019 and 1/2020. Cx came back + for esbl e coli,she is already on merrem  --Hypothermia resolved. LILLIAN prerenal possibly as well as ATN from UTI. Creatinine improving.  --continue iv fluids,avoid nephrotoxic agents. Hold accupril,bumex. Monitor BMP daily. Dementia. Continue Namenda. Per son, patient tends to get agitated nightly, and based on this information, we suspect sundowning. --Pt calm,alert and oriented to PPT during rounds on 9/12    H/o DVT in 2018. Patient remains on Eliquis. Patient may also be on Eliquis for suspected paroxysmal atrial fibrillation. COPD steroid dependent,chronci oxygen dependent respiratory  Failure  --Its reported prednisone was recently reduced from 10 mg to 5 mg daily ,maintain home dose,I don't suspect adrenal insufficieny,BP appropriately responded to volume expansion. --Patient uses oxygen at 2-3 L/min nightly at home, and as needed. --Bronchodilators     Low back pain. Moves legs fine. --Lumbar xray with acute bony abnormality of the lumbar spine. Multilevel degenerative change and levocurvature with grade 1 anterolisthesis at L4-5. --Pain control: scheduled tylenol, lidocaine patch,PT AND OT         Updating her son Claudia Brooke regularly    Discharge planning  -Her son said her personal aide is not coming anymore and asks for rehab,she may the need placement afterwards. .      Code status: DNR  DVT prophylaxis: Apixaban  Care Plan discussed with: Patient/Family and Nurse  Anticipated Disposition: rehab  Anticipated Discharge: Greater than 48 hours  Hospital Problems  Date Reviewed: 4/30/2021        Codes Class Noted POA    Severe sepsis ([facility] [address] ICD-10-CM: A41.9, R65.20  ICD-9-CM: 038.9, 995.92  9/10/2021 Unknown                Review of Systems:   A comprehensive review of systems was negative except for that written in the HPI. Vital Signs:    Last 24hrs VS reviewed since prior progress note.  Most recent are:  Visit Vitals  BP 123/74 (BP 1 Location: Left arm, BP Patient Position: At rest)   Pulse (!) 103   Temp 97.5 °F (36.4 °C)   Resp 19   Ht 5' 2.99\" (1.6 m)   Wt 52.5 kg (115 lb 11.9 oz)   SpO2 96%   BMI 20.51 kg/m²         Intake/Output Summary (Last 24 hours) at 9/13/2021 1359  Last data filed at 9/13/2021 6733  Gross per 24 hour   Intake 250 ml   Output    Net 250 ml        Physical Examination:     I had a face to face encounter with this patient and independently examined them on9/13/2021 as outlined below:        Constitutional:  chronically sick looking elderly,alert,not in distress   HEENT: Slightly dry buccal mucosa. Resp:  On oxygen via nasal canula. Diminished air entry at bases, no wheezing. Chest Wall: No deformity   CV:  Regular rhythm, normal rate, no murmurs, gallops, rubs    GI:  Soft, non distended, non tender. normoactive bowel sounds, no hepatosplenomegaly    :  No CVA or suprapubic tenderness    Musculoskeletal:  Pedal edema,with signs of vascular stasis on the left lower leg. Neurologic:  Mental status:Alert and oriented to PP,she knew she is in [address] of president. Cranial nerves II-XII : WNL  Motor exam:Moves all extremities symmetrically              Data Review:    Review and/or order of clinical lab test  Review and/or order of tests in the radiology section of CPT  Review and/or order of tests in the medicine section of [facility]      Labs:     Recent Labs     09/13/21 0410 09/12/21 0319 09/11/21 0745 09/11/21 0745 09/10/21  1647 09/10/21  1647   WBC  --   --   --  6.8  --  12.9*   HGB 7.9* 8.5*   < > 8.7*   < > 11.7   HCT 26.9* 28.4*   < > 28.6*   < > 37.7   PLT  --   --   --  123*  --  174    < > = values in this interval not displayed.      Recent Labs     09/13/21 0410 09/11/21  0745 09/10/21  1647   * 147* 138   K 3.6 3.6 4.6   * 117* 107   CO2 26 24 23   BUN 33* 44* 55*   CREA 1.12* 1.31* 2.16*   GLU 73 76 110*   CA 9.2 8.5 8.8   MG  --   --  2.1   PHOS  --   -- 4.7     Recent Labs     09/11/21  0745 09/10/21  1647   ALT 12 18    205*   TBILI 0.3 0.4   TP 5.6* 5.8*   ALB 2.9* 2.1*   GLOB 2.7 3.7     No results for input(s): INR, PTP, APTT, INREXT, INREXT in the last 72 hours. No results for input(s): FE, TIBC, PSAT, FERR in the last 72 hours. Lab Results   Component Value Date/Time    Folate 18.1 11/07/2010 03:40 AM      No results for input(s): PH, PCO2, PO2 in the last 72 hours.   Recent Labs     09/10/21  1647   TROIQ <0.05     Lab Results   Component Value Date/Time    Cholesterol, total 156 06/09/2021 04:14 PM    HDL Cholesterol 63 06/09/2021 04:14 PM    LDL, calculated 58 06/09/2021 04:14 PM    LDL, calculated 84.4 12/13/2019 06:22 AM    Triglyceride 221 (H) 06/09/2021 04:14 PM    CHOL/HDL Ratio 3.6 12/13/2019 06:22 AM     Lab Results   Component Value Date/Time    Glucose (POC) 112 09/12/2021 09:34 PM    Glucose (POC) 73 09/12/2021 03:12 AM    Glucose (POC) 125 (H) 12/12/2019 05:41 AM    Glucose (POC) 133 (H) 07/03/2018 02:49 PM    Glucose (POC) 137 (H) 02/28/2016 07:34 AM     Lab Results   Component Value Date/Time    Color YELLOW/STRAW 09/10/2021 06:25 PM    Appearance TURBID (A) 09/10/2021 06:25 PM    Specific gravity 1.011 09/10/2021 06:25 PM    Specific gravity 1.010 01/11/2020 04:08 PM    pH (UA) 5.0 09/10/2021 06:25 PM    Protein 30 (A) 09/10/2021 06:25 PM    Glucose Negative 09/10/2021 06:25 PM    Ketone Negative 09/10/2021 06:25 PM    Bilirubin Negative 09/10/2021 06:25 PM    Urobilinogen 0.2 09/10/2021 06:25 PM    Nitrites Positive (A) 09/10/2021 06:25 PM    Leukocyte Esterase LARGE (A) 09/10/2021 06:25 PM    Epithelial cells FEW 09/10/2021 06:25 PM    Bacteria 3+ (A) 09/10/2021 06:25 PM    WBC >100 (H) 09/10/2021 06:25 PM    RBC 0-5 09/10/2021 06:25 PM         Medications Reviewed:     Current Facility-Administered Medications   Medication Dose Route Frequency    balsam peru-castor oiL (VENELEX) ointment   Topical BID    meropenem (MERREM) 500 mg in 0.9% sodium chloride (MBP/ADV) 50 mL MBP  500 mg IntraVENous Q8H    oxyCODONE-acetaminophen (PERCOCET) 5-325 mg per tablet 1 Tablet  1 Tablet Oral Q6H PRN    0.9% sodium chloride infusion  100 mL/hr IntraVENous CONTINUOUS    predniSONE (DELTASONE) tablet 5 mg  5 mg Oral DAILY WITH BREAKFAST    acetaminophen (TYLENOL) tablet 1,000 mg  1,000 mg Oral BID    sodium chloride (NS) flush 5-10 mL  5-10 mL IntraVENous PRN    sodium chloride (NS) flush 5-40 mL  5-40 mL IntraVENous Q8H    sodium chloride (NS) flush 5-40 mL  5-40 mL IntraVENous PRN    acetaminophen (TYLENOL) tablet 650 mg  650 mg Oral Q6H PRN    Or    acetaminophen (TYLENOL) suppository 650 mg  650 mg Rectal Q6H PRN    polyethylene glycol (MIRALAX) packet 17 g  17 g Oral DAILY PRN    ondansetron (ZOFRAN ODT) tablet 4 mg  4 mg Oral Q8H PRN    Or    ondansetron (ZOFRAN) injection 4 mg  4 mg IntraVENous Q6H PRN    apixaban (ELIQUIS) tablet 2.5 mg  2.5 mg Oral BID    atorvastatin (LIPITOR) tablet 10 mg  10 mg Oral QHS    lidocaine (XYLOCAINE) 5 % ointment   Topical BID PRN    memantine (NAMENDA) tablet 10 mg  10 mg Oral DAILY    sertraline (ZOLOFT) tablet 50 mg  50 mg Oral DAILY    pramipexole (MIRAPEX) tablet 0.5 mg  0.5 mg Oral QHS     ______________________________________________________________________  EXPECTED LENGTH OF STAY: 3d 12h  ACTUAL LENGTH OF STAY:          3                 Mayuri Hall MD

## 2021-09-13 NOTE — WOUND CARE
WOCN Note:     Assessed patient in Rm: [phone]  ESBL / DNR  Brief no quiroz  Speech slow and garbled  [facility] Care Bed  New consult placed by RN for: multiple wounds on patient, skin tear on BLE. Chart shows:low sats and low heart rate. Admitted for severe sepsis, speech slow and garbled. PMH: DVT on eliquis, syncope, HTN, low K, dehydration, COPD, diverticulitis sof large intestine with abscess, Alzheimers, stroke, DM neuropathy. WBC = 7.9  On =   Admitted from: home. Assessment:   Patient is slow in communication, incontinent wearing a brief not mobile but turning side to side. Patient wearing briefs for incontinence no quiroz. Diet: Adult regular with supplements. Patient moaning in pain when legs moved. -Bilateral heels slow to bob. -Buttocks and sacral skin intact and without erythema. Heels offloaded on pillows. -Right lower shin with intact bruise: 2.0cm x 1.7cm x 0.0cm  -Right shin: healed scab: dry; 2.0cm x 0.1cm x 0.0cm  -Left foot 4+ pitting edema with blistering noted: intact skin. .  -Posterior left lower le wound / lower aspect: 4.0cm x 3.0cm x 0.1cm / 100% pink / surrounding skin red/ cellulitic/ painful / warm to touch. -left elbow bruising intact skin. Skin turgor poor, thin like tissue paper. Need to handle gently preventing skin tears. Minimize layers of linen/pads under patient to optimize support surface. Turn/reposition approximately every 2 hours and offload heels. Manage incontinence / promote continence; Z-Guard Paste buttocks and sacrum daily and as needed with incontinence care. Discussed above plan with patient and Mitra / RN. -Twice daily apply venelex ointment to: bilateral heels and bilateral elbows. - left posterior lower leg: every other day; clean with NS, apply vaseline gauze, ABD pads and wrap with noemy.     Transition of Care: Plan to follow weekly and as needed while admitted to [facility].     Jammie GUTIÉRREZ RN ET  In Patient Wound Care Department  Office: [phone] or Perfect Serve  Pager: [phone]
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
## 2021-09-14 NOTE — PROGRESS NOTES
[facility] Adult  Hospitalist Group                                                                                          Hospitalist Progress Note  Annika Flores MD  Answering service: [phone] OR [phone] from in house phone        Date of Service:  2021  NAME:  Pedro Holt  :  1935  MRN:  [id number]    This documentation was facilitated by a Voice Recognition software and may contain inadvertent typographical errors. Admission Summary:   From HPI  Bernard Maldonado is a 80 y.o. female dementia who lives at home. Patient has a caretaker in the daytime, and furthermore, her son stays with her most nights. Per son who is at bedside, patient has been sleeping more for the last 2 days, and not been eating very much. He states that she does have baseline dementia which waxes and wanes, but she is verbal.  He states that if she does not take her insomnia medication at night, she can get very agitated. Patient has not had any vomiting. Per son, they have a home health nurse who did come to their house today. He was not advised of any fever. Patient has not complained of any abdominal pain. Patient has not exhibited any upper respiratory infection symptoms like rhinorrhea or cough. She does not use home oxygen. Per son, patient always has lower extremity edema. He feels that this worsens because she tends to want to walk around the house. Patient uses a walker. As far as the son knows, he has not had any recent falls. As mentioned above, she has been in bed most of the time the last 2 days. '      Interval history / Subjective:        Ms. Sharmin Figueroa was uncomfortable complaining of pain all of her and she was tachycardic when I saw her early in rounds.   Adjusted pain regimen, added some IV morphine, heart rate is much improved this afternoon  Assessment & Plan:   Severe sepsis(meets sepsis criteria with hypothermia, leukocytosis, and hypotension),POA  ESBL E. coli and Pseudomonas UTI  --Sepsis reassessment completed. IV fluid discontinued. --Recent UTIs were due to ESBL E coli in 12/2019 and 1/2020. Cx came back + for esbl e coli,she is already on merrem  --Hypothermia resolved. --Continue meropenem, target 5 days    LILLIAN prerenal possibly as well as ATN from UTI. Creatinine improving. --Resolved. Continue to monitor. Dementia. Continue Namenda. Per son, patient tends to get agitated nightly, and based on this information, we suspect sundowning. --Pt calm,alert and oriented to PPT during rounds on 9/12    H/o DVT in 2018. Patient remains on Eliquis. Patient may also be on Eliquis for suspected paroxysmal atrial fibrillation. COPD steroid dependent,chronci oxygen dependent respiratory  Failure  --Its reported prednisone was recently reduced from 10 mg to 5 mg daily ,maintain home dose,I don't suspect adrenal insufficieny,BP appropriately responded to volume expansion. --Patient uses oxygen at 2-3 L/min nightly at home, and as needed. --Bronchodilators     Low back pain. Moves legs fine. --Lumbar xray with acute bony abnormality of the lumbar spine. Multilevel degenerative change and levocurvature with grade 1 anterolisthesis at L4-5. --Pain control: scheduled tylenol, lidocaine patch,PT AND OT         Updating her son Osmin Rodriguez regularly    Discharge planning  -Her son said her personal aide is not coming anymore and asks for rehab,she may the need placement afterwards. .      Code status: DNR  DVT prophylaxis: Apixaban  Care Plan discussed with: Patient/Family and Nurse  Anticipated Disposition: rehab  Anticipated Discharge: Greater than 48 hours  Hospital Problems  Date Reviewed: 4/30/2021        Codes Class Noted POA    Severe sepsis ([facility] [address] ICD-10-CM: A41.9, R65.20  ICD-9-CM: 038.9, 995.92  9/10/2021 Unknown                Review of Systems:   A comprehensive review of systems was negative except for that written in the HPI.        Vital Signs:    Last 24hrs VS reviewed since prior progress note. Most recent are:  Visit Vitals  /88 (BP 1 Location: Left upper arm, BP Patient Position: At rest)   Pulse 79   Temp 97.9 °F (36.6 °C)   Resp 15   Ht 5' 2.99\" (1.6 m)   Wt 52.4 kg (115 lb 8.3 oz)   SpO2 94%   BMI 20.47 kg/m²         Intake/Output Summary (Last 24 hours) at 9/14/2021 1615  Last data filed at 9/14/2021 2156  Gross per 24 hour   Intake 100 ml   Output 100 ml   Net 0 ml        Physical Examination:     I had a face to face encounter with this patient and independently examined them on9/14/2021 as outlined below:        Constitutional:  chronically sick looking elderly,alert,not in distress   HEENT: Slightly dry buccal mucosa. Resp:  On oxygen via nasal canula. Diminished air entry at bases, no wheezing. Chest Wall: No deformity   CV:  Regular rhythm, normal rate, no murmurs, gallops, rubs    GI:  Soft, non distended, non tender. normoactive bowel sounds, no hepatosplenomegaly    :  No CVA or suprapubic tenderness    Musculoskeletal:  Pedal edema,with signs of vascular stasis on the left lower leg. Neurologic:  Mental status:Alert and oriented to PP,she knew she is in [address] of president. Cranial nerves II-XII : WNL  Motor exam:Moves all extremities symmetrically              Data Review:    Review and/or order of clinical lab test  Review and/or order of tests in the radiology section of CPT  Review and/or order of tests in the medicine section of [facility]      Labs:     Recent Labs     09/14/21  0354 09/13/21  0410   WBC 5.7  --    HGB 9.7* 7.9*   HCT 33.0* 26.9*   *  --      Recent Labs     09/14/21  0354 09/13/21  0410   * 146*   K 3.6 3.6   * 115*   CO2 23 26   BUN 31* 33*   CREA 1.00 1.12*   GLU 74 73   CA 9.0 9.2     No results for input(s): ALT, AP, TBIL, TBILI, TP, ALB, GLOB, GGT, AML, LPSE in the last 72 hours.     No lab exists for component: SGOT, GPT, AMYP, HLPSE  No results for input(s): INR, PTP, APTT, INREXT, INREXT in the last 72 hours. No results for input(s): FE, TIBC, PSAT, FERR in the last 72 hours. Lab Results   Component Value Date/Time    Folate 18.1 11/07/2010 03:40 AM      No results for input(s): PH, PCO2, PO2 in the last 72 hours. No results for input(s): CPK, CKNDX, TROIQ in the last 72 hours.     No lab exists for component: CPKMB  Lab Results   Component Value Date/Time    Cholesterol, total 156 06/09/2021 04:14 PM    HDL Cholesterol 63 06/09/2021 04:14 PM    LDL, calculated 58 06/09/2021 04:14 PM    LDL, calculated 84.4 12/13/2019 06:22 AM    Triglyceride 221 (H) 06/09/2021 04:14 PM    CHOL/HDL Ratio 3.6 12/13/2019 06:22 AM     Lab Results   Component Value Date/Time    Glucose (POC) 112 09/12/2021 09:34 PM    Glucose (POC) 73 09/12/2021 03:12 AM    Glucose (POC) 125 (H) 12/12/2019 05:41 AM    Glucose (POC) 133 (H) 07/03/2018 02:49 PM    Glucose (POC) 137 (H) 02/28/2016 07:34 AM     Lab Results   Component Value Date/Time    Color YELLOW/STRAW 09/10/2021 06:25 PM    Appearance TURBID (A) 09/10/2021 06:25 PM    Specific gravity 1.011 09/10/2021 06:25 PM    Specific gravity 1.010 01/11/2020 04:08 PM    pH (UA) 5.0 09/10/2021 06:25 PM    Protein 30 (A) 09/10/2021 06:25 PM    Glucose Negative 09/10/2021 06:25 PM    Ketone Negative 09/10/2021 06:25 PM    Bilirubin Negative 09/10/2021 06:25 PM    Urobilinogen 0.2 09/10/2021 06:25 PM    Nitrites Positive (A) 09/10/2021 06:25 PM    Leukocyte Esterase LARGE (A) 09/10/2021 06:25 PM    Epithelial cells FEW 09/10/2021 06:25 PM    Bacteria 3+ (A) 09/10/2021 06:25 PM    WBC >100 (H) 09/10/2021 06:25 PM    RBC 0-5 09/10/2021 06:25 PM         Medications Reviewed:     Current Facility-Administered Medications   Medication Dose Route Frequency    morphine injection 1 mg  1 mg IntraVENous Q4H PRN    metoprolol (LOPRESSOR) injection 2.5 mg  2.5 mg IntraVENous Q6H PRN    metoprolol tartrate (LOPRESSOR) tablet 25 mg  25 mg Oral Q12H    balsam peru-castor oiL (VENELEX) ointment   Topical BID    meropenem (MERREM) 500 mg in 0.9% sodium chloride (MBP/ADV) 50 mL MBP  500 mg IntraVENous Q8H    oxyCODONE-acetaminophen (PERCOCET) 5-325 mg per tablet 1 Tablet  1 Tablet Oral Q6H PRN    0.9% sodium chloride infusion  50 mL/hr IntraVENous CONTINUOUS    predniSONE (DELTASONE) tablet 5 mg  5 mg Oral DAILY WITH BREAKFAST    acetaminophen (TYLENOL) tablet 1,000 mg  1,000 mg Oral BID    sodium chloride (NS) flush 5-10 mL  5-10 mL IntraVENous PRN    sodium chloride (NS) flush 5-40 mL  5-40 mL IntraVENous Q8H    sodium chloride (NS) flush 5-40 mL  5-40 mL IntraVENous PRN    acetaminophen (TYLENOL) tablet 650 mg  650 mg Oral Q6H PRN    Or    acetaminophen (TYLENOL) suppository 650 mg  650 mg Rectal Q6H PRN    polyethylene glycol (MIRALAX) packet 17 g  17 g Oral DAILY PRN    ondansetron (ZOFRAN ODT) tablet 4 mg  4 mg Oral Q8H PRN    Or    ondansetron (ZOFRAN) injection 4 mg  4 mg IntraVENous Q6H PRN    apixaban (ELIQUIS) tablet 2.5 mg  2.5 mg Oral BID    atorvastatin (LIPITOR) tablet 10 mg  10 mg Oral QHS    lidocaine (XYLOCAINE) 5 % ointment   Topical BID PRN    memantine (NAMENDA) tablet 10 mg  10 mg Oral DAILY    sertraline (ZOLOFT) tablet 50 mg  50 mg Oral DAILY    pramipexole (MIRAPEX) tablet 0.5 mg  0.5 mg Oral QHS     ______________________________________________________________________  EXPECTED LENGTH OF STAY: 3d 12h  ACTUAL LENGTH OF STAY:          4                 Anup Guevara MD

## 2021-09-14 NOTE — PROGRESS NOTES
Transition Plan of Care  RUR 15%-Low  Disposition recommendation are for rehab. This CM spoke with son Pedro Combs [phone] and he would like referrals sent to several SNFs. Raul Meraz of [facility]. And [facility]. CM will follow up with facilities tomorrow morning.   Peg Roman RN CRM  Ext [phone]
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
## 2021-09-14 NOTE — PROGRESS NOTES
Physician Progress Note      Linda Latham  [facility] #:                  [id number]  :                       1935  ADMIT DATE:       9/10/2021 4:58 PM  [address] [place] DATE:  RESPONDING  PROVIDER #:        Pooja Chapa MD          QUERY TEXT:    Dear Attending,    Pt admitted with sepsis due to UTI and has acute kidney injury prerenal possibly as well as ATN from UTI documented in Hospitalist progress notes. Pt's sCr decreased from 2.16 to 1.31 then 1.12 from 9/10/21 at admission through 21, appearing to return toward baseline in less than 72 hours. There is no current urine chemistry noted in the medical record. In order to support the diagnosis of ATN, please include additional clinical indicators in your documentation. Or please document if the diagnosis of ATN has been ruled out after further study. The medical record reflects the following:    Risk Factors: Sepsis, UTI, hypotension during admission with h/o HTN    Clinical Indicators:  -- sCr = 2.16 on 9/10 @ 1647 (at admit), 1.31 on @ 0745; 1.12 on  & 1.00 on   -- Hospitalist documenting LILLIAN prerenal possibly as well as ATN from UTI    Treatment: IVF (NS @  ml/hr & 2 liter bolus), avoid nephrotoxic agents, hold accupril & bumex, serial lab monitoring    Thank-you,  Forrest Abebe RN, [facility]  Clinical   [phone]    Defined by Kidney Disease Improving Global Outcomes (KDIGO) clinical practice guideline for acute kidney injury:  -Increase in SCr by greater than or equal to 0.3 mg/dl within 48 hours; or  -Increase or decrease in SCr to greater than or equal to 1.5 times baseline, which is known or presumed to have occurred within the prior 7 days; or  -Urine volume < 0.5ml/kg/h for 6 hours  Options provided:  -- Acute tubular necrosis evidenced by, Please document evidence.   -- Acute kidney injury only, acute tubular necrosis ruled out after study  -- Other - I will add my own diagnosis  -- Disagree - Not applicable / Not valid  -- Disagree - Clinically unable to determine / Unknown  -- Refer to Clinical Documentation Reviewer    PROVIDER RESPONSE TEXT:    LILLIAN presumed to be due to combination prerenal and ATN    Query created by: Ashly Saez on 9/14/2021 8:18 AM      Electronically signed by:  Nav Huggins MD 9/14/2021 4:20 PM

## 2021-09-14 NOTE — PROGRESS NOTES
Patient wants the Gill Peter vaccine prior to discharge. MD notified. Paul George (vaccinator) updated on status.

## 2021-09-14 NOTE — PROGRESS NOTES
Patient was  In A-fib and began to become tachycardic in the 130's-150 after having a brief change. Primary Nurse gave PRN order for percocet as patient was Alert and explained that she was in pain, MD Hodgson notified via perfectserve, awaiting new orders. Since giving Pain medication HR has came down slightly 120's-140's      Update, Patient has new orders HR has gone down to <100    MD has also allowed fluids to be held due to possible JVD noted by primary nurse.

## 2021-09-14 NOTE — PROGRESS NOTES
Bedside shift change report given to [facility] (oncoming nurse) by Jaswinder Werner (offgoing nurse). Report included the following information SBAR, Kardex, Intake/Output, MAR, Recent Results and Med Rec Status.
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
## 2021-09-15 NOTE — PROGRESS NOTES
[address] [facility] Adult  Hospitalist Group                                                                                          Hospitalist Progress Note  Sammie Raza MD  Answering service: [phone] OR [phone] from in house phone        Date of Service:  9/15/2021  NAME:  Lesly Chambers  :  1935  MRN:  [id number]    This documentation was facilitated by a Voice Recognition software and may contain inadvertent typographical errors. Admission Summary:   From HPI  Radha Turner is a 80 y.o. female dementia who lives at home. Patient has a caretaker in the daytime, and furthermore, her son stays with her most nights. Per son who is at bedside, patient has been sleeping more for the last 2 days, and not been eating very much. He states that she does have baseline dementia which waxes and wanes, but she is verbal.  He states that if she does not take her insomnia medication at night, she can get very agitated. Patient has not had any vomiting. Per son, they have a home health nurse who did come to their house today. He was not advised of any fever. Patient has not complained of any abdominal pain. Patient has not exhibited any upper respiratory infection symptoms like rhinorrhea or cough. She does not use home oxygen. Per son, patient always has lower extremity edema. He feels that this worsens because she tends to want to walk around the house. Patient uses a walker. As far as the son knows, he has not had any recent falls. As mentioned above, she has been in bed most of the time the last 2 days. '      Interval history / Subjective:        Ms. Jose Ordoñez has cough,HR intermittently in the 120-130 on my round. She denies chest pressure or chest pressure. Assessment & Plan:   Severe sepsis(meets sepsis criteria with hypothermia, leukocytosis, and hypotension),POA  ESBL E. coli and Pseudomonas UTI  --Sepsis reassessment completed. IV fluid discontinued.   --Recent UTIs were due to ESBL E coli in 12/2019 and 1/2020. Cx came back + for esbl e coli,she is already on merrem  --Hypothermia resolved. --Continue meropenem, target 5 days,to finish on 9/15    LILLIAN prerenal possibly as well as ATN from UTI. Creatinine improving. --Resolved. Continue to monitor. Dementia. Continue Namenda. Per son, patient tends to get agitated nightly, and based on this information, we suspect sundowning. --Pt calm,alert and oriented to PPT during rounds on 9/12    H/o DVT in 2018. Patient remains on Eliquis. Patient may also be on Eliquis for suspected paroxysmal atrial fibrillation. COPD steroid dependent,chronci oxygen dependent respiratory  Failure  --Its reported prednisone was recently reduced from 10 mg to 5 mg daily ,maintain home dose,I don't suspect adrenal insufficieny,BP appropriately responded to volume expansion. --Patient uses oxygen at 2-3 L/min nightly at home, and as needed. --Coughing more, schedule bonchodilators. CXR    Low back pain. Moves legs fine. --Lumbar xray with acute bony abnormality of the lumbar spine. Multilevel degenerative change and levocurvature with grade 1 anterolisthesis at L4-5. --Pain control: scheduled tylenol, lidocaine patch,PT AND OT         Updating her son Adrianne Rousseau regularly    Discharge planning  -Her son said her personal aide is not coming anymore and asks for rehab,she may the need placement afterwards. .      Code status: DNR  DVT prophylaxis: Apixaban  Care Plan discussed with: Patient/Family and Nurse  Anticipated Disposition: rehab in 1-2 days   Anticipated Discharge:   Hospital Problems  Date Reviewed: 4/30/2021        Codes Class Noted POA    Severe sepsis [facility]) ICD-10-CM: A41.9, R65.20  ICD-9-CM: 038.9, 995.92  9/10/2021 Unknown                Review of Systems:   A comprehensive review of systems was negative except for that written in the HPI. Vital Signs:    Last 24hrs VS reviewed since prior progress note.  Most recent are:  Visit Vitals  /73 (BP 1 Location: Left upper arm, BP Patient Position: Sitting)   Pulse (!) 130   Temp 98.1 °F (36.7 °C)   Resp 20   Ht 5' 2.99\" (1.6 m)   Wt 51.8 kg (114 lb 3.2 oz)   SpO2 90%   BMI 20.23 kg/m²       No intake or output data in the 24 hours ending 09/15/21 1541     Physical Examination:     I had a face to face encounter with this patient and independently examined them on9/15/2021 as outlined below:        Constitutional:  chronically sick looking elderly,alert,not in distress   HEENT: Slightly dry buccal mucosa. Resp:  On oxygen via nasal canula. Diminished air entry at bases, no wheezing. Chest Wall: No deformity   CV:  Regular rhythm, normal rate, no murmurs, gallops, rubs    GI:  Soft, non distended, non tender. normoactive bowel sounds, no hepatosplenomegaly    :  No CVA or suprapubic tenderness    Musculoskeletal:  Pedal edema,with signs of vascular stasis on the left lower leg. Neurologic:  Mental status:Alert and oriented to ,she knew she is in [address] president. Cranial nerves II-XII : WNL  Motor exam:Moves all extremities symmetrically              Data Review:    Review and/or order of clinical lab test  Review and/or order of tests in the radiology section of CPT  Review and/or order of tests in the medicine section of [facility]      Labs:     Recent Labs     09/15/21  0050 09/14/21  0354   WBC  --  5.7   HGB 9.3* 9.7*   HCT 31.5* 33.0*   PLT  --  126*     Recent Labs     09/15/21  0050 09/14/21  0354 09/13/21  0410    146* 146*   K 5.0 3.6 3.6   * 116* 115*   CO2 21 23 26   BUN 23* 31* 33*   CREA 0.75 1.00 1.12*   GLU 85 74 73   CA 8.9 9.0 9.2     No results for input(s): ALT, AP, TBIL, TBILI, TP, ALB, GLOB, GGT, AML, LPSE in the last 72 hours. No lab exists for component: SGOT, GPT, AMYP, HLPSE  No results for input(s): INR, PTP, APTT, INREXT, INREXT in the last 72 hours. No results for input(s): FE, TIBC, PSAT, FERR in the last 72 hours. Lab Results   Component Value Date/Time    Folate 18.1 11/07/2010 03:40 AM      No results for input(s): PH, PCO2, PO2 in the last 72 hours. No results for input(s): CPK, CKNDX, TROIQ in the last 72 hours.     No lab exists for component: CPKMB  Lab Results   Component Value Date/Time    Cholesterol, total 156 06/09/2021 04:14 PM    HDL Cholesterol 63 06/09/2021 04:14 PM    LDL, calculated 58 06/09/2021 04:14 PM    LDL, calculated 84.4 12/13/2019 06:22 AM    Triglyceride 221 (H) 06/09/2021 04:14 PM    CHOL/HDL Ratio 3.6 12/13/2019 06:22 AM     Lab Results   Component Value Date/Time    Glucose (POC) 139 (H) 09/15/2021 11:33 AM    Glucose (POC) 87 09/15/2021 08:20 AM    Glucose (POC) 112 09/12/2021 09:34 PM    Glucose (POC) 73 09/12/2021 03:12 AM    Glucose (POC) 125 (H) 12/12/2019 05:41 AM     Lab Results   Component Value Date/Time    Color YELLOW/STRAW 09/10/2021 06:25 PM    Appearance TURBID (A) 09/10/2021 06:25 PM    Specific gravity 1.011 09/10/2021 06:25 PM    Specific gravity 1.010 01/11/2020 04:08 PM    pH (UA) 5.0 09/10/2021 06:25 PM    Protein 30 (A) 09/10/2021 06:25 PM    Glucose Negative 09/10/2021 06:25 PM    Ketone Negative 09/10/2021 06:25 PM    Bilirubin Negative 09/10/2021 06:25 PM    Urobilinogen 0.2 09/10/2021 06:25 PM    Nitrites Positive (A) 09/10/2021 06:25 PM    Leukocyte Esterase LARGE (A) 09/10/2021 06:25 PM    Epithelial cells FEW 09/10/2021 06:25 PM    Bacteria 3+ (A) 09/10/2021 06:25 PM    WBC >100 (H) 09/10/2021 06:25 PM    RBC 0-5 09/10/2021 06:25 PM         Medications Reviewed:     Current Facility-Administered Medications   Medication Dose Route Frequency    morphine injection 1 mg  1 mg IntraVENous Q4H PRN    metoprolol (LOPRESSOR) injection 2.5 mg  2.5 mg IntraVENous Q6H PRN    metoprolol tartrate (LOPRESSOR) tablet 25 mg  25 mg Oral Q12H    balsam peru-castor oiL (VENELEX) ointment   Topical BID    meropenem (MERREM) 500 mg in 0.9% sodium chloride (MBP/ADV) 50 mL MBP  500 mg IntraVENous Q8H    oxyCODONE-acetaminophen (PERCOCET) 5-325 mg per tablet 1 Tablet  1 Tablet Oral Q6H PRN    predniSONE (DELTASONE) tablet 5 mg  5 mg Oral DAILY WITH BREAKFAST    sodium chloride (NS) flush 5-10 mL  5-10 mL IntraVENous PRN    sodium chloride (NS) flush 5-40 mL  5-40 mL IntraVENous Q8H    sodium chloride (NS) flush 5-40 mL  5-40 mL IntraVENous PRN    acetaminophen (TYLENOL) tablet 650 mg  650 mg Oral Q6H PRN    Or    acetaminophen (TYLENOL) suppository 650 mg  650 mg Rectal Q6H PRN    polyethylene glycol (MIRALAX) packet 17 g  17 g Oral DAILY PRN    ondansetron (ZOFRAN ODT) tablet 4 mg  4 mg Oral Q8H PRN    Or    ondansetron (ZOFRAN) injection 4 mg  4 mg IntraVENous Q6H PRN    apixaban (ELIQUIS) tablet 2.5 mg  2.5 mg Oral BID    atorvastatin (LIPITOR) tablet 10 mg  10 mg Oral QHS    lidocaine (XYLOCAINE) 5 % ointment   Topical BID PRN    memantine (NAMENDA) tablet 10 mg  10 mg Oral DAILY    sertraline (ZOLOFT) tablet 50 mg  50 mg Oral DAILY    pramipexole (MIRAPEX) tablet 0.5 mg  0.5 mg Oral QHS     ______________________________________________________________________  EXPECTED LENGTH OF STAY: 3d 12h  ACTUAL LENGTH OF STAY:          5                 Melyssa Vegas MD

## 2021-09-15 NOTE — PROGRESS NOTES
Problem: Mobility Impaired (Adult and Pediatric)  Goal: *Acute Goals and Plan of Care (Insert Text)  Description: FUNCTIONAL STATUS PRIOR TO ADMISSION: Son provided history. Notes that pt ambulated with RW half the time and without device half the time at baseline. States that pt was indep with mobility and ADLs until recent decline. Pt has had personal care aide during the day, and son staying with her as needed at night. HOME SUPPORT PRIOR TO ADMISSION: The patient lived alone with daytime aide and son to provide assistance. Physical Therapy Goals  Initiated 9/14/2021  1. Patient will move from supine to sit and sit to supine  and roll side to side in bed with minimal assistance/contact guard assist within 7 day(s). 2.  Patient will transfer from bed to chair and chair to bed with moderate assistance  using the least restrictive device within 7 day(s). 3.  Patient will perform sit to stand with moderate assistance  within 7 day(s). 4.  Patient will ambulate with moderate assistance  for 10 feet with the least restrictive device within 7 day(s). Outcome: Progressing Towards Goal   PHYSICAL THERAPY TREATMENT  Patient: Meena Granados (3 y.o. female)  Date: 9/15/2021  Diagnosis: [facility] [A41.9, R65.20] <principal problem not specified>       Precautions: fall   Chart, physical therapy assessment, plan of care and goals were reviewed. ASSESSMENT  Patient continues with skilled PT services and is progressing towards goals. Pt more alert and talkative this date. Requires increased time to move to/ from EOB and complete seated tasks. Pt required min A for balance with functional dynamic activity EOB (combing hair, drinking) due to tendency to lean toward R. Noted pt 's-133 with seated activity, c/o fatigue, therefore no further activity progression this date. Recommend follow up SNF rehab at d/c.     Current Level of Function Impacting Discharge (mobility/balance): L sidelying to sit CGA and increased time, sit to L sidelying mod A, static sit SBA/ CGA, dynamic sit min A    Other factors to consider for discharge: remains well below functional baseline         PLAN :  Patient continues to benefit from skilled intervention to address the above impairments. Continue treatment per established plan of care. to address goals. Recommendation for discharge: (in order for the patient to meet his/her long term goals)  Therapy up to 5 days/week in SNF setting    This discharge recommendation:  Has been made in collaboration with the attending provider and/or case management    IF patient discharges home will need the following DME: to be determined (TBD)       SUBJECTIVE:   Patient stated I can do it, I just can't do it fast    OBJECTIVE DATA SUMMARY:   Critical Behavior:  Neurologic State: Alert, Confused  Orientation Level: Disoriented to place, Disoriented to situation, Disoriented to time  Cognition: Poor safety awareness, Follows commands     Functional Mobility Training:  Bed Mobility:     Supine to Sit: Contact guard assistance; Additional time;Bed Modified (L sidelying to sit)  Sit to Supine: Moderate assistance (sit to L side, assist for LEs)  Scooting: Minimum assistance; Additional time (for scoot to EOB)        Transfers:      deferred                             Balance:  Sitting: Impaired  Sitting - Static: Fair (occasional) (leans to R)  Sitting - Dynamic: Fair (occasional); Poor (constant support) (leans to R)  Pain Rating:  Pt c/o ribcage pain    Activity Tolerance:   Fair and 's -130 with EOB activity    After treatment patient left in no apparent distress:   Patient positioned in left sidelying for pressure relief, Call bell within reach, Bed / chair alarm activated, and Side rails x 3    COMMUNICATION/COLLABORATION:   The patients plan of care was discussed with: Registered nurse.      Giovana Squires, PT   Time Calculation: 30 mins

## 2021-09-15 NOTE — PROGRESS NOTES
Problem: Falls - Risk of  Goal: *Absence of Falls  Description: Document Nasima Washington Fall Risk and appropriate interventions in the flowsheet. Outcome: Progressing Towards Goal  Note: Fall Risk Interventions:       Mentation Interventions: Bed/chair exit alarm, Evaluate medications/consider consulting pharmacy    Medication Interventions: Assess postural VS orthostatic hypotension, Evaluate medications/consider consulting pharmacy    Elimination Interventions: Call light in reach, Patient to call for help with toileting needs              Problem: Pressure Injury - Risk of  Goal: *Prevention of pressure injury  Description: Document Reynaldo Scale and appropriate interventions in the flowsheet.   Outcome: Progressing Towards Goal  Note: Pressure Injury Interventions:  Sensory Interventions: Assess changes in LOC, Float heels, Monitor skin under medical devices    Moisture Interventions: Absorbent underpads, Apply protective barrier, creams and emollients, Maintain skin hydration (lotion/cream)    Activity Interventions: Pressure redistribution bed/mattress(bed type)    Mobility Interventions: Pressure redistribution bed/mattress (bed type), Float heels    Nutrition Interventions: Document food/fluid/supplement intake    Friction and Shear Interventions: Apply protective barrier, creams and emollients, Minimize layers                Problem: Patient Education: Go to Patient Education Activity  Goal: Patient/Family Education  Outcome: Progressing Towards Goal

## 2021-09-16 NOTE — PROGRESS NOTES
BONIFACOI: anticipate [facility], likely tomorrow; Rapid covid test pending; UAI needs to be faxed to [facility] at [phone] once obtained; BLS transport on will call with [facility]; Pt is currently on 4L/NC O2; Will need IMM letter prior to d/c    RUR: 15%    1115-CM reviewed pt chart & sent perfect serve message to Attending to inquire if pt is medically ready for d/c yet. Attending advised pt can d/c anytime forward. CM spoke with Lakiaisaac Mcdonald of [facility], [phone] & she advised they need pt's UAI & disposition plan before they would be able to accept pt. CM left  for pt/s son Shanon Huber [phone] to discuss dispo plan following SNF. CM notes that record shows that pt lives alone and has personal care aides from 8-4 M-F with [address] [place] [address]. CM contacted [facility](spoke with Dean Hansen) [phone] and requested UAI copy for SNF, Dean Hansen to email it to CM. Cm also noted that the [address] [place] [address] has accepted pt, however CM spoke with Chantale Gomez in Admission who advised they do not have any available beds today. CM awaiting son return to call for SNF choice. 4281-CM spoke with pt's son, Lara Pires and he advised his first choice for SNF is [facility]. CM contacted Rahel of [facility] and she advised they can likely accept pt tomorrow, but will need a rapid covid test done. Also, CM reached out to [address] [place] [address] again to obtain UAI copy, Dean Hansen advised she will email it to CM. CM to fax to [facility] at [phone] once obtained. CM sent perfect serve message to Attending to inform of d/c plan. CM to inform nurse of need for rapid covid test as well. CM to follow.   Ovi Miguel RN BSN CCM

## 2021-09-16 NOTE — PROGRESS NOTES
Problem: Falls - Risk of  Goal: *Absence of Falls  Description: Document Patricia Garcia Fall Risk and appropriate interventions in the flowsheet. Outcome: Progressing Towards Goal  Note: Fall Risk Interventions:       Mentation Interventions: Adequate sleep, hydration, pain control    Medication Interventions: Patient to call before getting OOB    Elimination Interventions: Call light in reach, Patient to call for help with toileting needs              Problem: Patient Education: Go to Patient Education Activity  Goal: Patient/Family Education  Outcome: Progressing Towards Goal     Problem: Pressure Injury - Risk of  Goal: *Prevention of pressure injury  Description: Document Reynaldo Scale and appropriate interventions in the flowsheet. Outcome: Progressing Towards Goal  Note: Pressure Injury Interventions:  Sensory Interventions: Assess changes in LOC, Float heels, Keep linens dry and wrinkle-free, Minimize linen layers    Moisture Interventions: Absorbent underpads, Check for incontinence Q2 hours and as needed, Maintain skin hydration (lotion/cream), Minimize layers    Activity Interventions: Pressure redistribution bed/mattress(bed type)    Mobility Interventions: Float heels, Pressure redistribution bed/mattress (bed type)    Nutrition Interventions: Offer support with meals,snacks and hydration    Friction and Shear Interventions: Minimize layers                Problem: Patient Education: Go to Patient Education Activity  Goal: Patient/Family Education  Outcome: Progressing Towards Goal     Problem: Risk for Spread of Infection  Goal: Prevent transmission of infectious organism to others  Description: Prevent the transmission of infectious organisms to other patients, staff members, and visitors.   Outcome: Progressing Towards Goal     Problem: Patient Education:  Go to Education Activity  Goal: Patient/Family Education  Outcome: Progressing Towards Goal     Problem: Patient Education: Go to Patient Education Activity  Goal: Patient/Family Education  Outcome: Progressing Towards Goal     Problem: Patient Education: Go to Patient Education Activity  Goal: Patient/Family Education  Outcome: Progressing Towards Goal

## 2021-09-16 NOTE — ROUTINE PROCESS
[address] [facility] Screening   Previous Assessment Search Results Assessment Tracking Number: [id number] Status: Successfully Processed    Assessment Date: 04/03/2020    Jennifershayla Squiresfederico Information:   Screener's Name: [address] [facility]  NPI: [id number] Provider Name: [address] [place] [address] Information:  Screener's Name: Ceferino Garvin DEPT.  OF   NPI: [id number] Provider Name: Ceferino Garvin DEPT OF [address] Information:  Screener's Name:   NPI: Provider Name:

## 2021-09-16 NOTE — PROGRESS NOTES
Multiple attempts made by two nurses. We could not get the labs. She is a hard stick. She really needs a central line.  Will mention to the morning team.

## 2021-09-16 NOTE — PROGRESS NOTES
Bedside shift change report given to Melba Aguilar RN (oncoming nurse) by Carlitos García RN (offgoing nurse). Report included the following information SBAR, Kardex, ED Summary, MAR, Med Rec Status and Cardiac Rhythm a.fib.

## 2021-09-16 NOTE — PROGRESS NOTES
[facility]  Hospitalist Group                                                                                          Hospitalist Progress Note  Jolene Chapin MD  Answering service: [phone] OR [phone] from in house phone        Date of Service:  2021  NAME:  Litzy Salazar  :  1935  MRN:  [id number]    This documentation was facilitated by a Voice Recognition software and may contain inadvertent typographical errors. Admission Summary:   From [facility]  Leyda Smith is a 80 y.o. female dementia who lives at home. Patient has a caretaker in the daytime, and furthermore, her son stays with her most nights. Per son who is at bedside, patient has been sleeping more for the last 2 days, and not been eating very much. He states that she does have baseline dementia which waxes and wanes, but she is verbal.  He states that if she does not take her insomnia medication at night, she can get very agitated. Patient has not had any vomiting. Per son, they have a home health nurse who did come to their house today. He was not advised of any fever. Patient has not complained of any abdominal pain. Patient has not exhibited any upper respiratory infection symptoms like rhinorrhea or cough. She does not use home oxygen. Per son, patient always has lower extremity edema. He feels that this worsens because she tends to want to walk around the house. Patient uses a walker. As far as the son knows, he has not had any recent falls. As mentioned above, she has been in bed most of the time the last 2 days. '      Interval history / Subjective:        2021 :   [employer] No visible distress   VS's/lab stable    For dispo to SNF as bed avails  Evy Jean-Baptiste as below     Assessment & Plan:   Severe sepsis(meets sepsis criteria with hypothermia, leukocytosis, and hypotension),POA  ESBL E. coli and Pseudomonas UTI  --Sepsis reassessment completed. IV fluid discontinued.   --Recent UTIs were due to ESBL E coli in 12/2019 and 1/2020. Cx came back + for esbl e coli,she is already on merrem  --Hypothermia resolved. --Continue meropenem, target 5 days,to finish on 9/15- done, no further abx planned. LILLIAN prerenal possibly as well as ATN from UTI. Creatinine improving. --Resolved. Continue to monitor  Lab Results   Component Value Date/Time    Creatinine (POC) 0.8 03/14/2019 01:57 PM    Creatinine 0.75 09/16/2021 09:25 AM    .    Dementia. Continue Namenda. Per son, patient tends to get agitated nightly, and based on this information, we suspect sundowning. --Pt calm,alert and oriented to PPT during rounds on 9/12    H/o DVT in 2018. Patient remains on Eliquis. Patient may also be on Eliquis for suspected paroxysmal atrial fibrillation. COPD steroid dependent,chronci oxygen dependent respiratory  Failure  --Its reported prednisone was recently reduced from 10 mg to 5 mg daily ,maintain home dose,I don't suspect adrenal insufficieny,BP appropriately responded to volume expansion. --Patient uses oxygen at 2-3 L/min nightly at home, and as needed. --Coughing more, schedule bonchodilators. CXR -   New small bilateral pleural effusions and bilateral lower lobe  Atelectasis. - IS is best rx but w  Dementia- a challenge     Low back pain. Moves legs fine. --Lumbar xray with acute bony abnormality of the lumbar spine. Multilevel degenerative change and levocurvature with grade 1 anterolisthesis at L4-5. --Pain control: scheduled tylenol, lidocaine patch,PT AND OT         Updating her son Dhara Johnson regularly    Discharge planning  -Her son said her personal aide is not coming anymore and asks for rehab,she may the need placement afterwards. .      Code status: DNR  DVT prophylaxis: Apixaban  Care Plan discussed with: Patient/Family and Nurse  Anticipated Disposition: rehab in 1-2 days   Anticipated Discharge:   Hospital Problems  Date Reviewed: 4/30/2021        Codes Class Noted POA    Severe sepsis ([facility] [address] ICD-10-CM: A41.9, R65.20  ICD-9-CM: 038.9, 995.92  9/10/2021 Unknown                Review of Systems:   A comprehensive review of systems was negative except for that written in the HPI. Vital Signs:    Last 24hrs VS reviewed since prior progress note. Most recent are:  Visit Vitals  /79   Pulse 98   Temp 98 °F (36.7 °C)   Resp 17   Ht 5' 2.99\" (1.6 m)   Wt 52 kg (114 lb 10.2 oz)   SpO2 100%   BMI 20.31 kg/m²       No intake or output data in the 24 hours ending 09/16/21 1008     Physical Examination:     I had a face to face encounter with this patient and independently examined them on9/16/2021 as outlined below:        Constitutional:  chronically sick looking elderly,alert,not in distress   HEENT: Slightly dry buccal mucosa. Resp:  On oxygen via nasal canula. Diminished air entry at bases, no wheezing. Chest Wall: No deformity   CV:  Regular rhythm, normal rate, no murmurs, gallops, rubs    GI:  Soft, non distended, non tender. normoactive bowel sounds, no hepatosplenomegaly    :  No CVA or suprapubic tenderness    Musculoskeletal:  Pedal edema,with signs of vascular stasis on the left lower leg. Neurologic:  Mental status:Alert and oriented to ,she knew she is in [address] [facility]. Cranial nerves II-XII : WNL  Motor exam:Moves all extremities symmetrically              Data Review:    Review and/or order of clinical lab test  Review and/or order of tests in the radiology section of CPT  Review and/or order of tests in the medicine section of [facility]      Labs:     Recent Labs     09/15/21  0050 09/14/21  0354   WBC  --  5.7   HGB 9.3* 9.7*   HCT 31.5* 33.0*   PLT  --  126*     Recent Labs     09/15/21  0050 09/14/21  0354    146*   K 5.0 3.6   * 116*   CO2 21 23   BUN 23* 31*   CREA 0.75 1.00   GLU 85 74   CA 8.9 9.0     No results for input(s): ALT, AP, TBIL, TBILI, TP, ALB, GLOB, GGT, AML, LPSE in the last 72 hours.     No lab exists for component: SGOT, GPT, AMYP, HLPSE  No results for input(s): INR, PTP, APTT, INREXT, INREXT in the last 72 hours. No results for input(s): FE, TIBC, PSAT, FERR in the last 72 hours. Lab Results   Component Value Date/Time    Folate 18.1 11/07/2010 03:40 AM      No results for input(s): PH, PCO2, PO2 in the last 72 hours. No results for input(s): CPK, CKNDX, TROIQ in the last 72 hours.     No lab exists for component: CPKMB  Lab Results   Component Value Date/Time    Cholesterol, total 156 06/09/2021 04:14 PM    HDL Cholesterol 63 06/09/2021 04:14 PM    LDL, calculated 58 06/09/2021 04:14 PM    LDL, calculated 84.4 12/13/2019 06:22 AM    Triglyceride 221 (H) 06/09/2021 04:14 PM    CHOL/HDL Ratio 3.6 12/13/2019 06:22 AM     Lab Results   Component Value Date/Time    Glucose (POC) 139 (H) 09/15/2021 11:33 AM    Glucose (POC) 87 09/15/2021 08:20 AM    Glucose (POC) 112 09/12/2021 09:34 PM    Glucose (POC) 73 09/12/2021 03:12 AM    Glucose (POC) 125 (H) 12/12/2019 05:41 AM     Lab Results   Component Value Date/Time    Color YELLOW/STRAW 09/10/2021 06:25 PM    Appearance TURBID (A) 09/10/2021 06:25 PM    Specific gravity 1.011 09/10/2021 06:25 PM    Specific gravity 1.010 01/11/2020 04:08 PM    pH (UA) 5.0 09/10/2021 06:25 PM    Protein 30 (A) 09/10/2021 06:25 PM    Glucose Negative 09/10/2021 06:25 PM    Ketone Negative 09/10/2021 06:25 PM    Bilirubin Negative 09/10/2021 06:25 PM    Urobilinogen 0.2 09/10/2021 06:25 PM    Nitrites Positive (A) 09/10/2021 06:25 PM    Leukocyte Esterase LARGE (A) 09/10/2021 06:25 PM    Epithelial cells FEW 09/10/2021 06:25 PM    Bacteria 3+ (A) 09/10/2021 06:25 PM    WBC >100 (H) 09/10/2021 06:25 PM    RBC 0-5 09/10/2021 06:25 PM         Medications Reviewed:     Current Facility-Administered Medications   Medication Dose Route Frequency    albuterol-ipratropium (DUO-NEB) 2.5 MG-0.5 MG/3 ML  3 mL Nebulization Q6H RT    morphine injection 1 mg  1 mg IntraVENous Q4H PRN    metoprolol (LOPRESSOR) injection 2.5 mg  2.5 mg IntraVENous Q6H PRN    metoprolol tartrate (LOPRESSOR) tablet 25 mg  25 mg Oral Q12H    balsam peru-castor oiL (VENELEX) ointment   Topical BID    oxyCODONE-acetaminophen (PERCOCET) 5-325 mg per tablet 1 Tablet  1 Tablet Oral Q6H PRN    predniSONE (DELTASONE) tablet 5 mg  5 mg Oral DAILY WITH BREAKFAST    sodium chloride (NS) flush 5-10 mL  5-10 mL IntraVENous PRN    sodium chloride (NS) flush 5-40 mL  5-40 mL IntraVENous Q8H    sodium chloride (NS) flush 5-40 mL  5-40 mL IntraVENous PRN    acetaminophen (TYLENOL) tablet 650 mg  650 mg Oral Q6H PRN    Or    acetaminophen (TYLENOL) suppository 650 mg  650 mg Rectal Q6H PRN    polyethylene glycol (MIRALAX) packet 17 g  17 g Oral DAILY PRN    ondansetron (ZOFRAN ODT) tablet 4 mg  4 mg Oral Q8H PRN    Or    ondansetron (ZOFRAN) injection 4 mg  4 mg IntraVENous Q6H PRN    apixaban (ELIQUIS) tablet 2.5 mg  2.5 mg Oral BID    atorvastatin (LIPITOR) tablet 10 mg  10 mg Oral QHS    lidocaine (XYLOCAINE) 5 % ointment   Topical BID PRN    memantine (NAMENDA) tablet 10 mg  10 mg Oral DAILY    sertraline (ZOLOFT) tablet 50 mg  50 mg Oral DAILY    pramipexole (MIRAPEX) tablet 0.5 mg  0.5 mg Oral QHS     ______________________________________________________________________  EXPECTED LENGTH OF STAY: 3d 12h  ACTUAL LENGTH OF STAY:          6                 Jessica Howard MD

## 2021-09-16 NOTE — PROGRESS NOTES
Bedside shift change report given to [facility] Vince (oncoming nurse) by Amauri Diaz (offgoing nurse). Report included the following information SBAR, Kardex, ED Summary, MAR, Med Rec Status and Cardiac Rhythm a.fib.

## 2021-09-17 PROBLEM — A41.9 SEVERE SEPSIS (HCC): Status: RESOLVED | Noted: 2021-01-01 | Resolved: 2021-01-01

## 2021-09-17 PROBLEM — R65.20 SEVERE SEPSIS (HCC): Status: RESOLVED | Noted: 2021-01-01 | Resolved: 2021-01-01

## 2021-09-17 NOTE — PROGRESS NOTES
BONIFACIO: anticipate d/c to [facility] when medically stable for d/c; Pt is currently on 4L/NC 02; UAI screening proof faxed to [facility]; Rapid Covid test negative 9/16; BLS transport    RUR: 15%    0900- noted d/c order. CM contacted pt's son, Connie Downs [phone] and he is agreeable to d/c to [facility] SNF today. CM also met with pt & nurse at bedside and discussed d/c plan. CM contacted [facility] [phone] and spoke with Vane Garzon and she advised they can accept pt anytime after 2pm today. CM requested 2pm BLS transport time with [facility]. CM to follow. Karyle Lance RN BSN CCM Medicare pt has received, reviewed, and signed 2nd IM letter informing them of their right to appeal the discharge. Signed copy has been placed on pt bedside chart. 1115- was notified by Nurse that pt went into Rapid Afib and thus discharge for today has been cancelled. CLIF sent all scripts message to inform [facility] and also contacted pt's son, Britt Reyes to advise of the same. CLIF also spoke with Vane Garzon of [facility] to inform of d/c hold for now. CM to follow.   Karyle Lance RN BSN [facility]

## 2021-09-17 NOTE — ROUTINE PROCESS
Bedside and Verbal shift change report given to Jayson Cevallos RN (oncoming nurse) by Brenna Concepcion (offgoing nurse). Report included the following information SBAR, Intake/Output, MAR, Recent Results and Cardiac Rhythm afib.

## 2021-09-17 NOTE — PROGRESS NOTES
Bedside and Verbal shift change report given to Justo Musa RN (oncoming nurse) by ESME Nelson (offgoing nurse). Report included the following information SBAR, Intake/Output, Recent Results, Med Rec Status and Cardiac Rhythm Afib.

## 2021-09-18 NOTE — PROGRESS NOTES
Transition of Care Plan   RUR- Low  /  Medium / High Risks    DISPOSITION: The disposition plan is home with family assistance/home with home health/ transition to a SNF ****/transition to IPR *****; pending medical progression   F/U with PCP/Specialist     Transport: [facility]         Patient had an ETA for 15:15 and [facility] called to pick upi p

## 2021-09-18 NOTE — PROGRESS NOTES
Problem: Falls - Risk of  Goal: *Absence of Falls  Description: Document Patricia Craft Fall Risk and appropriate interventions in the flowsheet. Outcome: Progressing Towards Goal  Note: Fall Risk Interventions:  Mobility Interventions: Bed/chair exit alarm, Patient to call before getting OOB    Mentation Interventions: Bed/chair exit alarm, Door open when patient unattended    Medication Interventions: Bed/chair exit alarm, Patient to call before getting OOB    Elimination Interventions: Bed/chair exit alarm, Call light in reach, Patient to call for help with toileting needs              Problem: Patient Education: Go to Patient Education Activity  Goal: Patient/Family Education  Outcome: Progressing Towards Goal     Problem: Pressure Injury - Risk of  Goal: *Prevention of pressure injury  Description: Document Reynaldo Scale and appropriate interventions in the flowsheet. Outcome: Progressing Towards Goal  Note: Pressure Injury Interventions:  Sensory Interventions: Assess changes in LOC, Discuss PT/OT consult with provider    Moisture Interventions: Check for incontinence Q2 hours and as needed    Activity Interventions: Pressure redistribution bed/mattress(bed type)    Mobility Interventions: HOB 30 degrees or less, PT/OT evaluation, Pressure redistribution bed/mattress (bed type)    Nutrition Interventions: Document food/fluid/supplement intake    Friction and Shear Interventions: HOB 30 degrees or less                Problem: Patient Education: Go to Patient Education Activity  Goal: Patient/Family Education  Outcome: Progressing Towards Goal     Problem: Risk for Spread of Infection  Goal: Prevent transmission of infectious organism to others  Description: Prevent the transmission of infectious organisms to other patients, staff members, and visitors.   Outcome: Progressing Towards Goal     Problem: Patient Education:  Go to Education Activity  Goal: Patient/Family Education  Outcome: Progressing Towards Goal Problem: Patient Education: Go to Patient Education Activity  Goal: Patient/Family Education  Outcome: Progressing Towards Goal     Problem: Patient Education: Go to Patient Education Activity  Goal: Patient/Family Education  Outcome: Progressing Towards Goal     Problem: Breathing Pattern - Ineffective  Goal: *Absence of hypoxia  Outcome: Progressing Towards Goal  Goal: *Use of effective breathing techniques  Outcome: Progressing Towards Goal  Goal: *PALLIATIVE CARE:  Alleviation of Dyspnea  Outcome: Progressing Towards Goal     Problem: Patient Education: Go to Patient Education Activity  Goal: Patient/Family Education  Outcome: Progressing Towards Goal

## 2021-09-18 NOTE — PROGRESS NOTES
Transition of Care Plan   RUR- Low     DISPOSITION: CM to follow, called and left message for admissions coordinator in regards to dc    F/U with PCP/Specialist     [facility]: [facility] on [facility] call   Number for [facility] [phone] and fax number is [phone]    Aurelia Sargent  9:56 AM    Addendum:  Facility can accept today, Nurse will perfect serve Dr Jay Galan to make aware. This CM spoke with admissions staff at [facility] (liaison)    Aurelia Sargent  10:35 AM    Dc Summary Sent to facility, called and spoke with son about discharge. Kelly Dutta [phone]. Nurse called report to SNF [address] [facility]. Unable to populate SNF note. IM was completed on 9/17/2021.     China Zamorano  2:28 PM

## 2021-09-18 NOTE — PROGRESS NOTES
RN tried to call report to [facility] care  6 times nurse was transferred to different numbers but no one ans.

## 2021-09-18 NOTE — PROGRESS NOTES
[facility] [facility] Adult  Hospitalist Group                                                                                          Hospitalist Progress Note  Eugenio Martinez MD  Answering service: [phone] from in house phone        Date of Service:  2021 late entry 2n2 clinical canceled discharge   NAME:  Sumaya Cruz  :  1935  MRN:  [id number]    This documentation was facilitated by a Voice Recognition software and may contain inadvertent typographical errors. Admission Summary:   From [facility]  Amilcar Olson is a 80 y.o. female dementia who lives at home. Patient has a caretaker in the daytime, and furthermore, her son stays with her most nights. Per son who is at bedside, patient has been sleeping more for the last 2 days, and not been eating very much. He states that she does have baseline dementia which waxes and wanes, but she is verbal.  He states that if she does not take her insomnia medication at night, she can get very agitated. Patient has not had any vomiting. Per son, they have a home health nurse who did come to their house today. He was not advised of any fever. Patient has not complained of any abdominal pain. Patient has not exhibited any upper respiratory infection symptoms like rhinorrhea or cough. She does not use home oxygen. Per son, patient always has lower extremity edema. He feels that this worsens because she tends to want to walk around the house. Patient uses a walker. As far as the son knows, he has not had any recent falls.   As mentioned above, she has been in bed most of the time the last 2 days. '      Interval history / Subjective:             Discharge held 2n2 afib rvr,   Pt loaded with Dig 0.5 mg over 6-8 hours w benefit   Reassess discharge option in am       Assessment & Plan:   Severe sepsis(meets sepsis criteria with hypothermia, leukocytosis, and hypotension),POA  ESBL E. coli and Pseudomonas UTI  --Sepsis reassessment completed. IV fluid discontinued. --Recent UTIs were due to ESBL E coli in 12/2019 and 1/2020. Cx came back + for esbl e coli,she is already on merrem  --Hypothermia resolved. --Continue meropenem, target 5 days,to finish on 9/15- done, no further abx planned. LILLIAN prerenal possibly as well as ATN from UTI. Creatinine improving. --Resolved. Continue to monitor  Lab Results   Component Value Date/Time    Creatinine (POC) 0.8 03/14/2019 01:57 PM    Creatinine 0.55 09/17/2021 04:50 AM    .    Dementia. Continue Namenda. Per son, patient tends to get agitated nightly, and based on this information, we suspect sundowning. --Pt calm,alert and oriented to PPT during rounds on 9/12    H/o DVT in 2018. Patient remains on Eliquis. Patient may also be on Eliquis for suspected paroxysmal atrial fibrillation. COPD steroid dependent,chronci oxygen dependent respiratory  Failure  --Its reported prednisone was recently reduced from 10 mg to 5 mg daily ,maintain home dose,I don't suspect adrenal insufficieny,BP appropriately responded to volume expansion. --Patient uses oxygen at 2-3 L/min nightly at home, and as needed. --Coughing more, schedule bonchodilators. CXR -   New small bilateral pleural effusions and bilateral lower lobe  Atelectasis. - IS is best rx but w  Dementia- a challenge     Low back pain. Moves legs fine. --Lumbar xray with acute bony abnormality of the lumbar spine. Multilevel degenerative change and levocurvature with grade 1 anterolisthesis at L4-5. --Pain control: scheduled tylenol, lidocaine patch,PT AND OT       Afib RVR:   Discharge held 2n2 afib rvr,   Pt loaded with Dig 0.5 mg over 6-8 hours w benefit   Reassess discharge option in am 9/18     Chronic afib, on NOAC. Dig started 9/18     Updating her son Oswaldo Mcadams regularly    Discharge planning  -Her son said her personal aide is not coming anymore and asks for rehab,she may the need placement afterwards. .      Code status: DNR  DVT prophylaxis: Apixaban  Care Plan discussed with: Patient/Family and Nurse  Anticipated Disposition: rehab in 1-2 days   Anticipated Discharge:   Hospital Problems  Date Reviewed: 9/17/2021    None            Review of Systems:   A comprehensive review of systems was negative except for that written in the HPI. Vital Signs:    Last 24hrs VS reviewed since prior progress note. Most recent are:  Visit Vitals  BP 92/63   Pulse 81   Temp 97.9 °F (36.6 °C)   Resp 18   Ht 5' 2.99\" (1.6 m)   Wt 51.6 kg (113 lb 12.1 oz)   SpO2 100%   BMI 20.16 kg/m²       No intake or output data in the 24 hours ending 09/18/21 0806     Physical Examination:     I had a face to face encounter with this patient and independently examined them on9/18/2021 as outlined below:        Constitutional:  chronically sick looking elderly,alert,not in distress   HEENT: Slightly dry buccal mucosa. Resp:  On oxygen via nasal canula. Diminished air entry at bases, no wheezing. Chest Wall: No deformity   CV:  afib rvr,     GI:  Soft, non distended, non tender. normoactive bowel sounds, no hepatosplenomegaly    :  No CVA or suprapubic tenderness    Musculoskeletal:  Pedal edema,with signs of vascular stasis on the left lower leg. Neurologic:  Mental status:Alert and oriented to ,[facility] [address] [facility]   Cranial nerves II-XII : WNL  Motor exam:Moves all extremities symmetrically              Data Review:    Review and/or order of clinical lab test  Review and/or order of tests in the radiology section of CPT  Review and/or order of tests in the medicine section of [facility]      Labs:     Recent Labs     09/17/21 0450 09/16/21  0925   HGB 9.0* 10.2*   HCT 30.1* 35.4     Recent Labs     09/17/21 0450 09/16/21  0925    146*   K 3.8 4.1   * 112*   CO2 29 29   BUN 16 16   CREA 0.55 0.75   GLU 93 145*   CA 8.7 9.4     No results for input(s): ALT, AP, TBIL, TBILI, TP, ALB, GLOB, GGT, AML, LPSE in the last 72 hours. No lab exists for component: SGOT, GPT, AMYP, HLPSE  No results for input(s): INR, PTP, APTT, INREXT, INREXT in the last 72 hours. No results for input(s): FE, TIBC, PSAT, FERR in the last 72 hours. Lab Results   Component Value Date/Time    Folate 18.1 11/07/2010 03:40 AM      No results for input(s): PH, PCO2, PO2 in the last 72 hours. No results for input(s): CPK, CKNDX, TROIQ in the last 72 hours.     No lab exists for component: CPKMB  Lab Results   Component Value Date/Time    Cholesterol, total 156 06/09/2021 04:14 PM    HDL Cholesterol 63 06/09/2021 04:14 PM    LDL, calculated 58 06/09/2021 04:14 PM    LDL, calculated 84.4 12/13/2019 06:22 AM    Triglyceride 221 (H) 06/09/2021 04:14 PM    CHOL/HDL Ratio 3.6 12/13/2019 06:22 AM     Lab Results   Component Value Date/Time    Glucose (POC) 139 (H) 09/15/2021 11:33 AM    Glucose (POC) 87 09/15/2021 08:20 AM    Glucose (POC) 112 09/12/2021 09:34 PM    Glucose (POC) 73 09/12/2021 03:12 AM    Glucose (POC) 125 (H) 12/12/2019 05:41 AM     Lab Results   Component Value Date/Time    Color YELLOW/STRAW 09/10/2021 06:25 PM    Appearance TURBID (A) 09/10/2021 06:25 PM    Specific gravity 1.011 09/10/2021 06:25 PM    Specific gravity 1.010 01/11/2020 04:08 PM    pH (UA) 5.0 09/10/2021 06:25 PM    Protein 30 (A) 09/10/2021 06:25 PM    Glucose Negative 09/10/2021 06:25 PM    Ketone Negative 09/10/2021 06:25 PM    Bilirubin Negative 09/10/2021 06:25 PM    Urobilinogen 0.2 09/10/2021 06:25 PM    Nitrites Positive (A) 09/10/2021 06:25 PM    Leukocyte Esterase LARGE (A) 09/10/2021 06:25 PM    Epithelial cells FEW 09/10/2021 06:25 PM    Bacteria 3+ (A) 09/10/2021 06:25 PM    WBC >100 (H) 09/10/2021 06:25 PM    RBC 0-5 09/10/2021 06:25 PM         Medications Reviewed:     Current Facility-Administered Medications   Medication Dose Route Frequency    digoxin (LANOXIN) tablet 0.125 mg  0.125 mg Oral DAILY    albuterol-ipratropium (DUO-NEB) 2.5 MG-0.5 MG/3 ML  3 mL Nebulization BID RT    morphine injection 1 mg  1 mg IntraVENous Q4H PRN    metoprolol (LOPRESSOR) injection 2.5 mg  2.5 mg IntraVENous Q6H PRN    metoprolol tartrate (LOPRESSOR) tablet 25 mg  25 mg Oral Q12H    balsam peru-castor oiL (VENELEX) ointment   Topical BID    oxyCODONE-acetaminophen (PERCOCET) 5-325 mg per tablet 1 Tablet  1 Tablet Oral Q6H PRN    predniSONE (DELTASONE) tablet 5 mg  5 mg Oral DAILY WITH BREAKFAST    sodium chloride (NS) flush 5-10 mL  5-10 mL IntraVENous PRN    sodium chloride (NS) flush 5-40 mL  5-40 mL IntraVENous Q8H    sodium chloride (NS) flush 5-40 mL  5-40 mL IntraVENous PRN    acetaminophen (TYLENOL) tablet 650 mg  650 mg Oral Q6H PRN    Or    acetaminophen (TYLENOL) suppository 650 mg  650 mg Rectal Q6H PRN    polyethylene glycol (MIRALAX) packet 17 g  17 g Oral DAILY PRN    ondansetron (ZOFRAN ODT) tablet 4 mg  4 mg Oral Q8H PRN    Or    ondansetron (ZOFRAN) injection 4 mg  4 mg IntraVENous Q6H PRN    apixaban (ELIQUIS) tablet 2.5 mg  2.5 mg Oral BID    atorvastatin (LIPITOR) tablet 10 mg  10 mg Oral QHS    lidocaine (XYLOCAINE) 5 % ointment   Topical BID PRN    memantine (NAMENDA) tablet 10 mg  10 mg Oral DAILY    sertraline (ZOLOFT) tablet 50 mg  50 mg Oral DAILY    pramipexole (MIRAPEX) tablet 0.5 mg  0.5 mg Oral QHS     ______________________________________________________________________  EXPECTED LENGTH OF STAY: 4d 19h  ACTUAL LENGTH OF STAY:          8                 Tania Lang MD

## 2021-09-18 NOTE — PROGRESS NOTES
[facility] [facility] Adult  Hospitalist Group                                                                                          Hospitalist Progress Note  Bryon Canales MD  Answering service: [phone] from in house phone        Date of Service:  2021     NAME:  Cristel Hillman  :  1935  MRN:  [id number]    This documentation was facilitated by a Voice Recognition software and may contain inadvertent typographical errors. Admission Summary:   From [facility]  Isa Cerna is a 80 y.o. female dementia who lives at home. Patient has a caretaker in the daytime, and furthermore, her son stays with her most nights. Per son who is at bedside, patient has been sleeping more for the last 2 days, and not been eating very much. He states that she does have baseline dementia which waxes and wanes, but she is verbal.  He states that if she does not take her insomnia medication at night, she can get very agitated. Patient has not had any vomiting. Per son, they have a home health nurse who did come to their house today. He was not advised of any fever. Patient has not complained of any abdominal pain. Patient has not exhibited any upper respiratory infection symptoms like rhinorrhea or cough. She does not use home oxygen. Per son, patient always has lower extremity edema. He feels that this worsens because she tends to want to walk around the house. Patient uses a walker. As far as the son knows, he has not had any recent falls. As mentioned above, she has been in bed most of the time the last 2 days. '      Interval history / Subjective:        2021 :    Dig load 0.5 mg accomplished    Cont on dig  0.125 mg daily forward.  Pt is stable for discharge 2021   Rona Look as below     Assessment & Plan:   Severe sepsis(meets sepsis criteria with hypothermia, leukocytosis, and hypotension),POA  ESBL E. coli and Pseudomonas UTI  --Sepsis reassessment completed. IV fluid discontinued. --Recent UTIs were due to ESBL E coli in 12/2019 and 1/2020. Cx came back + for esbl e coli,she is already on merrem  --Hypothermia resolved. --Continue meropenem, target 5 days,to finish on 9/15- done, no further abx planned. LILLIAN prerenal possibly as well as ATN from UTI. Creatinine improving. --Resolved. Continue to monitor  Lab Results   Component Value Date/Time    Creatinine (POC) 0.8 03/14/2019 01:57 PM    Creatinine 0.55 09/17/2021 04:50 AM    .    Dementia. Continue Namenda. Per son, patient tends to get agitated nightly, and based on this information, we suspect sundowning. --Pt calm,alert and oriented to PPT during rounds on 9/12    H/o DVT in 2018. Patient remains on Eliquis. Patient may also be on Eliquis for suspected paroxysmal atrial fibrillation. COPD steroid dependent,chronci oxygen dependent respiratory  Failure  --Its reported prednisone was recently reduced from 10 mg to 5 mg daily ,maintain home dose,I don't suspect adrenal insufficieny,BP appropriately responded to volume expansion. --Patient uses oxygen at 2-3 L/min nightly at home, and as needed. --Coughing more, schedule bonchodilators. CXR -   New small bilateral pleural effusions and bilateral lower lobe  Atelectasis. - IS is best rx but w  Dementia- a challenge     Low back pain. Moves legs fine. --Lumbar xray with acute bony abnormality of the lumbar spine. Multilevel degenerative change and levocurvature with grade 1 anterolisthesis at L4-5. --Pain control: scheduled tylenol, lidocaine patch,PT AND OT       Afib RVR:   Discharge held 2n2 afib rvr,   Pt loaded with Dig 0.5 mg over 6-8 hours w benefit   Reassess discharge option in am 9/18     Chronic afib, on NOAC. Dig started 9/18     Updating her son Benny Salguero regularly    Discharge planning  -Her son said her personal aide is not coming anymore and asks for rehab,she may the need placement afterwards. .      Code status: DNR  DVT prophylaxis: Apixaban  Care Plan discussed with: Patient/Family and Nurse  Anticipated Disposition: rehab in 1-2 days   Anticipated Discharge:   Hospital Problems  Date Reviewed: 9/17/2021    None            Review of Systems:   A comprehensive review of systems was negative except for that written in the HPI. Vital Signs:    Last 24hrs VS reviewed since prior progress note. Most recent are:  Visit Vitals  BP 92/63   Pulse 81   Temp 97.9 °F (36.6 °C)   Resp 18   Ht 5' 2.99\" (1.6 m)   Wt 51.6 kg (113 lb 12.1 oz)   SpO2 100%   BMI 20.16 kg/m²       No intake or output data in the 24 hours ending 09/18/21 4477     Physical Examination:     I had a face to face encounter with this patient and independently examined them on9/18/2021 as outlined below:        Constitutional:  chronically sick looking elderly,alert,not in distress   HEENT: Slightly dry buccal mucosa. Resp:  On oxygen via nasal canula. Diminished air entry at bases, no wheezing. Chest Wall: No deformity   CV:  controled afib     GI:  Soft, non distended, non tender. normoactive bowel sounds, no hepatosplenomegaly    :  No CVA or suprapubic tenderness    Musculoskeletal:  Pedal edema,with signs of vascular stasis on the left lower leg. Neurologic:  Mental status:Alert and oriented to ,[facility] [address] [facility]   Cranial nerves II-XII : WNL  Motor exam:Moves all extremities symmetrically              Data Review:    Review and/or order of clinical lab test  Review and/or order of tests in the radiology section of CPT  Review and/or order of tests in the medicine section of [facility]      Labs:     Recent Labs     09/17/21  0450 09/16/21  0925   HGB 9.0* 10.2*   HCT 30.1* 35.4     Recent Labs     09/17/21  0450 09/16/21  0925    146*   K 3.8 4.1   * 112*   CO2 29 29   BUN 16 16   CREA 0.55 0.75   GLU 93 145*   CA 8.7 9.4     No results for input(s): ALT, AP, TBIL, TBILI, TP, ALB, GLOB, GGT, AML, LPSE in the last 72 hours.    No lab exists for component: SGOT, GPT, AMYP, HLPSE  No results for input(s): INR, PTP, APTT, INREXT, INREXT in the last 72 hours. No results for input(s): FE, TIBC, PSAT, FERR in the last 72 hours. Lab Results   Component Value Date/Time    Folate 18.1 11/07/2010 03:40 AM      No results for input(s): PH, PCO2, PO2 in the last 72 hours. No results for input(s): CPK, CKNDX, TROIQ in the last 72 hours.     No lab exists for component: CPKMB  Lab Results   Component Value Date/Time    Cholesterol, total 156 06/09/2021 04:14 PM    HDL Cholesterol 63 06/09/2021 04:14 PM    LDL, calculated 58 06/09/2021 04:14 PM    LDL, calculated 84.4 12/13/2019 06:22 AM    Triglyceride 221 (H) 06/09/2021 04:14 PM    CHOL/HDL Ratio 3.6 12/13/2019 06:22 AM     Lab Results   Component Value Date/Time    Glucose (POC) 139 (H) 09/15/2021 11:33 AM    Glucose (POC) 87 09/15/2021 08:20 AM    Glucose (POC) 112 09/12/2021 09:34 PM    Glucose (POC) 73 09/12/2021 03:12 AM    Glucose (POC) 125 (H) 12/12/2019 05:41 AM     Lab Results   Component Value Date/Time    Color YELLOW/STRAW 09/10/2021 06:25 PM    Appearance TURBID (A) 09/10/2021 06:25 PM    Specific gravity 1.011 09/10/2021 06:25 PM    Specific gravity 1.010 01/11/2020 04:08 PM    pH (UA) 5.0 09/10/2021 06:25 PM    Protein 30 (A) 09/10/2021 06:25 PM    Glucose Negative 09/10/2021 06:25 PM    Ketone Negative 09/10/2021 06:25 PM    Bilirubin Negative 09/10/2021 06:25 PM    Urobilinogen 0.2 09/10/2021 06:25 PM    Nitrites Positive (A) 09/10/2021 06:25 PM    Leukocyte Esterase LARGE (A) 09/10/2021 06:25 PM    Epithelial cells FEW 09/10/2021 06:25 PM    Bacteria 3+ (A) 09/10/2021 06:25 PM    WBC >100 (H) 09/10/2021 06:25 PM    RBC 0-5 09/10/2021 06:25 PM         Medications Reviewed:     Current Facility-Administered Medications   Medication Dose Route Frequency    digoxin (LANOXIN) tablet 0.125 mg  0.125 mg Oral DAILY    albuterol-ipratropium (DUO-NEB) 2.5 MG-0.5 MG/3 ML  3 mL Nebulization BID RT    morphine injection 1 mg  1 mg IntraVENous Q4H PRN    metoprolol (LOPRESSOR) injection 2.5 mg  2.5 mg IntraVENous Q6H PRN    metoprolol tartrate (LOPRESSOR) tablet 25 mg  25 mg Oral Q12H    balsam peru-castor oiL (VENELEX) ointment   Topical BID    oxyCODONE-acetaminophen (PERCOCET) 5-325 mg per tablet 1 Tablet  1 Tablet Oral Q6H PRN    predniSONE (DELTASONE) tablet 5 mg  5 mg Oral DAILY WITH BREAKFAST    sodium chloride (NS) flush 5-10 mL  5-10 mL IntraVENous PRN    sodium chloride (NS) flush 5-40 mL  5-40 mL IntraVENous Q8H    sodium chloride (NS) flush 5-40 mL  5-40 mL IntraVENous PRN    acetaminophen (TYLENOL) tablet 650 mg  650 mg Oral Q6H PRN    Or    acetaminophen (TYLENOL) suppository 650 mg  650 mg Rectal Q6H PRN    polyethylene glycol (MIRALAX) packet 17 g  17 g Oral DAILY PRN    ondansetron (ZOFRAN ODT) tablet 4 mg  4 mg Oral Q8H PRN    Or    ondansetron (ZOFRAN) injection 4 mg  4 mg IntraVENous Q6H PRN    apixaban (ELIQUIS) tablet 2.5 mg  2.5 mg Oral BID    atorvastatin (LIPITOR) tablet 10 mg  10 mg Oral QHS    lidocaine (XYLOCAINE) 5 % ointment   Topical BID PRN    memantine (NAMENDA) tablet 10 mg  10 mg Oral DAILY    sertraline (ZOLOFT) tablet 50 mg  50 mg Oral DAILY    pramipexole (MIRAPEX) tablet 0.5 mg  0.5 mg Oral QHS     ______________________________________________________________________  EXPECTED LENGTH OF STAY: 4d 19h  ACTUAL LENGTH OF STAY:          8                 Osvaldo Duron MD

## 2021-09-18 NOTE — PROGRESS NOTES
I have reviewed discharge instructions with the patient. The patient verbalized understanding. patient  Transport  to [facility] with [facility].

## 2021-09-20 NOTE — PROGRESS NOTES
Transition of care outreach postponed for 14 days due to patient's discharge to SNF. Per EMR patient discharged to [facility] and [facility], will continue to monitor patient progress.

## 2021-10-01 NOTE — TELEPHONE ENCOUNTER
Dionne Armando (Carlos: [id number]) - [id number]  Lidocaine 5% patches       Status: PA Response - Approved

## 2021-10-01 NOTE — PROGRESS NOTES
Post Acute Facility Update     *The information contained in this note was received during a weekly care coordination call with the post acute facility*    Current Facility: [address] [place] [address] [place] ([facility])  Anticipated Discharge Date: 2-3 weeks    Facility Nursing Update:  Resident is noted to have left lower extremity edema. Resident also reports SOB and she is receiving scheduled bronchodilator therapy. Nursing staff reports left upper extremity skin tear and left lower extremity skin wound, which is being dressed as per orders. Resident reports that her low back pain is controlled and is at a level of mild currently. Resident is participating with rehab therapy as tolerated. Facility Social Work Update:Resident was admitted under skilled care and plans to discharged to nursing home or LTC. SS will assist as needed with discharge planning such as home health and equipment needs. Bundle Program Status: none  Upper Extremity Assistance: Minimal Assistance   Lower Extremity Assistance: Maximum Assistance  Bed Mobility: Minimal Assistance   Transfers: Contact Guard Assist - hands on patient for balance   Ambulation: Contact Guard Assist - hands on patient for balance   How far (in feet) is the patient ambulating?  20 ft  Device Used: walker  Barriers to Discharge: JORDAN CARDENASN, RN  [facility]

## 2021-10-01 NOTE — PROGRESS NOTES
Post Acute Facility Update     *The information contained in this note was received during a weekly care coordination call with the post acute facility*    Current Facility: [address] [place] [address], [place] ([facility])  Anticipated Discharge Date: 2-3 weeks    Facility Nursing Update:  Resident reports SOB and she is receiving scheduled bronchodilator therapy. Nursing staff reports left upper extremity skin tear and left lower extremity skin wound, which is being dressed as per orders. Resident reports that her low back pain is controlled and is at a level of mild currently. Facility Social Work Update:  Resident was admitted under skilled care and plans to discharged to nursing home or LTC. She was living at home with caregiver and family support. SS will assist as needed with discharge planning such as home health and equipment needs. Bundle Program Status: none  Upper Extremity Assistance: Stand by Assist - Presence and Cueing  Lower Extremity Assistance: Maximum Assistance  Bed Mobility: Minimal Assistance   Transfers: Stand by Assist - Presence and Cueing  Ambulation: Stand by Assist - Presence and Cueing  How far (in feet) is the patient ambulating?  50 ft x2  Device Used: walker  Barriers to Discharge: JORDAN CARDENASN, RN  [facility]

## 2021-10-04 NOTE — PROGRESS NOTES
Outgoing call placed to [facility] regarding update of patient progress. Spoke to Tami larkin patient identified utilizing 2 identifiers. Introduced self and reason for the call. Tami larkin reports patient is currently admitted to the facility, will continue to monitor patient progress.

## 2021-10-09 NOTE — PROGRESS NOTES
Post Acute Facility Update     *The information contained in this note was received during a weekly care coordination call with the post acute facility*    Current Facility: [address] [place] [address], [place] ([facility])  Anticipated Discharge Date: 10/12/21    Facility Nursing Update:  Resident reports that her low back pain is controlled with current pain regimen. Residents left lower extremity wound is being dressed as per wound care team. Resident continues to have minimal left lower extremity edema. Resident is on oxygen 2L via nasal cannula. Facility Social Work Update: SS met with resident and her son Marcello regarding [facility] with last covered day as 10/11/21 with discharge or financial liability to begin on 10/12/21. Resident plans to stay for long term care. She was living at home with caregiver and family support. Bundle Program Status: none  Upper Extremity Assistance: Stand by Assist - Presence and Cueing  Lower Extremity Assistance: Stand by Assist - Presence and Cueing  Bed Mobility: Stand by Assist - Presence and Cueing  Transfers: Stand by Assist - Presence and Cueing  Ambulation: Stand by Assist - Presence and Cueing  How far (in feet) is the patient ambulating?  100  Device Used: walker  Barriers to Discharge: JORDAN CARDENASN, RN  [employer]

## 2021-10-11 NOTE — PROGRESS NOTES
Outgoing call placed to [facility] regarding update of patient progress. Spoke to [place] (staff member) patient identified utilizing 2 identifiers. Introduced self and reason for the call, Mitra reports patient is currently admitted to the facility. Will continue to monitor patient progress.

## 2021-10-14 NOTE — PROGRESS NOTES
[address] [place] [address] Dr Discharge Note    *The information contained in this note was received during a weekly care coordination call with the post acute facility*      Patient transitioned into LTC placement at [address] [place] [address] ([facility]) on 10/12/2021. PCP: MD POP GaviriaS appointment:   Future Appointments   Date Time Provider David Mani   11/2/2021  3:20 PM Fernando Morales MD NEU BS AMB       Home Health/Outpatient orders at discharge: [facility] ordered at discharge: None  [address] received prior to discharge: N/A    LPN Care Coordinator managing patient: ALESSANDRA HopeMikayla. Community Care Team will sign off at this time. Medications were not reconciled and general patient assessment was not completed during this skilled nursing facility outreach.    SHUKRI White  [facility] Team

## 2021-10-15 NOTE — PROGRESS NOTES
Received notification via staff messaging,  patient has transition into LTC at [address] [facility] [place].  Will sign off

## 2021-11-01 NOTE — PROGRESS NOTES
This encounter was created in error - please disregard.   The patient is in a nursing home and unwilling to do the appointment

## 2022-01-01 ENCOUNTER — HOME CARE VISIT (OUTPATIENT)
Dept: SCHEDULING | Facility: HOME HEALTH | Age: 87
End: 2022-01-01
Payer: MEDICARE

## 2022-01-01 ENCOUNTER — VIRTUAL VISIT (OUTPATIENT)
Dept: NEUROLOGY | Age: 87
End: 2022-01-01
Payer: MEDICARE

## 2022-01-01 ENCOUNTER — HOSPITAL ENCOUNTER (INPATIENT)
Age: 87
LOS: 9 days | Discharge: HOSPICE | DRG: 853 | End: 2022-04-11
Attending: EMERGENCY MEDICINE | Admitting: INTERNAL MEDICINE
Payer: MEDICARE

## 2022-01-01 ENCOUNTER — HOSPICE ADMISSION (OUTPATIENT)
Dept: HOSPICE | Facility: HOSPICE | Age: 87
End: 2022-01-01
Payer: MEDICARE

## 2022-01-01 ENCOUNTER — HOME CARE VISIT (OUTPATIENT)
Dept: HOSPICE | Facility: HOSPICE | Age: 87
End: 2022-01-01
Payer: MEDICARE

## 2022-01-01 ENCOUNTER — APPOINTMENT (OUTPATIENT)
Dept: GENERAL RADIOLOGY | Age: 87
DRG: 853 | End: 2022-01-01
Attending: EMERGENCY MEDICINE
Payer: MEDICARE

## 2022-01-01 ENCOUNTER — APPOINTMENT (OUTPATIENT)
Dept: CT IMAGING | Age: 87
DRG: 853 | End: 2022-01-01
Attending: EMERGENCY MEDICINE
Payer: MEDICARE

## 2022-01-01 ENCOUNTER — PATIENT OUTREACH (OUTPATIENT)
Dept: CASE MANAGEMENT | Age: 87
End: 2022-01-01

## 2022-01-01 VITALS
TEMPERATURE: 100.2 F | DIASTOLIC BLOOD PRESSURE: 64 MMHG | SYSTOLIC BLOOD PRESSURE: 108 MMHG | OXYGEN SATURATION: 100 % | HEART RATE: 80 BPM | RESPIRATION RATE: 30 BRPM

## 2022-01-01 VITALS
SYSTOLIC BLOOD PRESSURE: 104 MMHG | DIASTOLIC BLOOD PRESSURE: 64 MMHG | TEMPERATURE: 100.1 F | RESPIRATION RATE: 36 BRPM | HEART RATE: 104 BPM

## 2022-01-01 VITALS
OXYGEN SATURATION: 100 % | HEART RATE: 72 BPM | RESPIRATION RATE: 20 BRPM | TEMPERATURE: 99.4 F | DIASTOLIC BLOOD PRESSURE: 60 MMHG | SYSTOLIC BLOOD PRESSURE: 86 MMHG

## 2022-01-01 VITALS
RESPIRATION RATE: 22 BRPM | TEMPERATURE: 99.1 F | SYSTOLIC BLOOD PRESSURE: 88 MMHG | OXYGEN SATURATION: 100 % | DIASTOLIC BLOOD PRESSURE: 54 MMHG | HEART RATE: 78 BPM

## 2022-01-01 VITALS
WEIGHT: 86.2 LBS | HEART RATE: 71 BPM | RESPIRATION RATE: 16 BRPM | BODY MASS INDEX: 15.27 KG/M2 | OXYGEN SATURATION: 95 % | DIASTOLIC BLOOD PRESSURE: 47 MMHG | SYSTOLIC BLOOD PRESSURE: 87 MMHG | TEMPERATURE: 98.5 F | HEIGHT: 63 IN

## 2022-01-01 VITALS
TEMPERATURE: 97.8 F | DIASTOLIC BLOOD PRESSURE: 49 MMHG | RESPIRATION RATE: 16 BRPM | SYSTOLIC BLOOD PRESSURE: 99 MMHG | HEART RATE: 72 BPM

## 2022-01-01 DIAGNOSIS — G93.41 METABOLIC ENCEPHALOPATHY: ICD-10-CM

## 2022-01-01 DIAGNOSIS — Z71.89 GOALS OF CARE, COUNSELING/DISCUSSION: ICD-10-CM

## 2022-01-01 DIAGNOSIS — E53.8 B12 DEFICIENCY: ICD-10-CM

## 2022-01-01 DIAGNOSIS — M47.22 CERVICAL RADICULOPATHY DUE TO DEGENERATIVE JOINT DISEASE OF SPINE: ICD-10-CM

## 2022-01-01 DIAGNOSIS — G30.1 LATE ONSET ALZHEIMER'S DISEASE WITH BEHAVIORAL DISTURBANCE (HCC): ICD-10-CM

## 2022-01-01 DIAGNOSIS — Z51.5 PALLIATIVE CARE ENCOUNTER: ICD-10-CM

## 2022-01-01 DIAGNOSIS — R29.6 MULTIPLE FALLS: ICD-10-CM

## 2022-01-01 DIAGNOSIS — R52 PAIN: ICD-10-CM

## 2022-01-01 DIAGNOSIS — G25.0 BENIGN ESSENTIAL TREMOR SYNDROME: Primary | ICD-10-CM

## 2022-01-01 DIAGNOSIS — R53.81 DEBILITY: ICD-10-CM

## 2022-01-01 DIAGNOSIS — E55.9 VITAMIN D DEFICIENCY: ICD-10-CM

## 2022-01-01 DIAGNOSIS — E03.4 HYPOTHYROIDISM DUE TO ACQUIRED ATROPHY OF THYROID: ICD-10-CM

## 2022-01-01 DIAGNOSIS — F02.818 LATE ONSET ALZHEIMER'S DISEASE WITH BEHAVIORAL DISTURBANCE (HCC): ICD-10-CM

## 2022-01-01 DIAGNOSIS — R54 FRAILTY: ICD-10-CM

## 2022-01-01 DIAGNOSIS — Z86.73 HISTORY OF STROKE: ICD-10-CM

## 2022-01-01 DIAGNOSIS — T68.XXXA HYPOTHERMIA, INITIAL ENCOUNTER: Primary | ICD-10-CM

## 2022-01-01 DIAGNOSIS — I65.23 BILATERAL CAROTID ARTERY STENOSIS: ICD-10-CM

## 2022-01-01 DIAGNOSIS — N17.9 AKI (ACUTE KIDNEY INJURY) (HCC): ICD-10-CM

## 2022-01-01 DIAGNOSIS — N30.00 ACUTE CYSTITIS WITHOUT HEMATURIA: ICD-10-CM

## 2022-01-01 DIAGNOSIS — R00.1 SINUS BRADYCARDIA: ICD-10-CM

## 2022-01-01 DIAGNOSIS — G25.81 RLS (RESTLESS LEGS SYNDROME): ICD-10-CM

## 2022-01-01 DIAGNOSIS — R63.8 DECREASED ORAL INTAKE: ICD-10-CM

## 2022-01-01 DIAGNOSIS — R45.1 AGITATION: ICD-10-CM

## 2022-01-01 DIAGNOSIS — E11.42 DIABETIC PERIPHERAL NEUROPATHY ASSOCIATED WITH TYPE 2 DIABETES MELLITUS (HCC): ICD-10-CM

## 2022-01-01 LAB
ALBUMIN SERPL-MCNC: 2.9 G/DL (ref 3.5–5)
ALBUMIN/GLOB SERPL: 0.8 {RATIO} (ref 1.1–2.2)
ALP SERPL-CCNC: 111 U/L (ref 45–117)
ALT SERPL-CCNC: 28 U/L (ref 12–78)
ANION GAP SERPL CALC-SCNC: 2 MMOL/L (ref 5–15)
ANION GAP SERPL CALC-SCNC: 3 MMOL/L (ref 5–15)
ANION GAP SERPL CALC-SCNC: 4 MMOL/L (ref 5–15)
ANION GAP SERPL CALC-SCNC: 5 MMOL/L (ref 5–15)
ANION GAP SERPL CALC-SCNC: 6 MMOL/L (ref 5–15)
APPEARANCE UR: ABNORMAL
AST SERPL-CCNC: 33 U/L (ref 15–37)
ATRIAL RATE: 35 BPM
BACTERIA SPEC CULT: ABNORMAL
BACTERIA SPEC CULT: NORMAL
BACTERIA SPEC CULT: NORMAL
BACTERIA URNS QL MICRO: ABNORMAL /HPF
BASOPHILS # BLD: 0 K/UL (ref 0–0.1)
BASOPHILS NFR BLD: 0 % (ref 0–1)
BILIRUB SERPL-MCNC: 0.3 MG/DL (ref 0.2–1)
BILIRUB UR QL: NEGATIVE
BUN SERPL-MCNC: 12 MG/DL (ref 6–20)
BUN SERPL-MCNC: 18 MG/DL (ref 6–20)
BUN SERPL-MCNC: 26 MG/DL (ref 6–20)
BUN SERPL-MCNC: 43 MG/DL (ref 6–20)
BUN SERPL-MCNC: 51 MG/DL (ref 6–20)
BUN/CREAT SERPL: 14 (ref 12–20)
BUN/CREAT SERPL: 24 (ref 12–20)
BUN/CREAT SERPL: 25 (ref 12–20)
BUN/CREAT SERPL: 36 (ref 12–20)
BUN/CREAT SERPL: 37 (ref 12–20)
CALCIUM SERPL-MCNC: 8.3 MG/DL (ref 8.5–10.1)
CALCIUM SERPL-MCNC: 8.7 MG/DL (ref 8.5–10.1)
CALCIUM SERPL-MCNC: 8.8 MG/DL (ref 8.5–10.1)
CALCIUM SERPL-MCNC: 8.8 MG/DL (ref 8.5–10.1)
CALCIUM SERPL-MCNC: 9.7 MG/DL (ref 8.5–10.1)
CALCULATED P AXIS, ECG09: 21 DEGREES
CALCULATED R AXIS, ECG10: -53 DEGREES
CALCULATED T AXIS, ECG11: 97 DEGREES
CC UR VC: ABNORMAL
CHLORIDE SERPL-SCNC: 107 MMOL/L (ref 97–108)
CHLORIDE SERPL-SCNC: 113 MMOL/L (ref 97–108)
CHLORIDE SERPL-SCNC: 116 MMOL/L (ref 97–108)
CHLORIDE SERPL-SCNC: 117 MMOL/L (ref 97–108)
CHLORIDE SERPL-SCNC: 119 MMOL/L (ref 97–108)
CK SERPL-CCNC: 202 U/L (ref 26–192)
CO2 SERPL-SCNC: 26 MMOL/L (ref 21–32)
CO2 SERPL-SCNC: 29 MMOL/L (ref 21–32)
CO2 SERPL-SCNC: 30 MMOL/L (ref 21–32)
CO2 SERPL-SCNC: 30 MMOL/L (ref 21–32)
CO2 SERPL-SCNC: 32 MMOL/L (ref 21–32)
COLOR UR: ABNORMAL
COVID-19 RAPID TEST, COVR: NOT DETECTED
COVID-19 RAPID TEST, COVR: NOT DETECTED
CREAT SERPL-MCNC: 0.75 MG/DL (ref 0.55–1.02)
CREAT SERPL-MCNC: 0.84 MG/DL (ref 0.55–1.02)
CREAT SERPL-MCNC: 1.05 MG/DL (ref 0.55–1.02)
CREAT SERPL-MCNC: 1.16 MG/DL (ref 0.55–1.02)
CREAT SERPL-MCNC: 1.41 MG/DL (ref 0.55–1.02)
DIAGNOSIS, 93000: NORMAL
DIFFERENTIAL METHOD BLD: ABNORMAL
DIGOXIN SERPL-MCNC: 1.2 NG/ML (ref 0.9–2)
EOSINOPHIL # BLD: 0 K/UL (ref 0–0.4)
EOSINOPHIL # BLD: 0.1 K/UL (ref 0–0.4)
EOSINOPHIL NFR BLD: 0 % (ref 0–7)
EOSINOPHIL NFR BLD: 1 % (ref 0–7)
EPITH CASTS URNS QL MICRO: ABNORMAL /LPF
ERYTHROCYTE [DISTWIDTH] IN BLOOD BY AUTOMATED COUNT: 20.4 % (ref 11.5–14.5)
ERYTHROCYTE [DISTWIDTH] IN BLOOD BY AUTOMATED COUNT: 20.4 % (ref 11.5–14.5)
ERYTHROCYTE [DISTWIDTH] IN BLOOD BY AUTOMATED COUNT: 20.7 % (ref 11.5–14.5)
ERYTHROCYTE [DISTWIDTH] IN BLOOD BY AUTOMATED COUNT: 20.9 % (ref 11.5–14.5)
GLOBULIN SER CALC-MCNC: 3.5 G/DL (ref 2–4)
GLUCOSE BLD STRIP.AUTO-MCNC: 106 MG/DL (ref 65–117)
GLUCOSE BLD STRIP.AUTO-MCNC: 122 MG/DL (ref 65–117)
GLUCOSE BLD STRIP.AUTO-MCNC: 128 MG/DL (ref 65–117)
GLUCOSE BLD STRIP.AUTO-MCNC: 66 MG/DL (ref 65–117)
GLUCOSE BLD STRIP.AUTO-MCNC: 71 MG/DL (ref 65–117)
GLUCOSE BLD STRIP.AUTO-MCNC: 73 MG/DL (ref 65–117)
GLUCOSE BLD STRIP.AUTO-MCNC: 83 MG/DL (ref 65–117)
GLUCOSE BLD STRIP.AUTO-MCNC: 87 MG/DL (ref 65–117)
GLUCOSE BLD STRIP.AUTO-MCNC: 89 MG/DL (ref 65–117)
GLUCOSE BLD STRIP.AUTO-MCNC: 95 MG/DL (ref 65–117)
GLUCOSE BLD STRIP.AUTO-MCNC: 97 MG/DL (ref 65–117)
GLUCOSE SERPL-MCNC: 108 MG/DL (ref 65–100)
GLUCOSE SERPL-MCNC: 60 MG/DL (ref 65–100)
GLUCOSE SERPL-MCNC: 62 MG/DL (ref 65–100)
GLUCOSE SERPL-MCNC: 94 MG/DL (ref 65–100)
GLUCOSE SERPL-MCNC: 99 MG/DL (ref 65–100)
GLUCOSE UR STRIP.AUTO-MCNC: NEGATIVE MG/DL
GRAN CASTS URNS QL MICRO: ABNORMAL /LPF
HCT VFR BLD AUTO: 30.1 % (ref 35–47)
HCT VFR BLD AUTO: 30.2 % (ref 35–47)
HCT VFR BLD AUTO: 32.9 % (ref 35–47)
HCT VFR BLD AUTO: 35.8 % (ref 35–47)
HGB BLD-MCNC: 10 G/DL (ref 11.5–16)
HGB BLD-MCNC: 8.4 G/DL (ref 11.5–16)
HGB BLD-MCNC: 8.5 G/DL (ref 11.5–16)
HGB BLD-MCNC: 9.1 G/DL (ref 11.5–16)
HGB UR QL STRIP: ABNORMAL
HYALINE CASTS URNS QL MICRO: ABNORMAL /LPF (ref 0–5)
IMM GRANULOCYTES # BLD AUTO: 0 K/UL (ref 0–0.04)
IMM GRANULOCYTES # BLD AUTO: 0.1 K/UL (ref 0–0.04)
IMM GRANULOCYTES NFR BLD AUTO: 0 % (ref 0–0.5)
IMM GRANULOCYTES NFR BLD AUTO: 1 % (ref 0–0.5)
KETONES UR QL STRIP.AUTO: NEGATIVE MG/DL
LACTATE BLD-SCNC: 0.94 MMOL/L (ref 0.4–2)
LEUKOCYTE ESTERASE UR QL STRIP.AUTO: ABNORMAL
LYMPHOCYTES # BLD: 1.1 K/UL (ref 0.8–3.5)
LYMPHOCYTES # BLD: 1.2 K/UL (ref 0.8–3.5)
LYMPHOCYTES # BLD: 1.4 K/UL (ref 0.8–3.5)
LYMPHOCYTES # BLD: 1.5 K/UL (ref 0.8–3.5)
LYMPHOCYTES NFR BLD: 15 % (ref 12–49)
LYMPHOCYTES NFR BLD: 16 % (ref 12–49)
LYMPHOCYTES NFR BLD: 18 % (ref 12–49)
LYMPHOCYTES NFR BLD: 24 % (ref 12–49)
MAGNESIUM SERPL-MCNC: 2 MG/DL (ref 1.6–2.4)
MAGNESIUM SERPL-MCNC: 2.1 MG/DL (ref 1.6–2.4)
MAGNESIUM SERPL-MCNC: 2.2 MG/DL (ref 1.6–2.4)
MAGNESIUM SERPL-MCNC: 2.4 MG/DL (ref 1.6–2.4)
MAGNESIUM SERPL-MCNC: 2.4 MG/DL (ref 1.6–2.4)
MAGNESIUM SERPL-MCNC: 2.6 MG/DL (ref 1.6–2.4)
MCH RBC QN AUTO: 26.9 PG (ref 26–34)
MCH RBC QN AUTO: 27.2 PG (ref 26–34)
MCH RBC QN AUTO: 27.2 PG (ref 26–34)
MCH RBC QN AUTO: 27.4 PG (ref 26–34)
MCHC RBC AUTO-ENTMCNC: 27.7 G/DL (ref 30–36.5)
MCHC RBC AUTO-ENTMCNC: 27.8 G/DL (ref 30–36.5)
MCHC RBC AUTO-ENTMCNC: 27.9 G/DL (ref 30–36.5)
MCHC RBC AUTO-ENTMCNC: 28.2 G/DL (ref 30–36.5)
MCV RBC AUTO: 95.3 FL (ref 80–99)
MCV RBC AUTO: 97.5 FL (ref 80–99)
MCV RBC AUTO: 98.2 FL (ref 80–99)
MCV RBC AUTO: 98.4 FL (ref 80–99)
MONOCYTES # BLD: 0.3 K/UL (ref 0–1)
MONOCYTES # BLD: 0.3 K/UL (ref 0–1)
MONOCYTES # BLD: 0.5 K/UL (ref 0–1)
MONOCYTES # BLD: 0.6 K/UL (ref 0–1)
MONOCYTES NFR BLD: 4 % (ref 5–13)
MONOCYTES NFR BLD: 5 % (ref 5–13)
MONOCYTES NFR BLD: 6 % (ref 5–13)
MONOCYTES NFR BLD: 7 % (ref 5–13)
NEUTS SEG # BLD: 4.3 K/UL (ref 1.8–8)
NEUTS SEG # BLD: 5 K/UL (ref 1.8–8)
NEUTS SEG # BLD: 5.3 K/UL (ref 1.8–8)
NEUTS SEG # BLD: 7.2 K/UL (ref 1.8–8)
NEUTS SEG NFR BLD: 70 % (ref 32–75)
NEUTS SEG NFR BLD: 76 % (ref 32–75)
NEUTS SEG NFR BLD: 77 % (ref 32–75)
NEUTS SEG NFR BLD: 78 % (ref 32–75)
NITRITE UR QL STRIP.AUTO: POSITIVE
NRBC # BLD: 0 K/UL (ref 0–0.01)
NRBC BLD-RTO: 0 PER 100 WBC
OTHER,OTHU: ABNORMAL
P-R INTERVAL, ECG05: 206 MS
PH UR STRIP: 5 [PH] (ref 5–8)
PHOSPHATE SERPL-MCNC: 1.9 MG/DL (ref 2.6–4.7)
PHOSPHATE SERPL-MCNC: 2.2 MG/DL (ref 2.6–4.7)
PHOSPHATE SERPL-MCNC: 2.6 MG/DL (ref 2.6–4.7)
PHOSPHATE SERPL-MCNC: 2.8 MG/DL (ref 2.6–4.7)
PHOSPHATE SERPL-MCNC: 3 MG/DL (ref 2.6–4.7)
PHOSPHATE SERPL-MCNC: 3.8 MG/DL (ref 2.6–4.7)
PLATELET # BLD AUTO: 109 K/UL (ref 150–400)
PLATELET # BLD AUTO: 124 K/UL (ref 150–400)
PLATELET # BLD AUTO: 131 K/UL (ref 150–400)
PLATELET # BLD AUTO: 91 K/UL (ref 150–400)
PMV BLD AUTO: 10 FL (ref 8.9–12.9)
PMV BLD AUTO: 10.3 FL (ref 8.9–12.9)
PMV BLD AUTO: 10.3 FL (ref 8.9–12.9)
PMV BLD AUTO: 10.7 FL (ref 8.9–12.9)
POTASSIUM SERPL-SCNC: 3.4 MMOL/L (ref 3.5–5.1)
POTASSIUM SERPL-SCNC: 3.8 MMOL/L (ref 3.5–5.1)
POTASSIUM SERPL-SCNC: 4.1 MMOL/L (ref 3.5–5.1)
POTASSIUM SERPL-SCNC: 4.3 MMOL/L (ref 3.5–5.1)
POTASSIUM SERPL-SCNC: 4.3 MMOL/L (ref 3.5–5.1)
PROCALCITONIN SERPL-MCNC: <0.05 NG/ML
PROT SERPL-MCNC: 6.4 G/DL (ref 6.4–8.2)
PROT UR STRIP-MCNC: NEGATIVE MG/DL
Q-T INTERVAL, ECG07: 598 MS
QRS DURATION, ECG06: 126 MS
QTC CALCULATION (BEZET), ECG08: 456 MS
RBC # BLD AUTO: 3.07 M/UL (ref 3.8–5.2)
RBC # BLD AUTO: 3.16 M/UL (ref 3.8–5.2)
RBC # BLD AUTO: 3.35 M/UL (ref 3.8–5.2)
RBC # BLD AUTO: 3.67 M/UL (ref 3.8–5.2)
RBC #/AREA URNS HPF: ABNORMAL /HPF (ref 0–5)
RBC MORPH BLD: ABNORMAL
SERVICE CMNT-IMP: ABNORMAL
SERVICE CMNT-IMP: NORMAL
SODIUM SERPL-SCNC: 140 MMOL/L (ref 136–145)
SODIUM SERPL-SCNC: 147 MMOL/L (ref 136–145)
SODIUM SERPL-SCNC: 148 MMOL/L (ref 136–145)
SODIUM SERPL-SCNC: 151 MMOL/L (ref 136–145)
SODIUM SERPL-SCNC: 153 MMOL/L (ref 136–145)
SOURCE, COVRS: NORMAL
SOURCE, COVRS: NORMAL
SP GR UR REFRACTOMETRY: 1.01 (ref 1–1.03)
T3FREE SERPL-MCNC: 1 PG/ML (ref 2.2–4)
T4 FREE SERPL-MCNC: 0.9 NG/DL (ref 0.8–1.5)
TROPONIN-HIGH SENSITIVITY: 20 NG/L (ref 0–51)
TSH SERPL DL<=0.05 MIU/L-ACNC: 2.9 UIU/ML (ref 0.36–3.74)
UA: UC IF INDICATED,UAUC: ABNORMAL
UROBILINOGEN UR QL STRIP.AUTO: 0.2 EU/DL (ref 0.2–1)
VENTRICULAR RATE, ECG03: 35 BPM
WBC # BLD AUTO: 6.2 K/UL (ref 3.6–11)
WBC # BLD AUTO: 6.7 K/UL (ref 3.6–11)
WBC # BLD AUTO: 6.9 K/UL (ref 3.6–11)
WBC # BLD AUTO: 9.3 K/UL (ref 3.6–11)
WBC URNS QL MICRO: ABNORMAL /HPF (ref 0–4)
YEAST URNS QL MICRO: PRESENT

## 2022-01-01 PROCEDURE — 82962 GLUCOSE BLOOD TEST: CPT

## 2022-01-01 PROCEDURE — 84439 ASSAY OF FREE THYROXINE: CPT

## 2022-01-01 PROCEDURE — 84100 ASSAY OF PHOSPHORUS: CPT

## 2022-01-01 PROCEDURE — 36415 COLL VENOUS BLD VENIPUNCTURE: CPT

## 2022-01-01 PROCEDURE — 74011250636 HC RX REV CODE- 250/636: Performed by: NURSE PRACTITIONER

## 2022-01-01 PROCEDURE — 99233 SBSQ HOSP IP/OBS HIGH 50: CPT | Performed by: INTERNAL MEDICINE

## 2022-01-01 PROCEDURE — 65270000015 HC RM PRIVATE ONCOLOGY

## 2022-01-01 PROCEDURE — 0651 HSPC ROUTINE HOME CARE

## 2022-01-01 PROCEDURE — 74011250637 HC RX REV CODE- 250/637: Performed by: INTERNAL MEDICINE

## 2022-01-01 PROCEDURE — 99214 OFFICE O/P EST MOD 30 MIN: CPT | Performed by: PSYCHIATRY & NEUROLOGY

## 2022-01-01 PROCEDURE — 81001 URINALYSIS AUTO W/SCOPE: CPT

## 2022-01-01 PROCEDURE — 74011000258 HC RX REV CODE- 258: Performed by: NURSE PRACTITIONER

## 2022-01-01 PROCEDURE — HOSPICE MEDICATION HC HH HOSPICE MEDICATION

## 2022-01-01 PROCEDURE — 74011000250 HC RX REV CODE- 250: Performed by: NURSE PRACTITIONER

## 2022-01-01 PROCEDURE — 87040 BLOOD CULTURE FOR BACTERIA: CPT

## 2022-01-01 PROCEDURE — 74011000250 HC RX REV CODE- 250: Performed by: EMERGENCY MEDICINE

## 2022-01-01 PROCEDURE — 74011250636 HC RX REV CODE- 250/636

## 2022-01-01 PROCEDURE — 84443 ASSAY THYROID STIM HORMONE: CPT

## 2022-01-01 PROCEDURE — 74011000258 HC RX REV CODE- 258: Performed by: INTERNAL MEDICINE

## 2022-01-01 PROCEDURE — G8432 DEP SCR NOT DOC, RNG: HCPCS | Performed by: PSYCHIATRY & NEUROLOGY

## 2022-01-01 PROCEDURE — 74011250637 HC RX REV CODE- 250/637: Performed by: NURSE PRACTITIONER

## 2022-01-01 PROCEDURE — 74011000258 HC RX REV CODE- 258: Performed by: EMERGENCY MEDICINE

## 2022-01-01 PROCEDURE — 1090F PRES/ABSN URINE INCON ASSESS: CPT | Performed by: PSYCHIATRY & NEUROLOGY

## 2022-01-01 PROCEDURE — 74011250636 HC RX REV CODE- 250/636: Performed by: INTERNAL MEDICINE

## 2022-01-01 PROCEDURE — 70486 CT MAXILLOFACIAL W/O DYE: CPT

## 2022-01-01 PROCEDURE — 74011636637 HC RX REV CODE- 636/637: Performed by: NURSE PRACTITIONER

## 2022-01-01 PROCEDURE — 74011000258 HC RX REV CODE- 258: Performed by: STUDENT IN AN ORGANIZED HEALTH CARE EDUCATION/TRAINING PROGRAM

## 2022-01-01 PROCEDURE — 94760 N-INVAS EAR/PLS OXIMETRY 1: CPT

## 2022-01-01 PROCEDURE — 77010033678 HC OXYGEN DAILY

## 2022-01-01 PROCEDURE — 2709999900 HC NON-CHARGEABLE SUPPLY

## 2022-01-01 PROCEDURE — 74011250636 HC RX REV CODE- 250/636: Performed by: STUDENT IN AN ORGANIZED HEALTH CARE EDUCATION/TRAINING PROGRAM

## 2022-01-01 PROCEDURE — G0299 HHS/HOSPICE OF RN EA 15 MIN: HCPCS

## 2022-01-01 PROCEDURE — 83735 ASSAY OF MAGNESIUM: CPT

## 2022-01-01 PROCEDURE — 80048 BASIC METABOLIC PNL TOTAL CA: CPT

## 2022-01-01 PROCEDURE — 3336500001 HSPC ELECTION

## 2022-01-01 PROCEDURE — 0JQ10ZZ REPAIR FACE SUBCUTANEOUS TISSUE AND FASCIA, OPEN APPROACH: ICD-10-PCS | Performed by: EMERGENCY MEDICINE

## 2022-01-01 PROCEDURE — 3331090004 HSPC SERVICE INTENSITY ADD-ON

## 2022-01-01 PROCEDURE — 51702 INSERT TEMP BLADDER CATH: CPT

## 2022-01-01 PROCEDURE — 83605 ASSAY OF LACTIC ACID: CPT

## 2022-01-01 PROCEDURE — 96365 THER/PROPH/DIAG IV INF INIT: CPT

## 2022-01-01 PROCEDURE — 85025 COMPLETE CBC W/AUTO DIFF WBC: CPT

## 2022-01-01 PROCEDURE — G8420 CALC BMI NORM PARAMETERS: HCPCS | Performed by: PSYCHIATRY & NEUROLOGY

## 2022-01-01 PROCEDURE — 87635 SARS-COV-2 COVID-19 AMP PRB: CPT

## 2022-01-01 PROCEDURE — G0156 HHCP-SVS OF AIDE,EA 15 MIN: HCPCS

## 2022-01-01 PROCEDURE — 94640 AIRWAY INHALATION TREATMENT: CPT

## 2022-01-01 PROCEDURE — 80162 ASSAY OF DIGOXIN TOTAL: CPT

## 2022-01-01 PROCEDURE — G8536 NO DOC ELDER MAL SCRN: HCPCS | Performed by: PSYCHIATRY & NEUROLOGY

## 2022-01-01 PROCEDURE — 93005 ELECTROCARDIOGRAM TRACING: CPT

## 2022-01-01 PROCEDURE — 70450 CT HEAD/BRAIN W/O DYE: CPT

## 2022-01-01 PROCEDURE — 96375 TX/PRO/DX INJ NEW DRUG ADDON: CPT

## 2022-01-01 PROCEDURE — 99285 EMERGENCY DEPT VISIT HI MDM: CPT

## 2022-01-01 PROCEDURE — 75810000293 HC SIMP/SUPERF WND  RPR

## 2022-01-01 PROCEDURE — 1101F PT FALLS ASSESS-DOCD LE1/YR: CPT | Performed by: PSYCHIATRY & NEUROLOGY

## 2022-01-01 PROCEDURE — 82550 ASSAY OF CK (CPK): CPT

## 2022-01-01 PROCEDURE — 84145 PROCALCITONIN (PCT): CPT

## 2022-01-01 PROCEDURE — 77030038269 HC DRN EXT URIN PURWCK BARD -A

## 2022-01-01 PROCEDURE — G0155 HHCP-SVS OF CSW,EA 15 MIN: HCPCS

## 2022-01-01 PROCEDURE — 87086 URINE CULTURE/COLONY COUNT: CPT

## 2022-01-01 PROCEDURE — 72125 CT NECK SPINE W/O DYE: CPT

## 2022-01-01 PROCEDURE — 92610 EVALUATE SWALLOWING FUNCTION: CPT

## 2022-01-01 PROCEDURE — 99232 SBSQ HOSP IP/OBS MODERATE 35: CPT | Performed by: INTERNAL MEDICINE

## 2022-01-01 PROCEDURE — 94761 N-INVAS EAR/PLS OXIMETRY MLT: CPT

## 2022-01-01 PROCEDURE — 94664 DEMO&/EVAL PT USE INHALER: CPT

## 2022-01-01 PROCEDURE — 65610000006 HC RM INTENSIVE CARE

## 2022-01-01 PROCEDURE — G8427 DOCREV CUR MEDS BY ELIG CLIN: HCPCS | Performed by: PSYCHIATRY & NEUROLOGY

## 2022-01-01 PROCEDURE — 99223 1ST HOSP IP/OBS HIGH 75: CPT | Performed by: INTERNAL MEDICINE

## 2022-01-01 PROCEDURE — 87077 CULTURE AEROBIC IDENTIFY: CPT

## 2022-01-01 PROCEDURE — 74011250637 HC RX REV CODE- 250/637: Performed by: STUDENT IN AN ORGANIZED HEALTH CARE EDUCATION/TRAINING PROGRAM

## 2022-01-01 PROCEDURE — 84481 FREE ASSAY (FT-3): CPT

## 2022-01-01 PROCEDURE — 87186 SC STD MICRODIL/AGAR DIL: CPT

## 2022-01-01 PROCEDURE — 92526 ORAL FUNCTION THERAPY: CPT

## 2022-01-01 PROCEDURE — 84484 ASSAY OF TROPONIN QUANT: CPT

## 2022-01-01 PROCEDURE — 80053 COMPREHEN METABOLIC PANEL: CPT

## 2022-01-01 PROCEDURE — 74011250636 HC RX REV CODE- 250/636: Performed by: EMERGENCY MEDICINE

## 2022-01-01 PROCEDURE — 71045 X-RAY EXAM CHEST 1 VIEW: CPT

## 2022-01-01 RX ORDER — MORPHINE SULFATE 2 MG/ML
1 INJECTION, SOLUTION INTRAMUSCULAR; INTRAVENOUS 2 TIMES DAILY
Status: DISCONTINUED | OUTPATIENT
Start: 2022-01-01 | End: 2022-01-01

## 2022-01-01 RX ORDER — NOREPINEPHRINE BITARTRATE/D5W 8 MG/250ML
.5-2 PLASTIC BAG, INJECTION (ML) INTRAVENOUS
Status: DISCONTINUED | OUTPATIENT
Start: 2022-01-01 | End: 2022-01-01

## 2022-01-01 RX ORDER — ATORVASTATIN CALCIUM 10 MG/1
10 TABLET, FILM COATED ORAL
Status: DISCONTINUED | OUTPATIENT
Start: 2022-01-01 | End: 2022-01-01 | Stop reason: HOSPADM

## 2022-01-01 RX ORDER — ATROPINE SULFATE 0.1 MG/ML
1 INJECTION INTRAVENOUS
Status: COMPLETED | OUTPATIENT
Start: 2022-01-01 | End: 2022-01-01

## 2022-01-01 RX ORDER — MAGNESIUM SULFATE 100 %
4 CRYSTALS MISCELLANEOUS AS NEEDED
Status: DISCONTINUED | OUTPATIENT
Start: 2022-01-01 | End: 2022-01-01 | Stop reason: HOSPADM

## 2022-01-01 RX ORDER — ENOXAPARIN SODIUM 100 MG/ML
30 INJECTION SUBCUTANEOUS DAILY
Status: DISCONTINUED | OUTPATIENT
Start: 2022-01-01 | End: 2022-01-01

## 2022-01-01 RX ORDER — DEXTROSE MONOHYDRATE 100 MG/ML
0-250 INJECTION, SOLUTION INTRAVENOUS AS NEEDED
Status: DISCONTINUED | OUTPATIENT
Start: 2022-01-01 | End: 2022-01-01 | Stop reason: HOSPADM

## 2022-01-01 RX ORDER — SERTRALINE HYDROCHLORIDE 50 MG/1
50 TABLET, FILM COATED ORAL DAILY
Status: DISCONTINUED | OUTPATIENT
Start: 2022-01-01 | End: 2022-01-01 | Stop reason: HOSPADM

## 2022-01-01 RX ORDER — ACETAMINOPHEN 325 MG/1
650 TABLET ORAL
Status: DISCONTINUED | OUTPATIENT
Start: 2022-01-01 | End: 2022-01-01

## 2022-01-01 RX ORDER — DIPHENHYDRAMINE HYDROCHLORIDE 50 MG/ML
25 INJECTION, SOLUTION INTRAMUSCULAR; INTRAVENOUS ONCE
Status: COMPLETED | OUTPATIENT
Start: 2022-01-01 | End: 2022-01-01

## 2022-01-01 RX ORDER — DEXTROSE MONOHYDRATE 50 MG/ML
75 INJECTION, SOLUTION INTRAVENOUS CONTINUOUS
Status: DISCONTINUED | OUTPATIENT
Start: 2022-01-01 | End: 2022-01-01

## 2022-01-01 RX ORDER — PREDNISONE 5 MG/1
5 TABLET ORAL
Status: DISCONTINUED | OUTPATIENT
Start: 2022-01-01 | End: 2022-01-01 | Stop reason: HOSPADM

## 2022-01-01 RX ORDER — LORAZEPAM 2 MG/ML
0.5 INJECTION INTRAMUSCULAR ONCE
Status: COMPLETED | OUTPATIENT
Start: 2022-01-01 | End: 2022-01-01

## 2022-01-01 RX ORDER — SODIUM CHLORIDE 0.9 % (FLUSH) 0.9 %
5-10 SYRINGE (ML) INJECTION AS NEEDED
Status: DISCONTINUED | OUTPATIENT
Start: 2022-01-01 | End: 2022-01-01

## 2022-01-01 RX ORDER — SODIUM CHLORIDE 0.9 % (FLUSH) 0.9 %
5-40 SYRINGE (ML) INJECTION EVERY 8 HOURS
Status: DISCONTINUED | OUTPATIENT
Start: 2022-01-01 | End: 2022-01-01 | Stop reason: HOSPADM

## 2022-01-01 RX ORDER — IPRATROPIUM BROMIDE AND ALBUTEROL SULFATE 2.5; .5 MG/3ML; MG/3ML
3 SOLUTION RESPIRATORY (INHALATION)
Status: DISCONTINUED | OUTPATIENT
Start: 2022-01-01 | End: 2022-01-01

## 2022-01-01 RX ORDER — IPRATROPIUM BROMIDE AND ALBUTEROL SULFATE 2.5; .5 MG/3ML; MG/3ML
3 SOLUTION RESPIRATORY (INHALATION)
Status: DISCONTINUED | OUTPATIENT
Start: 2022-01-01 | End: 2022-01-01 | Stop reason: HOSPADM

## 2022-01-01 RX ORDER — LORAZEPAM 2 MG/ML
0.5 INJECTION INTRAMUSCULAR DAILY PRN
Status: DISCONTINUED | OUTPATIENT
Start: 2022-01-01 | End: 2022-01-01 | Stop reason: HOSPADM

## 2022-01-01 RX ORDER — ACETAMINOPHEN 650 MG/1
650 SUPPOSITORY RECTAL 2 TIMES DAILY
Status: DISCONTINUED | OUTPATIENT
Start: 2022-01-01 | End: 2022-01-01 | Stop reason: HOSPADM

## 2022-01-01 RX ORDER — ATROPINE SULFATE 0.1 MG/ML
1 INJECTION INTRAVENOUS
Status: ACTIVE | OUTPATIENT
Start: 2022-01-01 | End: 2022-01-01

## 2022-01-01 RX ORDER — ATROPINE SULFATE 0.1 MG/ML
1 INJECTION INTRAVENOUS ONCE
Status: ACTIVE | OUTPATIENT
Start: 2022-01-01 | End: 2022-01-01

## 2022-01-01 RX ORDER — FAMOTIDINE 20 MG/1
20 TABLET, FILM COATED ORAL 2 TIMES DAILY
Status: DISCONTINUED | OUTPATIENT
Start: 2022-01-01 | End: 2022-01-01

## 2022-01-01 RX ORDER — MORPHINE SULFATE 2 MG/ML
1 INJECTION, SOLUTION INTRAMUSCULAR; INTRAVENOUS
Status: DISCONTINUED | OUTPATIENT
Start: 2022-01-01 | End: 2022-01-01

## 2022-01-01 RX ORDER — OXYCODONE HYDROCHLORIDE 5 MG/1
5 TABLET ORAL
Status: DISCONTINUED | OUTPATIENT
Start: 2022-01-01 | End: 2022-01-01 | Stop reason: HOSPADM

## 2022-01-01 RX ORDER — ACETAMINOPHEN 650 MG/1
650 SUPPOSITORY RECTAL
Status: DISCONTINUED | OUTPATIENT
Start: 2022-01-01 | End: 2022-01-01

## 2022-01-01 RX ORDER — MORPHINE SULFATE 2 MG/ML
1 INJECTION, SOLUTION INTRAMUSCULAR; INTRAVENOUS DAILY
Status: DISCONTINUED | OUTPATIENT
Start: 2022-01-01 | End: 2022-01-01

## 2022-01-01 RX ORDER — OXYCODONE HYDROCHLORIDE 5 MG/1
5 TABLET ORAL
Qty: 12 TABLET | Refills: 0 | Status: SHIPPED | OUTPATIENT
Start: 2022-01-01 | End: 2022-01-01

## 2022-01-01 RX ORDER — DEXTROSE, SODIUM CHLORIDE, AND POTASSIUM CHLORIDE 5; .9; .15 G/100ML; G/100ML; G/100ML
100 INJECTION INTRAVENOUS CONTINUOUS
Status: DISCONTINUED | OUTPATIENT
Start: 2022-01-01 | End: 2022-01-01

## 2022-01-01 RX ORDER — ONDANSETRON 2 MG/ML
4 INJECTION INTRAMUSCULAR; INTRAVENOUS
Status: DISCONTINUED | OUTPATIENT
Start: 2022-01-01 | End: 2022-01-01 | Stop reason: HOSPADM

## 2022-01-01 RX ORDER — BALSAM PERU/CASTOR OIL
OINTMENT (GRAM) TOPICAL 2 TIMES DAILY
Status: DISCONTINUED | OUTPATIENT
Start: 2022-01-01 | End: 2022-01-01

## 2022-01-01 RX ORDER — MEMANTINE HYDROCHLORIDE 10 MG/1
5 TABLET ORAL 2 TIMES DAILY
Status: DISCONTINUED | OUTPATIENT
Start: 2022-01-01 | End: 2022-01-01 | Stop reason: HOSPADM

## 2022-01-01 RX ORDER — ACETAMINOPHEN 500 MG
500 TABLET ORAL 2 TIMES DAILY
Status: DISCONTINUED | OUTPATIENT
Start: 2022-01-01 | End: 2022-01-01 | Stop reason: HOSPADM

## 2022-01-01 RX ORDER — BALSAM PERU/CASTOR OIL
OINTMENT (GRAM) TOPICAL 2 TIMES DAILY
Status: DISCONTINUED | OUTPATIENT
Start: 2022-01-01 | End: 2022-01-01 | Stop reason: HOSPADM

## 2022-01-01 RX ORDER — ATROPINE SULFATE 0.1 MG/ML
1 INJECTION INTRAVENOUS ONCE
Status: COMPLETED | OUTPATIENT
Start: 2022-01-01 | End: 2022-01-01

## 2022-01-01 RX ORDER — SODIUM CHLORIDE 9 MG/ML
150 INJECTION, SOLUTION INTRAVENOUS
Status: DISCONTINUED | OUTPATIENT
Start: 2022-01-01 | End: 2022-01-01 | Stop reason: HOSPADM

## 2022-01-01 RX ORDER — SODIUM CHLORIDE 0.9 % (FLUSH) 0.9 %
5-40 SYRINGE (ML) INJECTION AS NEEDED
Status: DISCONTINUED | OUTPATIENT
Start: 2022-01-01 | End: 2022-01-01 | Stop reason: HOSPADM

## 2022-01-01 RX ORDER — ATROPINE SULFATE 1 MG/ML
1 INJECTION, SOLUTION INTRAVENOUS
Status: DISCONTINUED | OUTPATIENT
Start: 2022-01-01 | End: 2022-01-01 | Stop reason: SDUPTHER

## 2022-01-01 RX ORDER — DEXTROSE MONOHYDRATE 100 MG/ML
0-250 INJECTION, SOLUTION INTRAVENOUS AS NEEDED
Status: DISCONTINUED | OUTPATIENT
Start: 2022-01-01 | End: 2022-01-01 | Stop reason: SDUPTHER

## 2022-01-01 RX ORDER — FAMOTIDINE 20 MG/1
20 TABLET, FILM COATED ORAL DAILY
Status: DISCONTINUED | OUTPATIENT
Start: 2022-01-01 | End: 2022-01-01 | Stop reason: HOSPADM

## 2022-01-01 RX ADMIN — MEMANTINE HYDROCHLORIDE 5 MG: 10 TABLET ORAL at 10:04

## 2022-01-01 RX ADMIN — MEROPENEM 500 MG: 500 INJECTION, POWDER, FOR SOLUTION INTRAVENOUS at 04:01

## 2022-01-01 RX ADMIN — SERTRALINE 50 MG: 50 TABLET, FILM COATED ORAL at 10:02

## 2022-01-01 RX ADMIN — PIPERACILLIN AND TAZOBACTAM 3.38 G: 3; .375 INJECTION, POWDER, LYOPHILIZED, FOR SOLUTION INTRAVENOUS at 20:25

## 2022-01-01 RX ADMIN — ACETAMINOPHEN 500 MG: 500 TABLET ORAL at 17:53

## 2022-01-01 RX ADMIN — APIXABAN 2.5 MG: 2.5 TABLET, FILM COATED ORAL at 12:54

## 2022-01-01 RX ADMIN — MEROPENEM 500 MG: 500 INJECTION INTRAVENOUS at 14:38

## 2022-01-01 RX ADMIN — PIPERACILLIN AND TAZOBACTAM 3.38 G: 3; .375 INJECTION, POWDER, LYOPHILIZED, FOR SOLUTION INTRAVENOUS at 05:41

## 2022-01-01 RX ADMIN — POTASSIUM CHLORIDE, DEXTROSE MONOHYDRATE AND SODIUM CHLORIDE 75 ML/HR: 150; 5; 900 INJECTION, SOLUTION INTRAVENOUS at 08:50

## 2022-01-01 RX ADMIN — SODIUM CHLORIDE, PRESERVATIVE FREE 10 ML: 5 INJECTION INTRAVENOUS at 22:19

## 2022-01-01 RX ADMIN — DIPHENHYDRAMINE HYDROCHLORIDE 25 MG: 50 INJECTION, SOLUTION INTRAMUSCULAR; INTRAVENOUS at 00:06

## 2022-01-01 RX ADMIN — APIXABAN 2.5 MG: 2.5 TABLET, FILM COATED ORAL at 17:27

## 2022-01-01 RX ADMIN — ARFORMOTEROL TARTRATE: 15 SOLUTION RESPIRATORY (INHALATION) at 07:17

## 2022-01-01 RX ADMIN — ACETAMINOPHEN 500 MG: 500 TABLET ORAL at 19:44

## 2022-01-01 RX ADMIN — NOREPINEPHRINE BITARTRATE 4 MCG/MIN: 1 INJECTION, SOLUTION, CONCENTRATE INTRAVENOUS at 19:36

## 2022-01-01 RX ADMIN — MEMANTINE HYDROCHLORIDE 5 MG: 10 TABLET ORAL at 08:04

## 2022-01-01 RX ADMIN — MORPHINE SULFATE 1 MG: 2 INJECTION, SOLUTION INTRAMUSCULAR; INTRAVENOUS at 19:42

## 2022-01-01 RX ADMIN — IPRATROPIUM BROMIDE AND ALBUTEROL SULFATE 3 ML: .5; 3 SOLUTION RESPIRATORY (INHALATION) at 14:41

## 2022-01-01 RX ADMIN — CASTOR OIL AND BALSAM, PERU: 788; 87 OINTMENT TOPICAL at 21:15

## 2022-01-01 RX ADMIN — APIXABAN 2.5 MG: 2.5 TABLET, FILM COATED ORAL at 08:03

## 2022-01-01 RX ADMIN — SODIUM CHLORIDE 1000 ML: 9 INJECTION, SOLUTION INTRAVENOUS at 17:38

## 2022-01-01 RX ADMIN — PIPERACILLIN AND TAZOBACTAM 3.38 G: 3; .375 INJECTION, POWDER, LYOPHILIZED, FOR SOLUTION INTRAVENOUS at 04:12

## 2022-01-01 RX ADMIN — ACETAMINOPHEN 500 MG: 500 TABLET ORAL at 08:03

## 2022-01-01 RX ADMIN — OXYCODONE 5 MG: 5 TABLET ORAL at 14:22

## 2022-01-01 RX ADMIN — CASTOR OIL AND BALSAM, PERU: 788; 87 OINTMENT TOPICAL at 10:07

## 2022-01-01 RX ADMIN — SODIUM CHLORIDE, PRESERVATIVE FREE 10 ML: 5 INJECTION INTRAVENOUS at 14:20

## 2022-01-01 RX ADMIN — SODIUM CHLORIDE, PRESERVATIVE FREE 10 ML: 5 INJECTION INTRAVENOUS at 22:58

## 2022-01-01 RX ADMIN — MEMANTINE HYDROCHLORIDE 5 MG: 10 TABLET ORAL at 18:24

## 2022-01-01 RX ADMIN — MEROPENEM 500 MG: 500 INJECTION INTRAVENOUS at 03:43

## 2022-01-01 RX ADMIN — MEROPENEM 500 MG: 500 INJECTION, POWDER, FOR SOLUTION INTRAVENOUS at 11:27

## 2022-01-01 RX ADMIN — CASTOR OIL AND BALSAM, PERU: 788; 87 OINTMENT TOPICAL at 17:41

## 2022-01-01 RX ADMIN — ACETAMINOPHEN 650 MG: 325 TABLET ORAL at 10:15

## 2022-01-01 RX ADMIN — MORPHINE SULFATE 1 MG: 2 INJECTION, SOLUTION INTRAMUSCULAR; INTRAVENOUS at 10:19

## 2022-01-01 RX ADMIN — MEROPENEM 500 MG: 500 INJECTION, POWDER, FOR SOLUTION INTRAVENOUS at 20:27

## 2022-01-01 RX ADMIN — DEXTROSE MONOHYDRATE 75 ML/HR: 50 INJECTION, SOLUTION INTRAVENOUS at 03:40

## 2022-01-01 RX ADMIN — MORPHINE SULFATE 5 MG: 20 SOLUTION ORAL at 10:00

## 2022-01-01 RX ADMIN — MEMANTINE HYDROCHLORIDE 5 MG: 10 TABLET ORAL at 08:01

## 2022-01-01 RX ADMIN — SODIUM CHLORIDE, PRESERVATIVE FREE 10 ML: 5 INJECTION INTRAVENOUS at 14:46

## 2022-01-01 RX ADMIN — ACETAMINOPHEN 500 MG: 500 TABLET ORAL at 18:25

## 2022-01-01 RX ADMIN — CASTOR OIL AND BALSAM, PERU: 788; 87 OINTMENT TOPICAL at 09:05

## 2022-01-01 RX ADMIN — MORPHINE SULFATE 1 MG: 2 INJECTION, SOLUTION INTRAMUSCULAR; INTRAVENOUS at 01:10

## 2022-01-01 RX ADMIN — PREDNISONE 5 MG: 5 TABLET ORAL at 13:11

## 2022-01-01 RX ADMIN — FAMOTIDINE 20 MG: 20 TABLET ORAL at 10:03

## 2022-01-01 RX ADMIN — CEFEPIME HYDROCHLORIDE 2 G: 2 INJECTION, POWDER, FOR SOLUTION INTRAVENOUS at 18:27

## 2022-01-01 RX ADMIN — CASTOR OIL AND BALSAM, PERU: 788; 87 OINTMENT TOPICAL at 19:44

## 2022-01-01 RX ADMIN — SODIUM CHLORIDE, PRESERVATIVE FREE 10 ML: 5 INJECTION INTRAVENOUS at 22:00

## 2022-01-01 RX ADMIN — MEROPENEM 500 MG: 500 INJECTION INTRAVENOUS at 03:18

## 2022-01-01 RX ADMIN — PREDNISONE 5 MG: 5 TABLET ORAL at 08:06

## 2022-01-01 RX ADMIN — MORPHINE SULFATE 1 MG: 2 INJECTION, SOLUTION INTRAMUSCULAR; INTRAVENOUS at 16:06

## 2022-01-01 RX ADMIN — DEXTROSE MONOHYDRATE 75 ML/HR: 50 INJECTION, SOLUTION INTRAVENOUS at 23:28

## 2022-01-01 RX ADMIN — MORPHINE SULFATE 1 MG: 2 INJECTION, SOLUTION INTRAMUSCULAR; INTRAVENOUS at 04:21

## 2022-01-01 RX ADMIN — PIPERACILLIN AND TAZOBACTAM 3.38 G: 3; .375 INJECTION, POWDER, LYOPHILIZED, FOR SOLUTION INTRAVENOUS at 21:09

## 2022-01-01 RX ADMIN — MEMANTINE HYDROCHLORIDE 5 MG: 10 TABLET ORAL at 17:27

## 2022-01-01 RX ADMIN — SODIUM CHLORIDE, PRESERVATIVE FREE 10 ML: 5 INJECTION INTRAVENOUS at 05:45

## 2022-01-01 RX ADMIN — LORAZEPAM 0.5 MG: 2 INJECTION INTRAMUSCULAR; INTRAVENOUS at 06:20

## 2022-01-01 RX ADMIN — ARFORMOTEROL TARTRATE: 15 SOLUTION RESPIRATORY (INHALATION) at 07:18

## 2022-01-01 RX ADMIN — SODIUM CHLORIDE, PRESERVATIVE FREE 10 ML: 5 INJECTION INTRAVENOUS at 05:41

## 2022-01-01 RX ADMIN — CASTOR OIL AND BALSAM, PERU: 788; 87 OINTMENT TOPICAL at 18:23

## 2022-01-01 RX ADMIN — PREDNISONE 5 MG: 5 TABLET ORAL at 11:27

## 2022-01-01 RX ADMIN — SODIUM CHLORIDE, PRESERVATIVE FREE 10 ML: 5 INJECTION INTRAVENOUS at 16:07

## 2022-01-01 RX ADMIN — MEROPENEM 500 MG: 500 INJECTION, POWDER, FOR SOLUTION INTRAVENOUS at 20:22

## 2022-01-01 RX ADMIN — MORPHINE SULFATE 1 MG: 2 INJECTION, SOLUTION INTRAMUSCULAR; INTRAVENOUS at 22:16

## 2022-01-01 RX ADMIN — CASTOR OIL AND BALSAM, PERU: 788; 87 OINTMENT TOPICAL at 08:05

## 2022-01-01 RX ADMIN — MORPHINE SULFATE 1 MG: 2 INJECTION, SOLUTION INTRAMUSCULAR; INTRAVENOUS at 21:28

## 2022-01-01 RX ADMIN — MEROPENEM 500 MG: 500 INJECTION INTRAVENOUS at 11:59

## 2022-01-01 RX ADMIN — ACETAMINOPHEN 500 MG: 500 TABLET ORAL at 10:02

## 2022-01-01 RX ADMIN — PREDNISONE 5 MG: 5 TABLET ORAL at 10:03

## 2022-01-01 RX ADMIN — ATORVASTATIN CALCIUM 10 MG: 10 TABLET, FILM COATED ORAL at 21:51

## 2022-01-01 RX ADMIN — PREDNISONE 5 MG: 5 TABLET ORAL at 08:02

## 2022-01-01 RX ADMIN — VANCOMYCIN HYDROCHLORIDE 1000 MG: 1 INJECTION, POWDER, LYOPHILIZED, FOR SOLUTION INTRAVENOUS at 23:29

## 2022-01-01 RX ADMIN — MORPHINE SULFATE 1 MG: 2 INJECTION, SOLUTION INTRAMUSCULAR; INTRAVENOUS at 15:26

## 2022-01-01 RX ADMIN — ATROPINE SULFATE 1 MG: 0.1 INJECTION, SOLUTION INTRAVENOUS at 19:19

## 2022-01-01 RX ADMIN — DIBASIC SODIUM PHOSPHATE, MONOBASIC POTASSIUM PHOSPHATE AND MONOBASIC SODIUM PHOSPHATE 1 TABLET: 852; 155; 130 TABLET ORAL at 09:05

## 2022-01-01 RX ADMIN — PIPERACILLIN AND TAZOBACTAM 3.38 G: 3; .375 INJECTION, POWDER, LYOPHILIZED, FOR SOLUTION INTRAVENOUS at 11:43

## 2022-01-01 RX ADMIN — MEROPENEM 500 MG: 500 INJECTION INTRAVENOUS at 13:02

## 2022-01-01 RX ADMIN — MORPHINE SULFATE 1 MG: 2 INJECTION, SOLUTION INTRAMUSCULAR; INTRAVENOUS at 13:44

## 2022-01-01 RX ADMIN — MEROPENEM 500 MG: 500 INJECTION, POWDER, FOR SOLUTION INTRAVENOUS at 12:28

## 2022-01-01 RX ADMIN — ACETAMINOPHEN 500 MG: 500 TABLET ORAL at 11:27

## 2022-01-01 RX ADMIN — SODIUM CHLORIDE, PRESERVATIVE FREE 10 ML: 5 INJECTION INTRAVENOUS at 14:22

## 2022-01-01 RX ADMIN — MORPHINE SULFATE 1 MG: 2 INJECTION, SOLUTION INTRAMUSCULAR; INTRAVENOUS at 10:04

## 2022-01-01 RX ADMIN — MORPHINE SULFATE 1 MG: 2 INJECTION, SOLUTION INTRAMUSCULAR; INTRAVENOUS at 17:27

## 2022-01-01 RX ADMIN — SODIUM CHLORIDE, PRESERVATIVE FREE 10 ML: 5 INJECTION INTRAVENOUS at 06:20

## 2022-01-01 RX ADMIN — FAMOTIDINE 20 MG: 20 TABLET ORAL at 13:11

## 2022-01-01 RX ADMIN — SERTRALINE 50 MG: 50 TABLET, FILM COATED ORAL at 12:54

## 2022-01-01 RX ADMIN — MORPHINE SULFATE 1 MG: 2 INJECTION, SOLUTION INTRAMUSCULAR; INTRAVENOUS at 08:06

## 2022-01-01 RX ADMIN — ACETAMINOPHEN 500 MG: 500 TABLET ORAL at 17:40

## 2022-01-01 RX ADMIN — LORAZEPAM 0.5 MG: 2 INJECTION INTRAMUSCULAR; INTRAVENOUS at 02:29

## 2022-01-01 RX ADMIN — MORPHINE SULFATE 1 MG: 2 INJECTION, SOLUTION INTRAMUSCULAR; INTRAVENOUS at 16:23

## 2022-01-01 RX ADMIN — IPRATROPIUM BROMIDE AND ALBUTEROL SULFATE 3 ML: .5; 3 SOLUTION RESPIRATORY (INHALATION) at 07:17

## 2022-01-01 RX ADMIN — ATROPINE SULFATE 1 MG: 0.1 INJECTION, SOLUTION INTRAVENOUS at 17:14

## 2022-01-01 RX ADMIN — SODIUM CHLORIDE, PRESERVATIVE FREE 10 ML: 5 INJECTION INTRAVENOUS at 21:52

## 2022-01-01 RX ADMIN — APIXABAN 2.5 MG: 2.5 TABLET, FILM COATED ORAL at 08:05

## 2022-01-01 RX ADMIN — DIBASIC SODIUM PHOSPHATE, MONOBASIC POTASSIUM PHOSPHATE AND MONOBASIC SODIUM PHOSPHATE 1 TABLET: 852; 155; 130 TABLET ORAL at 13:11

## 2022-01-01 RX ADMIN — ACETAMINOPHEN 500 MG: 500 TABLET ORAL at 17:27

## 2022-01-01 RX ADMIN — MEROPENEM 500 MG: 500 INJECTION, POWDER, FOR SOLUTION INTRAVENOUS at 12:34

## 2022-01-01 RX ADMIN — CASTOR OIL AND BALSAM, PERU: 788; 87 OINTMENT TOPICAL at 21:53

## 2022-01-01 RX ADMIN — SODIUM CHLORIDE, PRESERVATIVE FREE 10 ML: 5 INJECTION INTRAVENOUS at 14:53

## 2022-01-01 RX ADMIN — PREDNISONE 5 MG: 5 TABLET ORAL at 10:47

## 2022-01-01 RX ADMIN — SODIUM CHLORIDE, PRESERVATIVE FREE 10 ML: 5 INJECTION INTRAVENOUS at 13:12

## 2022-01-01 RX ADMIN — SERTRALINE 50 MG: 50 TABLET, FILM COATED ORAL at 08:45

## 2022-01-01 RX ADMIN — SERTRALINE 50 MG: 50 TABLET, FILM COATED ORAL at 13:10

## 2022-01-01 RX ADMIN — FAMOTIDINE 20 MG: 20 TABLET ORAL at 08:05

## 2022-01-01 RX ADMIN — MEMANTINE HYDROCHLORIDE 5 MG: 10 TABLET ORAL at 17:53

## 2022-01-01 RX ADMIN — SODIUM CHLORIDE, PRESERVATIVE FREE 10 ML: 5 INJECTION INTRAVENOUS at 22:05

## 2022-01-01 RX ADMIN — ATORVASTATIN CALCIUM 10 MG: 10 TABLET, FILM COATED ORAL at 22:15

## 2022-01-01 RX ADMIN — SODIUM CHLORIDE, PRESERVATIVE FREE 10 ML: 5 INJECTION INTRAVENOUS at 23:31

## 2022-01-01 RX ADMIN — MORPHINE SULFATE 1 MG: 2 INJECTION, SOLUTION INTRAMUSCULAR; INTRAVENOUS at 17:40

## 2022-01-01 RX ADMIN — MORPHINE SULFATE 1 MG: 2 INJECTION, SOLUTION INTRAMUSCULAR; INTRAVENOUS at 14:53

## 2022-01-01 RX ADMIN — MEROPENEM 500 MG: 500 INJECTION INTRAVENOUS at 20:52

## 2022-01-01 RX ADMIN — MEMANTINE HYDROCHLORIDE 5 MG: 10 TABLET ORAL at 19:44

## 2022-01-01 RX ADMIN — MEROPENEM 500 MG: 500 INJECTION, POWDER, FOR SOLUTION INTRAVENOUS at 14:52

## 2022-01-01 RX ADMIN — SODIUM CHLORIDE, PRESERVATIVE FREE 10 ML: 5 INJECTION INTRAVENOUS at 02:32

## 2022-01-01 RX ADMIN — MORPHINE SULFATE 1 MG: 2 INJECTION, SOLUTION INTRAMUSCULAR; INTRAVENOUS at 08:17

## 2022-01-01 RX ADMIN — ATORVASTATIN CALCIUM 10 MG: 10 TABLET, FILM COATED ORAL at 21:29

## 2022-01-01 RX ADMIN — SERTRALINE 50 MG: 50 TABLET, FILM COATED ORAL at 08:05

## 2022-01-01 RX ADMIN — SODIUM CHLORIDE, PRESERVATIVE FREE 10 ML: 5 INJECTION INTRAVENOUS at 21:29

## 2022-01-01 RX ADMIN — MEROPENEM 500 MG: 500 INJECTION, POWDER, FOR SOLUTION INTRAVENOUS at 21:52

## 2022-01-01 RX ADMIN — SODIUM CHLORIDE, PRESERVATIVE FREE 10 ML: 5 INJECTION INTRAVENOUS at 15:26

## 2022-01-01 RX ADMIN — MEMANTINE HYDROCHLORIDE 5 MG: 10 TABLET ORAL at 12:54

## 2022-01-01 RX ADMIN — CASTOR OIL AND BALSAM, PERU: 788; 87 OINTMENT TOPICAL at 13:11

## 2022-01-01 RX ADMIN — MORPHINE SULFATE 1 MG: 2 INJECTION, SOLUTION INTRAMUSCULAR; INTRAVENOUS at 03:45

## 2022-01-01 RX ADMIN — MEROPENEM 500 MG: 500 INJECTION, POWDER, FOR SOLUTION INTRAVENOUS at 03:16

## 2022-01-01 RX ADMIN — CASTOR OIL AND BALSAM, PERU: 788; 87 OINTMENT TOPICAL at 17:55

## 2022-01-01 RX ADMIN — ACETAMINOPHEN 650 MG: 650 SUPPOSITORY RECTAL at 09:45

## 2022-01-01 RX ADMIN — DEXTROSE MONOHYDRATE 75 ML/HR: 50 INJECTION, SOLUTION INTRAVENOUS at 15:00

## 2022-01-01 RX ADMIN — CASTOR OIL AND BALSAM, PERU: 788; 87 OINTMENT TOPICAL at 17:29

## 2022-01-01 RX ADMIN — ATORVASTATIN CALCIUM 10 MG: 10 TABLET, FILM COATED ORAL at 22:04

## 2022-01-01 RX ADMIN — MEROPENEM 500 MG: 500 INJECTION, POWDER, FOR SOLUTION INTRAVENOUS at 04:31

## 2022-01-01 RX ADMIN — MEMANTINE HYDROCHLORIDE 5 MG: 10 TABLET ORAL at 13:10

## 2022-01-01 RX ADMIN — IPRATROPIUM BROMIDE AND ALBUTEROL SULFATE 3 ML: .5; 3 SOLUTION RESPIRATORY (INHALATION) at 20:42

## 2022-01-01 RX ADMIN — SODIUM CHLORIDE 1000 ML: 9 INJECTION, SOLUTION INTRAVENOUS at 19:12

## 2022-01-01 RX ADMIN — APIXABAN 2.5 MG: 2.5 TABLET, FILM COATED ORAL at 18:25

## 2022-01-01 RX ADMIN — MORPHINE SULFATE 1 MG: 2 INJECTION, SOLUTION INTRAMUSCULAR; INTRAVENOUS at 11:23

## 2022-01-01 RX ADMIN — SODIUM CHLORIDE, PRESERVATIVE FREE 10 ML: 5 INJECTION INTRAVENOUS at 21:11

## 2022-01-01 RX ADMIN — CASTOR OIL AND BALSAM, PERU: 788; 87 OINTMENT TOPICAL at 10:20

## 2022-01-01 RX ADMIN — MEROPENEM 500 MG: 500 INJECTION INTRAVENOUS at 04:31

## 2022-01-01 RX ADMIN — DEXTROSE MONOHYDRATE 75 ML/HR: 50 INJECTION, SOLUTION INTRAVENOUS at 17:17

## 2022-01-01 RX ADMIN — MEROPENEM 500 MG: 500 INJECTION INTRAVENOUS at 20:18

## 2022-01-01 RX ADMIN — DEXTROSE MONOHYDRATE 75 ML/HR: 50 INJECTION, SOLUTION INTRAVENOUS at 11:21

## 2022-01-01 RX ADMIN — MORPHINE SULFATE 1 MG: 2 INJECTION, SOLUTION INTRAMUSCULAR; INTRAVENOUS at 20:40

## 2022-01-01 RX ADMIN — POTASSIUM CHLORIDE, DEXTROSE MONOHYDRATE AND SODIUM CHLORIDE 75 ML/HR: 150; 5; 900 INJECTION, SOLUTION INTRAVENOUS at 05:41

## 2022-01-01 RX ADMIN — DEXTROSE MONOHYDRATE 75 ML/HR: 50 INJECTION, SOLUTION INTRAVENOUS at 01:44

## 2022-01-01 RX ADMIN — ACETAMINOPHEN 500 MG: 500 TABLET ORAL at 13:11

## 2022-01-01 RX ADMIN — POTASSIUM CHLORIDE, DEXTROSE MONOHYDRATE AND SODIUM CHLORIDE 100 ML/HR: 150; 5; 900 INJECTION, SOLUTION INTRAVENOUS at 02:13

## 2022-01-01 RX ADMIN — FAMOTIDINE 20 MG: 20 TABLET ORAL at 12:54

## 2022-01-01 RX ADMIN — IPRATROPIUM BROMIDE AND ALBUTEROL SULFATE 3 ML: .5; 3 SOLUTION RESPIRATORY (INHALATION) at 01:26

## 2022-01-01 RX ADMIN — SODIUM CHLORIDE, PRESERVATIVE FREE 10 ML: 5 INJECTION INTRAVENOUS at 10:08

## 2022-01-01 RX ADMIN — SODIUM CHLORIDE, PRESERVATIVE FREE 10 ML: 5 INJECTION INTRAVENOUS at 17:40

## 2022-01-01 RX ADMIN — FAMOTIDINE 20 MG: 20 TABLET ORAL at 08:02

## 2022-01-01 RX ADMIN — CASTOR OIL AND BALSAM, PERU: 788; 87 OINTMENT TOPICAL at 14:51

## 2022-01-01 RX ADMIN — MEROPENEM 500 MG: 500 INJECTION INTRAVENOUS at 19:42

## 2022-03-30 PROBLEM — G25.81 RLS (RESTLESS LEGS SYNDROME): Status: ACTIVE | Noted: 2022-01-01
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
## 2022-04-02 PROBLEM — N39.0 UTI (URINARY TRACT INFECTION): Status: ACTIVE | Noted: 2022-01-01

## 2022-04-02 PROBLEM — A41.9 SEPTIC SHOCK (HCC): Status: ACTIVE | Noted: 2022-01-01

## 2022-04-02 PROBLEM — R65.21 SEPTIC SHOCK (HCC): Status: ACTIVE | Noted: 2022-01-01

## 2022-04-02 NOTE — ED PROVIDER NOTES
Wound Repair    Date/Time: 4/2/2022 5:21 PM  Performed by: PAPreparation: skin prepped with Shur-Clens  Location details: face  Wound length:2.6 - 7.5 cm  Anesthesia: local infiltration    Anesthesia:  Local Anesthetic: lidocaine 2% with epinephrine  Foreign bodies: no foreign bodies  Irrigation solution: saline  Skin closure: gut  Subcutaneous closure: gut (2)  Number of sutures: 6 simple interrupted skin sutures, 2 deep sutures. Technique: simple  Approximation: close  Patient tolerance: patient tolerated the procedure well with no immediate complications  My total time at bedside, performing this procedure was 16-30 minutes. Face-To-Face Encounter with an PAT's Patient:      See progress note    I have personally seen and examined the patient, reviewed the PAT's note and agree with findings and plan.       Cali Ruiz M.D.

## 2022-04-02 NOTE — ED NOTES
Patient with open wound to R inner calf. Appears to be venous ulcer. Daughter states that she gets wound care.

## 2022-04-02 NOTE — ED NOTES
Pt. With skin tear to R knee and R elbow. Patient patient on 2 L nasal cannula at this time per Dr. Marylene Kohler.
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
## 2022-04-02 NOTE — ED NOTES
Bedside and Verbal shift change report given to dinora (oncoming nurse) by Katherine Newton (offgoing nurse). Report included the following information SBAR, Kardex, ED Summary, Intake/Output, MAR, Recent Results and Cardiac Rhythm Bradicardia.

## 2022-04-02 NOTE — ED NOTES
17:53 pt transported to CT. Pt presented to the ed via EMS due to an unwitnessed ground level fall. Pt has laceration above right eye and skin tear on right elbow.   18:30 back from CT

## 2022-04-02 NOTE — ED NOTES
Pt. BP low at this time. Spoke with Dr. Jil Spatz. Verbal orders for additional fluids. MD to place other orders.

## 2022-04-03 NOTE — PROGRESS NOTES
0730 Bedside and Verbal shift change report given to Junaid Palencia (oncoming nurse) by Radha Foster (offgoing nurse). Report included the following information SBAR, Kardex, ED Summary, Intake/Output, MAR, Recent Results and Cardiac Rhythm SA/SR. Dr Fisher Muster in to assess pt. Will add IVF, place SLP consult  0800 pt has been assessed per flowsheet. She is confused, speech is clear and she is answering questions, however, answers are not appropriate to the questions or conversation. CHG bath complete, pt is of of the LEVO drip, felicitas well.  0900 dysphagia screen completed and failed. No hypoxia, yet gurgling after an oral care sponge of water  1130 daughter is at bedside, updates provided  1200 pt has been reassessed per flowsheet  1400 daughter has spoken with pt's son- POA. They both are aware of the risks of feeding pt, yet since pt's only complaint seems to be that she hungry, they would like for her to be able to eat- even with the heightened risk of aspiration. They have also asked about being able to spend the night with her. These requests have been passed along. 1600 pt has been reassessed per flowsheet. Pt is felicitas apple sauce and itlian ice  3663 TRANSFER - OUT REPORT:    Verbal report given to Jenny(name) on Az Cortez  being transferred to Oncology 1111(unit) for routine progression of care       Report consisted of patients Situation, Background, Assessment and   Recommendations(SBAR). Information from the following report(s) SBAR, Kardex, ED Summary, Procedure Summary, Intake/Output, MAR, Recent Results and Cardiac Rhythm SA was reviewed with the receiving nurse. Lines:   Peripheral IV 04/02/22 Left Antecubital (Active)   Site Assessment Clean, dry, & intact 04/03/22 0800   Phlebitis Assessment 0 04/03/22 0800   Infiltration Assessment 0 04/03/22 0800   Dressing Status Clean, dry, & intact 04/03/22 0800   Dressing Type Tape;Transparent 04/03/22 0800   Hub Color/Line Status Pink; Infusing 04/03/22 0800 Action Taken Open ports on tubing capped 04/03/22 0800   Alcohol Cap Used Yes 04/03/22 0800       Peripheral IV 04/02/22 Right Antecubital (Active)   Site Assessment Clean, dry, & intact 04/03/22 0800   Phlebitis Assessment 0 04/03/22 0800   Infiltration Assessment 0 04/03/22 0800   Dressing Status Clean, dry, & intact 04/03/22 0800   Dressing Type Tape;Transparent 04/03/22 0800   Hub Color/Line Status Pink; Infusing 04/03/22 0800   Action Taken Open ports on tubing capped 04/03/22 0800   Alcohol Cap Used Yes 04/03/22 0800        Opportunity for questions and clarification was provided. Patient transported with:   O2 @ 3 liters  Registered Nurse  Tech    700 [id number] gabriella d/c'd per nurse driven protocol, daughter in agreement.  Pt transferred to [facility]

## 2022-04-03 NOTE — PROGRESS NOTES
Pharmacy Antimicrobial Kinetic Dosing    Indication for Antimicrobials: sepsis of unknown etiology     Current Regimen of Each Antimicrobial:  Vancomycin (Start Date 22; Day # 1)  Zosyn 3.375g q8h (Start Date 22; Day # 1)    Previous Antimicrobial Therapy:  Cefepime x 1 (Start Date 22)    Goal Level: AUC: 400-600 mg/hr/Liter/day and VANCOMYCIN TROUGH GOAL RANGE    Vancomycin Trough: 10 - 15 mcg/mL    Date Dose & Interval Measured (mcg/mL) Predicted AUC/VALE                       Date & time of next level: 22 @ 2100    Dosing calculator used: [employer] calculator    Significant Positive Cultures:    blood: NGTD, pending   urine: NGTD, pending    Conditions for Dosing Consideration: None    Labs:  Recent Labs     22  1710   CREA 1.41*   BUN 51*   PCT <0.05     Recent Labs     22  1710   WBC 6.2     Temp (24hrs), Av.2 °F (31.2 °C), Min:87.7 °F (30.9 °C), Max:89.2 °F (31.8 °C)    Creatinine Clearance (mL/min):   CrCl (Ideal Body Weight): 23.7   If actual weight < IBW: CrCl (Actual Body Weight) 19.1    Impression/Plan:   Therapy will be initiated with a loading dose of 1000 mg x 1. Maintenance regimen will not be ordered at this time due to CrCl < 30 mL/min  Recommend dosing by levels. Will order level ~24 hrs after loading dose  Continue Zosyn as ordered  Daily BMP ordered  Antimicrobial stop date TBD     Pharmacy will follow daily and adjust medications as appropriate for renal function and/or serum levels.     Thank you,  FERNY Mckeon    Vancomycin Dosing Document    Documents located on pharmacy Teams site: Clinical Practice -> Antimicrobial Stewardship -> Antibiotics_Vancomycin     Aminoglycoside Dosing Document    Documents located on pharmacy Teams site: Clinical Practice -> Antimicrobial Stewardship -> Antibiotics_Aminoglycosides

## 2022-04-03 NOTE — PROGRESS NOTES
[facility]    ICU TEAM Progress Note    Name: Bernarda Hutchison   : 1935   MRN: [id number]   Date: 4/3/2022      Subjective:   Progress Note: 4/3/2022      81 y/o F pt with pmh of Afib, DVT (2018) on Eliquis, COPD, past UTIs, dementia BIBEMS from [facility] after ground level fall. Upon arrival found to be altered, hypotensive, bradycardic, and hypothermic. CTH and spine done which showed no acute abnormalities but did show a chronic right frontotemporal infarct. UA sent was was significant for UTI, given cefepime and 2 L IVF. Pt bradycardic to the 30s with hypotension, given atropine with good response. ICU consulted for admission. Upon my arrival, pt hypotensive, being started on Levophed. Opens eyes to voice but does not follow commands. Will be admitted to the ICU for further management.       Active Problem List:     Problem List  Date Reviewed: 3/30/2022          Codes Class    UTI (urinary tract infection) ICD-10-CM: N39.0  ICD-9-CM: 599.0         Septic shock ([address] ICD-10-CM: A41.9, R65.21  ICD-9-CM: 038.9, 785.52, 995.92         RLS (restless legs syndrome) ICD-10-CM: G25.81  ICD-9-CM: 333.94         B12 deficiency ICD-10-CM: E53.8  ICD-9-CM: 266.2         Vitamin D deficiency ICD-10-CM: E55.9  ICD-9-CM: 268.9         Hypothyroidism due to acquired atrophy of thyroid ICD-10-CM: E03.4  ICD-9-CM: 244.8, 246.8         Late onset Alzheimer's disease with behavioral disturbance (HCC) ICD-10-CM: G30.1, F02.81  ICD-9-CM: 331.0, 294.11         Bilateral carotid artery stenosis ICD-10-CM: I65.23  ICD-9-CM: 433.10, 433.30         History of stroke ICD-10-CM: Z86.73  ICD-9-CM: V12.54         Cervical radiculopathy due to degenerative joint disease of spine ICD-10-CM: M47.22  ICD-9-CM: 721.0         Benign essential tremor syndrome ICD-10-CM: G25.0  ICD-9-CM: 333.1         Diabetic peripheral neuropathy associated with type 2 diabetes mellitus ([facility] ICD-10-CM: E11.42  ICD-9-CM: 250.60, 357.2         Iron deficiency anemia ICD-10-CM: D50.9  ICD-9-CM: 280.9         Diverticulitis of large intestine with abscess ICD-10-CM: K57.20  ICD-9-CM: 562.11, 569.5         Diverticular disease of large intestine with complication [id number]: Z74.19  ICD-9-CM: 562.10         COPD exacerbation (HCC) ICD-10-CM: J44.1  ICD-9-CM: 491.21         Hypokalemia ICD-10-CM: E87.6  ICD-9-CM: 276.8         Dehydration ICD-10-CM: E86.0  ICD-9-CM: 276.51         Syncope and collapse ICD-10-CM: R55  ICD-9-CM: 780.2         HTN (hypertension) (Chronic) ICD-10-CM: I10  ICD-9-CM: 401.9               Past Medical History:      has a past medical history of Alzheimer's dementia ([facility] Anxiety and depression, Arrhythmia, Arthritis, Chest pain, COPD, DVT (deep venous thrombosis) ([facility] (1972), DVT (deep venous thrombosis) ([facility] (04/2018), Female bladder prolapse, GERD (gastroesophageal reflux disease), H/O hypoglycemia, H/O tracheostomy (2009), Head injury (2010), MRSA infection, Hypertension, Internal carotid artery stenosis, right, On home oxygen therapy, PUD (peptic ulcer disease), Pulmonary HTN ([facility] Restless leg syndrome, Seizures ([facility] (10/2018 or 11/2018), Stroke ([facility] (12/2019), Thromboembolus ([facility] (11/2018), and Tremor, essential.    Past Surgical History:      has a past surgical history that includes hx appendectomy; upper gi endoscopy,biopsy (9/9/2015); pr dilate esophagus (9/9/2015); hx hysterectomy (36 yrs. old); hx breast augmentation (1976); hx orthopaedic; hx orthopaedic; hx heent (04/2009); hx cataract removal (Bilateral); place percut gastrostomy tube (12/19/2019); hx cystocele repair (05/2014); and hx heart catheterization (2010). Home Medications:     Prior to Admission medications    Medication Sig Start Date End Date Taking? Authorizing Provider   digoxin (LANOXIN) 0.125 mg tablet Take 1 Tablet by mouth daily.  9/18/21   Brennan Lal MD   polyethylene glycol [facility]) 17 gram packet Take 1 Packet by mouth daily as needed for Constipation. 9/17/21   Ivet Pun, MD   balsam peru-castor oiL (VENELEX) ointment Apply  to affected area two (2) times a day. 9/17/21   Ivet Dover MD   predniSONE (DELTASONE) 5 mg tablet Take 1 Tablet by mouth daily (with breakfast). 9/17/21   Ivet Dover MD   acetaminophen (TYLENOL) 325 mg tablet Take 2 Tablets by mouth every six (6) hours. 9/17/21   Ivet Dover MD   arformoteroL (BROVANA) 15 mcg/2 mL nebu neb solution 2 mL by Nebulization route two (2) times a day. For Dx: J44.9 9/17/21   Ivet Dover MD   albuterol-ipratropium (DUO-NEB) 2.5 mg-0.5 mg/3 ml nebu 3 mL by Nebulization route every six (6) hours as needed for Wheezing. 9/17/21   Ivet Dover MD   albuterol (PROVENTIL HFA, VENTOLIN HFA, PROAIR HFA) 90 mcg/actuation inhaler albuterol sulfate HFA 90 mcg/actuation aerosol inhaler    Provider, Historical   fluticasone furoate-vilanteroL (BREO ELLIPTA) 200-25 mcg/dose inhaler Breo Ellipta 200 mcg-25 mcg/dose powder for inhalation   TAKE 1 PUFF BY MOUTH EVERY DAY    Provider, Historical   lidocaine (LIDODERM) 5 % 1 Patch by TransDERmal route every twenty-four (24) hours. Apply patch to the affected area for 12 hours a day and remove for 12 hours a day.  9/9/21   Jason Pathak MD   ondansetron (ZOFRAN ODT) 4 mg disintegrating tablet DISSOLVE 1 TABLET ON THE TONGUE EVERY 8 HOURS AS NEEDED FOR NAUSEA OR VOMITING 8/30/21   Tamara Mata MD   famotidine (PEPCID) 20 mg tablet TAKE 1 TABLET BY MOUTH TWICE A DAY 8/23/21   Roxann Hernandez MD   Eliquis 2.5 mg tablet TAKE 1 TABLET BY MOUTH TWICE A DAY 8/16/21   Roxann Hernandez MD   pramipexole (MIRAPEX) 0.5 mg tablet TAKE 1 TABLET BY MOUTH EVERY DAY 8/11/21   Roxann Hernandez MD   sertraline (ZOLOFT) 50 mg tablet TAKE 1 TABLET BY MOUTH EVERY DAY 7/20/21   Roxann Garvey MD   memantine (NAMENDA) 10 mg tablet TAKE 1 TABLET BY MOUTH TWICE A DAY 6/28/21   Mai Leblanc NP   atorvastatin (LIPITOR) 10 mg tablet TAKE 1 TABLET BY MOUTH EVERY DAY AT NIGHT 21   Roxann Hernandez MD   metoprolol tartrate (LOPRESSOR) 25 mg tablet 1 Tab by Per G Tube route every twelve (12) hours. Patient taking differently: Take 1 Tab by mouth every twelve (12) hours. 19   Chiquita Burdick MD       Allergies/Social/Family History: Allergies   Allergen Reactions    Adhesive Tape-Silicones Other (comments)     Unsure     Ivp Dye [Fd And C Blue No.1] Rash    Tylenol [Acetaminophen] Nausea and Vomiting     Patient reports no reaction to Percocet. As of 18 pt states she has taken tylenol since without problems. As of 2018 pt states she cannot take tylenol as it makes her extremely nauseous. Social History     Tobacco Use    Smoking status: Former Smoker     Years: 15.00     Quit date: 2004     Years since quittin.9    Smokeless tobacco: Never Used   Substance Use Topics    Alcohol use: Yes     Comment: approx 2 mixed drinks per year      Family History   Problem Relation Age of Onset   Micki Nipper Alcohol abuse Mother     Heart Disease Mother         CHF    Diabetes Mother     Hypertension Mother     Emphysema Mother     Asthma Father     Alcohol abuse Father     Cancer Sister         STOMACH CA    Alcohol abuse Sister     Cancer Brother         LIVER CA    Alcohol abuse Brother     Alcohol abuse Daughter     Diabetes Sister     Hypertension Sister        Review of Systems:     Review of systems not obtained due to patient factors.     Objective:   Vital Signs:  Visit Vitals  BP (!) 88/42   Pulse 86   Temp 100.2 °F (37.9 °C)   Resp 22   Wt 42.3 kg (93 lb 4.1 oz)   SpO2 90%   BMI 16.52 kg/m²    O2 Flow Rate (L/min): 4 l/min O2 Device: Nasal cannula Temp (24hrs), Av °F (34.4 °C), Min:87.7 °F (30.9 °C), Max:100.2 °F (37.9 °C)           Intake/Output:     Intake/Output Summary (Last 24 hours) at 4/3/2022 0912  Last data filed at 4/3/2022 0651  Gross per 24 hour   Intake 485.93 ml   Output 990 ml   Net -504.07 ml       Physical Exam:    General:  Lethargic, emaciated, frail  Eye:  conjunctivae/corneas clear  Neurologic:  Opens eyes to voice, non focal  Lymphatic:  Cervical, supraclavicular, and axillary nodes normal.   Neck:  normal and no erythema or exudates noted. Lungs:  clear to auscultation bilaterally  Heart:  regular rate and rhythm, S1, S2 normal, no murmur, click, rub or gallop  Abdomen:  soft, non-tender. Bowel sounds normal. No masses,  no organomegaly  Cardiovascular:  S1S2 present, without murmur or extra heart sounds, pedal pulses normal, no edema and bradycardic  Skin: extensive bruising on legs and hands  Right leg large bruise    LABS AND  DATA: Personally reviewed  Recent Labs     04/03/22 0338 04/02/22  1710   WBC 6.9 6.2   HGB 8.5* 10.0*   HCT 30.1* 35.8   * 131*     Recent Labs     04/03/22 0338 04/02/22  1710   * 147*   K 4.1 4.3   * 113*   CO2 29 32   BUN 43* 51*   CREA 1.16* 1.41*   GLU 62* 99   CA 8.3* 9.7   MG 2.4  --    PHOS 3.8  --      Recent Labs     04/02/22 1710      TP 6.4   ALB 2.9*   GLOB 3.5     No results for input(s): INR, PTP, APTT, INREXT, INREXT in the last 72 hours. No results for input(s): PHI, PCO2I, PO2I, FIO2I in the last 72 hours. Recent Labs     04/02/22  1710   *     MEDS: Reviewed    Chest X-Ray: personally reviewed and report checked    Echo 2019: EF 50-55%    Assessment and Plan:     Discussed Plan of Care (goals of care): Yes  Addressed Code Status: Do Not Resuscitate  NOK: \"Marcello\" Luis A Sidhu (son), I spoke with Elgin Russ ([address] [place] [address]) over the phone and he confirmed his mother is DNR/DNI but he would be ok with vasopressors, he also consented to a Central line if needed. 1)  ID: Septic shock due to UTI  --pt has hx of ESBL ecoli and pseudomonas in urine, last culture in 2021 sensitive to zosyn but resistant to cefepime.   Will treat with zosyn and vancomycin for now and follow up urine culture and blood culture sensitivites    2) endocrine : Hypothermia   -likely due to septic shock   -    3) cardiac : Bradycardia -likely due to hypothermia but will r/o digoxin toxicity since pt takes digoxin at home  -send digoxin level  -PRN atropine for symptomatic bradycardia  -hold home metoprolol and digoxin in the setting of bradycardia    4) renal : LILLIAN pre-renal   Clinically appears dehydrated : gentle hydration  --avoid nephrotoxic agents  -quiroz for strict I/Os    5) Pulmonary  : PMH of COPD (steroid dependent)  --duonebs  -  6) neurologic : h/o stroke and PMH of dementia  -cont home Namenda and Zoloft    7) heme : PMH of DVT (2018)  -hold eliquis given frequent falls    8) GI : keep her NPO until speech therapy evaluates her      Called patient's son who is POA , lives in [place] and explained to him her current situation and recommend hospice but he wants to come and see her himself first before he can make that decision  Patient's daughter also updated about her condition on the phone    T/L/D  Tubes: None  Lines: Peripheral IV  Drains: Quiroz Catheter    DISPOSITION  Stay in ICU    CRITICAL CARE CONSULTANT NOTE  I had a face to face encounter with the patient, reviewed and interpreted patient data including clinical events, labs, images, vital signs, I/O's, and examined patient. I have discussed the case and the plan and management of the patient's care with the consulting services, the bedside nurses and the respiratory therapist.      NOTE OF PERSONAL INVOLVEMENT IN CARE   This patient has a high probability of imminent, clinically significant deterioration, which requires the highest level of preparedness to intervene urgently. I participated in the decision-making and personally managed or directed the management of the following life and organ supporting interventions that required my frequent assessment to treat or prevent imminent deterioration. I personally spent 45 minutes of critical care time.   This is time spent at this critically ill patient's bedside actively involved in patient care as well as the coordination of care and discussions with the patient's family. This does not include any procedural time which has been billed separately.     Viviana Alamo MD  [facility]  4/3/2022

## 2022-04-03 NOTE — PROGRESS NOTES
Renal Dosing/Monitoring  Medication: famotidine   Current regimen:  20 mg every 12 hr  Recent Labs     04/02/22  1710   CREA 1.41*   BUN 51*     Estimated CrCl:  ~19.1 ml/min  Plan: Change to 20 mg every 24 hr per P&T Committee Protocol with respect to renal function. Pharmacy will continue to monitor patient daily and will make dosage adjustments based upon changing renal function.

## 2022-04-03 NOTE — PROGRESS NOTES
Gave update to pt's daughter Ken Pipes. She is in agreement with her mother's plan of care.  Will continue to monitor

## 2022-04-03 NOTE — PROGRESS NOTES
.End of Shift Note    Bedside shift change report given to Ravinder Singh (oncoming nurse) by Diogo Darden RN (offgoing nurse). Report included the following information SBAR, Intake/Output, MAR, Recent Results and Med Rec Status    Shift worked:  17:     Shift summary and any significant changes:     Patient bathed with chlorhexidine wipes and made comfortable. Daughter at bedside. Patient on 3 L NC. Resting comfortably. Concerns for physician to address:  none     Zone phone for oncoming shift:   none       Activity:  Activity Level: Bed Rest  Number times ambulated in hallways past shift: 0  Number of times OOB to chair past shift: 0    Cardiac:   Cardiac Monitoring: Yes      Cardiac Rhythm: Sinus Arrhythmia    Access:   Current line(s): PIV     Genitourinary:   Urinary status: incontinent    Respiratory:   O2 Device: Nasal cannula  Chronic home O2 use?: NO  Incentive spirometer at bedside: NO       GI:     Current diet:  ADULT DIET Dysphagia - Pureed; Extremely Thick (Pudding)  Passing flatus: YES  Tolerating current diet: NO       Pain Management:   Patient states pain is manageable on current regimen: YES    Skin:  Reynaldo Score: 13  Interventions: float heels    Patient Safety:  Fall Score:  Total Score: 4  Interventions: pt to call before getting OOB  High Fall Risk: Yes    Length of Stay:  Expected LOS: - - -  Actual LOS: [facility], RN

## 2022-04-03 NOTE — PROGRESS NOTES
2025.  Received beside verbal report from  [facility]. Included were Kardex, MAR, orders, labs, ITRACE, LDA's, events from today and plans for tonight. Dual skin assessment performed at bedside with ER  nurse. Shift assessment performed. See flowsheet for details. Spoke with daughter via phone for updates, contact info and privacy code given. Also updated NH staff on admission and diagnosis. 0000. Reassessment, as noted. 7460. Temp 36.9.  ailyn hugger off    0400. Reassessment, as noted in flowsheet. 0500.,  Glucose on chemistry 62. Hospital out of d10. Gave 100cc d5w. Glucose recheck 73 and 71    0700.   Bedside report given to oncoming nurse

## 2022-04-03 NOTE — PROGRESS NOTES
Pt was found yesterday at [facility] by daughter. Pt was found unresponsive, ems brought her to ER. Pt was bradycardic, low bp, and hypothermic, with deep wound on right inner calf, muscle and cartilege noted. Suspect UTI with cultures showing gram neg rods. Pt on vanco to cover ESBL since she has a h/o this infection. Blood cultures pending. Pain meds would likely hasten death. Family understands. Brother is flying in from [facility] and is resistant to hospice at this time. Transfer to comfort care. Palliative consulted. Seen patient with daughter feeding her sherbet. Pt's mo, her son, is coming from [place], and would like to have quiet room for the night so that the daughter can spend the night with her. Will continue iv fluids, electrolyte support, antibiotics and focus on comfort care and transfer to hospitalist service. Pt is a DNR.

## 2022-04-03 NOTE — H&P
[facility]    ICU TEAM Progress Note    Name: Sherine Brown   : 1935   MRN: [id number]   Date: 2022      Subjective:   Progress Note: 2022      79 y/o F pt with pmh of Afib, DVT (2018) on Eliquis, COPD, past UTIs, dementia BIBEMS from [facility] after ground level fall. Upon arrival found to be altered, hypotensive, bradycardic, and hypothermic. CTH and spine done which showed no acute abnormalities but did show a chronic right frontotemporal infarct. UA sent was was significant for UTI, given cefepime and 2 L IVF. Pt bradycardic to the 30s with hypotension, given atropine with good response. ICU consulted for admission. Upon my arrival, pt hypotensive, being started on Levophed. Opens eyes to voice but does not follow commands. Will be admitted to the ICU for further management.       Active Problem List:     Problem List  Date Reviewed: 3/30/2022          Codes Class    UTI (urinary tract infection) ICD-10-CM: N39.0  ICD-9-CM: 599.0         Septic shock ([address] ICD-10-CM: A41.9, R65.21  ICD-9-CM: 038.9, 785.52, 995.92         RLS (restless legs syndrome) ICD-10-CM: G25.81  ICD-9-CM: 333.94         B12 deficiency ICD-10-CM: E53.8  ICD-9-CM: 266.2         Vitamin D deficiency ICD-10-CM: E55.9  ICD-9-CM: 268.9         Hypothyroidism due to acquired atrophy of thyroid ICD-10-CM: E03.4  ICD-9-CM: 244.8, 246.8         Late onset Alzheimer's disease with behavioral disturbance (HCC) ICD-10-CM: G30.1, F02.81  ICD-9-CM: 331.0, 294.11         Bilateral carotid artery stenosis ICD-10-CM: I65.23  ICD-9-CM: 433.10, 433.30         History of stroke ICD-10-CM: Z86.73  ICD-9-CM: V12.54         Cervical radiculopathy due to degenerative joint disease of spine ICD-10-CM: M47.22  ICD-9-CM: 721.0         Benign essential tremor syndrome ICD-10-CM: G25.0  ICD-9-CM: 333.1         Diabetic peripheral neuropathy associated with type 2 diabetes mellitus ([facility] ICD-10-CM: E11.42  ICD-9-CM: 250.60, 357.2         Iron deficiency anemia ICD-10-CM: D50.9  ICD-9-CM: 280.9         Diverticulitis of large intestine with abscess ICD-10-CM: K57.20  ICD-9-CM: 562.11, 569.5         Diverticular disease of large intestine with complication ALINA-00-QS: G33.42  ICD-9-CM: 562.10         COPD exacerbation (HCC) ICD-10-CM: J44.1  ICD-9-CM: 491.21         Hypokalemia ICD-10-CM: E87.6  ICD-9-CM: 276.8         Dehydration ICD-10-CM: E86.0  ICD-9-CM: 276.51         Syncope and collapse ICD-10-CM: R55  ICD-9-CM: 780.2         HTN (hypertension) (Chronic) ICD-10-CM: I10  ICD-9-CM: 401.9               Past Medical History:      has a past medical history of Alzheimer's dementia ([facility] Anxiety and depression, Arrhythmia, Arthritis, Chest pain, COPD, DVT (deep venous thrombosis) ([facility] (1972), DVT (deep venous thrombosis) ([facility] (04/2018), Female bladder prolapse, GERD (gastroesophageal reflux disease), H/O hypoglycemia, H/O tracheostomy (2009), Head injury (2010), MRSA infection, Hypertension, Internal carotid artery stenosis, right, On home oxygen therapy, PUD (peptic ulcer disease), Pulmonary HTN ([facility] Restless leg syndrome, Seizures ([facility] (10/2018 or 11/2018), Stroke ([facility] (12/2019), Thromboembolus ([facility] (11/2018), and Tremor, essential.    Past Surgical History:      has a past surgical history that includes hx appendectomy; upper gi endoscopy,biopsy (9/9/2015); pr dilate esophagus (9/9/2015); hx hysterectomy (36 yrs. old); hx breast augmentation (1976); hx orthopaedic; hx orthopaedic; hx heent (04/2009); hx cataract removal (Bilateral); place percut gastrostomy tube (12/19/2019); hx cystocele repair (05/2014); and hx heart catheterization (2010). Home Medications:     Prior to Admission medications    Medication Sig Start Date End Date Taking? Authorizing Provider   digoxin (LANOXIN) 0.125 mg tablet Take 1 Tablet by mouth daily.  9/18/21   Johnnie Delgado MD   polyethylene glycol [facility]) 17 gram packet Take 1 Packet by mouth daily as needed for Constipation. 9/17/21   Yaritza Morris MD   balsam peru-castor oiL (VENELEX) ointment Apply  to affected area two (2) times a day. 9/17/21   Yaritza Morris MD   predniSONE (DELTASONE) 5 mg tablet Take 1 Tablet by mouth daily (with breakfast). 9/17/21   Yaritza Morris MD   acetaminophen (TYLENOL) 325 mg tablet Take 2 Tablets by mouth every six (6) hours. 9/17/21   Yaritza Morris MD   arformoteroL (BROVANA) 15 mcg/2 mL nebu neb solution 2 mL by Nebulization route two (2) times a day. For Dx: J44.9 9/17/21   Yaritza Morris MD   albuterol-ipratropium (DUO-NEB) 2.5 mg-0.5 mg/3 ml nebu 3 mL by Nebulization route every six (6) hours as needed for Wheezing. 9/17/21   Yaritza Morris MD   albuterol (PROVENTIL HFA, VENTOLIN HFA, PROAIR HFA) 90 mcg/actuation inhaler albuterol sulfate HFA 90 mcg/actuation aerosol inhaler    Provider, Historical   fluticasone furoate-vilanteroL (BREO ELLIPTA) 200-25 mcg/dose inhaler Breo Ellipta 200 mcg-25 mcg/dose powder for inhalation   TAKE 1 PUFF BY MOUTH EVERY DAY    Provider, Historical   lidocaine (LIDODERM) 5 % 1 Patch by TransDERmal route every twenty-four (24) hours. Apply patch to the affected area for 12 hours a day and remove for 12 hours a day.  9/9/21   Jason Brand MD   ondansetron (ZOFRAN ODT) 4 mg disintegrating tablet DISSOLVE 1 TABLET ON THE TONGUE EVERY 8 HOURS AS NEEDED FOR NAUSEA OR VOMITING 8/30/21   Ramy Saldaña MD   famotidine (PEPCID) 20 mg tablet TAKE 1 TABLET BY MOUTH TWICE A DAY 8/23/21   Roxann Hernandez MD   Eliquis 2.5 mg tablet TAKE 1 TABLET BY MOUTH TWICE A DAY 8/16/21   Roxann Hernandez MD   pramipexole (MIRAPEX) 0.5 mg tablet TAKE 1 TABLET BY MOUTH EVERY DAY 8/11/21   Roxann Hernandez MD   sertraline (ZOLOFT) 50 mg tablet TAKE 1 TABLET BY MOUTH EVERY DAY 7/20/21   Roxann Garcia MD   memantine (NAMENDA) 10 mg tablet TAKE 1 TABLET BY MOUTH TWICE A DAY 6/28/21   Xiomara Zapata NP   atorvastatin (LIPITOR) 10 mg tablet TAKE 1 TABLET BY MOUTH EVERY DAY AT NIGHT 21   Roxann Hernandez MD   metoprolol tartrate (LOPRESSOR) 25 mg tablet 1 Tab by Per G Tube route every twelve (12) hours. Patient taking differently: Take 1 Tab by mouth every twelve (12) hours. 19   Neri Roque MD       Allergies/Social/Family History: Allergies   Allergen Reactions    Adhesive Tape-Silicones Other (comments)     Unsure     Ivp Dye [Fd And C Blue No.1] Rash    Tylenol [Acetaminophen] Nausea and Vomiting     Patient reports no reaction to Percocet. As of 18 pt states she has taken tylenol since without problems. As of 2018 pt states she cannot take tylenol as it makes her extremely nauseous. Social History     Tobacco Use    Smoking status: Former Smoker     Years: 15.00     Quit date: 2004     Years since quittin.9    Smokeless tobacco: Never Used   Substance Use Topics    Alcohol use: Yes     Comment: approx 2 mixed drinks per year      Family History   Problem Relation Age of Onset   [facility] Alcohol abuse Mother     Heart Disease Mother         CHF    Diabetes Mother     Hypertension Mother     Emphysema Mother     Asthma Father     Alcohol abuse Father     Cancer Sister         STOMACH CA    Alcohol abuse Sister     Cancer Brother         LIVER CA    Alcohol abuse Brother     Alcohol abuse Daughter     Diabetes Sister     Hypertension Sister        Review of Systems:     Review of systems not obtained due to patient factors.     Objective:   Vital Signs:  Visit Vitals  /69   Pulse 64   Temp (!) 89.2 °F (31.8 °C)   Resp 15   Wt 42.3 kg (93 lb 4.1 oz)   SpO2 99%   BMI 16.52 kg/m²        Temp (24hrs), Av.2 °F (31.2 °C), Min:87.7 °F (30.9 °C), Max:89.2 °F (31.8 °C)           Intake/Output:   No intake or output data in the 24 hours ending 22    Physical Exam:    General:  lethargic  Eye:  conjunctivae/corneas clear  Neurologic:  Opens eyes to voice, non focal  Lymphatic:  Cervical, supraclavicular, and axillary nodes normal.   Neck:  normal and no erythema or exudates noted. Lungs:  clear to auscultation bilaterally  Heart:  regular rate and rhythm, S1, S2 normal, no murmur, click, rub or gallop  Abdomen:  soft, non-tender. Bowel sounds normal. No masses,  no organomegaly  Cardiovascular:  S1S2 present, without murmur or extra heart sounds, pedal pulses normal, no edema and bradycardic  Skin:  Normal.    LABS AND  DATA: Personally reviewed  Recent Labs     04/02/22 1710   WBC 6.2   HGB 10.0*   HCT 35.8   *     Recent Labs     04/02/22 1710   *   K 4.3   *   CO2 32   BUN 51*   CREA 1.41*   GLU 99   CA 9.7     Recent Labs     04/02/22 1710      TP 6.4   ALB 2.9*   GLOB 3.5     No results for input(s): INR, PTP, APTT, INREXT in the last 72 hours. No results for input(s): PHI, PCO2I, PO2I, FIO2I in the last 72 hours. Recent Labs     04/02/22 1710   *       Hemodynamics:   PAP:   CO:     Wedge:   CI:     CVP:    SVR:       PVR:       Ventilator Settings:  Mode Rate Tidal Volume Pressure FiO2 PEEP                    Peak airway pressure:      Minute ventilation:          MEDS: Reviewed    Chest X-Ray: personally reviewed and report checked    Echo 2019: EF 50-55%    Assessment and Plan:     Discussed Plan of Care (goals of care): Yes  Addressed Code Status: Do Not Resuscitate  NOK: \"Marcello\" Mariaelena Jacobs (son), I spoke with Yann Adan ([address] [place] [address]) over the phone and he confirmed his mother is DNR/DNI but he would be ok with vasopressors, he also consented to a Central line if needed. Septic shock due to UTI  -s/p 2 L IVF in ED  -titrate levophed for goal MAP>65  -UA sent and significant for UTI  -pt has hx of ESBL ecoli and pseudomonas in urine, last culture in 2021 sensitive to zosyn but resistant to cefepime.   Will treat with zosyn and vancomycin for now and follow up urine culture and blood culture sensitivites    Hypothermia   -likely due to septic shock   -TSH level sent and normal (2.9)  -temp sensing quiroz  -warming blanket    Bradycardia -likely due to hypothermia but will r/o digoxin toxicity since pt takes digoxin at home  -send digoxin level  -PRN atropine for symptomatic bradycardia    LILLIAN likely pre-renal   -recheck creatinine after IVF, if not improving will send urine studies  -avoid nephrotoxic agents  -quiroz for strict I/Os    PMH of a-fib RVR  -hold home metoprolol and digoxin in the setting of bradycardia    PMH of COPD (steroid dependent)  -cont prednisone 5mg  -duonebs  -cont home breo ellipta    PMH of dementia  -cont home Namenda and Zoloft    PMH of DVT (2018)  -cont Eliquis 2.5mg    T/L/D  Tubes: None  Lines: Peripheral IV  Drains: Quiroz Catheter    DISPOSITION  Stay in ICU    CRITICAL CARE CONSULTANT NOTE  I had a face to face encounter with the patient, reviewed and interpreted patient data including clinical events, labs, images, vital signs, I/O's, and examined patient. I have discussed the case and the plan and management of the patient's care with the consulting services, the bedside nurses and the respiratory therapist.      NOTE OF PERSONAL INVOLVEMENT IN CARE   This patient has a high probability of imminent, clinically significant deterioration, which requires the highest level of preparedness to intervene urgently. I participated in the decision-making and personally managed or directed the management of the following life and organ supporting interventions that required my frequent assessment to treat or prevent imminent deterioration. I personally spent 60 minutes of critical care time. This is time spent at this critically ill patient's bedside actively involved in patient care as well as the coordination of care and discussions with the patient's family. This does not include any procedural time which has been billed separately.     Cecily Quintanilla NP  [facility] Critical Care  4/2/2022

## 2022-04-04 NOTE — PROGRESS NOTES
Received notification from bedside RN about patient with regards to: keeps trying to get out of bed, difficult to redirect.  High risk for fall  VS: /77, HR 76, RR 19, O2 sat 94% on NC 3 L    Intervention given: Benadryl 25 mg IV x 1 dose,  as needed ordered

## 2022-04-04 NOTE — PROGRESS NOTES
CM acknowledged consult. CM sent a referral to [address] [place] [address] for hospice info session. CM will complete assessment once hospice meet with family. CM will enter referrals as deemed necessary. CM will continue to follow.     SHUKRI Rdz, [address] [place] [address]

## 2022-04-04 NOTE — ACP (ADVANCE CARE PLANNING)
Primary Decision Maker: Minnie Federico \"Marcello\" - Son - [phone]    Primary Decision Maker: Darrick James - [phone]  Advance Care Planning 9/29/2020   Patient's Healthcare Decision Maker is: -   Primary Decision Maker Name -   Primary Decision Maker Phone Number -   Primary Decision Maker Relationship to Patient -   Confirm Advance Directive Yes, on file   Patient Would Like to Complete Advance Directive -     Patient has two AMDs in the chart, legally the latest AMD would hold. Patient completed the latest AMD in 2/22, naming her grand daughter Winferd Cushing as mPOA. The prior AMD has other family members named, including son Woodard Simmonds" Sande Dubs. Palliative team of Dr Jeremy Mckeon and this writer met with patient today to discuss with her who she would like to assign mPOA. Patient is alert and oriented X 2, she is able to ask us Chasidy Weinstein are you doing for me? \" \"Am I going to make it and get better\"? She is also asking to eat/drink by mouth, \"I would like to have something ice cold. They think I could have a problem if I eat and that could kill me, but my swallowing is just fine\". Patient is decisional and able to tell us what is important to her. At this time she is stating that she would like treatment to \"get better\". We explained to patient that she is getting IV antibiotics and fluids for dehydration. We let her know that we would ask speech therapy to re-evaluate her for eating by mouth. Patient is uncertain today about who she wants her primary mPOA to be. She first told us that she wants Aria Kelly as the \"authority\" for medical decision making and that Winferd Cushing would be next. Prior to our leaving she noted, \"I want Amaya Michelle to be the authority\". Apparently there is disagreement in terms of goals of care between these two family members. Patient wants them both involved to some extent but she goes back and forth in terms of who should be primary mPOA.  Patient would like to wait for her son \"Marcello\" Crystal Garner) regarding any decision making; he is arriving tonight from [facility]. Patient would benefit from a current AMD, if she is able to state who she wants as mPOA. Today she is naming both Augie/Marcello and Liliane Eden. We we re-assess tomorrow if patient has a clear and firm idea of who to name for mPOA. At this time, we will keep both Liliane Eden and Augie/Marcello updated about patient's condition. There are no major decisions to make today and patient is able to make decisions at this time. Interestingly, daughter Vikki Menjivar is the most in touch with patient in terms of her daily contact as she lives locally and visits her regularly [facility]. Patient has a DDNR that is signed and copied in the chart. Today she tells us that she would want CPR. Palliative team explained to her that she has a DDNR in the chart and we discussed the risks of CPR with her frailty and current medical issues. She noted, \"so it would not be good for me\"? We explained how in her current state, patient would likely end up on a breathing machine, would not have quality of life and that she would likely have much pain/ likely broken ribs from this intervention. Patient is okay with upholding DNR status at this time. Family meeting is planned for tomorrow at 12 noon to further discuss [address]. Phone message was left for Rena Mcguire that Palliative team is involved with patient and contact number given to her. We will meet with Augie/Marcello tomorrow. ADDENDUM: Liliane Eden Earlene/ grand daughter, called this writer back, david be present for family meeting tomorrow at 2 pm. She shared some complicated family dynamics. She and patient's daughter Vikki Menjivar take turns to be with patient. Liliane Eden will be coming from near [place], [place] where she lives.

## 2022-04-04 NOTE — HOSPICE
[address] [facility]  [phone]    Nursing Note   Patient Name: Lesly Chambers  YOB: 1935  Age: 80 y.o. [facility]      Hospice consult noted. Chart reviewed. Will assess patient and contact family if they are not at bedside. Hospice and Palliative will be working collaboratively. If there are any questions, please call the main [address] number at [phone]. Thank you for the opportunity to be of service to this patient.

## 2022-04-04 NOTE — PROGRESS NOTES
Hospitalist Progress Note    NAME: Americo Nicholas   :  1935   MRN:  [id number]       Assessment / Plan:  Assessment:  -GLF, CT head negative for acute findings  -UTI, hx of ESBL/Pseudomonas  -Bradycardia, improved  -Acute kidney injury, prerenal-improving  -Hx Afib, DVT (2018) on Eliquis, COPD, past UTIs, dementia     Plan:  -Patient was seen and examined this morning  -Continue ABX therapy with vancomycin/Zosyn  -Urine cultures growing gram-negative rods we will continue to follow  -IVF  -Hospice/palliative care following  -Patient's POA, son is flying from [place] and will be here tonight at 8 PM to make final decisions, daughter at bedside, extensive discussion regarding treatment plan  -Daughter is asking for pain medication to help with pain  -supportive care  -PT/OT/ST    less than 18.5 Underweight / Body mass index is 16.52 kg/m². Code status: DNR  Prophylaxis: SCD's  Recommended Disposition: Home w/Family     Subjective:     Chief Complaint / Reason for Physician Visit  No new complaints. Extensive discussion with the daughter at bedside. Patient is pleasantly demented. Objective:     VITALS:   Last 24hrs VS reviewed since prior progress note.  Most recent are:  Patient Vitals for the past 24 hrs:   Temp Pulse Resp BP SpO2   22 0745  (!) 57 18 (!) 152/83 96 %   22 0400  99      22 0317 97.7 °F (36.5 °C) 95 18 (!) 159/73 97 %   22 0000  88      22 2300 97.4 °F (36.3 °C) 76 19 (!) 142/77 94 %   22 2210 (!) 96.3 °F (35.7 °C)       22 2043     94 %   22 2006 (!) 96.1 °F (35.6 °C) 70 18 122/60 95 %   22 1755 97 °F (36.1 °C) 75 18 (!) 99/55 97 %   22 1600  90 20 110/64 95 %   22 1500 98.9 °F (37.2 °C) 91 16 (!) 102/50 100 %   22 1442     100 %   22 1400  84 25 121/64 100 %   22 1300  99 17 (!) 107/59 100 %   22 1200  70 17 (!) 100/54 100 %   22 1130 97.7 °F (36.5 °C) 79 17 (!) 99/56 100 %       Intake/Output Summary (Last 24 hours) at 4/4/2022 1103  Last data filed at 4/3/2022 1757  Gross per 24 hour   Intake 1183.75 ml   Output 240 ml   Net 943.75 ml        I had a face to face encounter and independently examined this patient on 4/4/2022, as outlined below:  PHYSICAL EXAM:  General: WD, WN. Alert, pleasantly demented, no acute distress    EENT:  EOMI. Anicteric sclerae. MMM  Resp:  CTA bilaterally, no wheezing or rales. No accessory muscle use  CV:  Regular  rhythm,  No edema  GI:  Soft, Non distended, Non tender. +Bowel sounds  Neurologic:  Pleasantly demented, normal speech,   Psych:   Good insight. Not anxious nor agitated  Skin:  No rashes. No jaundice    Reviewed most current lab test results and cultures  YES  Reviewed most current radiology test results   YES  Review and summation of old records today    NO  Reviewed patient's current orders and MAR    YES  PMH/ reviewed - no change compared to H&P  ________________________________________________________________________  Care Plan discussed with:    Comments   Patient     Family      RN     Care Manager     Consultant                        Multidiciplinary team rounds were held today with , nursing, pharmacist and clinical coordinator. Patient's plan of care was discussed; medications were reviewed and discharge planning was addressed. ________________________________________________________________________  Total NON critical care TIME:  25   Minutes    Total CRITICAL CARE TIME Spent:   Minutes non procedure based      Comments   >50% of visit spent in counseling and coordination of care     ________________________________________________________________________  Akhil Rank, DO     Procedures: see electronic medical records for all procedures/Xrays and details which were not copied into this note but were reviewed prior to creation of Plan.       LABS:  I reviewed today's most current labs and imaging studies. Pertinent labs include:  Recent Labs     04/04/22 0335 04/03/22 0338 04/02/22  1710   WBC 6.7 6.9 6.2   HGB 8.4* 8.5* 10.0*   HCT 30.2* 30.1* 35.8   * 124* 131*     Recent Labs     04/04/22 0335 04/03/22 0338 04/02/22  1710   * 151* 147*   K 3.4* 4.1 4.3   * 116* 113*   CO2 30 29 32   GLU 60* 62* 99   BUN 26* 43* 51*   CREA 1.05* 1.16* 1.41*   CA 8.8 8.3* 9.7   MG 2.6* 2.4  --    PHOS 3.0 3.8  --    ALB  --   --  2.9*   TBILI  --   --  0.3   ALT  --   --  28       Signed:  Casa Del Valle, DO

## 2022-04-04 NOTE — WOUND CARE
Wound care consult for \"Wound care orders please\":  Pt. Is 80years old and has been staying at [facility]. Apparently the patient fell while trying to help her roommate and the patient fell. Unknown downtime on the floor but patient was found by her daughter when she came to visit. Pt. Was on the floor. Hospice is following patient as well as Palliative care to determine patient's wishes. Awaiting some family members. Assessment:  All significant wounds were photographed. Pt. Has various healing old wounds on the left side / leg / arms and feet. Patient sustained a right head injury that required sutures and today she has ecchymosis all around the eye. The sutures are just above the eyebrow. 4-4-22: right eye wound  Pt. Moving her eyeball well      Right knee abrasion:       Right lower leg (medial) wound:   Full thickness with exposed  Tendon:       4 areas of DTI (pressure injury): All are purple  Left lateral Foot:        Treatment: The right lower leg wound was a partial thickness ulcer while at the Nursing home but when she fell the wound was avulsed and made worse. Wound care done with Mepitel One silicone and then packed with 4x4's and ABD wrapped in small roll noemy and then an ACE bandage. The right eye was sutured in the ED. The ecchymosis is an expected occurrence with this eye injury. Plan: Wound care orders written for daily wound care for this patient. Pt. Requires full off loading of the pressure. Physician aware of all wounds.    Katalina Jimenez RN, BSN, [employer]

## 2022-04-04 NOTE — PROGRESS NOTES
Speech pathology note  Attempted to see patient for swallowing evaluation, however palliative MD at bedside. SLP will hold for now and follow up as medically appropriate and in line with goals of care. Thank you. Addendum: Speech pathology brief note; full note to follow. Recommend puree/thin liquid diet with strict aspiration precautions. Patient will always be at risk for aspiration secondary to COPD and dementia, however improved tolerance of thin liquids noted at bedside with small, single sips.     Lavern Moroe., CCC-SLP
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
## 2022-04-04 NOTE — PROGRESS NOTES
Brief summary of visit , full note to follow. Met with patient and her daughter Russel Dos Santos . At this time of our evaluation patient is coherent, was conversant , able to understand medical issues and she is able to make medical decisions, wants treatment of infection to recover . Reviewed her recent Advance medical Directives from 2/2022, her grand daughter Dawn Schwab, is designated primary MPOA, patient is currently indecisive between Shlomo Chambers and patient son Rodrigue Maravilla to be her primary medical decision maker. Patient is looking forward for her son Rodrigue Maravilla to arrive from [place], and wants to discuss further with her family about long term goals . Family meeting tomorrow at noon with patient and her family . Patient wants to drink \" coke \" awaiting swallowing evaluation , discuss with speech therapist Tiffanie Gonzalez who is going to evaluate the patient this afternoon .

## 2022-04-04 NOTE — PROGRESS NOTES
Problem: Risk for Spread of Infection  Goal: Prevent transmission of infectious organism to others  Description: Prevent the transmission of infectious organisms to other patients, staff members, and visitors. Outcome: Progressing Towards Goal     Problem: Falls - Risk of  Goal: *Absence of Falls  Description: Document [place] Fall Risk and appropriate interventions in the flowsheet. Outcome: Progressing Towards Goal  Note: Fall Risk Interventions:       Mentation Interventions: Bed/chair exit alarm    Medication Interventions: Bed/chair exit alarm    Elimination Interventions: Call light in reach    History of Falls Interventions:  Investigate reason for fall

## 2022-04-04 NOTE — PROGRESS NOTES
Problem: Falls - Risk of  Goal: *Absence of Falls  Description: Document Kamilla Rasmussens Fall Risk and appropriate interventions in the flowsheet. Outcome: Progressing Towards Goal  Note: Fall Risk Interventions:       Mentation Interventions: Adequate sleep, hydration, pain control,Bed/chair exit alarm,Door open when patient unattended,More frequent rounding,Reorient patient,Room close to nurse's station,Toileting rounds,Update white board    Medication Interventions: Bed/chair exit alarm,Evaluate medications/consider consulting pharmacy,Patient to call before getting OOB,Teach patient to arise slowly,Assess postural VS orthostatic hypotension    Elimination Interventions: Call light in reach,Bed/chair exit alarm,Patient to call for help with toileting needs,Toileting schedule/hourly rounds,Stay With Me (per policy)    History of Falls Interventions: Bed/chair exit alarm,Consult care management for discharge planning,Door open when patient unattended,Investigate reason for fall,Room close to nurse's station         Problem: Pressure Injury - Risk of  Goal: *Prevention of pressure injury  Description: Document Reynaldo Scale and appropriate interventions in the flowsheet. Outcome: Progressing Towards Goal  Note: Pressure Injury Interventions:  Sensory Interventions: Assess changes in LOC,Avoid rigorous massage over bony prominences,Check visual cues for pain,Keep linens dry and wrinkle-free,Turn and reposition approx. every two hours (pillows and wedges if needed)    Moisture Interventions: Absorbent underpads,Apply protective barrier, creams and emollients,Check for incontinence Q2 hours and as needed,Contain wound drainage,Minimize layers    Activity Interventions: Assess need for specialty bed,PT/OT evaluation    Mobility Interventions: Float heels,HOB 30 degrees or less,PT/OT evaluation,Turn and reposition approx.  every two hours(pillow and wedges),Assess need for specialty bed    Nutrition Interventions: Document food/fluid/supplement intake,Offer support with meals,snacks and hydration    Friction and Shear Interventions: Apply protective barrier, creams and emollients,Feet elevated on foot rest,Foam dressings/transparent film/skin sealants,HOB 30 degrees or less,Transferring/repositioning devices

## 2022-04-04 NOTE — PROGRESS NOTES
Palliative Medicine  [place]: [phone])  Former[facility]: [phone])    81 y/o F pt with pmh of Afib, DVT (2018) on Eliquis, COPD, past UTIs, dementia, BIBEMS from [facility] after ground level fall. Upon arrival found to be altered, hypotensive, bradycardic, and hypothermic. CTH and spine done which showed no acute abnormalities but did show a chronic right frontotemporal infarct. UA sent was was significant for UTI, given cefepime and 2 L IVF. Pt bradycardic to the 30s with hypotension, given atropine with good response. ICU consulted for admission. (From ED notes). Palliative team consulted for Goals of Care; hospice has been recommended. Patient's mental state is much more alert today, at time of our meeting. She is able to ask us appropriate questions, share some of her goals with us, she is able to state that she would like to wait to make any decisions until after her family arrives. Patient has an AMD, naming her grand daughter, Kim Lunsford as Carmina Lemons [phone], however she also seems to want her son Audra Barker" Opal Dyer involved. (Please see ACP note from today). There has been a history of contention between family members. Patient has lived at [facility] for about 6 months. Previously she lived in her son's (Augie/ Marcello) home, with 24 hr caregivers. Patient has a complicated family dynamics with family members not always in agreement and not able to come to consensus, regarding patient's goals of care and needs. Patient has four biological children, two daughters and two sons. One daughter has had a stroke and is at a nursing facility. Both her sons live in [place] and daughter Carlie Richey lives locally; she is the one who is most involved with patient. Patient also has a very involved grand daughter, Kim Lunsford, who patient raised until she was 5years old, Yanet Mehta describes that patient has been [place] a mother\" to her.      Family Meeting : Palliative team of  Valentino Alcaraz and this writer met with patient and her daughter Mingo Coleman. 1. We discussed role of our team and asked about the latest AMD since medical team was communicating with patient's son and daughter and not Polo Angelito shared that Laura Gentile is her daughter and that she lives in [place], [place] but has been very involved with patient. Terrance Hanna has not been named on the AMD and she does not want to get in the middle of any family dynamics; she chooses to stay out of the ginna but does spend a lot of time with patient. 2. Patient, although initially was confused, has been getting clearer mentally and is able to participate in decision making for herself. Patient wanting both Laura Gentile and son Gayla Ch / Shai Dumas to be involved with her with decision making. Unfortunately Marcello and the rest of the family have not been able to work co-operatively in the past based on family information from today. 3. Patient seems to want to pursue treatment at this time and has expressed the goal of \"getting better\". We will meet with family tomorrow at 2 pm for continued [address] discussions.

## 2022-04-04 NOTE — PROGRESS NOTES
End of Shift Note    Bedside shift change report given to Oneyda Beal RN (oncoming nurse) by Rory Sullivan RN (offgoing nurse). Report included the following information SBAR, Kardex, Intake/Output, MAR, Recent Results and Cardiac Rhythm A fib; PVC; NSR; SV Tachy    Shift worked:  7p-7a     Shift summary and any significant changes:     Pt stable, confused overnight wanting to get out of bed - difficult to understand pt verbal responses, multiple times re-orienting pt and resetting bed alarm, labs drawn, IV fluids running, No PO meds given - aspiration dysphagia risk, Possible sitter need with fall Hx and confusion, Charge RN [facility] did [employer] messaged NP Frankie about pt's confusion and something to help her settle - see below     Concerns for physician to address:  Irasema Luz NP overnight about pt's fall risk and confusion - got orders for   \"Benadryl 25 mg IV x 1 dose,  as needed ordered\"      Pt skin very fragile and bruised and difficult IV stick - current IV's getting leaky due to [facility] location - attempted for new IV with no success notified PICC nurse to assist     Zone phone for oncoming shift:   [phone]       Activity:  Activity Level: Bed Rest  Number times ambulated in hallways past shift: 0  Number of times OOB to chair past shift: 0    Cardiac:   Cardiac Monitoring: Yes      Cardiac Rhythm: SV Tachy,Atrial Fib,PVC    Access:   Current line(s): PIV     Genitourinary:   Urinary status: voiding and incontinent    Respiratory:   O2 Device: Nasal cannula  Chronic home O2 use?: YES  Incentive spirometer at bedside: N/A - pt very confused        GI:     Current diet:  ADULT DIET Dysphagia - Pureed;  Extremely Thick (Pudding)  Passing flatus: YES  Tolerating current diet: NO       Pain Management:   Patient states pain is manageable on current regimen: NO    Skin:  Reynaldo Score: 14  Interventions: turn team, float heels, foam dressing, PT/OT consult, limit briefs, internal/external urinary devices and nutritional support     Patient Safety:  Fall Score:  Total Score: 4  Interventions: bed/chair alarm, gripper socks, pt to call before getting OOB and stay with me (per policy)  High Fall Risk: Yes    Length of Stay:  Expected LOS: - - -  Actual LOS: 2      Makenzie Hinson RN
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
## 2022-04-04 NOTE — PROGRESS NOTES
End of Shift Note    Bedside shift change report given to Shahab Delcid RN (oncoming nurse) by Mireya Obrien RN (offgoing nurse). Report included the following information SBAR, Kardex and MAR    Shift worked:  7a - 7p     Shift summary and any significant changes:     -PRN morphine given x2 for leg pain   -Pt seen by wound care, palliative, and speech therapy   -Wound care completed by Kylie Kruse   -Diet order advanced by speech therapist to dysphagia pureed w/ thin liquids (small, single sips)   -Daughter present at bedside     Concerns for physician to address:       Zone phone for oncoming shift:   [phone]       Activity:  Activity Level: Bed Rest  Number times ambulated in hallways past shift: 0  Number of times OOB to chair past shift: 0    Cardiac:   Cardiac Monitoring: Yes      Cardiac Rhythm: Sinus Jose Angel    Access:   Current line(s): PIV     Genitourinary:   Urinary status: incontinent and external catheter    Respiratory:   O2 Device: Nasal cannula  Chronic home O2 use?: NO  Incentive spirometer at bedside: NO       GI:     Current diet:  ADULT DIET Dysphagia - Pureed  Passing flatus: YES  Tolerating current diet: YES       Pain Management:   Patient states pain is manageable on current regimen: YES    Skin:  Reynaldo Score: 12  Interventions: float heels, foam dressing, limit briefs, internal/external urinary devices and nutritional support     Patient Safety:  Fall Score:  Total Score: 4  Interventions: bed/chair alarm, gripper socks, pt to call before getting OOB and stay with me (per policy)  High Fall Risk: Yes    Length of Stay:  Expected LOS: - - -  Actual LOS: 2      Mireya Obrien RN

## 2022-04-05 NOTE — PROGRESS NOTES
End of Shift Note    Bedside shift change report given to Blake VICTORIA (oncoming nurse) by Franck Mcmahan RN (offgoing nurse). Report included the following information SBAR, Kardex, ED Summary, Intake/Output, MAR, Recent Results and Cardiac Rhythm A fib, PVC's,irregular    Shift worked:  [id number]     Shift summary and any significant changes:     Patient confused and hallucinating. Talkative and restless. Patient not able to sleep. Informed Yunior Williamson NP. Order for ativan given with good relief. Patient calm and able to sleep. Patient incontinent. Voiding QS. Patient taking sips of CL. Vitals WNL. Patietnt turned and venelex to sacrum and heels. Concerns for physician to address:  see above     Zone phone for oncoming shift:  [phone]     Activity:  Activity Level: Bed Rest  Number times ambulated in hallways past shift: 0  Number of times OOB to chair past shift: 0    Cardiac:   Cardiac Monitoring: Yes      Cardiac Rhythm: Atrial Fib,PVC,Multiform PVCs    Access:   Current line(s): PIV     Genitourinary:   Urinary status: incontinent and external catheter    Respiratory:   O2 Device: Nasal cannula,Humidifier  Chronic home O2 use?: YES  Incentive spirometer at bedside: NO       GI:  Last Bowel Movement Date: 04/05/22  Current diet:  ADULT DIET Dysphagia - Pureed  Passing flatus: YES  Tolerating current diet: NO.  taking sips of CL. Pain Management:   Patient states pain is manageable on current regimen: YES    Skin:  Reynaldo Score: 12  Interventions: turn team, speciality bed, float heels, foam dressing, PT/OT consult and internal/external urinary devices    Patient Safety:  Fall Score:  Total Score: 4  Interventions: bed/chair alarm, assistive device (walker, cane, etc), gripper socks and pt to call before getting OOB  High Fall Risk: Yes    Length of Stay:  Expected LOS: - - -  Actual LOS: 3      Caroline Kulkarni RN

## 2022-04-05 NOTE — PROGRESS NOTES
Palliative Medicine  [place]: [phone] ([phone])  [facility]: [phone] ([phone])    Family Meeting time changed to 1 pm at request of Alex Henao due to her long distance travel. Will let patient's son, Augie/ Abby Jimenes know about this change in time.

## 2022-04-05 NOTE — PROGRESS NOTES
Patient -130's. Patient hallucinating and calls out for help intermittently. Carissa Hair NP notified. Request something for sleep or calm patient.     0710  Patient stating that there's a lady in room and to take care of her first.
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
## 2022-04-05 NOTE — HOSPICE
[address] Pen[address] RN note:  Discussed pt with palliative MD Juan José Morin. Per palliative,  pt/family want to meet with hospice on Saturday for information only. Will coordinate meeting on Saturday. Thank you for the opportunity to care for this pt and family. Please contact hospice at [phone] with any questions or concerns.

## 2022-04-05 NOTE — PROGRESS NOTES
Spiritual Care Assessment/Progress Note  [facility]      NAME: Bernarda Hutchison      MRN: [id number]  AGE: 80 y.o. SEX: female  Religion Affiliation: Worship   Language: English     4/5/2022     Total Time (in minutes): 14     Spiritual Assessment begun in [facility] through conversation with:         [x]Patient        [x] Family    [] Friend(s)        Reason for Consult: Initial/Spiritual assessment, patient floor     Spiritual beliefs: (Please include comment if needed)     [x] Identifies with a kendal tradition:   Madeline Dub       [] Supported by a kendal community:            [] Claims no spiritual orientation:           [] Seeking spiritual identity:                [] Adheres to an individual form of spirituality:           [] Not able to assess:                           Identified resources for coping:      [x] Prayer                               [] Music                  [] Guided Imagery     [x] Family/friends                 [] Pet visits     [] Devotional reading                         [] Unknown     [] Other:                                                Interventions offered during this visit: (See comments for more details)          Family/Friend(s):  Affirmation of emotions/emotional suffering,Coping skills reviewed/reinforced,Normalization of emotional/spiritual concerns,Prayer (assurance of),Catharsis/review of pertinent events in supportive environment,Affirmation of kendal,Life review/legacy,Religion beliefs/image of God discussed     Plan of Care:     [] Support spiritual and/or cultural needs    [] Support AMD and/or advance care planning process      [] Support grieving process   [] Coordinate Rites and/or Rituals    [] Coordination with community clergy   [] No spiritual needs identified at this time   [] Detailed Plan of Care below (See Comments)  [] Make referral to Music Therapy  [] Make referral to Pet Therapy     [] Make referral to Addiction services  [] Make referral to [facility]  [] Make referral to Spiritual Care Partner  [] No future visits requested        [x] Contact Spiritual Care for further referrals     Comments:  Patient was unresponsive in her room, [phone], when  visited for palliative care initial assessment. Her son, a daughter and a grand daughter were present. They received  warmly and engaged in conversation. They shared that it was hard on them at this time, but they hold on to their kendal. Patient is very loved by family.  offered empathy and affirmation, calm words of comfort spoken. They expressed no other need for support but were informed of chaplains availability for support through staff referrals. They thanked  for the visit. Visited by: Troy Herron.    Paging Service: [phone])

## 2022-04-05 NOTE — PROGRESS NOTES
Transition of Care Plan:    RUR: 15%   Disposition: Palliative Meeting, Hospice Info Session to discuss goals of care   Follow up appointments: Follow up with PCP and/or Specialist   DME needed:equipment to be determined based on family meeting   Transportation at [address] transport-CM will arrange   Keys or means to access home:  Family will provide   IM Medicare Letter:2nd IM Medicare Letter to be given   Is patient a BCPI-A Bundle:   CM will provide if required    If yes, was Bundle Letter given?:    Is patient a [place] and connected with the [place]? N/A       If yes, was [employer] transfer form completed and [facility] notified? Caregiver Contact:Augie Fairchild (son) [phone], Gaye Amy (daughter) [phone], Elvin Dunn ([facility]) [phone]  Discharge Caregiver contacted prior to discharge? Family to be contacted   Care Conference needed?: Palliative Meeting scheduled today      CM informed that palliative will be meeting with pts family on today at [address] [place] [address], to assist with pt's goals of care. Hospice team is currently following pt, to assist with pt's care. CM will continue to follow and enter referrals as deemed necessary.     Elis Barney MSW, [address] [place] [address]

## 2022-04-05 NOTE — PROGRESS NOTES
0205- Received perfectserve from nursing that stated: \"Patient -130's. Patient hallucinating and calls out for help intermittently. Request something for sleep or calm patient. \". Noted dementia hx, planned meeting with palliative. 0.5 mg IV ativan x 1 ordered.

## 2022-04-05 NOTE — ACP (ADVANCE CARE PLANNING)
Palliative Medicine  [place]: [phone] ([phone])  [facility]: [phone])        Primary Decision Maker: [facility] [phone]  Advance Care Planning 4/5/2022   Patient's Healthcare Decision Maker is: Named in scanned ACP document   Primary Decision Maker Name -   Primary Decision Maker Phone Number -   Primary Decision Maker Relationship to Patient -   Confirm Advance Directive Yes, on file   Patient Would Like to Complete Advance Directive -   Does the patient have other document types Do Not Resuscitate     Patient has an AMD naming her grand daughter Brii Davies as mPOA. Today Palliative team of Dr Cristian Abad and this writer had a meeting with family including grand daughter Brii Davies, daughter Leni Alonso and son Chloé Maria. Patient was not able to participate in decision making due to her confusion and lethargy. The family understands and accepts Rose Mary Henao as mPOA, however all family members present today are important to patient and vice versa, all are participating in decision making in the best interest of patient. We discussed goals today, based on patient wishes from yesterday and family input : wait to see for the next few days how patient does with IV ABX, regroup on Monday 4/11 (or earlier if family feels there is a need for change in plan). We also discussed hospice as an option upon discharge, as family repeatedly noted that they don't want patient to suffer, that they would like her to have quality of life and symptoms managed, such as her pain (she has wounds which are painful and are not healing). Patient would [facility]. Patient has a DDNR in the chart. Code status and risks vs benefits was discussed with family including patient's conversation with us yesterday where she contemplated CPR for a short time but then agreed to keep the DNR status.

## 2022-04-05 NOTE — PROGRESS NOTES
Hospitalist Progress Note    NAME: Mesfin Pearl   :  1935   MRN:  [id number]       Assessment / Plan:    -UTI, hx of ESBL/Pseudomonas  -Bradycardia, improved  -Septic shock resolved  -Acute kidney injury, prerenal-resolved  Hypernatremia  Acute metabolic encephalopathy  -Hx Afib, DVT (2018) on Eliquis, COPD, past UTIs, dementia   -End-stage dementia  Status post ground-level fall leading to periorbital swelling  Right lower leg (medial) wound: Full thickness with exposed tendon   DTI left lateral foot    Plan:  -Continue with meropenem  Sodium improving down to 148, change fluid to dextrose  -Urine culture showing ESBL E. coli and Proteus mirabilis  -Hospice/palliative care following  Updated family at bedside, son daughter and grandson daughter at bedside, all questions answered on   Hospice meeting decided Saturday  Patient is lethargic, keep n.p.o. while lethargic, waxes and wanes  less than 18.5 Underweight / Body mass index is 15.27 kg/m². Code status: DNR  Prophylaxis: SCD's   Estimated discharge date: To be determined  Barriers: Hospice meeting  Recommended Disposition: To be determined     Subjective:     Chief Complaint / Reason for Physician Visit  : Septic shock, ground-level fall, UTI  Review of system unable to obtain as patient is sleepy  Discussed with family at bedside  Objective:     VITALS:   Last 24hrs VS reviewed since prior progress note.  Most recent are:  Patient Vitals for the past 24 hrs:   Temp Pulse Resp BP SpO2   22 0757 97.6 °F (36.4 °C) 93 19 (!) 135/96 100 %   22 0405 97.7 °F (36.5 °C) 96 18 (!) 156/92 97 %   22 0213  (!) 107      22 0151  100      22 0141  (!) 113      22 0134 98.5 °F (36.9 °C) (!) 120 18 (!) 149/75    22 0122  67   99 %   22 2042 97.8 °F (36.6 °C) 66 18 (!) 145/80 95 %   22 1530 97.7 °F (36.5 °C) 83 18 (!) 142/70 98 %       Intake/Output Summary (Last 24 hours) at 2022 [address] [facility],  [address] filed at 4/5/2022 3709  Gross per 24 hour   Intake 2028.33 ml   Output 1200 ml   Net 828.33 ml        I had a face to face encounter and independently examined this patient on 4/5/2022, as outlined below:  PHYSICAL EXAM:  General: WD, WN. Alert, pleasantly demented, no acute distress    EENT:  EOMI. Anicteric sclerae. MMM, periorbital swelling  Resp:  CTA bilaterally, no wheezing or rales. No accessory muscle use  CV:  Regular  rhythm,  No edema  GI:  Soft, Non distended, Non tender. +Bowel sounds  Neurologic:  Pleasantly demented, sleepy   psych:   Unable to assess   skin:  No rashes. No jaundice    Reviewed most current lab test results and cultures  YES  Reviewed most current radiology test results   YES  Review and summation of old records today    NO  Reviewed patient's current orders and MAR    YES  PMH/ reviewed - no change compared to H&P  ________________________________________________________________________  Care Plan discussed with:    Comments   Patient     Family  x    RN x    Care Manager     Consultant  x  palliative care                     Multidiciplinary team rounds were held today with , nursing, pharmacist and clinical coordinator. Patient's plan of care was discussed; medications were reviewed and discharge planning was addressed. ________________________________________________________________________  Total NON critical care TIME:35Minutes    Total CRITICAL CARE TIME Spent:   Minutes non procedure based      Comments   >50% of visit spent in counseling and coordination of care     ________________________________________________________________________  Morales Moralez MD     Procedures: see electronic medical records for all procedures/Xrays and details which were not copied into this note but were reviewed prior to creation of Plan. LABS:  I reviewed today's most current labs and imaging studies.   Pertinent labs include:  Recent Labs 04/05/22 0424 04/04/22 0335 04/03/22 0338   WBC 9.3 6.7 6.9   HGB 9.1* 8.4* 8.5*   HCT 32.9* 30.2* 30.1*   PLT 91* 109* 124*     Recent Labs     04/05/22 0424 04/04/22 0335 04/03/22 0338 04/02/22  1710 04/02/22 1710   * 153* 151*   < > 147*   K 3.8 3.4* 4.1   < > 4.3   * 119* 116*   < > 113*   CO2 26 30 29   < > 32   * 60* 62*   < > 99   BUN 18 26* 43*   < > 51*   CREA 0.75 1.05* 1.16*   < > 1.41*   CA 8.8 8.8 8.3*   < > 9.7   MG 2.4 2.6* 2.4  --   --    PHOS 1.9* 3.0 3.8  --   --    ALB  --   --   --   --  2.9*   TBILI  --   --   --   --  0.3   ALT  --   --   --   --  28    < > = values in this interval not displayed.        Signed: Betsy Riley MD

## 2022-04-05 NOTE — PROGRESS NOTES
Problem: Risk for Spread of Infection  Goal: Prevent transmission of infectious organism to others  Description: Prevent the transmission of infectious organisms to other patients, staff members, and visitors. 4/5/2022 0959 by Tracy Otero RN  Outcome: Progressing Towards Goal  4/5/2022 0857 by Tracy Otero RN  Outcome: Progressing Towards Goal     Problem: Patient Education:  Go to Education Activity  Goal: Patient/Family Education  Outcome: Progressing Towards Goal     Problem: Falls - Risk of  Goal: *Absence of Falls  Description: Document Yazan Ruiz Fall Risk and appropriate interventions in the flowsheet.   4/5/2022 0959 by Tracy Otero RN  Outcome: Progressing Towards Goal  Note: Fall Risk Interventions:       Mentation Interventions: Bed/chair exit alarm,Door open when patient unattended,Family/sitter at bedside,Gait belt with transfers/ambulation,More frequent rounding,Room close to nurse's station,Self-releasing belt    Medication Interventions: Assess postural VS orthostatic hypotension,Bed/chair exit alarm    Elimination Interventions: Bed/chair exit alarm,Call light in reach,Patient to call for help with toileting needs,Toileting schedule/hourly rounds    History of Falls Interventions: Bed/chair exit alarm,Door open when patient unattended,Room close to nurse's station,Assess for delayed presentation/identification of injury for 48 hrs (comment for end date)      4/5/2022 0857 by Clover KIM RN  Outcome: Progressing Towards Goal  Note: Fall Risk Interventions:       Mentation Interventions: Bed/chair exit alarm,Door open when patient unattended,Family/sitter at bedside,Gait belt with transfers/ambulation,More frequent rounding,Room close to nurse's station,Self-releasing belt    Medication Interventions: Assess postural VS orthostatic hypotension,Bed/chair exit alarm    Elimination Interventions: Bed/chair exit alarm,Call light in reach,Patient to call for help with toileting needs,Toileting schedule/hourly rounds    History of Falls Interventions: Bed/chair exit alarm,Door open when patient unattended,Room close to nurse's station,Assess for delayed presentation/identification of injury for 48 hrs (comment for end date)         Problem: Pressure Injury - Risk of  Goal: *Prevention of pressure injury  Description: Document Reynaldo Scale and appropriate interventions in the flowsheet.   4/5/2022 0858 by Cat Kirkpatrick RN  Outcome: Progressing Towards Goal  Note: Pressure Injury Interventions:  Sensory Interventions: Assess changes in LOC,Avoid rigorous massage over bony prominences,Chair cushion,Check visual cues for pain,Discuss PT/OT consult with provider,Float heels,Monitor skin under medical devices    Moisture Interventions: Absorbent underpads,Apply protective barrier, creams and emollients,Internal/External urinary devices,Maintain skin hydration (lotion/cream)    Activity Interventions: Pressure redistribution bed/mattress(bed type),PT/OT evaluation    Mobility Interventions: Float heels,HOB 30 degrees or less,Pressure redistribution bed/mattress (bed type),PT/OT evaluation    Nutrition Interventions: Document food/fluid/supplement intake,Discuss nutritional consult with provider    Friction and Shear Interventions: Foam dressings/transparent film/skin sealants,HOB 30 degrees or less,Lift sheet,Minimize layers,Transferring/repositioning devices             4/5/2022 0857 by Ricardo KIM RN  Outcome: Progressing Towards Goal  Note: Pressure Injury Interventions:  Sensory Interventions: Assess changes in LOC,Avoid rigorous massage over bony prominences,Chair cushion,Check visual cues for pain,Discuss PT/OT consult with provider,Float heels,Monitor skin under medical devices    Moisture Interventions: Absorbent underpads,Apply protective barrier, creams and emollients,Internal/External urinary devices,Maintain skin hydration (lotion/cream)    Activity Interventions: Pressure redistribution bed/mattress(bed type),PT/OT evaluation    Mobility Interventions: Float heels,HOB 30 degrees or less,Pressure redistribution bed/mattress (bed type),PT/OT evaluation    Nutrition Interventions: Document food/fluid/supplement intake,Discuss nutritional consult with provider    Friction and Shear Interventions: Foam dressings/transparent film/skin sealants,HOB 30 degrees or less,Lift sheet,Minimize layers,Transferring/repositioning devices                Problem: Patient Education: Go to Patient Education Activity  Goal: Patient/Family Education  Outcome: Progressing Towards Goal     Problem: Urinary Tract Infection - Adult  Goal: *Absence of infection signs and symptoms  4/5/2022 0959 by Janina Root RN  Outcome: Progressing Towards Goal  4/5/2022 0858 by Janina Root RN  Outcome: Progressing Towards Goal

## 2022-04-05 NOTE — PROGRESS NOTES
Palliative Medicine      Code Status: DNR - DDNR is in the chart. Advance Care Planning:  Advance Care Planning 4/5/2022   Patient's Healthcare Decision Maker is: Named in scanned ACP document   Primary Decision Maker Name -   Primary Decision Maker Phone Number -   Primary Decision Maker Relationship to Patient -   Confirm Advance Directive Yes, on file   Patient Would Like to Complete Advance Directive -   Does the patient have other document types Do Not Resuscitate     Grand daughter Brenda Henao is patient's assigned mPOA - #[phone]. Patient also wanted son Jose Aburto" [employer] [phone] to be involved in decision making. He is presently here from [facility] and plans to be here for about a week. Media Friend lives in [place], [place]. She was visiting patient every other week at the facility. Patient / Family Encounter Documentation    Participants (names): Palliative team of Dr Krish Wood, this writer, daughter Regina Francisco, grand daughter Mariel Bell. Patient sleepy and lethargic, more confused today per family and observation, unable to participate. Narrative: Palliative team discussed patient's overall condition with family, including recent falls and declines, her declined quality of life, her desire to remain independent. Family agree that patient has always been very \"stubborn\", is a \"fighter\", does not give up easily. Marcello shared that patient had a similar incident/infection about 9 months ago and they were told that she may not make it, patient survived this. He would like to give patient some more time to see how patient recovers or if this is a new baseline. A consistent theme today was that family does not want patient to \"suffer\". When she gets wound care, patient has had much pain and has \"screamed out\" in pain, per daughter Tejal Delgado. Dr Krish Wood is going to take a look at medications and add a pain regimen pre-wound care.      We discussed the option of a focus on comfort, with or without patient declines, based on her overall condition. The goals and general hospice information was shared with family. They are open to meeting with hospice fpr more information on Saturday [birth year] (which is patient's birthday). Much education to [facility] regarding his concern of patient \"starving\" at this time. Discussed decreased need for nutrition at end of life, if this is what we are looking at. Also discussed her non healing wounds and how these will likely not heal.     Psychosocial Issues Identified/ Resilience Factors: Although family has had their differences in the past, today they were able to come together and discuss patient's issues and needs cohesively. They are working together in the best interest of the patient, with common goal of making things as best as they can be for patient with the goal of decreasing pain and suffering. All family members are communicating at this time and are able to be together to provide support to patient. Patient is not able to make her own decisions today, she presents with some confusion and much lethargy. Caregiver [place]: Low since patient is at a facility. Family including Bob Laura Null visit patient but are not responsible for hour to hour caregiving. \    Does the caregiver feel confident administering medication? N/A    Does the caregiver need any help connecting with community resources? Hospice info session consult placed. Does the caregiver feel confident assisting with activities of daily living? Not at this time, but facility is equipped to manage care. Goals of Care / Plan: Continue current treatment and antibiotics. Family to meet with [facility] team on Sat 4/9. Palliative team will re-group with family on Monday 4/11 unless they call us earlier. Thank you for including Palliative Medicine in the care of Mrs. Nithin Sharp.

## 2022-04-06 NOTE — PROGRESS NOTES
End of Shift Note    Bedside shift change report given to Jane (oncoming nurse) by Hunter Lemus (offgoing nurse). Report included the following information SBAR and Kardex    Shift worked:  7a-7p     Shift summary and any significant changes:    Hourly rounding completed and meds given per MAR. Pills crushed in applesauce. Incontinence care completed and patient turned Q2. Family at bedside. 2L NC. Consult with Palliative care done today. Concerns for physician to address:       Zone phone for oncoming shift:          Activity:  Activity Level: Bed Rest  Number times ambulated in hallways past shift: 0  Number of times OOB to chair past shift: 0    Cardiac:   Cardiac Monitoring: Yes      Cardiac Rhythm: Sinus Rhythm    Access:   Current line(s): PIV     Genitourinary:   Urinary status: incontinent    Respiratory:   O2 Device: Nasal cannula  Chronic home O2 use?: YES  Incentive spirometer at bedside: N/A       GI:  Last Bowel Movement Date: 04/05/22  Current diet:  ADULT DIET Dysphagia - Pureed  ADULT ORAL NUTRITION SUPPLEMENT Breakfast, Lunch, Dinner; Standard High Calorie/High Protein  Passing flatus: YES  Tolerating current diet: YES       Pain Management:   Patient states pain is manageable on current regimen: YES    Skin:  Reynaldo Score: 16  Interventions: internal/external urinary devices and nutritional support     Patient Safety:  Fall Score:  Total Score: 5  Interventions: bed/chair alarm and gripper socks  High Fall Risk: Yes    Length of Stay:  Expected LOS: - - -  Actual LOS: 4      Hunter Lemus

## 2022-04-06 NOTE — PROGRESS NOTES
End of Shift Note    Bedside shift change report given to Sarmad Childress (oncoming nurse) by Zoya Chavis (offgoing nurse). Report included the following information SBAR, Kardex and MAR    Shift worked:  7p-7a     Shift summary and any significant changes:     Scheduled medications were given, see MAR. Patient did complain of pain, Morphine was given once, see PRN MAR.  IV has been flushed and is patent. Patient is getting maintenance fluid. Family member was present at bedside. Patient teaching and routine rounding has been done. Concerns for physician to address:       Zone phone for oncoming shift:          Activity:  Activity Level: Bed Rest  Number times ambulated in hallways past shift: 0  Number of times OOB to chair past shift: 0    Cardiac:   Cardiac Monitoring: Yes      Cardiac Rhythm: Sinus Rhythm    Access:   Current line(s): PIV     Genitourinary:   Urinary status: voiding    Respiratory:   O2 Device: Nasal cannula  Chronic home O2 use?: YES  Incentive spirometer at bedside: NO       GI:  Last Bowel Movement Date: 04/05/22 (incontinent)  Current diet:  ADULT DIET Dysphagia - Pureed  ADULT ORAL NUTRITION SUPPLEMENT Breakfast, Lunch, Dinner; Standard High Calorie/High Protein  Passing flatus: YES  Tolerating current diet: YES       Pain Management:   Patient states pain is manageable on current regimen: YES    Skin:  Reynaldo Score: 16  Interventions: float heels    Patient Safety:  Fall Score:  Total Score: 5  Interventions: bed/chair alarm  High Fall Risk: Yes    Length of Stay:  Expected LOS: - - -  Actual LOS: [address]

## 2022-04-06 NOTE — PROGRESS NOTES
Problem: Risk for Spread of Infection  Goal: Prevent transmission of infectious organism to others  Description: Prevent the transmission of infectious organisms to other patients, staff members, and visitors. Outcome: Progressing Towards Goal     Problem: Falls - Risk of  Goal: *Absence of Falls  Description: Document Morene Hole Fall Risk and appropriate interventions in the flowsheet. Outcome: Progressing Towards Goal  Note: Fall Risk Interventions:       Mentation Interventions: Bed/chair exit alarm    Medication Interventions: Bed/chair exit alarm    Elimination Interventions: Call light in reach    History of Falls Interventions:  Investigate reason for fall

## 2022-04-06 NOTE — HOSPICE
[address] [facility] RN note:  T/C to grand daughter Higinio Batch [phone]. Meeting arranged for 2:00pm Saturday to accommodate Selin Richardson who will be traveling from [facility] Saturday morning. Thank you for the opportunity to care for this pt and family. Please contact hospice at [phone] with any questions or concerns.
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
## 2022-04-06 NOTE — PROGRESS NOTES
End of Shift Note    Bedside shift change report given to Denise Molina RN (oncoming nurse) by Amadou Rizvi RN (offgoing nurse). Report included the following information SBAR, Kardex and MAR    Shift worked:  7a - 7p     Shift summary and any significant changes:     -PRN morphine given once   -Family present at bedside   -Pt seen by palliative     Concerns for physician to address:       Zone phone for oncoming shift:   [phone]       Activity:  Activity Level: Bed Rest  Number times ambulated in hallways past shift: 0  Number of times OOB to chair past shift: 0    Cardiac:   Cardiac Monitoring: Yes      Cardiac Rhythm: Sinus Rhythm    Access:   Current line(s): PIV     Genitourinary:   Urinary status: incontinent and external catheter    Respiratory:   O2 Device: Nasal cannula  Chronic home O2 use?: YES  Incentive spirometer at bedside: NO       GI:  Last Bowel Movement Date: 04/05/22 (incontinent)  Current diet:  ADULT DIET Dysphagia - Pureed  ADULT ORAL NUTRITION SUPPLEMENT Breakfast, Lunch, Dinner; Standard High Calorie/High Protein  Passing flatus: YES  Tolerating current diet: YES       Pain Management:   Patient states pain is manageable on current regimen: YES    Skin:  Reynaldo Score: 13  Interventions: float heels, foam dressing, limit briefs, internal/external urinary devices and nutritional support     Patient Safety:  Fall Score:  Total Score: 4  Interventions: bed/chair alarm, gripper socks, pt to call before getting OOB and stay with me (per policy)  High Fall Risk: Yes    Length of Stay:  Expected LOS: - - -  Actual LOS: 3      Amadou Rizvi RN

## 2022-04-06 NOTE — PROGRESS NOTES
Hospitalist Progress Note    NAME: Olivia Reina   :  1935   MRN:  [id number]       Assessment / Plan:  -UTI, ESBL ecoli and p-Bradycardia, improved  -Septic shock resolved  -Acute kidney injury, prerenal-resolved  Hypernatremia  Acute metabolic encephalopathy  -Hx Afib, DVT (2018) on Eliquis, COPD, past UTIs, dementia   -End-stage dementia  Status post ground-level fall leading to periorbital swelling  Right lower leg (medial) wound: Full thickness with exposed tendon   DTI left lateral foot    -Continue with meropenem  Sodium improving down to 148, change fluid to dextrose  -Urine culture showing ESBL E. coli and Proteus mirabilis  -Hospice/palliative care following  Updated family at bedside, son daughter and grandson daughter at bedside, all questions answered on   Hospice meeting decided Saturday  Pt is more awake , AAox3 and interactive   less than 18.5 Underweight / Body mass index is 15.27 kg/m². Code status: DNR  Prophylaxis: SCD's   Estimated discharge date: To be determined  Barriers: Hospice meeting  Recommended Disposition: To be determined     Subjective:     Chief Complaint / Reason for Physician Visit  : Septic shock, ground-level fall, UTI  Patient is more alert and oriented x3 today, interactive  No acute complaints, not eating much    Objective:     VITALS:   Last 24hrs VS reviewed since prior progress note.  Most recent are:  Patient Vitals for the past 24 hrs:   Temp Pulse Resp BP SpO2   22 1130 98 °F (36.7 °C) 87 18 (!) 86/61 93 %   22 0815 97.6 °F (36.4 °C) 71 18 118/74 93 %   22 0337 97.6 °F (36.4 °C) 95 18 119/66 100 %   22 2314 97.8 °F (36.6 °C) 82 18 (!) 143/77 100 %   22 1925 96.8 °F (36 °C) 100 18 (!) 144/79 100 %   22 1520 97.6 °F (36.4 °C) 72 18 (!) 153/79 99 %       Intake/Output Summary (Last 24 hours) at 2022 1305  Last data filed at 2022 1700  Gross per 24 hour   Intake    Output 700 ml   Net -700 ml        I had a face to face encounter and independently examined this patient on 4/6/2022, as outlined below:  PHYSICAL EXAM:  General: WD, WN. Alert, pleasantly demented, no acute distress    EENT:  EOMI. Anicteric sclerae. MMM, periorbital swelling  Resp:  CTA bilaterally, no wheezing or rales. No accessory muscle use  CV:  Regular  rhythm,  No edema  GI:  Soft, Non distended, Non tender. +Bowel sounds  Neurologic:  Alert oriented x3  psych:   Unable to assess   skin:  No rashes. No jaundice    Reviewed most current lab test results and cultures  YES  Reviewed most current radiology test results   YES  Review and summation of old records today    NO  Reviewed patient's current orders and MAR    YES  PMH/SH reviewed - no change compared to H&P  ________________________________________________________________________  Care Plan discussed with:    Comments   Patient x    Family  x    RN x    Care Manager     Consultant  x                      Multidiciplinary team rounds were held today with , nursing, pharmacist and clinical coordinator. Patient's plan of care was discussed; medications were reviewed and discharge planning was addressed. ________________________________________________________________________  Total NON critical care TIME:35Minutes    Total CRITICAL CARE TIME Spent:   Minutes non procedure based      Comments   >50% of visit spent in counseling and coordination of care     ________________________________________________________________________  Dajuan Cuenca MD     Procedures: see electronic medical records for all procedures/Xrays and details which were not copied into this note but were reviewed prior to creation of Plan. LABS:  I reviewed today's most current labs and imaging studies.   Pertinent labs include:  Recent Labs     04/05/22 0424 04/04/22 0335   WBC 9.3 6.7   HGB 9.1* 8.4*   HCT 32.9* 30.2*   PLT 91* 109*     Recent Labs     04/05/22 0424 04/04/22 0335   * 153* K 3.8 3.4*   * 119*   CO2 26 30   * 60*   BUN 18 26*   CREA 0.75 1.05*   CA 8.8 8.8   MG 2.4 2.6*   PHOS 1.9* 3.0       Signed: dEdi Alex MD

## 2022-04-06 NOTE — PROGRESS NOTES
Palliative Medicine  [facility]: [phone] ([phone])  [facility]: [phone] ([phone])    Patient seen with her daughter Barbara Caro at bedside. Patient is more alert, able to interact and talk to this writer. She is quite vocal about not wanting to return to [address] [place] [address],[address] SNF: \"I know I don't want to go back there\". Love Kevin also verbalizes this same view, feels that patient has had \"too many falls there\" and she would not be comfortable with patient being left in her room alone. Love Kevin mentioned that she would like patient to be able to return to her own home, have asked Love Wellington if she has discussed this with the rest of the family. She as discussed with her daughter Shlomo Chambers but not with Marcello. LCSW encouraged this conversation to see if this is a feasible plan. Patient does have Medicaid and may be eligible for Personal Care at home, if this is what family wishes. Patient reports better sleep last night and feels well rested. She also is reporting that her pain is better controlled. LCSW explored with patient that we are trying to see what services would be helpful to patient after discharge and that hospice is one possible option. Asked patient if she knows what hospice is. She has a rudimentary understanding of hospice services, \"it's when someone has 10 days before they die\". Explained briefly role of hospice and explained to patient that hospice team plans to meet with her and family on Saturday. Patient is okay with this. Her mental state does wax and wane and today is a good day. Spoke briefly to hospice liaison Isiah Lockwood as family is hoping for a time for Saturday to meet with patient. Love Kevin and patient requesting some sherbet for her, LCSW obtained a couple of cups for her from floor. Spoke to Lucina MENEZES that family wants IV antibiotics to continue for now, meet with hospice and then determine a plan, with Palliative team following up on Monday 4/11 with them.

## 2022-04-06 NOTE — PROGRESS NOTES
Transition of Care Plan:    RUR: 15%   Disposition: Palliative Meeting, Hospice Info Session to discuss goals of care   Follow up appointments: Follow up with PCP and/or Specialist   DME needed:equipment to be determined based on family meeting   Transportation at [address] transport-CM will arrange   Keys or means to access home:  Family will provide   IM Medicare Letter:2nd IM Medicare Letter to be given   Is patient a BCPI-A Bundle:   CM will provide if required    If yes, was Bundle Letter given?:    Is patient a [place] and connected with the [address]? N/A       If yes, was [employer] transfer form completed and VA notified? Caregiver Contact:Augie Cleaning (son) [phone], Darrion Cloud (daughter) [phone], Lorraine Waters ([facility]) [phone]  Discharge Caregiver contacted prior to discharge? Family to be contacted   Care Conference needed?: Palliative Meeting scheduled today      UPDATE: 3:02PM    CM staffed case with palliative SW, regarding family meeting. It has been determined that pt's family would like for pt  to continue with IV abx to determine if pt is becoming medically stable or not. CM informed that hospice plans to resume family meeting on Saturday, to include pt's granddaugher, due to her residing in [place]. Once decisions have been made, with family plans for home hospice care or home with family with additional services will be finalized. CM will continue to follow. SHUKRI Thomason, [place]         INITIATAL NOTE CM will follow up with clinical team to determine pt d/c needs and plans. Per hospice note, family wanted to meet with family on Saturday. CM will confirm if meeting can be sooner. CM will continue to follow.     SHUKRI Thomason, [address] [place] [address]
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
## 2022-04-07 NOTE — PROGRESS NOTES
End of Shift Note    Bedside shift change report given to Chao Naidu (oncoming nurse) by Vanessa Lee (offgoing nurse). Report included the following information SBAR, Kardex and MAR    Shift worked:  7p-7a     Shift summary and any significant changes:     Scheduled medications were given, see MAR. Patient was pre-medicated before wound care was done, see PRN MAR.  IV has been flushed and is patent. Patient is getting maintenance fluid. Family member was present at the bedside. Patient was repositioned during my shift. Patient teaching and routine rounding has been done. Concerns for physician to address:       Zone phone for oncoming shift:          Activity:  Activity Level: Bed Rest  Number times ambulated in hallways past shift: 0  Number of times OOB to chair past shift: 0    Cardiac:   Cardiac Monitoring: No      Cardiac Rhythm: Sinus Rhythm    Access:   Current line(s): PIV     Genitourinary:   Urinary status: incontinent    Respiratory:   O2 Device: Nasal cannula  Chronic home O2 use?: NO  Incentive spirometer at bedside: NO       GI:  Last Bowel Movement Date: 04/05/22  Current diet:  ADULT DIET Dysphagia - Pureed  ADULT ORAL NUTRITION SUPPLEMENT Breakfast, Lunch, Dinner; Standard High Calorie/High Protein  Passing flatus: YES  Tolerating current diet: YES       Pain Management:   Patient states pain is manageable on current regimen: YES    Skin:  Reynaldo Score: 16  Interventions: turn team and float heels    Patient Safety:  Fall Score:  Total Score: 4  Interventions: gripper socks  High Fall Risk: Yes    Length of Stay:  Expected LOS: - - -  Actual LOS: 2005 A [facility]

## 2022-04-07 NOTE — PROGRESS NOTES
Hospitalist Progress Note    NAME: Pat Palmer   :  1935   MRN:  [id number]       Assessment / Plan:  -UTI, ESBL ecoli and p-Bradycardia, improved  -Septic shock resolved  -Acute kidney injury, prerenal-resolved  Hypernatremia  Acute metabolic encephalopathy  -Hx Afib, DVT (2018) on Eliquis, COPD, past UTIs, dementia   -End-stage dementia  Status post ground-level fall leading to periorbital swelling  Right lower leg (medial) wound: Full thickness with exposed tendon   DTI left lateral foot    -Continue with meropenem  Sodium improving down to 148, change fluid to dextrose  -Urine culture showing ESBL E. coli and Proteus mirabilis  -Hospice/palliative care following  Updated family at bedside, son daughter and grandson daughter at bedside, all questions answered on   Hospice meeting decided Saturday  Pt is more awake , AAox3 and interactive   Patient requesting for dysphagia diet to be changed to regular diet  Clinically improving we will continue meropenem for 2 more days to complete 7-day course. Speech reconsulted to reevaluate patient    less than 18.5 Underweight / Body mass index is 15.27 kg/m². Code status: DNR  Prophylaxis: SCD's   Estimated discharge date: To be determined  Barriers: Hospice meeting  Recommended Disposition: To be determined     Subjective:     Chief Complaint / Reason for Physician Visit  Follow-up septic shock, ground-level fall, UTI  No acute issues  Patient is not eating much and does not like the dysphagia diet  Objective:     VITALS:   Last 24hrs VS reviewed since prior progress note.  Most recent are:  Patient Vitals for the past 24 hrs:   Temp Pulse Resp BP SpO2   22 1115 (!) 96.5 °F (35.8 °C) 83 18 139/68 90 %   22 0745  66 18 132/70 99 %   22 0254 97.6 °F (36.4 °C) 77 16 (!) 151/76    22 2019 97.9 °F (36.6 °C) 83 16 108/63    22 1530 97.9 °F (36.6 °C) 85 18 122/65 97 %     No intake or output data in the 24 hours ending 04/07/22 1317     I had a face to face encounter and independently examined this patient on 4/7/2022, as outlined below:  PHYSICAL EXAM:  General: WD, WN. Alert, pleasantly demented, no acute distress    EENT:  EOMI. Anicteric sclerae. MMM, periorbital swelling  Resp:  CTA bilaterally, no wheezing or rales. No accessory muscle use  CV:  Regular  rhythm,  No edema  GI:  Soft, Non distended, Non tender. +Bowel sounds  Neurologic:  Alert oriented x3  psych:   Unable to assess   skin:  No rashes. No jaundice    Reviewed most current lab test results and cultures  YES  Reviewed most current radiology test results   YES  Review and summation of old records today    NO  Reviewed patient's current orders and MAR    YES  PMH/SH reviewed - no change compared to H&P  ________________________________________________________________________  Care Plan discussed with:    Comments   Patient x    Family  x    RN x    Care Manager     Consultant  x                      Multidiciplinary team rounds were held today with , nursing, pharmacist and clinical coordinator. Patient's plan of care was discussed; medications were reviewed and discharge planning was addressed. ________________________________________________________________________  Total NON critical care TIME:35Minutes    Total CRITICAL CARE TIME Spent:   Minutes non procedure based      Comments   >50% of visit spent in counseling and coordination of care     ________________________________________________________________________  Donekeith Oshea MD     Procedures: see electronic medical records for all procedures/Xrays and details which were not copied into this note but were reviewed prior to creation of Plan. LABS:  I reviewed today's most current labs and imaging studies.   Pertinent labs include:  Recent Labs     04/05/22  0424   WBC 9.3   HGB 9.1*   HCT 32.9*   PLT 91*     Recent Labs     04/07/22  1003 04/06/22  1553 04/05/22  0424    --  148*   K 4.3  --  3.8     --  117*   CO2 30  --  26   GLU 94  --  108*   BUN 12  --  18   CREA 0.84  --  0.75   CA 8.7  --  8.8   MG 2.1 2.0 2.4   PHOS 2.6 2.8 1.9*       Signed: Olivia Palmer MD

## 2022-04-07 NOTE — PROGRESS NOTES
Problem: Dysphagia (Adult)  Goal: *Acute Goals and Plan of Care (Insert Text)  Description: Speech pathology goals  Initiated 4/4/2022  1. Patient will tolerate puree/thin liquid diet with no overt s/s aspiration or adverse effects within 7 days  2. Patient will tolerate trials of solids with functional mastication and full oral clearance when she has her dentures  Added 4/7/2022: 3. Patient will tolerate Easy to Chew/Thin Liquids when awake/alert with no overt s/s aspiration or adverse effects within 7 days. Outcome: Progressing Towards Goal     SPEECH LANGUAGE PATHOLOGY DYSPHAGIA TREATMENT  Patient: Janusz Alcantar (40 y.o. female)  Date: 4/7/2022  Diagnosis: Septic shock ([facility] [address] [A41.9, R65.21]  UTI (urinary tract infection) [N39.0] <principal problem not specified>       Precautions: Aspiration      ASSESSMENT:  Patient continues to be at risk for aspiration 2/2 history of dementia and COPD. Initial SLP evaluation on 4/4 recommended Puree/Thin Liquids given patient's risk for aspiration. Per MD, patient requesting diet be advanced. On this date, patient continues to delayed mastication, leading to suspected delayed propulsion. No overt signs of aspiration on this date, however, patient with chronic dysphagia risk factors. Patient on Regular/Thin Liquids at home with dentures. At this juncture, will upgrade diet to Easy to Chew/Thin Liquids with strict aspiration precautions when patient is alert/awake and actively accepting PO intake. Please hold PO and/or solids if decline in patient's mentation given risk for asphyxiation. RN educated re: SLP evaluation.      PLAN:  Recommendations and Planned Interventions:  -- Easy to Chew/Thin Liquids with strict aspiration precautions when patient is alert/awake and actively accepting PO intake  -- Small, Single Bites/Sips  -- Slow Rate  -- Upright for all PO  -- Medication in Puree  -- Please hold PO and/or solids if decline in patient's mentation given risk for asphyxiation    Patient continues to benefit from skilled intervention to address the above impairments. Continue treatment per established plan of care. Discharge Recommendations: To Be Determined     SUBJECTIVE:   Patient stated it's a candy bar when asked what she was eating. OBJECTIVE:   Cognitive and Communication Status:  Neurologic State: Alert  Orientation Level: Oriented to person,Oriented to place  Cognition: Follows commands     Perseveration: No perseveration noted  Safety/Judgement: Awareness of environment    Dysphagia Treatment:  Oral Assessment:  Oral Assessment  Dentition: Natural;Limited; Other (comment) (Patient is edentulous on top with limited lower dentition)    P.O. Trials:  Patient Position: Upright  Vocal quality prior to P.O.: No impairment  Consistency Presented: Thin liquid; Solid  How Presented: Self-fed/presented;Straw;Successive swallows     Bolus Acceptance: No impairment  Bolus Formation/Control: Impaired  Type of Impairment: Delayed;Mastication  Propulsion: Delayed (# of seconds)  Oral Residue: None        Aspiration Signs/Symptoms: None  Pharyngeal Phase Characteristics: Other (comment) (At risk for aspiration)  Effective Modifications: Alternate liquids/solids                Pain:             After treatment:   Patient left in no apparent distress in bed, Call bell within reach, Nursing notified and Caregiver / family present    COMMUNICATION/EDUCATION:   The patient's plan of care including recommendations, planned interventions, and recommended diet changes were discussed with: Registered nurse.      Ja Sidhu, SLP  Time Calculation: 12 mins

## 2022-04-08 NOTE — PROGRESS NOTES
End of Shift Note    Bedside shift change report given to Natalia Kawasaki, RN (oncoming nurse) by Shayla Bose RN (offgoing nurse). Report included the following information SBAR, Kardex and MAR    Shift worked:  7a - 7p     Shift summary and any significant changes:     -IV in L upper arm infiltrated; removed   -New IV placed in RFA   -Pt seen by dietitian   -PRN morphine IV given twice for R leg pain   -Wound care completed; pt tolerated well   -Plan is for family meeting w/ hospice tomorrow     Concerns for physician to address:       Zone phone for oncoming shift:   [phone]       Activity:  Activity Level: Bed Rest  Number times ambulated in hallways past shift: 0  Number of times OOB to chair past shift: 0    Cardiac:   Cardiac Monitoring: Yes      Cardiac Rhythm: Sinus Rhythm,Multiform PVCs    Access:   Current line(s): PIV     Genitourinary:   Urinary status: voiding, incontinent and external catheter    Respiratory:   O2 Device: None (Room air)  Chronic home O2 use?: NO  Incentive spirometer at bedside: NO       GI:  Last Bowel Movement Date: 04/06/22  Current diet:  ADULT ORAL NUTRITION SUPPLEMENT Breakfast, Lunch, Dinner; Standard High Calorie/High Protein  ADULT DIET Easy to Chew  ADULT ORAL NUTRITION SUPPLEMENT Breakfast, Lunch, Dinner; Frozen Supplement  Passing flatus: YES  Tolerating current diet: YES       Pain Management:   Patient states pain is manageable on current regimen: YES    Skin:  Reynaldo Score: 12  Interventions: turn team, float heels, increase time out of bed, foam dressing, PT/OT consult, limit briefs, internal/external urinary devices and nutritional support     Patient Safety:  Fall Score:  Total Score: 4  Interventions: bed/chair alarm, gripper socks, pt to call before getting OOB and stay with me (per policy)  High Fall Risk: Yes    Length of Stay:  Expected LOS: - - -  Actual LOS: 6      Shayla Bose, ESME

## 2022-04-08 NOTE — PROGRESS NOTES
Transition of Care Plan:    RUR:  12% low  Disposition: To be determined after Hospice information meeting scheduled for Saturday, 4/9/22  Follow up appointments:  PCP and specialists if not going into hospice  DME needed:  Will assess for DME needs once disposition is identified  Transportation at Discharge: Family vs [facility]  [place] or means to access home:  Family has access to home      IM Medicare Letter:  2nd IMM letter needed prior to discharge  Is patient a BCPI-A Bundle:  n/a         If yes, was Bundle Letter given?:    Is patient a [place] and connected with the [address] [place] [address]?  n/a              If yes, was [employer] transfer form completed and [facility] notified? Caregiver Contact:  Mikael Beauchamp, son ([phone])  Discharge Caregiver contacted prior to discharge? Family actively involved in discharge [address] needed?:  Not at this time. CM reviewed chart. Waiting on hospice informational session to occur to determine plan of care and discharge plan. Hospice informational session scheduled for Saturday, April 8, 2022 when family can arrive from [facility] to participate. CM will follow and assist with discharge planning as needed. Fernanda Diggs Pyo, [address] - ED [facility]  [employer] ACP Facilitator  Zone Phone: [phone]

## 2022-04-08 NOTE — PROGRESS NOTES
Hospitalist Progress Note    NAME: Janusz Alcantar   :  1935   MRN:  [id number]       Assessment / Plan:  -UTI, ESBL ecoli and p-Bradycardia, improved  -Septic shock resolved  -Acute kidney injury, prerenal-resolved  Hypernatremia  Acute metabolic encephalopathy  -Hx Afib, DVT (2018) on Eliquis, COPD, past UTIs, dementia   -End-stage dementia  Status post ground-level fall leading to periorbital swelling  Right lower leg (medial) wound: Full thickness with exposed tendon   DTI left lateral foot    -Continue with meropenem for a total of 14 days  Sodium back to within normal limit  -Urine culture showing ESBL E. coli and Proteus mirabilis  -Hospice/palliative care following  Updated family at bedside, son daughter and grandson daughter at bedside, all questions answered on   Hospice meeting decided Saturday at 2 PM  Pt is more awake , AAox3 and interactive   Patient requesting for dysphagia diet to be changed to regular diet  Clinically improving we will continue meropenem for 2 more days to complete 7-day course. Speech reconsulted to reevaluate patient  Speech reconsulted, patient is at high risk of aspiration, diet switched  to easy to chew    less than 18.5 Underweight / Body mass index is 15.27 kg/m². Code status: DNR  Prophylaxis: SCD's   Estimated discharge date: To be determined  Barriers: Hospice meeting  Recommended Disposition: To be determined     Subjective:     Chief Complaint / Reason for Physician Visit  Follow-up septic shock, ground-level fall, UTI  Patient seen alert awake, does not like dysphagia diet  Her daughter was feeding her a cookie this morning, explained to her that she needs a easy to chew diet as she is at high risk of aspiration  Objective:     VITALS:   Last 24hrs VS reviewed since prior progress note.  Most recent are:  Patient Vitals for the past 24 hrs:   Temp Pulse Resp BP SpO2   22 0717 97.9 °F (36.6 °C) 81 18 130/78 90 %   22 0346 97.8 °F (36.6 °C) 87 18 125/73 92 %   04/07/22 2246 97.9 °F (36.6 °C) 91 28 126/71 91 %   04/07/22 1913 98.2 °F (36.8 °C) 99 18 103/63 92 %   04/07/22 1515 98 °F (36.7 °C) 87 18 (!) 103/59 96 %   04/07/22 1115 (!) 96.5 °F (35.8 °C) 83 18 139/68 90 %     No intake or output data in the 24 hours ending 04/08/22 0756     I had a face to face encounter and independently examined this patient on 4/8/2022, as outlined below:  PHYSICAL EXAM:  General: WD, WN. Alert, pleasantly demented, no acute distress    EENT:  EOMI. Anicteric sclerae. MMM, periorbital swelling  Resp:  CTA bilaterally, no wheezing or rales. No accessory muscle use  CV:  Regular  rhythm,  No edema  GI:  Soft, Non distended, Non tender. +Bowel sounds  Neurologic:  Alert oriented x3  psych:   Unable to assess   skin:  No rashes. No jaundice    Reviewed most current lab test results and cultures  YES  Reviewed most current radiology test results   YES  Review and summation of old records today    NO  Reviewed patient's current orders and MAR    YES  PMH/SH reviewed - no change compared to H&P  ________________________________________________________________________  Care Plan discussed with:    Comments   Patient x    Family  x    RN x    Care Manager     Consultant  x                      Multidiciplinary team rounds were held today with , nursing, pharmacist and clinical coordinator. Patient's plan of care was discussed; medications were reviewed and discharge planning was addressed.      ________________________________________________________________________  Total NON critical care TIME:35Minutes    Total CRITICAL CARE TIME Spent:   Minutes non procedure based      Comments   >50% of visit spent in counseling and coordination of care     ________________________________________________________________________  Orestes Lake MD     Procedures: see electronic medical records for all procedures/Xrays and details which were not copied into this note but were reviewed prior to creation of Plan. LABS:  I reviewed today's most current labs and imaging studies. Pertinent labs include:  No results for input(s): WBC, HGB, HCT, PLT, HGBEXT, HCTEXT, PLTEXT, HGBEXT, HCTEXT, PLTEXT in the last 72 hours.   Recent Labs     04/08/22  0348 04/07/22  1003 04/06/22  1553   NA  --  140  --    K  --  4.3  --    CL  --  107  --    CO2  --  30  --    GLU  --  94  --    BUN  --  12  --    CREA  --  0.84  --    CA  --  8.7  --    MG 2.2 2.1 2.0   PHOS 2.2* 2.6 2.8       Signed: Teo Zamora MD

## 2022-04-08 NOTE — PROGRESS NOTES
Comprehensive Nutrition Assessment    Type and Reason for Visit: Initial,RD nutrition re-screen/LOS (& BMI)    Nutrition Recommendations/Plan:   Continue easy to chew diet, modifications per SLP. Liberalize if transitioning to CMO/hospice. Ensure enlive TID  Add Magic cup TID as well  Please document % meals and supplements consumed in flowsheet I/O's under intake     Nutrition Assessment:      Chart reviewed for low BMI and LOS. Pt noted for UTI, septic shock, LILLIAN, dementia, s/p GLF, right lower leg (medial) wound that is full thickness with exposed tendon, DTI on left foot, sutures to right eyebrow. Pt appears cachectic with profound fat and muscle wasting. Lunch mostly untouched. Pt asking for a peanut butter sandwich, but not appropriate on the easy to chew diet per SLP recs. She has Ensure shakes already on board, drinking some. She says she loves sweets and sherbet so will order Magic cups as well. Hospice meeting with family tomorrow to gather consensus on goals of care. Daughter at bedside extremely lethargic during my visit, falling asleep. Per EHR, pt is down >20% over the last 10 months, severe for the time frame. Clearly malnourished per ASPEN criteria.      Wt Readings from Last 5 Encounters:   04/05/22 39.1 kg (86 lb 3.2 oz)   09/18/21 51.6 kg (113 lb 12.1 oz)   06/11/21 49.4 kg (108 lb 12.8 oz)   10/26/20 47.2 kg (104 lb)   10/16/20 47.6 kg (105 lb)   ]    Malnutrition Assessment:  Malnutrition Status:  Severe malnutrition    Context:  Chronic illness     Findings of the 6 clinical characteristics of malnutrition:   Energy Intake:  No significant decrease in energy intake  Weight Loss:  7.000 - Greater than 20% over 1 year     Body Fat Loss:  7 - Severe body fat loss, Buccal region,Fat overlying ribs,Orbital   Muscle Mass Loss:  7 - Severe muscle mass loss, Clavicles (pectoralis &deltoids),Hand (interosseous),Scapula (trapezius),Temples (temporalis)  Fluid Accumulation:  No significant fluid accumulation,     Strength:  Not performed         Estimated Daily Nutrient Needs:  Energy (kcal): 1340 kcals (BMR x 1.3AF + 300 for wt gain); Weight Used for Energy Requirements: Current  Protein (g): 63-55g (1.4-1.6g/kg); Weight Used for Protein Requirements: Current  Fluid (ml/day): 1300mL; Method Used for Fluid Requirements: 1 ml/kcal      Nutrition Related Findings:  Labs: phos 2.2. Meds: merrem, prednisone, D5, morphine. BM 4/6.       Wounds:    Multiple,Deep tissue injury,Skin tears,Open wounds       Current Nutrition Therapies:  ADULT ORAL NUTRITION SUPPLEMENT Breakfast, Lunch, Dinner; Standard High Calorie/High Protein  ADULT DIET Easy to Chew    Anthropometric Measures:  · Height:  5' 2.99\" (160 cm)  · Current Body Wt:  39.1 kg (86 lb 3.2 oz)   · Ideal Body Wt:  115 lbs:  75 %   · BMI Category:  Underweight (BMI less than 18.5)       Nutrition Diagnosis:   · Severe malnutrition,In context of chronic illness related to inadequate protein-energy intake (dementia) as evidenced by intake 0-25%,severe muscle loss,severe loss of subcutaneous fat      Nutrition Interventions:   Food and/or Nutrient Delivery: Continue current diet,Continue oral nutrition supplement  Nutrition Education and Counseling: No recommendations at this time  Coordination of Nutrition Care: Continue to monitor while inpatient    Goals:  PO intake >50% meals and supplements or transition to comfort/hospice next 2-4 days       Nutrition Monitoring and Evaluation:   Behavioral-Environmental Outcomes: None identified  Food/Nutrient Intake Outcomes: Food and nutrient intake,Supplement intake  Physical Signs/Symptoms Outcomes: Biochemical data,GI status,Weight,Nutrition focused physical findings,Skin    Discharge Planning:    Continue current diet,Continue oral nutrition supplement     Electronically signed by Lexi Yao RD on 4/8/2022 at 2:11 PM    Contact: [phone]

## 2022-04-08 NOTE — PROGRESS NOTES
Problem: Risk for Spread of Infection  Goal: Prevent transmission of infectious organism to others  Description: Prevent the transmission of infectious organisms to other patients, staff members, and visitors. Outcome: Progressing Towards Goal     Problem: Falls - Risk of  Goal: *Absence of Falls  Description: Document Juan J Rosemary Fall Risk and appropriate interventions in the flowsheet. Note: Fall Risk Interventions:  Mobility Interventions: Bed/chair exit alarm    Mentation Interventions: Bed/chair exit alarm    Medication Interventions: Bed/chair exit alarm    Elimination Interventions: Call light in reach    History of Falls Interventions:  Investigate reason for fall

## 2022-04-08 NOTE — HOSPICE
Hospice RN Note: spoke with granddaughter, Suraj Bianchi via phone today and she will not be driving to [place] for hospice meeting at 2pm tomorrow. Marcy Roblero has asked that liaison call her on speaker phone tomorrow for 2pm meeting [phone].

## 2022-04-09 NOTE — HOSPICE
[facility]  [phone]    Nursing Note   Patient Name: Sherine Brown  YOB: 1935  Age: 80 y.o. [address] RN Note:     0824: In to meet with patient, Awilda/dtr, Carolee/dtr, Marcello/son with Valentino Hajulito Henao/granddaughter/MPOA on the phone for conference call. Discussed Hospice philosophy, general plan of care, levels of care, services. Patient shared that her  was in hospice many years ago. After answering patient and family's questions, Candace/TORREY stated that family would continue to discuss and will get back with the CM here at [facility] on their decision. Explained that [id number] [employer] will continue to follow patient while at [facility] and will assist with discharge planning. [phone] [employer] contact number provided. Thank you for the opportunity to be of service to this patient and her family.

## 2022-04-09 NOTE — PROGRESS NOTES
Problem: Risk for Spread of Infection  Goal: Prevent transmission of infectious organism to others  Description: Prevent the transmission of infectious organisms to other patients, staff members, and visitors. Outcome: Progressing Towards Goal     Problem: Falls - Risk of  Goal: *Absence of Falls  Description: Document Michelle Guajardo Fall Risk and appropriate interventions in the flowsheet.   Outcome: Progressing Towards Goal  Note: Fall Risk Interventions:  Mobility Interventions: Bed/chair exit alarm    Mentation Interventions: Bed/chair exit alarm,Door open when patient unattended,More frequent rounding,Room close to nurse's station    Medication Interventions: Bed/chair exit alarm    Elimination Interventions: Bed/chair exit alarm,Call light in reach    History of Falls Interventions: Bed/chair exit alarm,Door open when patient unattended,Room close to nurse's station

## 2022-04-09 NOTE — PROGRESS NOTES
Hospitalist Progress Note    NAME: Shawn Thomas   :  1935   MRN:  [id number]       Assessment / Plan:  -UTI, ESBL ecoli and p-Bradycardia, improved  -Septic shock resolved  -Acute kidney injury, prerenal-resolved  Hypernatremia  Acute metabolic encephalopathy  -Hx Afib, DVT (2018) on Eliquis, COPD, past UTIs, dementia   -End-stage dementia  Status post ground-level fall leading to periorbital swelling  Right lower leg (medial) wound: Full thickness with exposed tendon   DTI left lateral foot    Continue meropenem until tomorrow  Sodium back to within normal limit  -Urine culture showing ESBL E. coli and Proteus mirabilis  -Hospice/palliative care following  Updated family at bedside, son daughter and grandson daughter at bedside, all questions answered on   Hospice meeting decided Saturday at 2 PM  Pt is more awake , AAox3 and interactive   Patient requesting for dysphagia diet to be changed to regular diet  Clinically improving we will continue meropenem for 2 more days to complete 7-day course. Speech reconsulted to reevaluate patient  Speech reconsulted, patient is at high risk of aspiration, diet switched  to easy to chew  Hospice meeting today  less than 18.5 Underweight / Body mass index is 15.27 kg/m². Code status: DNR  Prophylaxis: SCD's   Estimated discharge date: 04/10  Barriers: Hospice meeting  Recommended Disposition: To be determined     Subjective:     Chief Complaint / Reason for Physician Visit  Follow-up septic shock, ground-level fall, UTI  No acute issues  Objective:     VITALS:   Last 24hrs VS reviewed since prior progress note.  Most recent are:  Patient Vitals for the past 24 hrs:   Temp Pulse Resp BP SpO2   22 0730 97.8 °F (36.6 °C) 83 18 137/70 90 %   22 0355 98.2 °F (36.8 °C) 87 18 (!) 140/72 91 %   22 0307     91 %   22 1915 98.4 °F (36.9 °C) 87 18 120/82 91 %   22 1441 98.2 °F (36.8 °C) 76 18 113/60 92 %   22 1107 97.9 °F (36.6 °C) 96 18 95/70 95 %     No intake or output data in the 24 hours ending 04/09/22 0804     I had a face to face encounter and independently examined this patient on 4/9/2022, as outlined below:  PHYSICAL EXAM:  General: WD, WN. Alert, pleasantly demented, no acute distress    EENT:  EOMI. Anicteric sclerae. MMM, periorbital swelling  Resp:  CTA bilaterally, no wheezing or rales. No accessory muscle use  CV:  Regular  rhythm,  No edema  GI:  Soft, Non distended, Non tender. +Bowel sounds  Neurologic:  Alert oriented x3  psych:   Unable to assess   skin:  No rashes. No jaundice    Reviewed most current lab test results and cultures  YES  Reviewed most current radiology test results   YES  Review and summation of old records today    NO  Reviewed patient's current orders and MAR    YES  PMH/ reviewed - no change compared to H&P  ________________________________________________________________________  Care Plan discussed with:    Comments   Patient x    Family  x    RN x    Care Manager     Consultant  x                      Multidiciplinary team rounds were held today with , nursing, pharmacist and clinical coordinator. Patient's plan of care was discussed; medications were reviewed and discharge planning was addressed. ________________________________________________________________________  Total NON critical care TIME:35Minutes    Total CRITICAL CARE TIME Spent:   Minutes non procedure based      Comments   >50% of visit spent in counseling and coordination of care     ________________________________________________________________________  Ricci Gowers, MD     Procedures: see electronic medical records for all procedures/Xrays and details which were not copied into this note but were reviewed prior to creation of Plan. LABS:  I reviewed today's most current labs and imaging studies.   Pertinent labs include:  No results for input(s): WBC, HGB, HCT, PLT, HGBEXT, HCTEXT, PLTEXT, HGBEXT, HCTEXT, PLTEXT in the last 72 hours.   Recent Labs     04/08/22  0348 04/07/22  1003 04/06/22  1553   NA  --  140  --    K  --  4.3  --    CL  --  107  --    CO2  --  30  --    GLU  --  94  --    BUN  --  12  --    CREA  --  0.84  --    CA  --  8.7  --    MG 2.2 2.1 2.0   PHOS 2.2* 2.6 2.8       Signed: Americo Garcia MD

## 2022-04-09 NOTE — PROGRESS NOTES
End of Shift Note    Bedside shift change report given to Monet Lund (oncoming nurse) by Josafat Bach (offgoing nurse). Report included the following information SBAR, Kardex and MAR    Shift worked:  7p-7a     Shift summary and any significant changes:     Scheduled medications were given, see MAR. Patient did not complain of any pain during my shift. IV has been flushed and is patent. Family member was present at bedside throughout my shift. Patient is getting maintenance fluid. Patient teaching and routine rounding has been done. Concerns for physician to address:       Zone phone for oncoming shift:          Activity:  Activity Level: Bed Rest  Number times ambulated in hallways past shift: 0  Number of times OOB to chair past shift: 0    Cardiac:   Cardiac Monitoring: Yes      Cardiac Rhythm: Sinus Rhythm    Access:   Current line(s): PIV     Genitourinary:   Urinary status: voiding and external catheter    Respiratory:   O2 Device: Nasal cannula  Chronic home O2 use?: NO  Incentive spirometer at bedside: NO       GI:  Last Bowel Movement Date: 04/06/22  Current diet:  ADULT ORAL NUTRITION SUPPLEMENT Breakfast, Lunch, Dinner; Standard High Calorie/High Protein  ADULT DIET Easy to Chew  ADULT ORAL NUTRITION SUPPLEMENT Breakfast, Lunch, Dinner; Frozen Supplement  Passing flatus: YES  Tolerating current diet: YES       Pain Management:   Patient states pain is manageable on current regimen: YES    Skin:  Reynaldo Score: 17  Interventions: float heels and internal/external urinary devices    Patient Safety:  Fall Score:  Total Score: 4  Interventions: bed/chair alarm  High Fall Risk: Yes    Length of Stay:  Expected LOS: - - -  Actual LOS: [address] [facility]

## 2022-04-09 NOTE — PROGRESS NOTES
End of Shift Note    Bedside shift change report given to [facility] (oncoming nurse) by Vijaya Negro RN (offgoing nurse). Report included the following information SBAR, Kardex, Intake/Output, MAR and Recent Results    Shift worked:  4953-3370     Shift summary and any significant changes:     Pleasant. Morphine x 2 for pain. Multiple family members at bedside to celebrate birthday. Eating well. Wound care to right leg performed.       Concerns for physician to address:       Zone phone for oncoming shift:            Vijaya Negro, RN

## 2022-04-09 NOTE — PROGRESS NOTES
Problem: Risk for Spread of Infection  Goal: Prevent transmission of infectious organism to others  Description: Prevent the transmission of infectious organisms to other patients, staff members, and visitors. 4/9/2022 1105 by Carl Fernandez RN  Outcome: Progressing Towards Goal  4/9/2022 0743 by Carl Fernandez RN  Outcome: Progressing Towards Goal     Problem: Falls - Risk of  Goal: *Absence of Falls  Description: Document Kelly Salguero Fall Risk and appropriate interventions in the flowsheet.   4/9/2022 1105 by Carl Fernandez RN  Outcome: Progressing Towards Goal  Note: Fall Risk Interventions:  Mobility Interventions: Bed/chair exit alarm,Communicate number of staff needed for ambulation/transfer,Patient to call before getting OOB,PT Consult for mobility concerns    Mentation Interventions: Adequate sleep, hydration, pain control,Bed/chair exit alarm,Door open when patient unattended,Family/sitter at [facility] frequent rounding,Reorient patient,Room close to nurse's station,Update white board    Medication Interventions: Bed/chair exit alarm,Patient to call before getting OOB,Teach patient to arise slowly    Elimination Interventions: Bed/chair exit alarm,Call light in reach,Patient to call for help with toileting needs,Toileting schedule/hourly rounds    History of Falls Interventions: Evaluate medications/consider consulting pharmacy,Bed/chair exit alarm,Consult care management for discharge planning,Room close to nurse's station      4/9/2022 0743 by Carl Fernandez RN  Outcome: Progressing Towards Goal  Note: Fall Risk Interventions:  Mobility Interventions: Bed/chair exit alarm    Mentation Interventions: Bed/chair exit alarm,Door open when patient unattended,More frequent rounding,Room close to nurse's station    Medication Interventions: Bed/chair exit alarm    Elimination Interventions: Bed/chair exit alarm,Call light in reach    History of Falls Interventions: Bed/chair exit alarm,Door open when patient unattended,Room close to nurse's station         Problem: Pressure Injury - Risk of  Goal: *Prevention of pressure injury  Description: Document Reynaldo Scale and appropriate interventions in the flowsheet.   Outcome: Progressing Towards Goal  Note: Pressure Injury Interventions:  Sensory Interventions: Assess changes in LOC,Avoid rigorous massage over bony prominences,Discuss PT/OT consult with provider,Float heels,Keep linens dry and wrinkle-free,Maintain/enhance activity level,Minimize linen layers,Monitor skin under medical devices    Moisture Interventions: Absorbent underpads,Apply protective barrier, creams and emollients,Internal/External urinary devices,Limit adult briefs,Minimize layers    Activity Interventions: Pressure redistribution bed/mattress(bed type)    Mobility Interventions: PT/OT evaluation,Float heels,Assess need for specialty bed    Nutrition Interventions: Document food/fluid/supplement intake    Friction and Shear Interventions: Apply protective barrier, creams and emollients,Feet elevated on foot rest,HOB 30 degrees or less,Lift sheet,Lift team/patient mobility team,Minimize layers                Problem: Patient Education: Go to Patient Education Activity  Goal: Patient/Family Education  Outcome: Progressing Towards Goal     Problem: Urinary Tract Infection - Adult  Goal: *Absence of infection signs and symptoms  Outcome: Progressing Towards Goal

## 2022-04-10 NOTE — PROGRESS NOTES
Hospitalist Progress Note    NAME: Az Cortez   :  1935   MRN:  [id number]       Assessment / Plan:  -UTI, ESBL ecoli and p-Bradycardia, improved  -Septic shock resolved  -Acute kidney injury, prerenal-resolved  Hypernatremia  Acute metabolic encephalopathy  -Hx Afib, DVT (2018) on Eliquis, COPD, past UTIs, dementia   -End-stage dementia  Status post ground-level fall leading to periorbital swelling  Right lower leg (medial) wound: Full thickness with exposed tendon   DTI left lateral foot    S/p meropenem   Sodium back to within normal limit  -Urine culture showing ESBL E. coli and Proteus mirabilis  -Hospice/palliative care following  Updated family at bedside, son daughter and grandson daughter at bedside, all questions answered on   Hospice meeting decided Saturday at 2 PM  Pt is more awake , AAox3 and interactive   Patient requesting for dysphagia diet to be changed to regular diet  Clinically improving we will continue meropenem for 2 more days to complete 7-day course. Speech reconsulted to reevaluate patient  Speech reconsulted, patient is at high risk of aspiration, diet switched  to easy to chew  Hospice meeting on , family still not decisive  Patient is clinically ready to be discharged  Patient is eating better, will discontinue IV fluid  less than 18.5 Underweight / Body mass index is 15.27 kg/m². Code status: DNR  Prophylaxis: SCD's   Estimated discharge date:   Barriers: Family dynamics  Recommended Disposition: To be determined     Subjective:     Chief Complaint / Reason for Physician Visit  Follow-up septic shock, ground-level fall, UTI  Doing better, eating better  Objective:     VITALS:   Last 24hrs VS reviewed since prior progress note.  Most recent are:  Patient Vitals for the past 24 hrs:   Temp Pulse Resp BP SpO2   04/10/22 0744 98.3 °F (36.8 °C) 84 17 128/67 92 %   04/10/22 0335 97.2 °F (36.2 °C) 71 18 136/76 90 %   22 2311 98.2 °F (36.8 °C) 72 17 Josefina Joyner ) 142/70 94 %   04/09/22 1921 98.1 °F (36.7 °C) 81 18 114/63 91 %   04/09/22 1528 98.1 °F (36.7 °C) 83 17 (!) 93/57 91 %       Intake/Output Summary (Last 24 hours) at 4/10/2022 0753  Last data filed at 4/9/2022 1636  Gross per 24 hour   Intake    Output 850 ml   Net -850 ml        I had a face to face encounter and independently examined this patient on 4/10/2022, as outlined below:  PHYSICAL EXAM:  General: WD, WN. Alert, pleasantly demented, no acute distress    EENT:  EOMI. Anicteric sclerae. MMM, periorbital swelling  Resp:  CTA bilaterally, no wheezing or rales. No accessory muscle use  CV:  Regular  rhythm,  No edema  GI:  Soft, Non distended, Non tender. +Bowel sounds  Neurologic:  Alert oriented x3  psych:   Unable to assess   skin:  No rashes. No jaundice    Reviewed most current lab test results and cultures  YES  Reviewed most current radiology test results   YES  Review and summation of old records today    NO  Reviewed patient's current orders and MAR    YES  PMH/ reviewed - no change compared to H&P  ________________________________________________________________________  Care Plan discussed with:    Comments   Patient x    Family  x    RN x    Care Manager     Consultant  x                      Multidiciplinary team rounds were held today with , nursing, pharmacist and clinical coordinator. Patient's plan of care was discussed; medications were reviewed and discharge planning was addressed.      ________________________________________________________________________  Total NON critical care TIME:35Minutes    Total CRITICAL CARE TIME Spent:   Minutes non procedure based      Comments   >50% of visit spent in counseling and coordination of care     ________________________________________________________________________  Americo Garcia MD     Procedures: see electronic medical records for all procedures/Xrays and details which were not copied into this note but were reviewed prior to creation of Plan. LABS:  I reviewed today's most current labs and imaging studies. Pertinent labs include:  No results for input(s): WBC, HGB, HCT, PLT, HGBEXT, HCTEXT, PLTEXT, HGBEXT, HCTEXT, PLTEXT in the last 72 hours.   Recent Labs     04/08/22  0348 04/07/22  1003   NA  --  140   K  --  4.3   CL  --  107   CO2  --  30   GLU  --  94   BUN  --  12   CREA  --  0.84   CA  --  8.7   MG 2.2 2.1   PHOS 2.2* 2.6       Signed: Ricci Gowers, MD

## 2022-04-10 NOTE — PROGRESS NOTES
End of Shift Note    Bedside shift change report given to Olivia Patel RN (oncoming nurse) by Zahra Costa RN (offgoing nurse). Report included the following information SBAR, Kardex and MAR    Shift worked:  7a - 7p     Shift summary and any significant changes:     -PRN morphine given twice for R leg pain   -Wound care completed per [facility] order   -Family present at bedside     Concerns for physician to address:  -Pt expressed desire to make her son Marcello her POA   -Complicated family dynamics; they're having difficulty coming to a consensus w/ pt's end of life care     Zone phone for oncoming shift:   [phone]       Activity:  Activity Level: Bed Rest  Number times ambulated in hallways past shift: 0  Number of times OOB to chair past shift: 0    Cardiac:   Cardiac Monitoring: No - discontinued today  Cardiac Rhythm: Sinus Rhythm    Access:   Current line(s): PIV     Genitourinary:   Urinary status: voiding, incontinent and external catheter    Respiratory:   O2 Device: None (Room air)  Chronic home O2 use?: NO  Incentive spirometer at bedside: NO       GI:  Last Bowel Movement Date: 04/05/22  Current diet:  ADULT ORAL NUTRITION SUPPLEMENT Breakfast, Lunch, Dinner; Standard High Calorie/High Protein  ADULT DIET Easy to Chew  ADULT ORAL NUTRITION SUPPLEMENT Breakfast, Lunch, Dinner; Frozen Supplement  Passing flatus: YES  Tolerating current diet: YES       Pain Management:   Patient states pain is manageable on current regimen: YES    Skin:  Reynaldo Score: 12  Interventions: turn team, float heels, increase time out of bed, limit briefs, internal/external urinary devices and nutritional support     Patient Safety:  Fall Score:  Total Score: 4  Interventions: bed/chair alarm, gripper socks, pt to call before getting OOB and stay with me (per policy)  High Fall Risk: Yes    Length of Stay:  Expected LOS: - - -  Actual LOS: [address], RN

## 2022-04-10 NOTE — PROGRESS NOTES
Problem: Risk for Spread of Infection  Goal: Prevent transmission of infectious organism to others  Description: Prevent the transmission of infectious organisms to other patients, staff members, and visitors. Outcome: Progressing Towards Goal     Problem: Falls - Risk of  Goal: *Absence of Falls  Description: Document Svetlana Quinones Fall Risk and appropriate interventions in the flowsheet. Outcome: Progressing Towards Goal  Note: Fall Risk Interventions:  Mobility Interventions: Bed/chair exit alarm    Mentation Interventions: Bed/chair exit alarm    Medication Interventions: Bed/chair exit alarm    Elimination Interventions: Call light in reach    History of Falls Interventions:  Investigate reason for fall

## 2022-04-10 NOTE — PROGRESS NOTES
End of Shift Note    Bedside shift change report given to Afua Douglas (oncoming nurse) by Prisca Mcclellan (offgoing nurse). Report included the following information SBAR, Kardex and MAR    Shift worked:  7p-7a     Shift summary and any significant changes:     Scheduled medications were given, see MAR. Patient did complain of pain, Morphine was given once, see PRN MAR. Family member was present during my shift. IV has been flushed and is patent. Patient is getting maintenance fluid. Patient teaching and routine rounding has been done. Concerns for physician to address:       Zone phone for oncoming shift:          Activity:  Activity Level: Bed Rest  Number times ambulated in hallways past shift: 0  Number of times OOB to chair past shift: 0    Cardiac:   Cardiac Monitoring: Yes      Cardiac Rhythm: Sinus Rhythm    Access:   Current line(s): PIV     Genitourinary:   Urinary status: external catheter    Respiratory:   O2 Device: Nasal cannula  Chronic home O2 use?: NO  Incentive spirometer at bedside: NO       GI:  Last Bowel Movement Date: 04/05/22  Current diet:  ADULT ORAL NUTRITION SUPPLEMENT Breakfast, Lunch, Dinner; Standard High Calorie/High Protein  ADULT DIET Easy to Chew  ADULT ORAL NUTRITION SUPPLEMENT Breakfast, Lunch, Dinner; Frozen Supplement  Passing flatus: YES  Tolerating current diet: YES       Pain Management:   Patient states pain is manageable on current regimen: YES    Skin:  Reynaldo Score: 15  Interventions: float heels, increase time out of bed and internal/external urinary devices    Patient Safety:  Fall Score:  Total Score: 4  Interventions: bed/chair alarm and gripper socks  High Fall Risk: Yes    Length of Stay:  Expected LOS: - - -  Actual LOS: [address]

## 2022-04-11 NOTE — PROGRESS NOTES
I have reviewed discharge instructions with the PATIENT PARENT GUARDIAN: patient and guardian. The patient and guardian verbalized understanding. Discharge medications reviewed with patient and guardian and appropriate educational materials and side effects teaching were provided. Follow-up appointments reviewed. Opportunity for questions and clarification was provided. Venous access removed without difficulty. Patient's belongings gathered and sent with patient. Patient is ready for discharge.      Christal Oquendo

## 2022-04-11 NOTE — HOSPICE
[facility]:  Notified that pt is returning to [address] [place] [address],[address] at 4:00pm today 4/11. Hospice is able to admit at facility between 5-6.  Oksana Marie attempted contact with maverick, message left. Will need to obtain consent forms from grand daughter maverick before hospice can admit. Discussed pt with CLIF Paul who confimed desire for hospice with grand daughter and other family members. [address] to await return call from grand daughterElizabeth Shoemaker [birth year] rm 1111     KEVIN 4/11  Dx: PCM BMI 15.27, 27lb wt loss in 6 months. Consult Date: 4/4/2022  Discharge date: 4/11/22 with 5:00pm admission   DNR:  POST  Consents: yes, via docusign   CTI  per Dr Nani Gómez Notes: Pt has 4 kids, granddaughter Alonzo Jiménez [phone] is [facility] and lives in [facility] Decision made to return To [facility] today at 4:00pm, admission with Christen at 5:00pm.  Consents via docusign by Jasiel Peña to [facility]. Thank you for the opportunity to care for this pt and family. Please contact hospice at [phone] with any questions or concerns.

## 2022-04-11 NOTE — HOSPICE
[facility]  [phone]    Nursing Note   Patient Name: Savi Pandey  YOB: 1935  Age: 80 y.o. [facility] RN Note:      Called and spoke with Tolu Hdezfro/daughter/MPOA for f/u from yesterday's hospice meeting. Tolu Saul stated that she and family are in agreement with hospice services and are looking at possibly getting patient home with family and hired caregivers or placement at a facility other than [address] [place] [address],[address]. Candace plans to contact  tomorrow for list of facilities. CM: please contact Candace to discuss possible placement at a facility. Hospice will continue to work with family and CM on discharge planning with hospice services. Thank you for the opportunity to be of service to this patient and her family.

## 2022-04-11 NOTE — HOSPICE
[address] For Admission  Note:    This LMSW met with pt and family for hospice consult. Consents Reviewed: Yes  Person Reviewed/Signed with: Candace Henao  Right to NCD Reviewed: Yes  NCD Requested: please reevaluate  Admission Nurse/Intake Notified: Yes   Planned Start of Care Date: 4/11/2022  Hospice Witness Representative: Gunjan Toney    Psychosocial needs; previous resident of [address] [place] [address]; MPOA is granddaughter Regina Barrett who lives in [place]. Daughter Martine Degroot is local and involved.   Son Roberto Leon is out-of-state and involved,    Pt is DDNR; yes already scanned in chart    Home admission is scheduled for this evening after pt is discharged from [address], [address] [facility]   [phone]
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
## 2022-04-11 NOTE — PROGRESS NOTES
Pt to discharge back to [address] [place] [address] for LTC services with Hospice provided by [facility]. Nurse to call report to [phone]. [facility] transport arranged f 4or:00pm after rapid COVID results return. Transition of Care Plan:    RUR: 14% low risk  Disposition:  Return to [facility]ik Bingham with [address]  Follow up appointments: To be arranged by [address] [place] [address]  DME needed: none needed  Transportation at Discharge: [facility] transport 4:00pm requested  Keys or means to access home:  Pt return to LTC at [facility]       IM Medicare Letter: 2nd IMM given, discussed with family. Signed copy placed on bedside chart  Is patient a BCPI-A Bundle:  n/a         If yes, was Bundle Letter given?:    Is patient a [place] and connected with the [address]?  n/a              If yes, was [employer] transfer form completed and VA notified? Caregiver Contact: Granddaughter Candace Henao and Son Eulalia Curling \"Marcello\" Ardena Albino  Discharge Caregiver contacted prior to discharge? Pt's granddaughter, son and daughter all contacted about discharge plan and agreeable to the plan. Care Conference needed?:   N/a    Pt ready for discharge. Pt will require a rapid COVID test prior to discharging back to LTC at [address] [place] [address]. [facility] transport arranged for 4:00pm to allow for time for the results to return from COVID test.  CM spoke with all of patient's children and grandchild about plan and everyone is agreeable to plan. Nursing can call report to [phone]. Transition of Care Plan to SNF/Rehab    SNF/Rehab Transition:  Patient has been accepted to [address] [place] [address] and meets criteria for admission. Patient will transported by [facility] and expected to leave at 4:00pm.    Communication to Patient/Family:  Met with patient, daughter, Nargis White, and spoke with mPOA, granddaughter, Kay Beatty and son, Gloria Cloud (identified care giver) and they are agreeable to the transition plan.     Communication to SNF/Rehab:  Bedside RN, Phillip Marquez, has been notified to update the transition plan to the facility and call report (phone number [phone]). Discharge information has been updated on the AVS.     Discharge instructions to be fax'd to facility through [address] [facility] [facility]. Nursing Please include all hard scripts for controlled substances, med rec and dc summary, and AVS in packet. Reviewed and confirmed with facility, Vivienne at [facility], can manage the patient care needs for the following:     Rosa Perez with (X) only those applicable:    Medication:  [x]  Medications will be available at the facility  []  IV Antibiotics  [x]  Controlled Substance - hard copy to be sent with patient   []  Weekly Labs   Documents:  [x] Hard RX  [x] MAR  [x] Kardex  [x] AVS  [x]Transfer Summary  [x]Discharge   Equipment:  []  CPAP/BiPAP  []  Wound Vacuum  []  Cronin or Urinary Device  []  PICC/Central Line  []  Nebulizer  []  Ventilator   Treatment:  []Isolation (for MRSA, VRE, etc.)  []Surgical Drain Management  []Tracheostomy Care  []Dressing Changes  []Dialysis with transportation and chair time. []PEG Care  []Oxygen  []Daily Weights for Heart Failure   Dietary:  []Any diet limitations  []Tube Feedings   []Total Parenteral Management (TPN)   Eligible for Medicaid Long Term Services and Supports  Yes:  [] Eligible for medical assistance or will become eligible within 180 days and UAI completed. [] Provider/Patient and/or support system has requested screening. [] UAI copy provided to patient or responsible party. [] UAI unavailable at discharge will send once processed to SNF provider. [] UAI unavailable at discharged mailed to patient  No:   [] Private pay and is not financially eligible for Medicaid within the next 180 days. [] Reside out-of-state.   [] A residents of a state owned/operated facility that is licensed  by [address] or [facility]  [] Enrollment in [facility] hospice services  [] [facility] citizen  [] Patient /Family declines to have screening completed or provide financial information for screening     Financial Resources:  Medicaid    [] Initiated and application pending   [] Full coverage     Advanced Care Plan:  []Surrogate Decision Maker of Care  [x]POA - granddaughter, Lorraine Waters  []Communicated Code Status DNR    Other    [facility]         Discharge Location  Patient Expects to be Discharged to[de-identified] [address] PAUL Barrera Chi, [address] - ED [facility]  Advanced Steps ACP Facilitator  Zone Phone: [phone]

## 2022-04-11 NOTE — PROGRESS NOTES
End of Shift Note    Bedside shift change report given to Abdoul Johnson RN (oncoming nurse) by Marisa Chao RN (offgoing nurse). Report included the following information SBAR, Kardex, Intake/Output and MAR    Shift worked:  7p-7a     Shift summary and any significant changes:     Pt stable, scheduled meds given, PRN Morphine given for R leg pain, antibiotic hung, Wound care performed during day shift and is still clean, dry, and intact, Daughter at bedside, Ativan given for additional restlessness and pain near end of shift     Concerns for physician to address:  Issues with pt's decision for making a POA per day shift RN     Zone phone for oncoming shift:   [phone]       Activity:  Activity Level: Bed Rest  Number times ambulated in hallways past shift: 0  Number of times OOB to chair past shift: 0    Cardiac:   Cardiac Monitoring: No      Cardiac Rhythm: Atrial Fib    Access:   Current line(s): PIV     Genitourinary:   Urinary status: voiding and incontinent    Respiratory:   O2 Device: None (Room air)  Chronic home O2 use?: YES  Incentive spirometer at bedside: NO       GI:  Last Bowel Movement Date: 04/08/22  Current diet:  ADULT ORAL NUTRITION SUPPLEMENT Breakfast, Lunch, Dinner; Standard High Calorie/High Protein  ADULT DIET Easy to Filtr8  ADULT ORAL NUTRITION SUPPLEMENT Breakfast, Lunch, Dinner; Frozen Supplement  Passing flatus: YES  Tolerating current diet: YES       Pain Management:   Patient states pain is manageable on current regimen: YES    Skin:  Reynaldo Score: 12  Interventions: turn team, float heels, foam dressing, PT/OT consult, limit briefs, internal/external urinary devices and nutritional support     Patient Safety:  Fall Score:  Total Score: 4  Interventions: bed/chair alarm, gripper socks and stay with me (per policy)  High Fall Risk: Yes    Length of Stay:  Expected LOS: - - -  Actual LOS: [address], RN

## 2022-04-11 NOTE — PROGRESS NOTES
Problem: Risk for Spread of Infection  Goal: Prevent transmission of infectious organism to others  Description: Prevent the transmission of infectious organisms to other patients, staff members, and visitors. 4/10/2022 2013 by Jocelyn Sandoval RN  Outcome: Progressing Towards Goal  4/10/2022 1628 by Jocelyn Sandoval RN  Outcome: Progressing Towards Goal     Problem: Falls - Risk of  Goal: *Absence of Falls  Description: Document [place] Fall Risk and appropriate interventions in the flowsheet. 4/10/2022 2013 by Jocelyn Sandoval RN  Outcome: Progressing Towards Goal  Note: Fall Risk Interventions:  Mobility Interventions: Bed/chair exit alarm    Mentation Interventions: Bed/chair exit alarm    Medication Interventions: Bed/chair exit alarm    Elimination Interventions: Call light in reach    History of Falls Interventions: Investigate reason for fall      4/10/2022 1628 by Jocelyn Sandoval RN  Outcome: Progressing Towards Goal  Note: Fall Risk Interventions:  Mobility Interventions: Bed/chair exit alarm    Mentation Interventions: Bed/chair exit alarm    Medication Interventions: Bed/chair exit alarm    Elimination Interventions: Call light in reach    History of Falls Interventions:  Investigate reason for fall

## 2022-04-11 NOTE — PROGRESS NOTES
Music Therapy Assessment  [address]    Betzaida Jan [id number]     1935  80 y.o.  female    Patient Telephone Number: [phone] (home)   Mandaen Affiliation: Gnosticist   Language: English   Patient Active Problem List    Diagnosis Date Noted    UTI (urinary tract infection) 04/02/2022    Septic shock ([facility] 04/02/2022    RLS (restless legs syndrome) 03/30/2022    B12 deficiency 10/16/2020    Vitamin D deficiency 10/16/2020    Hypothyroidism due to acquired atrophy of thyroid 10/16/2020    Late onset Alzheimer's disease with behavioral disturbance ([facility] 10/16/2020    Bilateral carotid artery stenosis 10/16/2020    History of stroke 10/16/2020    Cervical radiculopathy due to degenerative joint disease of spine 10/16/2020    Benign essential tremor syndrome 10/16/2020    Diabetic peripheral neuropathy associated with type 2 diabetes mellitus ([facility] 10/16/2020    Iron deficiency anemia 09/17/2020    Diverticulitis of large intestine with abscess 12/11/2019    Diverticular disease of large intestine with complication 32/42/1728    COPD exacerbation ([facility] 02/24/2016    Hypokalemia 11/09/2010    Dehydration 11/09/2010    Syncope and collapse 11/05/2010    HTN (hypertension) 11/05/2010        Date: 4/11/2022            Total Time (in minutes): 10          MRM 1 MEDICAL ONCOLOGY    Mental Status:   [  ] Alert [  ] Elana Donnie [  ]  Confused  [  ] Minimally responsive  [x] Sleeping    Communication Status: [  ] Impaired Speech [  ] Nonverbal -N/A    Physical Status:   [  ] Oxygen in use  [  ] Hard of Hearing [  ] Vision Impaired  [  ] Ambulatory  [  ] Ambulatory with assistance [  ] Non-ambulatory -N/A    Music Preferences, Background: Saray Mayers Sis, [facility]. Clinical Problem addressed: Emotional support for pt's family.     Goal(s) met in session:  Physical/Pain management (Scale of 1-10):    Pre-session rating: N/A: Please see Session Observations below. Post-session rating: N/A: Please see Session Observations below. [  ] Increased relaxation   [  ] Affected breathing patterns  [  ] Decreased muscle tension   [  ] Decreased agitation  [  ] Affected heart rate    [  ] Increased alertness     Emotional/Psychological:  [  ] Increased self-expression   [  ] Decreased aggressive behavior   [  ] Decreased feelings of stress  [  ] Discussed healthy coping skills     [  ] Improved mood    [  ] Decreased withdrawn behavior     Social:  [  ] Decreased feelings of isolation/loneliness [  ] Positive social interaction   [x] Provided support and/or comfort for family/friends    Spiritual:  [  ] Spiritual support    [  ] Expressed peace  [  ] Expressed kendal    [  ] Discussed beliefs    Techniques Utilized (Check all that apply): N/A: Please see Session Observations below. [  ] Procedural support MT [  ] Music for relaxation [  ] Patient preferred music  [  ] Alivia analysis  [  ] Song choice  [  ] Music for validation  [  ] Entrainment  [  ] Movement to music [  ] Guided visualization  [  ] Thelma Huggins  [  ] Patient instrument playing [  ] Aurora Rudd writing  [  ] Kaitlynn Molina along   [  ] Chris Teixeira  [  ] Sensory stimulation  [  ] Active Listening  [  ] Music for spiritual support [  ] Making of CDs as gifts    Session Observations:  F/up visit; Patient (pt) was lying in bed with her eyes closed, and she appeared to be sleeping soundly and peacefully. Pt's daughter (from earlier visit MT made) and pt's son were at bedside. They confirmed the pt was sleeping soundly. They shared they'd heard a music therapy session in the room next door, and the pt's son had stepped into the hallway to listen to the music. They expressed feeling soothed by the music and enjoying it. They confirmed that now wasn't a good time for the pt to have a music therapy session because of being asleep.  Pt's children thanked MT for the follow up visit and denied further needs at this time.     KAMARI Hwang (Music Therapist-Board Certified)  Spiritual Care Department  Referral-based service

## 2022-04-11 NOTE — PROGRESS NOTES
Palliative Medicine  [facility]: [phone] ([phone])  [facility]: [phone])    Patient is fast asleep, unable to engage in any conversation regarding plan of care this morning. She and family met with the hospice team on 4/9/22. Patient's son Sarah Schaffer / Lucina Veloz is in the room with her. Palliative team of Dr Liliana Kirby and this writer met with hospice liaison Toni Lee RN; patient is eligible for hospice services based on significant weight loss, Protein Calorie Malnutrition. Our team then met with Marcello. LCSW also received a phone call this morning from patient's grand daughter and mPOARena as well as from daughter Vikki Menjivar (messages left by both). There remains some concern that patient is getting some \"pressure\" to change her medical POA back to her son Marcello/ Lucina Veloz based on conversation with family and with hospice liaison. Patient is not in any condition to do any new documentation at this time. In addition, she has gone back and forth on who to name. Sarah Schaffer is the financial POA. He is feeling \"left out\" of decision making at this time as he notes that \"no one is calling me to talk about any plans\". Explained to him that Liliane Eden is mPOA and that she is likely the one that is getting the calls. Did let him know that he can certainly be involved and that it is important to work together with Liliane Eden, to make the best decisions possible for patient. Marcello is open to hospice services, he is also open to patient returning to [facility]. LCSW spoke to Liliane Hand via phone. Discussed above, and let her know that it appears that family is consistent on desire for patient to have hospice services, she agreed. Also discussed with her that it may make sense for patient to return to a setting that knows her, at this stage, vs a new facility that would be new to both. Liliane Eden in agreement for patient to return to [facility], with hospice services. Family does agree on their goals at this time. Have notified [facility] and hospice team. Patient is clinically ready for discharge, per chart.

## 2022-04-11 NOTE — PROGRESS NOTES
Music Therapy Assessment  [address]    Ajay Damon [id number]     1935  80 y.o.  female    Patient Telephone Number: [phone] (home)   Evangelical Affiliation: Buddhism   Language: English   Patient Active Problem List    Diagnosis Date Noted    UTI (urinary tract infection) 04/02/2022    Septic shock ([facility] 04/02/2022    RLS (restless legs syndrome) 03/30/2022    B12 deficiency 10/16/2020    Vitamin D deficiency 10/16/2020    Hypothyroidism due to acquired atrophy of thyroid 10/16/2020    Late onset Alzheimer's disease with behavioral disturbance ([facility] 10/16/2020    Bilateral carotid artery stenosis 10/16/2020    History of stroke 10/16/2020    Cervical radiculopathy due to degenerative joint disease of spine 10/16/2020    Benign essential tremor syndrome 10/16/2020    Diabetic peripheral neuropathy associated with type 2 diabetes mellitus ([facility] 10/16/2020    Iron deficiency anemia 09/17/2020    Diverticulitis of large intestine with abscess 12/11/2019    Diverticular disease of large intestine with complication 47/36/8196    COPD exacerbation ([facility] 02/24/2016    Hypokalemia 11/09/2010    Dehydration 11/09/2010    Syncope and collapse 11/05/2010    HTN (hypertension) 11/05/2010        Date: 4/11/2022            Total Time (in minutes): 10          MRM 1 MEDICAL ONCOLOGY    Mental Status:   [x] Alert [  ] Red Combe [  ]  Confused  [  ] Minimally responsive  [  ] Sleeping    Communication Status: [  ] Impaired Speech [  ] Nonverbal -N/A    Physical Status:   [  ] Oxygen in use  [  ] Hard of Hearing [  ] Vision Impaired  [  ] Ambulatory  [  ] Ambulatory with assistance [  ] Non-ambulatory -N/A    Music Preferences, Background: Desmond Luna, Aamir Dillon, [address] [place] [address]. Clinical Problem addressed: N/A: Please see Session Observations below. Goal(s) met in session: N/A: Please see Session Observations below.   Physical/Pain management (Scale of 1-10):    Pre-session rating ___________    Post-session rating __________  [  ] Increased relaxation   [  ] Affected breathing patterns  [  ] Decreased muscle tension   [  ] Decreased agitation  [  ] Affected heart rate    [  ] Increased alertness     Emotional/Psychological:  [  ] Increased self-expression   [  ] Decreased aggressive behavior   [  ] Decreased feelings of stress  [  ] Discussed healthy coping skills     [  ] Improved mood    [  ] Decreased withdrawn behavior     Social:  [  ] Decreased feelings of isolation/loneliness [  ] Positive social interaction   [  ] Provided support and/or comfort for family/friends    Spiritual:  [  ] Spiritual support    [  ] Expressed peace  [  ] Expressed kendal    [  ] Discussed beliefs    Techniques Utilized (Check all that apply): N/A: Please see Session Observations below. [  ] Procedural support MT [  ] Music for relaxation [  ] Patient preferred music  [  ] Alivia analysis  [  ] Song choice  [  ] Music for validation  [  ] Entrainment  [  ] Movement to music [  ] Guided visualization  [  ] Carmen Curran  [  ] Patient instrument playing [  ] Sd Adam writing  [  ] Anitra Chavez along   [  ] Keagan Braden  [  ] Sensory stimulation  [  ] Active Listening  [  ] Music for spiritual support [  ] Making of CDs as gifts    Session Observations:  Referral from [facility] . Patient (pt) was alert lying in bed. Pt's daughter and granddaughter (daughter of the pt's son) were at bedside. This music therapist (MT) introduced self and asked if this would be a good time for music therapy. Pt's family members declined, saying the pt was just starting to eat her lunch. MT expressed understanding and asked about the pt's music preferences. Pt's family members and the pt shared these before the visit concluded.     DALLAS [employer] (Music Therapist-Board Certified)  Spiritual Care Department  Referral-based service

## 2022-04-11 NOTE — PALLIATIVE CARE DISCHARGE
The Palliative Medicine team was consulted as part of your / your loved one's care in the hospital. Our team is a supportive service; we strive to relieve suffering and improve quality of life. You met with the Palliative Care team of Dr Clarisa Reilly and [facility]. We met with your family including your son Julieta Ulloa, your grand daughter Toby Aaron and your daughter Cricket Huitron. You and your family [facility] to discuss what support hospice could provide you. You identified the following goal(s) as your main focus for healthcare: You and your family agree that a focus on comfort, to have a good quality of life and to not suffer is the goal. The decision was made for you to return to [address] [place] [address] with the support of hospice services. We reviewed advance care planning information, which includes the following:  Advance Care Planning 4/5/2022   Patient's Healthcare Decision Maker is: Named in scanned ACP document   Primary Decision Maker Name -   Primary Decision Maker Phone Number -   Primary Decision Maker Relationship to Patient -   Confirm Advance Directive Yes, on file   Patient Would Like to Complete Advance Directive -   Does the patient have other document types Do Not Resuscitate       We reviewed / discussed your code status as: DNR - you have a DDNR that you had signed in the past.      Full Code means perform CPR in the event of cardiac arrest     [facility] means do NOT perform CPR in the event of cardiac arrest     Partial Code means you have specific preferences, please discuss with your health care team     No Order means this issue was not addressed / resolved during your stay    Because of the importance of this information, we are providing you with a printed copy to share with other healthcare providers after this hospitalization is complete.     If any of the above information is incomplete or incorrect, please contact the Palliative Medicine team at [phone].     Best Wishes to you as you leave the hospital.    SAULO ShepherdW

## 2022-04-11 NOTE — PROGRESS NOTES
Problem: Falls - Risk of  Goal: *Absence of Falls  Description: Document Taylor Linares Fall Risk and appropriate interventions in the flowsheet. Outcome: Progressing Towards Goal  Note: Fall Risk Interventions:  Mobility Interventions: Bed/chair exit alarm,Communicate number of staff needed for ambulation/transfer,OT consult for ADLs,PT Consult for mobility concerns    Mentation Interventions: Adequate sleep, hydration, pain control,Bed/chair exit alarm,Door open when patient unattended,Familiar objects from home,Reorient patient,Room close to nurse's station,Toileting rounds,Update white board,More frequent rounding    Medication Interventions: Bed/chair exit alarm,Evaluate medications/consider consulting pharmacy,Patient to call before getting OOB,Teach patient to arise slowly    Elimination Interventions: Bed/chair exit alarm,Call light in reach,Toilet paper/wipes in reach,Patient to call for help with toileting needs    History of Falls Interventions: Bed/chair exit alarm,Door open when patient unattended,Room close to nurse's station         Problem: Pressure Injury - Risk of  Goal: *Prevention of pressure injury  Description: Document Reynaldo Scale and appropriate interventions in the flowsheet. Outcome: Progressing Towards Goal  Note: Pressure Injury Interventions:  Sensory Interventions: Assess changes in LOC,Check visual cues for pain,Maintain/enhance activity level,Turn and reposition approx. every two hours (pillows and wedges if needed),Discuss PT/OT consult with provider,Avoid rigorous massage over bony prominences    Moisture Interventions: Absorbent underpads,Apply protective barrier, creams and emollients,Check for incontinence Q2 hours and as needed,Maintain skin hydration (lotion/cream),Offer toileting Q_hr    Activity Interventions: Assess need for specialty bed,PT/OT evaluation    Mobility Interventions: HOB 30 degrees or less,Float heels,PT/OT evaluation,Turn and reposition approx.  every two hours(pillow and wedges)    Nutrition Interventions: Offer support with meals,snacks and hydration    Friction and Shear Interventions: HOB 30 degrees or less,Foam dressings/transparent film/skin sealants,Apply protective barrier, creams and emollients,Transferring/repositioning devices                Problem: Pain  Goal: *Control of Pain  Outcome: Progressing Towards Goal

## 2022-04-11 NOTE — DISCHARGE INSTRUCTIONS
HOSPITALIST DISCHARGE INSTRUCTIONS    NAME: Az Cortez   :  1935   MRN:  [id number]     Date/Time:  2022 11:43 AM    ADMIT DATE: 2022   DISCHARGE DATE: 2022     Attending Physician: Patrice Cohen MD    DISCHARGE DIAGNOSIS:  UTI, ESBL ecoli and p-Bradycardia, improved  -Septic shock resolved  -Acute kidney injury, prerenal-resolved  Hypernatremia  Acute metabolic encephalopathy  -Hx Afib, DVT (2018) on Eliquis, COPD, past UTIs, dementia   -End-stage dementia  Status post ground-level fall leading to periorbital swelling  Right lower leg (medial) wound: Full thickness with exposed tendon   DTI left lateral foot     Medications: Per above medication reconciliation. Pain Management: per above medications    Recommended diet: easy to chew    Recommended activity: Activity as tolerated    Wound care: Wound care for the left lateral foot and bilateral heels:  Cleanse the skin with soap and water and dry the skin well. Apply the Venelex ointment to these areas and leave open to air AND off loaded from any pressure. Also apply the Venelex to the knee abrasions. Indwelling devices:  None    Supplemental Oxygen: None    Required Lab work: Per SNF routine    Glucose management:  None    Code status: DNR        Outside physician follow up: Follow-up Information     Follow up With Specialties Details Why Contact Info    Mabel Jameson MD Internal Medicine   [phone] [phone]                 Skilled nursing facility/ SNF MD responsible for above on discharge. Information obtained by :  I understand that if any problems occur once I am at home I am to contact my physician. I understand and acknowledge receipt of the instructions indicated above.                                                                                                                                            Physician's or R.N.'s Signature                                                                  Date/Time                                                                                                                                              Patient or Repres

## 2022-04-12 NOTE — PROGRESS NOTES
Per EMR patient discharged to [facility] with [facility]. No transition of care outreach indicated due to patient discharge to hospice care.

## 2022-04-12 NOTE — HOSPICE
Patient Name:  Romy Torres  YOB: 1935  Age:  80 y.o. Principle Hospice Diagnosis:  Protein Calorie Malnutrition  Diagnoses RELATED to the terminal prognosis: (As discussed with Attending) Alzheimer's with behavioral disturbance    Other Diagnoses:  septic shock, acute metabolic encephalopathy, frequent urinary tract infections (ESBL, e-coli), acute kidney injury, COPD, hypertension, hypothyroidism, stroke, bilateral carotid artery stenosis, syncope, restless leg syndrome, bilateral peripheral neuropathy, cervical radiculopathy due to degenerative joint disease of spine, benign essential tremor syndrome, iron deficiency anemia, diverticulitis (hx), atrial fibrillation (hx), deep vein thrombosis (hx), high fall risk, right lower medial leg wound (full thickness with exposed tendon), deep tissue injury of left lateral foot     Date of Hospice Admission: 4/11/22  Hospice Attending Elected by Patient:  Shadi Turner MD  Primary Care Physician: Madid Sierra MD    Admitting RN:  Rossy Bill CM:   Mona Mccrary  :  Karo Constant:   Dominique Short DNR:  Yes    Direct Observation:  Upon this RN's arrival, pt in bed eating soup with dtr Julianna Lakhani and son Lisandro Briscoe (Marcello) at bedside. Pt A&Ox2 and responds appropriately to questions; able to make her wishes known. Pt drowsy at times r/t Ativan given before discharge. Multiple wounds (deep wound to right shin, as well as smaller BLE wounds and laceration above R eye) from recent ground-level fall at [facility], which sent pt to [address] [address] [facility], where she was also treated for UTI and septic shock. Pt has had ~4 falls in recent months. Unable to obtain pulse ox reading due to pt's cool fingertips. BP 99/49. Wound care performed for R shin and L lateral DTI (foam dressing). BUE ecchymosis, sacrum red but blanchable.      ER Visits/ Hospitalizations in past year: 2+  Onset Date of Hospice Diagnosis: ~2 years   Summary of Disease Progression Leading to Hospice Diagnosis:   Excerpt from Dr. Leatha Govea 4/6/22 progress note: Sofy Shipley is a 80 y.o. female with a past history of atrial fibrillation, DVT 2018 on Eliquis, past UTI( Ecoli and ecoli and pseudomonas), CVA, COPD, steroid dependant, who was admitted on 4/2/2022 from nursing facility presented. S/p ground level fall, admitted for septic Shock with UTI/gram negative rods. From Dr. Kimberly Jacobs progress note of 4/10/22: Hospice/palliative care following. Updated family at bedside, son daughter and grandson daughter at bedside, all questions answered on 04/05. Hospice meeting decided Saturday at 2 PM      Use LCD Guidelines and list features:     General Decline  Part I. Decline in clinical status guidelines   Patients will be considered to have a life expectancy of six months or less if there is documented evidence of decline in clinical status based on the guidelines listed below. Other clinical variables not on this list may support a six-month or less life expectancy. These should be documented in the clinical record. X  1. Progression of disease as documented by worsening clinical status, symptoms, signs and laboratory results    BMI 15.27, 27lb wt loss in 6 months, albumin=2.9    A. Clinical Status  X     1) Recurrent or intractable infections such as pneumonia, sepsis or upper urinary tract. X     2) Progressive inanition as documented by:  X   a) Weight loss not due to reversible causes such as depression or use of diuretics         X   b) Decreasing anthropomorphic measurements (mid-arm circumference, abdominal girth), not            due to reversible causes such as depression or use of diuretics          X  c) Decreasing serum albumin or cholesterol  X    3) Dysphagia leading to recurrent aspiration and/or inadequate oral intake documented by              decreasing food portion consumption.     B. Symptoms       1) Dyspnea with increasing respiratory rate       2) Cough, intractable 3) Nausea/vomiting poorly responsive to treatment       4) Diarrhea, intractable  X   5) Pain requiring increasing doses of major analgesics more than briefly. C. Signs         1) Decline in systolic blood pressure to below 90 or progressive postural hypotension         2) Ascites         3) Venous, arterial or lymphatic obstruction due to local progression or metastatic disease  X     4) Edema         5) Pleural / pericardial effusion  X     6) Weakness         7) Change in level of consciousness    D. Laboratory (When available. Lab testing is not required to establish hospice eligibility.)        1) Increasing pCO2 or decreasing pO2 or decreasing SaO2        2) Increasing calcium, creatinine or liver function studies        3) Increasing tumor markers (e.g. CEA, PSA)  X   4) Progressively decreasing or increasing serum sodium or increasing serum potassium    X  2. Decline in Karnofsky Performance Status (KPS) or Palliative Performance Score (PPS) from <70% due to progression of disease. PPS=40%        X  3. Increasing emergency room visits, hospitalizations, or physician's visits related to hospice primary diagnosis        4. Progressive decline in Functional Assessment Staging (FAST) for dementia (from ? 7A on the FAST)    X  5. Progression to dependence on assistance with additional activities of daily living (See Part II, Sec 2)    X  6. Progressive stage 3-4 pressure ulcers in spite of optimal care (DTI on L lateral foot)      Part II. Non-disease specific baseline guidelines                (both of these should be met)    X  1. Physiologic impairment of functional status as demonstrated by Karnofsky Performance                   Status (KPS) or Palliative Performance Score (PPS) <70%. Note that two of the disease                  specific guidelines (HIV Disease, Stroke and Coma) establish a lower qualifying KPS or                  PPS.  X  2.  Dependence on assistance for two or more activities of daily living (ADLs)             A. Feeding         X  B. Ambulation         X  C. Continence         X  D. Transfer         X  E. Bathing         X  F. Dressing    Additionally, disease specific guidelines should be used with these (Part II) baseline guidelines. The baseline guidelines do not independently qualify a patient for hospice coverage. Note: The word should in the disease specific guidelines means that on medical review the guideline so identified will be given great weight in making a coverage determination. It does not mean, however, that meeting the guideline is obligatory. Part III. Co-morbidities   Although not the primary hospice diagnosis, the presence of disease such as the following, the severity of which is likely to contribute to a life expectancy of six months or less, should be considered in determining hospice eligibility. X     A. Chronic obstructive pulmonary disease         B. Congestive heart failure         C. Ischemic heart disease         D. Diabetes mellitus         E. Neurologic disease (CVA, ALS, MS, Parkinson's)         F. Renal failure         G. Liver Disease         H. Neoplasia         I. Acquired immune deficiency syndrome  X     J. Dementia      Dr. Mireya Castillo contacted, agrees to serve as attending provider for hospice and provided verbal certification of terminal illness with prognosis of 6 months or less life expectancy. Orders for hospice admission, medications and plan of treatment received. Medication review  completed with Dr. Sushil Contreras after hours and reviewed further with Dr. Davon Canchola 2/34/24.     Currently this patient has: O2 prn    MEDS:  I have reviewed the patient's medication list with MD and identified the following:  Nonformulary medications: mamentine   Unrelated medications: Eliquis, atorvastatin   (family will likely agree to stopping above medications after further education, but they remain on the [facility] until discussion occurs)    IDT communication to include MD, SN, SW, CH and support team. IDT communication to include MD, SN, SW, CH and support team.

## 2022-04-13 PROBLEM — Z51.5 HOSPICE CARE PATIENT: Status: ACTIVE | Noted: 2022-01-01

## 2022-04-13 NOTE — HOSPICE
Patient sitting up in bed. She is alert and Ox3. She was very talkative and cheerful. She stated she has 4 children (2 boys and 2 girls), 9 grandchildren and 9 great-grandchildren. She stated the pain she was having in her lower back and right leg is better since they gave her some pain medication. Her lungs were clear in the upper fields and diminished in the bases. She had an occasional, moist cough. She had multiple bruises and multiple areas of healing wounds on both legs and arms. DON and unit manager had just finished doing a drsg change on her right lower leg. Patient has a healing laceration over her right eyebrow. The surrounding area around the eye is bruised and slightly swollen. Her right eye is gray, like a cataract and left eye is her normal color. I mentioned that her granddaughter, Rose Mary Gordon, had called. Patient said that she did not want to talk to her. V.S. 88/54; O2 sat 100% on 2L; temp 99.1; pulse 78 irreg.; resp 22. Daughter, Ken Moncada, called and given an update on her mother's condition.

## 2022-04-14 NOTE — DISCHARGE SUMMARY
Hospitalist Discharge Summary     Patient ID:  Ciara Lucero  [id number]  80 y.o.  1935 4/2/2022    PCP on record: Alen Smith MD    Admit date: 4/2/2022  Discharge date and time: 4/14/2022    DISCHARGE DIAGNOSIS:  UTI, ESBL ecoli and p-Bradycardia, improved  -Septic shock resolved  -Acute kidney injury, prerenal-resolved  Hypernatremia  Acute metabolic encephalopathy  -Hx Afib, DVT (2018) on Eliquis, COPD, past UTIs, dementia   -End-stage dementia  Status post ground-level fall leading to periorbital swelling  Right lower leg (medial) wound: Full thickness with exposed tendon   DTI left lateral foot    CONSULTATIONS:  None    Excerpted HPI from H&P of Comfort Garcia MD:  79 y/o F pt with pmh of Afib, DVT (2018) on Eliquis, COPD, past UTIs, dementia BIBEMS from [facility] after ground level fall. Upon arrival found to be altered, hypotensive, bradycardic, and hypothermic. CTH and spine done which showed no acute abnormalities but did show a chronic right frontotemporal infarct. UA sent was was significant for UTI, given cefepime and 2 L IVF. Pt bradycardic to the 30s with hypotension, given atropine with good response. ICU consulted for admission.     Upon my arrival, pt hypotensive, being started on Levophed. Opens eyes to voice but does not follow commands. Will be admitted to the ICU for further management.    ______________________________________________________________________  DISCHARGE SUMMARY/HOSPITAL COURSE:  for full details see H&P, daily progress notes, labs, consult notes.    -UTI, ESBL ecoli and p-Bradycardia, improved  -Septic shock resolved  -Acute kidney injury, prerenal-resolved  Hypernatremia  Acute metabolic encephalopathy  -Hx Afib, DVT (2018) on Eliquis, COPD, past UTIs, dementia   -End-stage dementia  Status post ground-level fall leading to periorbital swelling  Right lower leg (medial) wound: Full thickness with exposed tendon   DTI left lateral foot     S/p meropenem   Sodium back to within normal limit  -Urine culture showing ESBL E. coli and Proteus mirabilis  -Status post IV fluid  Patient decided to be discharged to SNF with hospice  Clinically stable for discharge    _______________________________________________________________________  Patient seen and examined by me on discharge day. Pertinent Findings:  Gen:    Not in distress  Chest: Clear lungs  CVS:   Regular rhythm. No edema  Abd:  Soft, not distended, not tender  Neuro:  Alert, oriented x3  _______________________________________________________________________  DISCHARGE MEDICATIONS:   Discharge Medication List as of 4/11/2022  4:29 PM      START taking these medications    Details   oxyCODONE IR (ROXICODONE) 5 mg immediate release tablet Take 1 Tablet by mouth every six (6) hours as needed for Pain for up to 3 days. Max Daily Amount: 20 mg., Normal, Disp-12 Tablet, R-0         CONTINUE these medications which have NOT CHANGED    Details   digoxin (LANOXIN) 0.125 mg tablet Take 1 Tablet by mouth daily. , No Print, Disp-30 Tablet, R-0      polyethylene glycol (MIRALAX) 17 gram packet Take 1 Packet by mouth daily as needed for Constipation. , No Print, Disp-30 Packet, R-0      balsam peru-castor oiL (VENELEX) ointment Apply  to affected area two (2) times a day., No Print, Disp-200 g, R-0      predniSONE (DELTASONE) 5 mg tablet Take 1 Tablet by mouth daily (with breakfast). , No Print, Disp-30 Tablet, R-0      acetaminophen (TYLENOL) 325 mg tablet Take 2 Tablets by mouth every six (6) hours. , No Print, Disp-120 Tablet, R-0      arformoteroL (BROVANA) 15 mcg/2 mL nebu neb solution 2 mL by Nebulization route two (2) times a day.  For Dx: J44.9, No Print, Disp-60 Each, R-0      albuterol-ipratropium (DUO-NEB) 2.5 mg-0.5 mg/3 ml nebu 3 mL by Nebulization route every six (6) hours as needed for Wheezing., No Print, Disp-60 Nebule, R-0      albuterol (PROVENTIL HFA, VENTOLIN HFA, PROAIR HFA) 90 mcg/actuation inhaler albuterol sulfate HFA 90 mcg/actuation aerosol inhaler, Historical Med      fluticasone furoate-vilanteroL (BREO ELLIPTA) 200-25 mcg/dose inhaler Breo Ellipta 200 mcg-25 mcg/dose powder for inhalation   TAKE 1 PUFF BY MOUTH EVERY DAY, Historical Med      lidocaine (LIDODERM) 5 % 1 Patch by TransDERmal route every twenty-four (24) hours. Apply patch to the affected area for 12 hours a day and remove for 12 hours a day., Normal, Disp-15 Each, R-0      ondansetron (ZOFRAN ODT) 4 mg disintegrating tablet DISSOLVE 1 TABLET ON THE TONGUE EVERY 8 HOURS AS NEEDED FOR NAUSEA OR VOMITING, Normal, Disp-30 Tablet, R-5      famotidine (PEPCID) 20 mg tablet TAKE 1 TABLET BY MOUTH TWICE A DAY, Normal, Disp-180 Tablet, R-1      Eliquis 2.5 mg tablet TAKE 1 TABLET BY MOUTH TWICE A DAY, Normal, Disp-60 Tablet, R-3      pramipexole (MIRAPEX) 0.5 mg tablet TAKE 1 TABLET BY MOUTH EVERY DAY, Normal, Disp-90 Tablet, R-1      sertraline (ZOLOFT) 50 mg tablet TAKE 1 TABLET BY MOUTH EVERY DAY, Normal, Disp-90 Tablet, R-1      memantine (NAMENDA) 10 mg tablet TAKE 1 TABLET BY MOUTH TWICE A DAY, Normal, Disp-180 Tablet, R-1      atorvastatin (LIPITOR) 10 mg tablet TAKE 1 TABLET BY MOUTH EVERY DAY AT NIGHT, Normal, Disp-90 Tablet, R-1      metoprolol tartrate (LOPRESSOR) 25 mg tablet 1 Tab by Per G Tube route every twelve (12) hours. , No Print, Disp-60 Tab, R-0               Patient Follow Up Instructions:    Activity: Activity as tolerated  Diet: Regular Diet and Cardiac Diet  Wound Care: As directed    Follow-up Information     Follow up With Specialties Details [facility], Jordyn Cosby MD Internal Medicine   [address]  [facility] [address]  [facility]  [phone]      [facility] [address]  [address] [place] [id number]  [phone]        ________________________________________________________________    Risk of deterioration: High    Condition at Discharge:  Stable  __________________________________________________________________    Disposition  Home with hospice services    ____________________________________________________________________    Code Status: DNR/DNI  ___________________________________________________________________      Total time in minutes spent coordinating this discharge (includes going over instructions, follow-up, prescriptions, and preparing report for sign off to her PCP) :  >30 minutes    Signed:  Brian Boalnos MD

## 2022-04-15 NOTE — HOSPICE
Patient alert and Ox3. She was sitting up in bed. She denied any pain. Sutures removed from right eyebrow. Five sutures removed. Area well healed. Patient tolerated suture removal well. Patient had routine HHN tx. Lungs were clear before and after treatment. Wound on leg not assessed due to wound care and new drsg recently done. Daughter at bedside but stepped out of the room. At that time the hospice MSW arrived and got patient to sign her medical POA form. Patient was alert and oriented and verbalized understanding of form before she signed. V.S. 86/60; temp 99.4; pulse 72 irreg.; O2 sat 100% on 2L. resp 20  Unable to leave msg for son, Swathi Salcido, due to his voice mail being full.

## 2022-04-18 NOTE — HOSPICE
LMSW met with patient bedside at [facility] along with RN Sunni Costello. LMSW inquired with patient about desire to create new AMD previously reported to [address] [place] [address] by admission RN. Patient stated that she would like to change her MPOA with a new AMD. LMSW inquired if patient knew who she wished to appoint as new MPOA. Patient stated that she wished to appoint her son Adeline Goltz. LMSW inquired if patient had spoke about this desire to Adeline Goltz and expressed her wishes to him. Patient stated that she had. LMSW assisted patient in completing AMD. LMSW and ESME Sandy served as witnesses as facility staff stated that they cannot and because of strained family dynamics, no additional family could serve as witnesses. LMSW inquired about final arrangements. Patient stated that she wants to be cremated but she has not made final arrangements yet. LMSW inquired if Marcello had access to patient's finances to be able to make arrangements. Patient stated that he does and she has discussed her preferences with him. Patient's daughter Mauricio Villatoro entered the room. LMSW inquired about social history. 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 Patient discussed her grandchildren and great-grandchildren. Patient stated that she has 2 sons and 2 daughters. Patient stated that one daughter lives in a facility. LMSW provided copy of new AMD to facility to update their charts. LMSW called and spoke with patient's son Adeline Goltz. LMSW informed Marcello that new AMD had been implemented. LMSW inquired about final arrangements with Marcello. Marcello stated that he knew patient wanted cremation. LMSW offered to send cremation providers to Adeline Goltz. Marcello provided email address, [email or web address]. LMSW sent cremation providers [facility] via email. LMSW will continue to provide support and be available to address needs that arise.

## 2022-04-18 NOTE — HOSPICE
Katlin Brennana engaged  in life review, shared about her marriage and supportive family, and how she and her  were both [address]. She shared about her kulwinder in crafts and design.  provided an active listening presence, life review, and prayer.

## 2022-04-19 NOTE — HOSPICE
Patient appears tired. Her son and granddaughter were at bedside. She had respirations of 30 and a moist cough but her lungs were clear bilat. Her abdomen was slightly distended and she had hyperactive bowel sounds. She initially denied any pain but expressed pain when her right leg was moved. The wound on her right lower leg measures 7cm x 3cm. The wound bed is dry. Blue foam placed back on wound and wrapped with roll gauze. Her left lower arm has a new skin tear measuring 4cm x 1.5cm. It was covered with a clean drsg. She has a new unstageable wound on her left heel measuring 4cm x 2cm. Skin Prep placed on area and heel protectors placed on both heels. Nurse manager notified of new wound. Patient had a fever of 100.2. Staff nurse notified and Tylenol 650mg given in applesauce. Patient ate several bites of breakfast earlier that morning. V.S. 108/64; O2 sat 100% RA; pulse 80 irreg.; resp 30.

## 2022-04-26 NOTE — HOSPICE
Patient in bed mouth breathing at an irregular rate of 36. She would open her eyes to verbal stimuli but did not answer questions. Patient's son, Gloria Cloud, was at bedside. He stated that yesterday she was in severe pain. The facility nurse practioner came in and increased her morphine to 10mg every 4 hours routine and 10mg every 30min PRN. Patient was calm but occasionally would grimace and moan. She had a fever of 100.1. Tylenol suppos given. Patient has a new DTI (Butterfly in shape) on sacrum. Patient cleaned of stool. Patient expressed pain with any movement. Morphine 10mg PO given. Left hand had non-pitting edema. Lips and neck were dusky. Once patient was more comfortable, patient had episodes of apnea and then rapid breathing. Spoke to the son about her EOL symptoms. He mentioned he watched his father and mother in law die and so he veralized understanding of her imminent death. He stated he would notify the rest of her family. V.S. 104/64; pulse 104 irreg.; resp 36.

## 2022-04-27 ENCOUNTER — HOME CARE VISIT (OUTPATIENT)
Dept: HOSPICE | Facility: HOSPICE | Age: 87
End: 2022-04-27
Payer: MEDICARE

## 2022-04-29 ENCOUNTER — HOME CARE VISIT (OUTPATIENT)
Dept: HOSPICE | Facility: HOSPICE | Age: 87
End: 2022-04-29
Payer: MEDICARE

## 2022-11-28 NOTE — HOSPICE
Triage nurse recieved call at 6:30pm that patient had  at 5:30pm. Patient pronounced by facility RN. Family stated they did not need a visit by hospice staff. Meds wasted according to facility protocol. [facility] called to get patient.
Yes

## 2023-01-01 NOTE — PROGRESS NOTES
MRI safety screening form is not complete. MRI can not be done until form is completed.  Attempted to call floor but got no answer 1

## 2023-10-27 NOTE — PROGRESS NOTES
Problem: Self Care Deficits Care Plan (Adult)  Goal: *Acute Goals and Plan of Care (Insert Text)  Description    FUNCTIONAL STATUS PRIOR TO ADMISSION: Per chart, Patient was modified independent using a walker for functional mobility, occasionally furniture walking in the home. Patient was independent for basic and instrumental ADLs, reporting she mostly sponge bathed and got into the tub 1x/week. At baseline patient uses supplemental oxygen. Patient reporting she still drives and cooks. Patient is blind in R eye. HOME SUPPORT: Per chart, patient lives alone, but her daughter will occasionally stay with her. Occupational Therapy Goals  Initiated 12/13/2019    1. Patient will perform grooming with supervision/set-up within 7 day(s). 2.  Patient will perform self-feeding with supervision/set-up within 7 day(s). 3.  Patient will perform upper body dressing with supervision/set-up within 7 day(s). 4.  Patient will tolerate sitting EOB for 5 minutes in preparation for OOB ADLs with supervision/set up within 7 days. 5.  Patient will perform toilet transfers with moderate assistance using least restrictive assistive device within 7 day(s). 6.  Patient will participate in upper extremity therapeutic exercise/activities with supervision/set-up for 5 minutes within 7 day(s). Outcome: Progressing Towards Goal   OCCUPATIONAL THERAPY TREATMENT  Patient: Ally Monte (87 y.o. female)  Date: 12/18/2019  Diagnosis: Diverticulitis of large intestine with abscess [K57.20]  Diverticular disease of large intestine with complication [H21.68]   <principal problem not specified>       Precautions: Aspiration  Chart, occupational therapy assessment, plan of care, and goals were reviewed. ASSESSMENT  Patient continues with skilled OT services and is progressing towards goals. Pt was CGA for bed mobility and able to scoot with CGA to SBA.   She was able to stand with CGA and with min to CGA and use of RW walked to bathroom. Pt was CGA to min for transfer to toilet and able to walk to recliner with RW and CGA. Her O2% did drop to 88% on 5 liters after sitting and she recovered quickly. Worked on PLB with pt and this assisted with recovery. Current Level of Function Impacting Discharge (ADLs): decreased resp status and endurance, decreased ADLs and functional mobility. PLAN :  Patient continues to benefit from skilled intervention to address the above impairments. Continue treatment per established plan of care. to address goals. Recommend with staff: Den Driver for meals and transfer to toilet     Recommend next OT session: work on standing at the sink for grooming. Recommendation for discharge: (in order for the patient to meet his/her long term goals)  3 hours of therapy at facility, In pt pending her progress, she may be able to return home with home care OT and PT with 24/7 assist.     This discharge recommendation:  Has been made in collaboration with the attending provider and/or case management    IF patient discharges home will need the following DME: none       SUBJECTIVE:   Patient stated I.    OBJECTIVE DATA SUMMARY:   Cognitive/Behavioral Status:  Neurologic State: Alert  Orientation Level: Oriented X4  Cognition: Follows commands  Perception: Appears intact  Perseveration: No perseveration noted  Safety/Judgement: Fall prevention    Functional Mobility and Transfers for ADLs:  Bed Mobility:  Rolling: Contact guard assistance  Supine to Sit: Contact guard assistance  Scooting: Stand-by assistance;Contact guard assistance    Transfers:  Sit to Stand: Contact guard assistance  Functional Transfers  Bathroom Mobility: Contact guard assistance;Minimum assistance  Toilet Transfer : Minimum assistance;Contact guard assistance  Bed to Chair: Minimum assistance    Balance:  Sitting: Intact  Sitting - Static: Good (unsupported)  Sitting - Dynamic: Good (unsupported)  Standing: Impaired; Without support  Standing - Static: Fair  Standing - Dynamic : Fair    ADL Intervention:  Feeding  Feeding Assistance: Set-up    Grooming  Grooming Assistance: Supervision  Position Performed: Seated in chair  Washing Face: Set-up  Washing Hands: Set-up  Brushing Teeth: Set-up    Upper Body Bathing  Bathing Assistance: Minimum assistance;Contact guard assistance    Lower Body Bathing  Bathing Assistance: Maximum assistance              Toileting  Toileting Assistance: Contact guard assistance  Bladder Hygiene: Contact guard assistance  Bowel Hygiene: Contact guard assistance    Cognitive Retraining  Safety/Judgement: Fall prevention        Activity Tolerance:   Fair  Please refer to the flowsheet for vital signs taken during this treatment.     After treatment patient left in no apparent distress:   Sitting in chair, Call bell within reach and Caregiver / family present    COMMUNICATION/COLLABORATION:   The patients plan of care was discussed with: Physical Therapist and Registered Nurse    Rocio Rivera OT  Time Calculation: 26 mins Crescentic Advancement Flap Text: The defect edges were debeveled with a #15 scalpel blade.  Given the location of the defect and the proximity to free margins a crescentic advancement flap was deemed most appropriate.  Using a sterile surgical marker, the appropriate advancement flap was drawn incorporating the defect and placing the expected incisions within the relaxed skin tension lines where possible.    The area thus outlined was incised deep to adipose tissue with a #15 scalpel blade.  The skin margins were undermined to an appropriate distance in all directions utilizing iris scissors.

## (undated) DEVICE — HANDLE LT SNAP ON ULT DURABLE LENS FOR TRUMPF ALC DISPOSABLE

## (undated) DEVICE — SYR 10ML LUER LOK 1/5ML GRAD --

## (undated) DEVICE — SOLUTION IV 1000ML 0.9% SOD CHL

## (undated) DEVICE — BASIN EMSIS 16OZ GRAPHITE PLAS KID SHP MOLD GRAD FOR ORAL

## (undated) DEVICE — DRAPE,EXTREMITY,89X128,STERILE: Brand: MEDLINE

## (undated) DEVICE — SKIN MARKER,REGULAR TIP WITH RULER AND LABELS: Brand: DEVON

## (undated) DEVICE — NEONATAL-ADULT SPO2 SENSOR: Brand: NELLCOR

## (undated) DEVICE — ELECTRODE,RADIOTRANSLUCENT,FOAM,5PK: Brand: MEDLINE

## (undated) DEVICE — STRETCH BANDAGE ROLL: Brand: DERMACEA

## (undated) DEVICE — NEEDLE SPNL L4.75IN OD25GA QNCKE TYP SPINOCAN

## (undated) DEVICE — REM POLYHESIVE ADULT PATIENT RETURN ELECTRODE: Brand: VALLEYLAB

## (undated) DEVICE — TOWEL 4 PLY TISS 19X30 SUE WHT

## (undated) DEVICE — ADULT SPO2 SENSOR: Brand: NELLCOR

## (undated) DEVICE — 1200 GUARD II KIT W/5MM TUBE W/O VAC TUBE: Brand: GUARDIAN

## (undated) DEVICE — Device

## (undated) DEVICE — DRESSING PETRO GZ XRFRM CURAD ST OVERWRAP 5 X 9 IN

## (undated) DEVICE — NEEDLE HYPO 25GA L1.5IN BVL ORIENTED ECLIPSE

## (undated) DEVICE — (D)PREP SKN CHLRAPRP APPL 26ML -- CONVERT TO ITEM 371833

## (undated) DEVICE — SYR 5ML 1/5 GRAD LL NSAF LF --

## (undated) DEVICE — DEVICE TRNSF SPIK STL 2008S] MICROTEK MEDICAL INC]

## (undated) DEVICE — 3000CC GUARDIAN II: Brand: GUARDIAN

## (undated) DEVICE — INFECTION CONTROL KIT SYS

## (undated) DEVICE — TAPE,CLOTH/SILK,CURAD,3"X10YD,LF,40/CS: Brand: CURAD

## (undated) DEVICE — BLOCK BITE ENDOSCP AD 21 MM W/ DIL BLU LF DISP

## (undated) DEVICE — TOWEL SURG W17XL27IN STD BLU COT NONFENESTRATED PREWASHED

## (undated) DEVICE — DRESSING,STRATASORB,COMP,ISLAND,6"X7.5": Brand: MEDLINE

## (undated) DEVICE — DERMACEA GAUZE FLUFF ROLL: Brand: DERMACEA

## (undated) DEVICE — ROCKER SWITCH PENCIL BLADE ELECTRODE, HOLSTER: Brand: EDGE

## (undated) DEVICE — BASIC PACK: Brand: CONVERTORS

## (undated) DEVICE — COTTON TIPPED APPLICATORS: Brand: DEROYAL

## (undated) DEVICE — SET ADMIN 16ML TBNG L100IN 2 Y INJ SITE IV PIGGY BK DISP

## (undated) DEVICE — DEVON™ KNEE AND BODY STRAP 60" X 3" (1.5 M X 7.6 CM): Brand: DEVON

## (undated) DEVICE — STERILE POLYISOPRENE POWDER-FREE SURGICAL GLOVES: Brand: PROTEXIS

## (undated) DEVICE — KERLIX BANDAGE ROLL: Brand: KERLIX

## (undated) DEVICE — GOWN,SIRUS,NONRNF,SETINSLV,2XL,18/CS: Brand: MEDLINE

## (undated) DEVICE — POLYLINED TOWEL: Brand: CONVERTORS

## (undated) DEVICE — NEEDLE HYPO 18GA L1.5IN PNK S STL HUB POLYPR SHLD REG BVL

## (undated) DEVICE — SURGICAL PROCEDURE PACK BASIN MAJ SET CUST NO CAUT

## (undated) DEVICE — Z DISCONTINUED PER MEDLINE LINE GAS SAMPLING O2/CO2 LNG AD 13 FT NSL W/ TBNG FILTERLINE

## (undated) DEVICE — SYR 3ML LL TIP 1/10ML GRAD --

## (undated) DEVICE — DRAPE,REIN 53X77,STERILE: Brand: MEDLINE

## (undated) DEVICE — CATH IV AUTOGRD BC PNK 20GA 25 -- INSYTE

## (undated) DEVICE — NEEDLE HYPO 22GA L1.5IN BLK S STL HUB POLYPR SHLD REG BVL

## (undated) DEVICE — YANKAUER,TAPERED BULBOUS TIP,W/O VENT: Brand: MEDLINE

## (undated) DEVICE — SET IV EXTN L7IN 05ML PRIMING STD BOR MAXZERO CONN PINCH

## (undated) DEVICE — SOLIDIFIER MEDC 1200ML -- CONVERT TO 356117